# Patient Record
Sex: MALE | Race: WHITE | Employment: OTHER | URBAN - NONMETROPOLITAN AREA
[De-identification: names, ages, dates, MRNs, and addresses within clinical notes are randomized per-mention and may not be internally consistent; named-entity substitution may affect disease eponyms.]

---

## 2017-01-19 ENCOUNTER — NURSE ONLY (OUTPATIENT)
Dept: LAB | Age: 67
End: 2017-01-19

## 2017-01-19 DIAGNOSIS — E53.8 B12 DEFICIENCY: Primary | ICD-10-CM

## 2017-01-19 PROCEDURE — 96372 THER/PROPH/DIAG INJ SC/IM: CPT | Performed by: SURGERY

## 2017-01-19 RX ADMIN — CYANOCOBALAMIN 1000 MCG: 1000 INJECTION, SOLUTION INTRAMUSCULAR; SUBCUTANEOUS at 16:02

## 2017-01-25 ENCOUNTER — OFFICE VISIT (OUTPATIENT)
Dept: UROLOGY | Age: 67
End: 2017-01-25

## 2017-01-25 VITALS
BODY MASS INDEX: 30.72 KG/M2 | WEIGHT: 231.8 LBS | SYSTOLIC BLOOD PRESSURE: 128 MMHG | DIASTOLIC BLOOD PRESSURE: 80 MMHG | HEIGHT: 73 IN | HEART RATE: 78 BPM

## 2017-01-25 DIAGNOSIS — R97.20 ABNORMAL PSA: Primary | ICD-10-CM

## 2017-01-25 DIAGNOSIS — N40.0 BENIGN NON-NODULAR PROSTATIC HYPERPLASIA WITHOUT LOWER URINARY TRACT SYMPTOMS: ICD-10-CM

## 2017-01-25 PROCEDURE — 3017F COLORECTAL CA SCREEN DOC REV: CPT | Performed by: UROLOGY

## 2017-01-25 PROCEDURE — 4004F PT TOBACCO SCREEN RCVD TLK: CPT | Performed by: UROLOGY

## 2017-01-25 PROCEDURE — 1123F ACP DISCUSS/DSCN MKR DOCD: CPT | Performed by: UROLOGY

## 2017-01-25 PROCEDURE — G8419 CALC BMI OUT NRM PARAM NOF/U: HCPCS | Performed by: UROLOGY

## 2017-01-25 PROCEDURE — G8484 FLU IMMUNIZE NO ADMIN: HCPCS | Performed by: UROLOGY

## 2017-01-25 PROCEDURE — 99213 OFFICE O/P EST LOW 20 MIN: CPT | Performed by: UROLOGY

## 2017-01-25 PROCEDURE — 4040F PNEUMOC VAC/ADMIN/RCVD: CPT | Performed by: UROLOGY

## 2017-01-25 PROCEDURE — G8427 DOCREV CUR MEDS BY ELIG CLIN: HCPCS | Performed by: UROLOGY

## 2017-02-17 ENCOUNTER — NURSE ONLY (OUTPATIENT)
Dept: LAB | Age: 67
End: 2017-02-17

## 2017-02-17 DIAGNOSIS — E53.8 B12 DEFICIENCY: Primary | ICD-10-CM

## 2017-02-17 PROCEDURE — 96372 THER/PROPH/DIAG INJ SC/IM: CPT | Performed by: SURGERY

## 2017-02-17 RX ADMIN — CYANOCOBALAMIN 1000 MCG: 1000 INJECTION, SOLUTION INTRAMUSCULAR; SUBCUTANEOUS at 14:56

## 2017-03-07 ENCOUNTER — HOSPITAL ENCOUNTER (OUTPATIENT)
Age: 67
Setting detail: SPECIMEN
Discharge: HOME | End: 2017-03-07
Payer: MEDICARE

## 2017-03-07 DIAGNOSIS — I10 ESSENTIAL HYPERTENSION: ICD-10-CM

## 2017-03-07 LAB
ABSOLUTE RETIC #: 0.05 M/UL (ref 0.02–0.1)
FOLATE: >20 NG/ML
RETIC %: 0.8 % (ref 0.5–2)
VITAMIN B-12: 827 PG/ML (ref 211–946)

## 2017-03-07 RX ORDER — BENAZEPRIL HYDROCHLORIDE 40 MG/1
40 TABLET, FILM COATED ORAL DAILY
Qty: 90 TABLET | Refills: 3 | Status: SHIPPED | OUTPATIENT
Start: 2017-03-07 | End: 2018-03-01 | Stop reason: SDUPTHER

## 2017-03-14 ENCOUNTER — OFFICE VISIT (OUTPATIENT)
Dept: INTERNAL MEDICINE | Age: 67
End: 2017-03-14
Payer: MEDICARE

## 2017-03-14 VITALS
SYSTOLIC BLOOD PRESSURE: 118 MMHG | RESPIRATION RATE: 16 BRPM | DIASTOLIC BLOOD PRESSURE: 78 MMHG | WEIGHT: 221 LBS | BODY MASS INDEX: 29.93 KG/M2 | HEIGHT: 72 IN | HEART RATE: 96 BPM

## 2017-03-14 DIAGNOSIS — D50.9 IRON DEFICIENCY ANEMIA, UNSPECIFIED IRON DEFICIENCY ANEMIA TYPE: ICD-10-CM

## 2017-03-14 DIAGNOSIS — Z23 NEED FOR PNEUMOCOCCAL VACCINATION: ICD-10-CM

## 2017-03-14 DIAGNOSIS — I10 ESSENTIAL HYPERTENSION: ICD-10-CM

## 2017-03-14 DIAGNOSIS — E78.1 PURE HYPERGLYCERIDEMIA: Primary | ICD-10-CM

## 2017-03-14 DIAGNOSIS — K21.9 GASTROESOPHAGEAL REFLUX DISEASE WITHOUT ESOPHAGITIS: ICD-10-CM

## 2017-03-14 DIAGNOSIS — K92.1 GASTROINTESTINAL HEMORRHAGE WITH MELENA: ICD-10-CM

## 2017-03-14 PROCEDURE — 4040F PNEUMOC VAC/ADMIN/RCVD: CPT | Performed by: INTERNAL MEDICINE

## 2017-03-14 PROCEDURE — 90670 PCV13 VACCINE IM: CPT | Performed by: INTERNAL MEDICINE

## 2017-03-14 PROCEDURE — G8427 DOCREV CUR MEDS BY ELIG CLIN: HCPCS | Performed by: INTERNAL MEDICINE

## 2017-03-14 PROCEDURE — 99214 OFFICE O/P EST MOD 30 MIN: CPT | Performed by: INTERNAL MEDICINE

## 2017-03-14 PROCEDURE — G8484 FLU IMMUNIZE NO ADMIN: HCPCS | Performed by: INTERNAL MEDICINE

## 2017-03-14 PROCEDURE — 4004F PT TOBACCO SCREEN RCVD TLK: CPT | Performed by: INTERNAL MEDICINE

## 2017-03-14 PROCEDURE — 3017F COLORECTAL CA SCREEN DOC REV: CPT | Performed by: INTERNAL MEDICINE

## 2017-03-14 PROCEDURE — G8420 CALC BMI NORM PARAMETERS: HCPCS | Performed by: INTERNAL MEDICINE

## 2017-03-14 PROCEDURE — G0009 ADMIN PNEUMOCOCCAL VACCINE: HCPCS | Performed by: INTERNAL MEDICINE

## 2017-03-14 PROCEDURE — 1123F ACP DISCUSS/DSCN MKR DOCD: CPT | Performed by: INTERNAL MEDICINE

## 2017-03-14 RX ORDER — OMEPRAZOLE 20 MG/1
20 CAPSULE, DELAYED RELEASE ORAL DAILY
Qty: 180 CAPSULE | Refills: 3 | Status: SHIPPED | OUTPATIENT
Start: 2017-03-14 | End: 2018-03-20 | Stop reason: SDUPTHER

## 2017-03-14 RX ORDER — HYDROCHLOROTHIAZIDE 12.5 MG/1
12.5 CAPSULE, GELATIN COATED ORAL DAILY
Qty: 90 CAPSULE | Refills: 3 | Status: SHIPPED | OUTPATIENT
Start: 2017-03-14 | End: 2018-03-20 | Stop reason: SDUPTHER

## 2017-03-14 ASSESSMENT — ENCOUNTER SYMPTOMS
DIARRHEA: 0
HEARTBURN: 1
SHORTNESS OF BREATH: 0
COUGH: 0
VOMITING: 0
ABDOMINAL PAIN: 0
BLOOD IN STOOL: 0
CONSTIPATION: 0
EYE PAIN: 0
NAUSEA: 0
BACK PAIN: 0

## 2017-03-14 ASSESSMENT — PATIENT HEALTH QUESTIONNAIRE - PHQ9
2. FEELING DOWN, DEPRESSED OR HOPELESS: 0
SUM OF ALL RESPONSES TO PHQ9 QUESTIONS 1 & 2: 0
1. LITTLE INTEREST OR PLEASURE IN DOING THINGS: 0
SUM OF ALL RESPONSES TO PHQ QUESTIONS 1-9: 0

## 2017-03-20 ENCOUNTER — NURSE ONLY (OUTPATIENT)
Dept: LAB | Age: 67
End: 2017-03-20
Payer: MEDICARE

## 2017-03-20 DIAGNOSIS — E53.8 B12 DEFICIENCY: Primary | ICD-10-CM

## 2017-03-20 PROCEDURE — 96372 THER/PROPH/DIAG INJ SC/IM: CPT | Performed by: SURGERY

## 2017-03-20 RX ADMIN — CYANOCOBALAMIN 1000 MCG: 1000 INJECTION, SOLUTION INTRAMUSCULAR; SUBCUTANEOUS at 08:33

## 2017-03-21 ENCOUNTER — OFFICE VISIT (OUTPATIENT)
Dept: SURGERY | Age: 67
End: 2017-03-21
Payer: MEDICARE

## 2017-03-21 VITALS
BODY MASS INDEX: 30.31 KG/M2 | SYSTOLIC BLOOD PRESSURE: 114 MMHG | RESPIRATION RATE: 16 BRPM | TEMPERATURE: 97.4 F | HEIGHT: 72 IN | WEIGHT: 223.8 LBS | DIASTOLIC BLOOD PRESSURE: 78 MMHG | HEART RATE: 94 BPM

## 2017-03-21 DIAGNOSIS — D50.0 IRON DEFICIENCY ANEMIA SECONDARY TO BLOOD LOSS (CHRONIC): Primary | ICD-10-CM

## 2017-03-21 DIAGNOSIS — K92.2 CHRONIC GI BLEEDING: ICD-10-CM

## 2017-03-21 PROCEDURE — 1123F ACP DISCUSS/DSCN MKR DOCD: CPT | Performed by: SURGERY

## 2017-03-21 PROCEDURE — 99214 OFFICE O/P EST MOD 30 MIN: CPT | Performed by: SURGERY

## 2017-03-21 PROCEDURE — 3017F COLORECTAL CA SCREEN DOC REV: CPT | Performed by: SURGERY

## 2017-03-21 PROCEDURE — 4040F PNEUMOC VAC/ADMIN/RCVD: CPT | Performed by: SURGERY

## 2017-03-21 PROCEDURE — G8419 CALC BMI OUT NRM PARAM NOF/U: HCPCS | Performed by: SURGERY

## 2017-03-21 PROCEDURE — G8484 FLU IMMUNIZE NO ADMIN: HCPCS | Performed by: SURGERY

## 2017-03-21 PROCEDURE — G8427 DOCREV CUR MEDS BY ELIG CLIN: HCPCS | Performed by: SURGERY

## 2017-03-21 PROCEDURE — 4004F PT TOBACCO SCREEN RCVD TLK: CPT | Performed by: SURGERY

## 2017-04-19 ENCOUNTER — NURSE ONLY (OUTPATIENT)
Dept: LAB | Age: 67
End: 2017-04-19
Payer: MEDICARE

## 2017-04-19 DIAGNOSIS — E53.8 B12 DEFICIENCY: Primary | ICD-10-CM

## 2017-04-19 PROCEDURE — 96372 THER/PROPH/DIAG INJ SC/IM: CPT | Performed by: SURGERY

## 2017-04-19 RX ADMIN — CYANOCOBALAMIN 1000 MCG: 1000 INJECTION, SOLUTION INTRAMUSCULAR; SUBCUTANEOUS at 08:32

## 2017-05-19 ENCOUNTER — NURSE ONLY (OUTPATIENT)
Dept: LAB | Age: 67
End: 2017-05-19
Payer: MEDICARE

## 2017-05-19 DIAGNOSIS — E53.8 B12 DEFICIENCY: Primary | ICD-10-CM

## 2017-05-19 PROCEDURE — 96372 THER/PROPH/DIAG INJ SC/IM: CPT | Performed by: SURGERY

## 2017-05-19 RX ADMIN — CYANOCOBALAMIN 1000 MCG: 1000 INJECTION, SOLUTION INTRAMUSCULAR; SUBCUTANEOUS at 08:35

## 2017-06-19 ENCOUNTER — NURSE ONLY (OUTPATIENT)
Dept: LAB | Age: 67
End: 2017-06-19
Payer: MEDICARE

## 2017-06-19 DIAGNOSIS — E53.8 B12 DEFICIENCY: Primary | ICD-10-CM

## 2017-06-19 PROCEDURE — 96372 THER/PROPH/DIAG INJ SC/IM: CPT | Performed by: INTERNAL MEDICINE

## 2017-06-19 RX ADMIN — CYANOCOBALAMIN 1000 MCG: 1000 INJECTION, SOLUTION INTRAMUSCULAR; SUBCUTANEOUS at 15:31

## 2017-06-20 ENCOUNTER — OFFICE VISIT (OUTPATIENT)
Dept: PRIMARY CARE CLINIC | Age: 67
End: 2017-06-20
Payer: MEDICARE

## 2017-06-20 ENCOUNTER — OFFICE VISIT (OUTPATIENT)
Dept: SURGERY | Age: 67
End: 2017-06-20
Payer: MEDICARE

## 2017-06-20 VITALS
SYSTOLIC BLOOD PRESSURE: 120 MMHG | TEMPERATURE: 97.2 F | BODY MASS INDEX: 30.34 KG/M2 | OXYGEN SATURATION: 99 % | HEIGHT: 72 IN | DIASTOLIC BLOOD PRESSURE: 80 MMHG | HEART RATE: 92 BPM | WEIGHT: 224 LBS

## 2017-06-20 VITALS
BODY MASS INDEX: 29.91 KG/M2 | DIASTOLIC BLOOD PRESSURE: 78 MMHG | SYSTOLIC BLOOD PRESSURE: 118 MMHG | HEART RATE: 92 BPM | TEMPERATURE: 97.5 F | HEIGHT: 72 IN | RESPIRATION RATE: 16 BRPM | WEIGHT: 220.8 LBS

## 2017-06-20 DIAGNOSIS — D50.0 IRON DEFICIENCY ANEMIA SECONDARY TO BLOOD LOSS (CHRONIC): Primary | ICD-10-CM

## 2017-06-20 DIAGNOSIS — H61.23 BILATERAL IMPACTED CERUMEN: Primary | ICD-10-CM

## 2017-06-20 DIAGNOSIS — R19.5 OCCULT GI BLEEDING: ICD-10-CM

## 2017-06-20 DIAGNOSIS — H61.892 IRRITATION OF EXTERNAL EAR CANAL, LEFT: ICD-10-CM

## 2017-06-20 PROCEDURE — G8427 DOCREV CUR MEDS BY ELIG CLIN: HCPCS | Performed by: FAMILY MEDICINE

## 2017-06-20 PROCEDURE — 4004F PT TOBACCO SCREEN RCVD TLK: CPT | Performed by: SURGERY

## 2017-06-20 PROCEDURE — 69210 REMOVE IMPACTED EAR WAX UNI: CPT | Performed by: FAMILY MEDICINE

## 2017-06-20 PROCEDURE — 4040F PNEUMOC VAC/ADMIN/RCVD: CPT | Performed by: SURGERY

## 2017-06-20 PROCEDURE — G8419 CALC BMI OUT NRM PARAM NOF/U: HCPCS | Performed by: SURGERY

## 2017-06-20 PROCEDURE — 4040F PNEUMOC VAC/ADMIN/RCVD: CPT | Performed by: FAMILY MEDICINE

## 2017-06-20 PROCEDURE — G8417 CALC BMI ABV UP PARAM F/U: HCPCS | Performed by: FAMILY MEDICINE

## 2017-06-20 PROCEDURE — 4004F PT TOBACCO SCREEN RCVD TLK: CPT | Performed by: FAMILY MEDICINE

## 2017-06-20 PROCEDURE — 1123F ACP DISCUSS/DSCN MKR DOCD: CPT | Performed by: FAMILY MEDICINE

## 2017-06-20 PROCEDURE — 1123F ACP DISCUSS/DSCN MKR DOCD: CPT | Performed by: SURGERY

## 2017-06-20 PROCEDURE — G8427 DOCREV CUR MEDS BY ELIG CLIN: HCPCS | Performed by: SURGERY

## 2017-06-20 PROCEDURE — 3017F COLORECTAL CA SCREEN DOC REV: CPT | Performed by: FAMILY MEDICINE

## 2017-06-20 PROCEDURE — 99214 OFFICE O/P EST MOD 30 MIN: CPT | Performed by: SURGERY

## 2017-06-20 PROCEDURE — 3017F COLORECTAL CA SCREEN DOC REV: CPT | Performed by: SURGERY

## 2017-06-20 RX ORDER — NEOMYCIN SULFATE, POLYMYXIN B SULFATE AND HYDROCORTISONE 10; 3.5; 1 MG/ML; MG/ML; [USP'U]/ML
3 SUSPENSION/ DROPS AURICULAR (OTIC) 3 TIMES DAILY
Qty: 1 BOTTLE | Refills: 0 | Status: SHIPPED | OUTPATIENT
Start: 2017-06-20 | End: 2017-06-25

## 2017-06-20 ASSESSMENT — ENCOUNTER SYMPTOMS: RHINORRHEA: 0

## 2017-07-19 ENCOUNTER — NURSE ONLY (OUTPATIENT)
Dept: LAB | Age: 67
End: 2017-07-19
Payer: MEDICARE

## 2017-07-19 ENCOUNTER — OFFICE VISIT (OUTPATIENT)
Dept: UROLOGY | Age: 67
End: 2017-07-19
Payer: MEDICARE

## 2017-07-19 VITALS
WEIGHT: 222.2 LBS | SYSTOLIC BLOOD PRESSURE: 122 MMHG | DIASTOLIC BLOOD PRESSURE: 84 MMHG | BODY MASS INDEX: 30.14 KG/M2 | HEART RATE: 92 BPM

## 2017-07-19 DIAGNOSIS — R97.20 ABNORMAL PSA: Primary | ICD-10-CM

## 2017-07-19 DIAGNOSIS — E53.8 B12 DEFICIENCY: Primary | ICD-10-CM

## 2017-07-19 DIAGNOSIS — D51.0 VITAMIN B12 DEFICIENCY ANEMIA DUE TO INTRINSIC FACTOR DEFICIENCY: ICD-10-CM

## 2017-07-19 PROCEDURE — 99213 OFFICE O/P EST LOW 20 MIN: CPT | Performed by: UROLOGY

## 2017-07-19 PROCEDURE — 4040F PNEUMOC VAC/ADMIN/RCVD: CPT | Performed by: UROLOGY

## 2017-07-19 PROCEDURE — 4004F PT TOBACCO SCREEN RCVD TLK: CPT | Performed by: UROLOGY

## 2017-07-19 PROCEDURE — G8427 DOCREV CUR MEDS BY ELIG CLIN: HCPCS | Performed by: UROLOGY

## 2017-07-19 PROCEDURE — 1123F ACP DISCUSS/DSCN MKR DOCD: CPT | Performed by: UROLOGY

## 2017-07-19 PROCEDURE — 96372 THER/PROPH/DIAG INJ SC/IM: CPT | Performed by: SURGERY

## 2017-07-19 PROCEDURE — 3017F COLORECTAL CA SCREEN DOC REV: CPT | Performed by: UROLOGY

## 2017-07-19 PROCEDURE — G8417 CALC BMI ABV UP PARAM F/U: HCPCS | Performed by: UROLOGY

## 2017-07-19 RX ADMIN — CYANOCOBALAMIN 1000 MCG: 1000 INJECTION, SOLUTION INTRAMUSCULAR; SUBCUTANEOUS at 08:35

## 2017-08-18 ENCOUNTER — NURSE ONLY (OUTPATIENT)
Dept: LAB | Age: 67
End: 2017-08-18
Payer: MEDICARE

## 2017-08-18 DIAGNOSIS — E53.8 B12 DEFICIENCY: Primary | ICD-10-CM

## 2017-08-18 DIAGNOSIS — D51.0 VITAMIN B12 DEFICIENCY ANEMIA DUE TO INTRINSIC FACTOR DEFICIENCY: ICD-10-CM

## 2017-08-18 PROCEDURE — 96372 THER/PROPH/DIAG INJ SC/IM: CPT | Performed by: SURGERY

## 2017-08-18 RX ADMIN — CYANOCOBALAMIN 1000 MCG: 1000 INJECTION, SOLUTION INTRAMUSCULAR; SUBCUTANEOUS at 08:33

## 2017-09-12 ENCOUNTER — HOSPITAL ENCOUNTER (OUTPATIENT)
Dept: LAB | Age: 67
Discharge: HOME | End: 2017-09-12
Payer: MEDICARE

## 2017-09-12 DIAGNOSIS — K21.9 GASTROESOPHAGEAL REFLUX DISEASE WITHOUT ESOPHAGITIS: ICD-10-CM

## 2017-09-12 DIAGNOSIS — K92.1 GASTROINTESTINAL HEMORRHAGE WITH MELENA: ICD-10-CM

## 2017-09-12 DIAGNOSIS — D50.9 IRON DEFICIENCY ANEMIA, UNSPECIFIED IRON DEFICIENCY ANEMIA TYPE: ICD-10-CM

## 2017-09-12 DIAGNOSIS — I10 ESSENTIAL HYPERTENSION: ICD-10-CM

## 2017-09-12 LAB
HEMOGLOBIN: 15.9 G/DL (ref 13.5–17.5)
IRON SATURATION: 20 % (ref 20–55)
IRON: 64 UG/DL (ref 59–158)
TOTAL IRON BINDING CAPACITY: 322 UG/DL (ref 250–450)
UNSATURATED IRON BINDING CAPACITY: 258 UG/DL (ref 112–347)

## 2017-09-12 PROCEDURE — 85018 HEMOGLOBIN: CPT

## 2017-09-12 PROCEDURE — 83540 ASSAY OF IRON: CPT

## 2017-09-12 PROCEDURE — 36415 COLL VENOUS BLD VENIPUNCTURE: CPT

## 2017-09-12 PROCEDURE — 83550 IRON BINDING TEST: CPT

## 2017-09-18 ENCOUNTER — NURSE ONLY (OUTPATIENT)
Dept: LAB | Age: 67
End: 2017-09-18
Payer: MEDICARE

## 2017-09-18 DIAGNOSIS — E53.8 B12 DEFICIENCY: Primary | ICD-10-CM

## 2017-09-18 PROCEDURE — 96372 THER/PROPH/DIAG INJ SC/IM: CPT | Performed by: INTERNAL MEDICINE

## 2017-09-18 RX ADMIN — CYANOCOBALAMIN 1000 MCG: 1000 INJECTION, SOLUTION INTRAMUSCULAR; SUBCUTANEOUS at 08:35

## 2017-09-19 ENCOUNTER — OFFICE VISIT (OUTPATIENT)
Dept: INTERNAL MEDICINE | Age: 67
End: 2017-09-19
Payer: MEDICARE

## 2017-09-19 VITALS
BODY MASS INDEX: 30.34 KG/M2 | DIASTOLIC BLOOD PRESSURE: 88 MMHG | SYSTOLIC BLOOD PRESSURE: 138 MMHG | WEIGHT: 224 LBS | HEIGHT: 72 IN | RESPIRATION RATE: 20 BRPM | HEART RATE: 96 BPM

## 2017-09-19 DIAGNOSIS — D50.9 IRON DEFICIENCY ANEMIA, UNSPECIFIED IRON DEFICIENCY ANEMIA TYPE: ICD-10-CM

## 2017-09-19 DIAGNOSIS — E53.8 VITAMIN B12 DEFICIENCY: ICD-10-CM

## 2017-09-19 DIAGNOSIS — E78.1 PURE HYPERGLYCERIDEMIA: Primary | ICD-10-CM

## 2017-09-19 DIAGNOSIS — K21.9 GASTROESOPHAGEAL REFLUX DISEASE WITHOUT ESOPHAGITIS: ICD-10-CM

## 2017-09-19 DIAGNOSIS — I10 ESSENTIAL HYPERTENSION: ICD-10-CM

## 2017-09-19 PROCEDURE — 4004F PT TOBACCO SCREEN RCVD TLK: CPT | Performed by: INTERNAL MEDICINE

## 2017-09-19 PROCEDURE — G8417 CALC BMI ABV UP PARAM F/U: HCPCS | Performed by: INTERNAL MEDICINE

## 2017-09-19 PROCEDURE — 1123F ACP DISCUSS/DSCN MKR DOCD: CPT | Performed by: INTERNAL MEDICINE

## 2017-09-19 PROCEDURE — 99214 OFFICE O/P EST MOD 30 MIN: CPT | Performed by: INTERNAL MEDICINE

## 2017-09-19 PROCEDURE — 3017F COLORECTAL CA SCREEN DOC REV: CPT | Performed by: INTERNAL MEDICINE

## 2017-09-19 PROCEDURE — G8427 DOCREV CUR MEDS BY ELIG CLIN: HCPCS | Performed by: INTERNAL MEDICINE

## 2017-09-19 PROCEDURE — 4040F PNEUMOC VAC/ADMIN/RCVD: CPT | Performed by: INTERNAL MEDICINE

## 2017-09-19 ASSESSMENT — ENCOUNTER SYMPTOMS
HEARTBURN: 1
COUGH: 0
VOMITING: 0
BLOOD IN STOOL: 0
EYE PAIN: 0
BACK PAIN: 0
DIARRHEA: 0
ABDOMINAL PAIN: 0
NAUSEA: 0
SHORTNESS OF BREATH: 0
CONSTIPATION: 0

## 2017-10-17 ENCOUNTER — NURSE ONLY (OUTPATIENT)
Dept: LAB | Age: 67
End: 2017-10-17
Payer: MEDICARE

## 2017-10-17 DIAGNOSIS — E53.8 B12 DEFICIENCY: Primary | ICD-10-CM

## 2017-10-17 PROCEDURE — 96372 THER/PROPH/DIAG INJ SC/IM: CPT | Performed by: SURGERY

## 2017-10-17 RX ADMIN — CYANOCOBALAMIN 1000 MCG: 1000 INJECTION, SOLUTION INTRAMUSCULAR; SUBCUTANEOUS at 08:34

## 2017-11-16 ENCOUNTER — NURSE ONLY (OUTPATIENT)
Dept: LAB | Age: 67
End: 2017-11-16
Payer: MEDICARE

## 2017-11-16 DIAGNOSIS — E53.8 B12 DEFICIENCY: Primary | ICD-10-CM

## 2017-11-16 PROCEDURE — 96372 THER/PROPH/DIAG INJ SC/IM: CPT | Performed by: INTERNAL MEDICINE

## 2017-11-16 RX ADMIN — CYANOCOBALAMIN 1000 MCG: 1000 INJECTION, SOLUTION INTRAMUSCULAR; SUBCUTANEOUS at 13:57

## 2017-12-18 ENCOUNTER — NURSE ONLY (OUTPATIENT)
Dept: LAB | Age: 67
End: 2017-12-18
Payer: MEDICARE

## 2017-12-18 DIAGNOSIS — E53.8 B12 DEFICIENCY: Primary | ICD-10-CM

## 2017-12-18 PROCEDURE — 96372 THER/PROPH/DIAG INJ SC/IM: CPT | Performed by: SURGERY

## 2017-12-18 RX ADMIN — CYANOCOBALAMIN 1000 MCG: 1000 INJECTION, SOLUTION INTRAMUSCULAR; SUBCUTANEOUS at 08:40

## 2017-12-19 PROCEDURE — 82274 ASSAY TEST FOR BLOOD FECAL: CPT

## 2017-12-20 ENCOUNTER — HOSPITAL ENCOUNTER (OUTPATIENT)
Dept: LAB | Age: 67
Setting detail: SPECIMEN
Discharge: HOME | End: 2017-12-20
Payer: MEDICARE

## 2017-12-20 DIAGNOSIS — D50.0 IRON DEFICIENCY ANEMIA DUE TO CHRONIC BLOOD LOSS: ICD-10-CM

## 2017-12-20 DIAGNOSIS — R19.5 OCCULT GI BLEEDING: ICD-10-CM

## 2017-12-20 DIAGNOSIS — R97.20 ABNORMAL PSA: ICD-10-CM

## 2017-12-20 LAB
ABSOLUTE EOS #: 0.1 K/UL (ref 0–0.4)
ABSOLUTE IMMATURE GRANULOCYTE: ABNORMAL K/UL (ref 0–0.3)
ABSOLUTE LYMPH #: 1.3 K/UL (ref 1–4.8)
ABSOLUTE MONO #: 0.5 K/UL (ref 0.1–1.2)
BASOPHILS # BLD: 1 % (ref 0–2)
BASOPHILS ABSOLUTE: 0 K/UL (ref 0–0.2)
DATE, STOOL #1: NORMAL
DATE, STOOL #2: NORMAL
DATE, STOOL #3: NORMAL
DIFFERENTIAL TYPE: ABNORMAL
EOSINOPHILS RELATIVE PERCENT: 1 % (ref 1–8)
HCT VFR BLD CALC: 48.4 % (ref 41–53)
HEMOCCULT SP1 STL QL: NEGATIVE
HEMOCCULT SP2 STL QL: NORMAL
HEMOCCULT SP3 STL QL: NORMAL
HEMOGLOBIN: 16.6 G/DL (ref 13.5–17.5)
IMMATURE GRANULOCYTES: ABNORMAL %
IRON: 61 UG/DL (ref 59–158)
LYMPHOCYTES # BLD: 17 % (ref 15–43)
MCH RBC QN AUTO: 29.2 PG (ref 26–34)
MCHC RBC AUTO-ENTMCNC: 34.2 G/DL (ref 31–37)
MCV RBC AUTO: 85.3 FL (ref 80–100)
MONOCYTES # BLD: 6 % (ref 6–14)
PDW BLD-RTO: 14.9 % (ref 11–14.5)
PLATELET # BLD: 178 K/UL (ref 140–450)
PLATELET ESTIMATE: ABNORMAL
PMV BLD AUTO: 9 FL (ref 6–12)
RBC # BLD: 5.68 M/UL (ref 4.5–5.9)
RBC # BLD: ABNORMAL 10*6/UL
SEG NEUTROPHILS: 75 % (ref 44–74)
SEGMENTED NEUTROPHILS ABSOLUTE COUNT: 5.6 K/UL (ref 1.8–7.7)
TIME, STOOL #1: 800
TIME, STOOL #2: NORMAL
TIME, STOOL #3: NORMAL
WBC # BLD: 7.6 K/UL (ref 3.5–11)
WBC # BLD: ABNORMAL 10*3/UL

## 2017-12-20 PROCEDURE — 83540 ASSAY OF IRON: CPT

## 2017-12-20 PROCEDURE — 36415 COLL VENOUS BLD VENIPUNCTURE: CPT

## 2017-12-20 PROCEDURE — 85025 COMPLETE CBC W/AUTO DIFF WBC: CPT

## 2017-12-20 PROCEDURE — 84154 ASSAY OF PSA FREE: CPT

## 2017-12-21 LAB
PROSTATE SPECIFIC ANTIGEN FREE: 1.5 UG/L
PROSTATE SPECIFIC ANTIGEN PERCENT FREE: 25.4 %
PROSTATE SPECIFIC ANTIGEN: 5.9 UG/L (ref 0–4)

## 2017-12-29 ENCOUNTER — OFFICE VISIT (OUTPATIENT)
Dept: SURGERY | Age: 67
End: 2017-12-29
Payer: MEDICARE

## 2017-12-29 VITALS
WEIGHT: 223 LBS | SYSTOLIC BLOOD PRESSURE: 124 MMHG | RESPIRATION RATE: 16 BRPM | BODY MASS INDEX: 30.2 KG/M2 | DIASTOLIC BLOOD PRESSURE: 88 MMHG | TEMPERATURE: 97.3 F | HEIGHT: 72 IN | HEART RATE: 92 BPM

## 2017-12-29 DIAGNOSIS — D50.0 IRON DEFICIENCY ANEMIA DUE TO CHRONIC BLOOD LOSS: Primary | ICD-10-CM

## 2017-12-29 PROCEDURE — 4004F PT TOBACCO SCREEN RCVD TLK: CPT | Performed by: SURGERY

## 2017-12-29 PROCEDURE — 1123F ACP DISCUSS/DSCN MKR DOCD: CPT | Performed by: SURGERY

## 2017-12-29 PROCEDURE — 3017F COLORECTAL CA SCREEN DOC REV: CPT | Performed by: SURGERY

## 2017-12-29 PROCEDURE — G8484 FLU IMMUNIZE NO ADMIN: HCPCS | Performed by: SURGERY

## 2017-12-29 PROCEDURE — 99214 OFFICE O/P EST MOD 30 MIN: CPT | Performed by: SURGERY

## 2017-12-29 PROCEDURE — 4040F PNEUMOC VAC/ADMIN/RCVD: CPT | Performed by: SURGERY

## 2017-12-29 PROCEDURE — G8417 CALC BMI ABV UP PARAM F/U: HCPCS | Performed by: SURGERY

## 2017-12-29 PROCEDURE — G8427 DOCREV CUR MEDS BY ELIG CLIN: HCPCS | Performed by: SURGERY

## 2017-12-29 NOTE — PROGRESS NOTES
This patient is seen on follow-up for iron deficiency anemia secondary to chronic GI bleeding most likely aggravated by taking vitamin C niacin and fish oil. He stopped taking niacin fish oil and vitamin C and he was placed on daily iron supplement. He is asymptomatic at the present time. His most recent hemoglobin is 16 and his most recent iron blood level is 61 both are within normal limits although the iron level is in the lower normal limit. He is clinically asymptomatic at the present time has no complaints and the has normal vital signs. He is advised to continue his iron supplement for 6 more months. He is to repeat his CBC and differential and iron level after 6 months  He is to return after 6 months.

## 2018-01-17 ENCOUNTER — OFFICE VISIT (OUTPATIENT)
Dept: UROLOGY | Age: 68
End: 2018-01-17
Payer: MEDICARE

## 2018-01-17 ENCOUNTER — NURSE ONLY (OUTPATIENT)
Dept: LAB | Age: 68
End: 2018-01-17
Payer: MEDICARE

## 2018-01-17 VITALS
HEIGHT: 73 IN | WEIGHT: 222.2 LBS | BODY MASS INDEX: 29.45 KG/M2 | HEART RATE: 84 BPM | DIASTOLIC BLOOD PRESSURE: 84 MMHG | SYSTOLIC BLOOD PRESSURE: 138 MMHG

## 2018-01-17 DIAGNOSIS — R35.1 NOCTURIA: ICD-10-CM

## 2018-01-17 DIAGNOSIS — E53.8 B12 DEFICIENCY: Primary | ICD-10-CM

## 2018-01-17 DIAGNOSIS — N40.0 BENIGN NON-NODULAR PROSTATIC HYPERPLASIA WITHOUT LOWER URINARY TRACT SYMPTOMS: ICD-10-CM

## 2018-01-17 DIAGNOSIS — N40.0 ENLARGED PROSTATE: ICD-10-CM

## 2018-01-17 DIAGNOSIS — R97.20 ABNORMAL PSA: Primary | ICD-10-CM

## 2018-01-17 PROCEDURE — G8417 CALC BMI ABV UP PARAM F/U: HCPCS | Performed by: UROLOGY

## 2018-01-17 PROCEDURE — G8427 DOCREV CUR MEDS BY ELIG CLIN: HCPCS | Performed by: UROLOGY

## 2018-01-17 PROCEDURE — G8484 FLU IMMUNIZE NO ADMIN: HCPCS | Performed by: UROLOGY

## 2018-01-17 PROCEDURE — 4040F PNEUMOC VAC/ADMIN/RCVD: CPT | Performed by: UROLOGY

## 2018-01-17 PROCEDURE — 96372 THER/PROPH/DIAG INJ SC/IM: CPT | Performed by: SURGERY

## 2018-01-17 PROCEDURE — 4004F PT TOBACCO SCREEN RCVD TLK: CPT | Performed by: UROLOGY

## 2018-01-17 PROCEDURE — 99213 OFFICE O/P EST LOW 20 MIN: CPT | Performed by: UROLOGY

## 2018-01-17 PROCEDURE — 3017F COLORECTAL CA SCREEN DOC REV: CPT | Performed by: UROLOGY

## 2018-01-17 PROCEDURE — 1123F ACP DISCUSS/DSCN MKR DOCD: CPT | Performed by: UROLOGY

## 2018-01-17 RX ADMIN — CYANOCOBALAMIN 1000 MCG: 1000 INJECTION, SOLUTION INTRAMUSCULAR; SUBCUTANEOUS at 12:17

## 2018-01-17 NOTE — PROGRESS NOTES
Surendra Mejia MD   Urology Clinic Progress Note      Patient:  Shania Winchester  YOB: 1950  Date: 1/17/2018    HISTORY OF PRESENT ILLNESS:   The patient is a 79 y.o. male who presents today for follow-up for the following problem(s): elevated PSA  Overall the problem(s) : are improving. Associated Symptoms: No dysuria, gross hematuria. Pain Severity: Pain Score:   0 - No pain    Summary of old records: 7.92 on 7-18-17/last psa 5.68 on 1-17-17/prostate biopsy 2012 and 2014/ both benign. Prostate gland measures 80.7 mL in volume     Additional History:  Strong stream, empties bladder  Nocturia 1x  On diuretic    Last several PSA's:  Lab Results   Component Value Date    PSA 5.9 (H) 12/20/2017    PSA 7.92 (H) 07/18/2017    PSA 5.68 (H) 01/17/2017       Last total testosterone:  No results found for: TESTOSTERONE    Urinalysis today:  No results found for this visit on 01/17/18. Last BUN and creatinine:  Lab Results   Component Value Date    BUN 22 03/07/2017     Lab Results   Component Value Date    CREATININE 1.16 03/07/2017       Imaging Reviewed during this Office Visit:   (results were independently reviewed by physician and radiology report verified)    PAST MEDICAL, FAMILY AND SOCIAL HISTORY UPDATE:  Past Medical History:   Diagnosis Date    BPH with elevated PSA     post biopsy    Elevated Gina-Barr virus antibody titer     Elevated fasting glucose     History of chronic fatigue syndrome     History of GI bleed     Hyperlipidemia     (triglycerides)    Hypertension     Internal hemorrhoids      Past Surgical History:   Procedure Laterality Date    COLONOSCOPY  2000    internal hemorrhoids    COLONOSCOPY  02/02/2015    polypx1, repeat 5yrs due to family hx & hx polyps- brannon    COLONOSCOPY  12/09/2015    polyp X2  int. Hemorrhoids  partial prolapse of ileocecal valve    COLONOSCOPY  11/2016    FOOT SURGERY Bilateral 1958    to correct flat arches (age 9)   [address] BIOPSY Bilateral 12/04/2012    benign    PROSTATE BIOPSY Bilateral 07/2014    benign    UPPER GASTROINTESTINAL ENDOSCOPY  12/8/15    Normal upper GI tract, no evidence of blood in upper GI tract, mild distal esophagitis    UPPER GASTROINTESTINAL ENDOSCOPY  11/2016     Family History   Problem Relation Age of Onset    Coronary Art Dis Other     Hypertension Other     Stroke Other     Diabetes Other     Cancer Other      Outpatient Prescriptions Marked as Taking for the 1/17/18 encounter (Office Visit) with Kel Hill MD   Medication Sig Dispense Refill    omeprazole (PRILOSEC) 20 MG delayed release capsule Take 1 capsule by mouth daily 180 capsule 3    hydrochlorothiazide (MICROZIDE) 12.5 MG capsule Take 1 capsule by mouth daily 90 capsule 3    benazepril (LOTENSIN) 40 MG tablet Take 1 tablet by mouth daily 90 tablet 3    ferrous sulfate 325 (65 FE) MG tablet Take 325 mg by mouth 2 times daily       NONFORMULARY Take  by mouth daily. Centrum Men's multivitamin. Review of patient's allergies indicates no known allergies. History   Smoking Status    Light Tobacco Smoker    Types: Pipe   Smokeless Tobacco    Never Used     Comment: Rare pipe smoker. No inhalation. Has never smoked cigarettes. History   Alcohol Use    0.0 oz/week     Comment: Rare. REVIEW OF SYSTEMS:  Constitutional: negative  Eyes: negative  Respiratory: negative  Cardiovascular: negative  Gastrointestinal: negative  Musculoskeletal: negative  Genitourinary: negative except for what is in HPI  Skin: negative   Neurological: negative  Hematological/Lymphatic: negative  Psychological: negative    Physical Exam:      Vitals:    01/17/18 1315   BP: 138/84   Pulse: 84     Patient is a 79 y.o. male in no acute distress and alert and oriented to person, place and time. NAD, A/o  Non labored respiration  Normal peripheral pulses  Skin- warm and dry  Psych- normal mood and affect      Assessment and Plan      1.

## 2018-01-24 ENCOUNTER — HOSPITAL ENCOUNTER (OUTPATIENT)
Dept: MRI IMAGING | Age: 68
Discharge: HOME | End: 2018-01-24
Payer: MEDICARE

## 2018-01-24 DIAGNOSIS — R97.20 ABNORMAL PSA: ICD-10-CM

## 2018-01-24 LAB
BUN BLDV-MCNC: 23 MG/DL (ref 8–23)
CREAT SERPL-MCNC: 0.99 MG/DL (ref 0.7–1.2)
GFR AFRICAN AMERICAN: >60 ML/MIN
GFR NON-AFRICAN AMERICAN: >60 ML/MIN
GFR SERPL CREATININE-BSD FRML MDRD: NORMAL ML/MIN/{1.73_M2}
GFR SERPL CREATININE-BSD FRML MDRD: NORMAL ML/MIN/{1.73_M2}

## 2018-01-24 PROCEDURE — A9579 GAD-BASE MR CONTRAST NOS,1ML: HCPCS | Performed by: UROLOGY

## 2018-01-24 PROCEDURE — 2580000003 HC RX 258: Performed by: UROLOGY

## 2018-01-24 PROCEDURE — 72197 MRI PELVIS W/O & W/DYE: CPT

## 2018-01-24 PROCEDURE — 36415 COLL VENOUS BLD VENIPUNCTURE: CPT

## 2018-01-24 PROCEDURE — 6360000004 HC RX CONTRAST MEDICATION: Performed by: UROLOGY

## 2018-01-24 PROCEDURE — 82565 ASSAY OF CREATININE: CPT

## 2018-01-24 PROCEDURE — 84520 ASSAY OF UREA NITROGEN: CPT

## 2018-01-24 RX ORDER — 0.9 % SODIUM CHLORIDE 0.9 %
50 INTRAVENOUS SOLUTION INTRAVENOUS ONCE
Status: COMPLETED | OUTPATIENT
Start: 2018-01-24 | End: 2018-01-24

## 2018-01-24 RX ADMIN — GADOTERIDOL 20 ML: 279.3 INJECTION, SOLUTION INTRAVENOUS at 17:50

## 2018-01-24 RX ADMIN — SODIUM CHLORIDE 50 ML: 9 INJECTION, SOLUTION INTRAVENOUS at 17:50

## 2018-02-07 ENCOUNTER — OFFICE VISIT (OUTPATIENT)
Dept: UROLOGY | Age: 68
End: 2018-02-07
Payer: MEDICARE

## 2018-02-07 VITALS
HEIGHT: 73 IN | DIASTOLIC BLOOD PRESSURE: 90 MMHG | BODY MASS INDEX: 29.55 KG/M2 | WEIGHT: 223 LBS | SYSTOLIC BLOOD PRESSURE: 130 MMHG | HEART RATE: 92 BPM

## 2018-02-07 DIAGNOSIS — R35.1 NOCTURIA: ICD-10-CM

## 2018-02-07 DIAGNOSIS — R97.20 ABNORMAL PSA: Primary | ICD-10-CM

## 2018-02-07 DIAGNOSIS — N40.0 ENLARGED PROSTATE: ICD-10-CM

## 2018-02-07 DIAGNOSIS — N40.0 BENIGN NON-NODULAR PROSTATIC HYPERPLASIA WITHOUT LOWER URINARY TRACT SYMPTOMS: ICD-10-CM

## 2018-02-07 PROCEDURE — G8417 CALC BMI ABV UP PARAM F/U: HCPCS | Performed by: UROLOGY

## 2018-02-07 PROCEDURE — 4040F PNEUMOC VAC/ADMIN/RCVD: CPT | Performed by: UROLOGY

## 2018-02-07 PROCEDURE — G8427 DOCREV CUR MEDS BY ELIG CLIN: HCPCS | Performed by: UROLOGY

## 2018-02-07 PROCEDURE — 3017F COLORECTAL CA SCREEN DOC REV: CPT | Performed by: UROLOGY

## 2018-02-07 PROCEDURE — G8484 FLU IMMUNIZE NO ADMIN: HCPCS | Performed by: UROLOGY

## 2018-02-07 PROCEDURE — 1123F ACP DISCUSS/DSCN MKR DOCD: CPT | Performed by: UROLOGY

## 2018-02-07 PROCEDURE — 99213 OFFICE O/P EST LOW 20 MIN: CPT | Performed by: UROLOGY

## 2018-02-07 PROCEDURE — 4004F PT TOBACCO SCREEN RCVD TLK: CPT | Performed by: UROLOGY

## 2018-02-07 NOTE — PROGRESS NOTES
Bilateral 12/04/2012    benign    PROSTATE BIOPSY Bilateral 07/2014    benign    UPPER GASTROINTESTINAL ENDOSCOPY  12/8/15    Normal upper GI tract, no evidence of blood in upper GI tract, mild distal esophagitis    UPPER GASTROINTESTINAL ENDOSCOPY  11/2016     Family History   Problem Relation Age of Onset    Coronary Art Dis Other     Hypertension Other     Stroke Other     Diabetes Other     Cancer Other      Outpatient Prescriptions Marked as Taking for the 2/7/18 encounter (Office Visit) with Lane Fernandez MD   Medication Sig Dispense Refill    omeprazole (PRILOSEC) 20 MG delayed release capsule Take 1 capsule by mouth daily 180 capsule 3    hydrochlorothiazide (MICROZIDE) 12.5 MG capsule Take 1 capsule by mouth daily 90 capsule 3    benazepril (LOTENSIN) 40 MG tablet Take 1 tablet by mouth daily 90 tablet 3    ferrous sulfate 325 (65 FE) MG tablet Take 325 mg by mouth 2 times daily       NONFORMULARY Take  by mouth daily. Centrum Men's multivitamin. Review of patient's allergies indicates no known allergies. History   Smoking Status    Light Tobacco Smoker    Types: Pipe   Smokeless Tobacco    Never Used     Comment: Rare pipe smoker. No inhalation. Has never smoked cigarettes. History   Alcohol Use    0.0 oz/week     Comment: Rare. REVIEW OF SYSTEMS:  Constitutional: negative  Eyes: negative  Respiratory: negative  Cardiovascular: negative  Gastrointestinal: negative  Musculoskeletal: negative  Genitourinary: negative except for what is in HPI  Skin: negative   Neurological: negative  Hematological/Lymphatic: negative  Psychological: negative    Physical Exam:      Vitals:    02/07/18 1423   BP: (!) 130/90   Pulse: 92     Patient is a 79 y.o. male in no acute distress and alert and oriented to person, place and time. NAD, A/o  Non labored respiration  Normal peripheral pulses  Skin- warm and dry  Psych- normal mood and affect      Assessment and Plan      1.  Abnormal

## 2018-02-16 ENCOUNTER — NURSE ONLY (OUTPATIENT)
Dept: LAB | Age: 68
End: 2018-02-16
Payer: MEDICARE

## 2018-02-16 DIAGNOSIS — E53.8 B12 DEFICIENCY: Primary | ICD-10-CM

## 2018-02-16 PROCEDURE — 96372 THER/PROPH/DIAG INJ SC/IM: CPT | Performed by: INTERNAL MEDICINE

## 2018-02-16 RX ADMIN — CYANOCOBALAMIN 1000 MCG: 1000 INJECTION, SOLUTION INTRAMUSCULAR; SUBCUTANEOUS at 14:30

## 2018-03-01 DIAGNOSIS — I10 ESSENTIAL HYPERTENSION: ICD-10-CM

## 2018-03-01 RX ORDER — BENAZEPRIL HYDROCHLORIDE 40 MG/1
40 TABLET, FILM COATED ORAL DAILY
Qty: 90 TABLET | Refills: 3 | Status: SHIPPED | OUTPATIENT
Start: 2018-03-01 | End: 2019-02-21 | Stop reason: SDUPTHER

## 2018-03-13 ENCOUNTER — HOSPITAL ENCOUNTER (OUTPATIENT)
Dept: LAB | Age: 68
Setting detail: SPECIMEN
Discharge: HOME | End: 2018-03-13
Payer: MEDICARE

## 2018-03-13 DIAGNOSIS — E78.1 PURE HYPERGLYCERIDEMIA: ICD-10-CM

## 2018-03-13 DIAGNOSIS — I10 ESSENTIAL HYPERTENSION: ICD-10-CM

## 2018-03-13 DIAGNOSIS — E53.8 VITAMIN B12 DEFICIENCY: ICD-10-CM

## 2018-03-13 DIAGNOSIS — D50.9 IRON DEFICIENCY ANEMIA, UNSPECIFIED IRON DEFICIENCY ANEMIA TYPE: ICD-10-CM

## 2018-03-13 LAB
ALBUMIN SERPL-MCNC: 4.4 G/DL (ref 3.5–5.2)
ALBUMIN/GLOBULIN RATIO: 1.4 (ref 1–2.5)
ALP BLD-CCNC: 75 U/L (ref 40–129)
ALT SERPL-CCNC: 47 U/L (ref 5–41)
ANION GAP SERPL CALCULATED.3IONS-SCNC: 14 MMOL/L (ref 9–17)
AST SERPL-CCNC: 32 U/L
BILIRUB SERPL-MCNC: 1.21 MG/DL (ref 0.3–1.2)
BUN BLDV-MCNC: 21 MG/DL (ref 8–23)
BUN/CREAT BLD: 20 (ref 9–20)
CALCIUM SERPL-MCNC: 9.5 MG/DL (ref 8.6–10.4)
CHLORIDE BLD-SCNC: 98 MMOL/L (ref 98–107)
CHOLESTEROL/HDL RATIO: 8.5
CHOLESTEROL: 222 MG/DL
CO2: 26 MMOL/L (ref 20–31)
CREAT SERPL-MCNC: 1.06 MG/DL (ref 0.7–1.2)
GFR AFRICAN AMERICAN: >60 ML/MIN
GFR NON-AFRICAN AMERICAN: >60 ML/MIN
GFR SERPL CREATININE-BSD FRML MDRD: ABNORMAL ML/MIN/{1.73_M2}
GFR SERPL CREATININE-BSD FRML MDRD: ABNORMAL ML/MIN/{1.73_M2}
GLUCOSE BLD-MCNC: 106 MG/DL (ref 70–99)
HDLC SERPL-MCNC: 26 MG/DL
HEMOGLOBIN: 17.1 G/DL (ref 13.5–17.5)
IRON SATURATION: 30 % (ref 20–55)
IRON: 107 UG/DL (ref 59–158)
LDL CHOLESTEROL: 136 MG/DL (ref 0–130)
POTASSIUM SERPL-SCNC: 4 MMOL/L (ref 3.7–5.3)
SODIUM BLD-SCNC: 138 MMOL/L (ref 135–144)
TOTAL IRON BINDING CAPACITY: 355 UG/DL (ref 250–450)
TOTAL PROTEIN: 7.6 G/DL (ref 6.4–8.3)
TRIGL SERPL-MCNC: 300 MG/DL
UNSATURATED IRON BINDING CAPACITY: 248 UG/DL (ref 112–347)
VITAMIN B-12: 699 PG/ML (ref 232–1245)
VLDLC SERPL CALC-MCNC: ABNORMAL MG/DL (ref 1–30)

## 2018-03-13 PROCEDURE — 82607 VITAMIN B-12: CPT

## 2018-03-13 PROCEDURE — 85018 HEMOGLOBIN: CPT

## 2018-03-13 PROCEDURE — 36415 COLL VENOUS BLD VENIPUNCTURE: CPT

## 2018-03-13 PROCEDURE — 83540 ASSAY OF IRON: CPT

## 2018-03-13 PROCEDURE — 83550 IRON BINDING TEST: CPT

## 2018-03-13 PROCEDURE — 80061 LIPID PANEL: CPT

## 2018-03-13 PROCEDURE — 80053 COMPREHEN METABOLIC PANEL: CPT

## 2018-03-19 ENCOUNTER — NURSE ONLY (OUTPATIENT)
Dept: LAB | Age: 68
End: 2018-03-19
Payer: MEDICARE

## 2018-03-19 DIAGNOSIS — E53.8 VITAMIN B12 DEFICIENCY: ICD-10-CM

## 2018-03-19 DIAGNOSIS — E53.8 B12 DEFICIENCY: Primary | ICD-10-CM

## 2018-03-19 PROCEDURE — 96372 THER/PROPH/DIAG INJ SC/IM: CPT | Performed by: SURGERY

## 2018-03-19 RX ADMIN — CYANOCOBALAMIN 1000 MCG: 1000 INJECTION, SOLUTION INTRAMUSCULAR; SUBCUTANEOUS at 08:39

## 2018-03-20 ENCOUNTER — OFFICE VISIT (OUTPATIENT)
Dept: INTERNAL MEDICINE | Age: 68
End: 2018-03-20
Payer: MEDICARE

## 2018-03-20 VITALS
SYSTOLIC BLOOD PRESSURE: 124 MMHG | DIASTOLIC BLOOD PRESSURE: 86 MMHG | RESPIRATION RATE: 16 BRPM | HEIGHT: 72 IN | WEIGHT: 222 LBS | HEART RATE: 88 BPM | BODY MASS INDEX: 30.07 KG/M2

## 2018-03-20 DIAGNOSIS — K21.9 GASTROESOPHAGEAL REFLUX DISEASE WITHOUT ESOPHAGITIS: ICD-10-CM

## 2018-03-20 DIAGNOSIS — I10 ESSENTIAL HYPERTENSION: Primary | ICD-10-CM

## 2018-03-20 DIAGNOSIS — K92.1 GASTROINTESTINAL HEMORRHAGE WITH MELENA: ICD-10-CM

## 2018-03-20 DIAGNOSIS — D50.9 IRON DEFICIENCY ANEMIA, UNSPECIFIED IRON DEFICIENCY ANEMIA TYPE: ICD-10-CM

## 2018-03-20 DIAGNOSIS — E78.1 PURE HYPERGLYCERIDEMIA: ICD-10-CM

## 2018-03-20 PROCEDURE — 99214 OFFICE O/P EST MOD 30 MIN: CPT | Performed by: INTERNAL MEDICINE

## 2018-03-20 PROCEDURE — 3017F COLORECTAL CA SCREEN DOC REV: CPT | Performed by: INTERNAL MEDICINE

## 2018-03-20 PROCEDURE — 4004F PT TOBACCO SCREEN RCVD TLK: CPT | Performed by: INTERNAL MEDICINE

## 2018-03-20 PROCEDURE — G8484 FLU IMMUNIZE NO ADMIN: HCPCS | Performed by: INTERNAL MEDICINE

## 2018-03-20 PROCEDURE — G8427 DOCREV CUR MEDS BY ELIG CLIN: HCPCS | Performed by: INTERNAL MEDICINE

## 2018-03-20 PROCEDURE — 1123F ACP DISCUSS/DSCN MKR DOCD: CPT | Performed by: INTERNAL MEDICINE

## 2018-03-20 PROCEDURE — 4040F PNEUMOC VAC/ADMIN/RCVD: CPT | Performed by: INTERNAL MEDICINE

## 2018-03-20 PROCEDURE — G8417 CALC BMI ABV UP PARAM F/U: HCPCS | Performed by: INTERNAL MEDICINE

## 2018-03-20 RX ORDER — HYDROCHLOROTHIAZIDE 12.5 MG/1
12.5 CAPSULE, GELATIN COATED ORAL DAILY
Qty: 90 CAPSULE | Refills: 3 | Status: SHIPPED | OUTPATIENT
Start: 2018-03-20 | End: 2019-03-19 | Stop reason: SDUPTHER

## 2018-03-20 RX ORDER — OMEPRAZOLE 20 MG/1
20 CAPSULE, DELAYED RELEASE ORAL DAILY
Qty: 180 CAPSULE | Refills: 3 | Status: SHIPPED | OUTPATIENT
Start: 2018-03-20 | End: 2019-03-19 | Stop reason: SDUPTHER

## 2018-03-20 ASSESSMENT — ENCOUNTER SYMPTOMS
HEARTBURN: 1
COUGH: 0
BLOOD IN STOOL: 0
SHORTNESS OF BREATH: 0
BACK PAIN: 0
VOMITING: 0
EYE PAIN: 0
CONSTIPATION: 0
ABDOMINAL PAIN: 0
NAUSEA: 0
DIARRHEA: 0

## 2018-03-20 ASSESSMENT — PATIENT HEALTH QUESTIONNAIRE - PHQ9
SUM OF ALL RESPONSES TO PHQ QUESTIONS 1-9: 0
2. FEELING DOWN, DEPRESSED OR HOPELESS: 0
SUM OF ALL RESPONSES TO PHQ9 QUESTIONS 1 & 2: 0
1. LITTLE INTEREST OR PLEASURE IN DOING THINGS: 0

## 2018-03-20 NOTE — PROGRESS NOTES
Chronic Disease Visit Information    BP Readings from Last 3 Encounters:   03/20/18 (!) 138/92   02/07/18 (!) 130/90   01/17/18 138/84          LDL Cholesterol (mg/dL)   Date Value   03/13/2018 136 (H)     HDL (mg/dL)   Date Value   03/13/2018 26 (L)     BUN (mg/dL)   Date Value   03/13/2018 21     CREATININE (mg/dL)   Date Value   03/13/2018 1.06     Glucose (mg/dL)   Date Value   03/13/2018 106 (H)            Have you changed or started any medications since your last visit including any over-the-counter medicines, vitamins, or herbal medicines? no   Are you having any side effects from any of your medications? -  no  Have you stopped taking any of your medications? Is so, why? -  no    Have you seen any other physician or provider since your last visit? Yes - Records Obtained  Have you had any other diagnostic tests since your last visit? Yes - Records Obtained  Have you been seen in the emergency room and/or had an admission to a hospital since we last saw you? No  Have you had your annual diabetic retinal (eye) exam? NA  Have you had your routine dental cleaning in the past 6 months? yes - every 6 months    Have you activated your [employer] account? If not, what are your barriers?  No: declines     Patient Care Team:  Amanda Yi MD as PCP - General (Internal Medicine)  Amanda Yi MD as PCP - S Attributed Provider         Medical History Review  Past Medical, Family, and Social History reviewed and does contribute to the patient presenting condition    Health Maintenance   Topic Date Due    AAA screen  1950    Diabetes screen  09/04/1990    Shingles Vaccine (1 of 2 - 2 Dose Series) 09/04/2000    Hepatitis C screen  09/19/2018 (Originally 1950)    DTaP/Tdap/Td vaccine (1 - Tdap) 03/20/2019 (Originally 9/29/2007)    Flu vaccine (1) 03/20/2019 (Originally 9/1/2017)    Pneumococcal low/med risk (2 of 2 - PPSV23) 03/20/2020 (Originally 3/14/2018)    Colon Cancer Screen FIT/FOBT

## 2018-03-20 NOTE — PATIENT INSTRUCTIONS
calluses or corns, you can lose your balance and fall. · Talk to your doctor if you have numbness in your feet. Preventing falls at home  · Remove raised doorway thresholds, throw rugs, and clutter. Repair loose carpet or raised areas in the floor. · Move furniture and electrical cords to keep them out of walking paths. · Use nonskid floor wax, and wipe up spills right away, especially on ceramic tile floors. · If you use a walker or cane, put rubber tips on it. If you use crutches, clean the bottoms of them regularly with an abrasive pad, such as steel wool. · Keep your house well lit, especially [place], and outside walkways. Use night-lights in areas such as hallways and bathrooms. Add extra light switches or use remote switches (such as switches that go on or off when you clap your hands) to make it easier to turn lights on if you have to get up during the night. · Install sturdy handrails on stairways. · Move items in your cabinets so that the things you use a lot are on the lower shelves (about waist level). · Keep a cordless phone and a flashlight with new batteries by your bed. If possible, put a phone in each of the main rooms of your house, or carry a cell phone in case you fall and cannot reach a phone. Or, you can wear a device around your neck or wrist. You push a button that sends a signal for help. · Wear low-heeled shoes that fit well and give your feet good support. Use footwear with nonskid soles. Check the heels and soles of your shoes for wear. Repair or replace worn heels or soles. · Do not wear socks without shoes on wood floors. · Walk on the grass when the sidewalks are slippery. If you live in an area that gets snow and ice in the winter, sprinkle salt on slippery steps and sidewalks. Preventing falls in the bath  · Install grab bars and nonskid mats inside and outside your shower or tub and near the toilet and sinks. · Use shower chairs and bath benches.   · Use a the medicines you take. How can you care for yourself at home? Look at what you eat  · Keep a food diary for a week or two and record everything you eat or drink. Track the number of servings you eat from each food group. · For a balanced diet every day, eat a variety of:  ¨ 6 or more ounce-equivalents of grains, such as cereals, breads, crackers, rice, or pasta, every day. An ounce-equivalent is 1 slice of bread, 1 cup of ready-to-eat cereal, or ½ cup of cooked rice, cooked pasta, or cooked cereal.  ¨ 2½ cups of vegetables, especially:  § Dark-green vegetables such as broccoli and spinach. § Orange vegetables such as carrots and sweet potatoes. § Dry beans (such as blake and kidney beans) and peas (such as lentils). ¨ 2 cups of fresh, frozen, or canned fruit. A small apple or 1 banana or orange equals 1 cup. ¨ 3 cups of nonfat or low-fat milk, yogurt, or other milk products. ¨ 5½ ounces of meat and beans, such as chicken, fish, lean meat, beans, nuts, and seeds. One egg, 1 tablespoon of peanut butter, ½ ounce nuts or seeds, or ¼ cup of cooked beans equals 1 ounce of meat. · Learn how to read food labels for serving sizes and ingredients. Fast-food and convenience-food meals often contain few or no fruits or vegetables. Make sure you eat some fruits and vegetables to make the meal more nutritious. · Look at your food diary. For each food group, add up what you have eaten and then divide the total by the number of days. This will give you an idea of how much you are eating from each food group. See if you can find some ways to change your diet to make it more healthy. Start small  · Do not try to make dramatic changes to your diet all at once. You might feel that you are missing out on your favorite foods and then be more likely to fail. · Start slowly, and gradually change your habits. Try some of the following:  ¨ Use whole wheat bread instead of white bread.   ¨ Use nonfat or low-fat milk instead of whole milk. ¨ Eat brown rice instead of white rice, and eat whole wheat pasta instead of white-flour pasta. ¨ Try low-fat cheeses and low-fat yogurt. ¨ Add more fruits and vegetables to meals and have them for snacks. ¨ Add lettuce, tomato, cucumber, and onion to sandwiches. ¨ Add fruit to yogurt and cereal.  Enjoy food  · You can still eat your favorite foods. You just may need to eat less of them. If your favorite foods are high in fat, salt, and sugar, limit how often you eat them, but do not cut them out entirely. · Eat a wide variety of foods. Make healthy choices when eating out  · The type of restaurant you choose can help you make healthy choices. Even fast-food chains are now offering more low-fat or healthier choices on the menu. · Choose smaller portions, or take half of your meal home. · When eating out, try:  ¨ A veggie pizza with a whole wheat crust or grilled chicken (instead of sausage or pepperoni). ¨ Pasta with roasted vegetables, grilled chicken, or marinara sauce instead of cream sauce. ¨ A vegetable wrap or grilled chicken wrap. ¨ Broiled or poached food instead of fried or breaded items. Make healthy choices easy  · Buy packaged, prewashed, ready-to-eat fresh vegetables and fruits, such as baby carrots, salad mixes, and chopped or shredded broccoli and cauliflower. · Buy packaged, presliced fruits, such as melon or pineapple. · Choose 100% fruit or vegetable juice instead of soda. Limit juice intake to 4 to 6 oz (½ to ¾ cup) a day. · Blend low-fat yogurt, fruit juice, and canned or frozen fruit to make a smoothie for breakfast or a snack. Where can you learn more? Go to https://[employer]. org and sign in to your [employer] account. Enter F473 in the Daio box to learn more about \"Eating Healthy Foods: Care Instructions. \"     If you do not have an account, please click on the \"Sign Up Now\" link. Current as of:  May 12, 2017  Content Version: 11.5  © 2376-8694 Healthwise, Incorporated. Care instructions adapted under license by [facility] ([facility]). If you have questions about a medical condition or this instruction, always ask your healthcare professional. [address] any warranty or liability for your use of this information.

## 2018-03-20 NOTE — PROGRESS NOTES
[address] [facility]  [address] [id number]  Dept: [phone]  Dept Fax: [phone]  Loc: [phone]    David Blancas is a 79 y.o. male who presents today for his medical conditions/complaints as noted below. David Blancas is c/o of   Chief Complaint   Patient presents with    Hypertension     6 month appt    Hyperlipidemia    Gastroesophageal Reflux         HPI:     Hypertension   This is a chronic (essential htn) problem. The current episode started more than 1 year ago. The problem has been waxing and waning since onset. The problem is controlled. Pertinent negatives include no chest pain, headaches, neck pain, palpitations or shortness of breath. Hyperlipidemia   This is a chronic problem. The current episode started more than 1 year ago. The problem is controlled. Recent lipid tests were reviewed and are variable. Pertinent negatives include no chest pain or shortness of breath. Gastroesophageal Reflux   He complains of heartburn. He reports no abdominal pain, no chest pain, no coughing or no nausea. This is a recurrent problem. The current episode started more than 1 month ago. The problem occurs rarely. The problem has been waxing and waning.        No results found for: LABA1C             No results found for: LABMICR  LDL Cholesterol (mg/dL)   Date Value   03/13/2018 136 (H)   03/07/2017 108   03/07/2016 98         AST (U/L)   Date Value   03/13/2018 32     ALT (U/L)   Date Value   03/13/2018 47 (H)     BUN (mg/dL)   Date Value   03/13/2018 21     BP Readings from Last 3 Encounters:   03/20/18 124/86   02/07/18 (!) 130/90   01/17/18 138/84              Past Medical History:   Diagnosis Date    BPH with elevated PSA     post biopsy    Elevated Gina-Barr virus antibody titer     Elevated fasting glucose     History of chronic fatigue syndrome     History of GI bleed     Hyperlipidemia     (triglycerides)    Hypertension     Internal hemorrhoids       Past Surgical History:   Procedure Laterality Date    COLONOSCOPY  2000    internal hemorrhoids    COLONOSCOPY  02/02/2015    polypx1, repeat 5yrs due to family hx & hx polyps- brannon    COLONOSCOPY  12/09/2015    polyp X2  int. Hemorrhoids  partial prolapse of ileocecal valve    COLONOSCOPY  11/2016    FOOT SURGERY Bilateral 1958    to correct flat arches (age 9)    PROSTATE BIOPSY Bilateral 12/04/2012    benign    PROSTATE BIOPSY Bilateral 07/2014    benign    UPPER GASTROINTESTINAL ENDOSCOPY  12/8/15    Normal upper GI tract, no evidence of blood in upper GI tract, mild distal esophagitis    UPPER GASTROINTESTINAL ENDOSCOPY  11/2016       Family History   Problem Relation Age of Onset    Coronary Art Dis Other     Hypertension Other     Stroke Other     Diabetes Other     Cancer Other        Social History   Substance Use Topics    Smoking status: Light Tobacco Smoker     Types: Pipe    Smokeless tobacco: Never Used      Comment: Rare pipe smoker. No inhalation. Has never smoked cigarettes.  Alcohol use 0.0 oz/week      Comment: Rare. Current Outpatient Prescriptions   Medication Sig Dispense Refill    hydrochlorothiazide (MICROZIDE) 12.5 MG capsule Take 1 capsule by mouth daily 90 capsule 3    omeprazole (PRILOSEC) 20 MG delayed release capsule Take 1 capsule by mouth daily 180 capsule 3    benazepril (LOTENSIN) 40 MG tablet Take 1 tablet by mouth daily 90 tablet 3    ferrous sulfate 325 (65 FE) MG tablet Take 325 mg by mouth 2 times daily       NONFORMULARY Take  by mouth daily. Centrum Men's multivitamin.        Current Facility-Administered Medications   Medication Dose Route Frequency Provider Last Rate Last Dose    cyanocobalamin injection 1,000 mcg  1 mg Intramuscular Q30 Days William Cuadra MD   1,000 mcg at 03/19/18 0839     No Known Allergies    Health Maintenance   Topic Date Due    AAA screen  1950    Diabetes screen  09/04/1990    Shingles Vaccine (1 of 2 - 2 Dose Series) 09/04/2000    Hepatitis C screen  09/19/2018 (Originally 1950)    DTaP/Tdap/Td vaccine (1 - Tdap) 03/20/2019 (Originally 9/29/2007)    Flu vaccine (1) 03/20/2019 (Originally 9/1/2017)    Pneumococcal low/med risk (2 of 2 - PPSV23) 03/20/2020 (Originally 3/14/2018)    Colon Cancer Screen FIT/FOBT  12/19/2018    Potassium monitoring  03/13/2019    Creatinine monitoring  03/13/2019    Lipid screen  03/13/2023       Subjective:      Review of Systems   Constitutional: Negative for chills and fever. HENT: Negative for hearing loss. Eyes: Negative for pain and visual disturbance. Respiratory: Negative for cough and shortness of breath. Cardiovascular: Negative for chest pain, palpitations and leg swelling. Gastrointestinal: Positive for heartburn. Negative for abdominal pain, blood in stool, constipation, diarrhea, nausea and vomiting. Endocrine: Negative for cold intolerance, polydipsia and polyuria. Genitourinary: Negative for difficulty urinating, dysuria and hematuria. Musculoskeletal: Negative for arthralgias, back pain, gait problem and neck pain. Skin: Negative for pallor and rash. Neurological: Negative for dizziness, weakness, numbness and headaches. Hematological: Negative for adenopathy. Does not bruise/bleed easily. Psychiatric/Behavioral: Negative for confusion. The patient is not nervous/anxious. Objective:     Physical Exam   Constitutional: He is oriented to person, place, and time. He appears well-developed and well-nourished. HENT:   Head: Normocephalic and atraumatic. Eyes: EOM are normal. Pupils are equal, round, and reactive to light. Neck: Neck supple. Cardiovascular: Normal rate and regular rhythm. Exam reveals no gallop and no friction rub. No murmur heard. Pulmonary/Chest: Effort normal and breath sounds normal. He has no wheezes. He has no rales. Abdominal: Soft. He exhibits no distension and no mass.

## 2018-04-18 ENCOUNTER — NURSE ONLY (OUTPATIENT)
Dept: LAB | Age: 68
End: 2018-04-18
Payer: MEDICARE

## 2018-04-18 DIAGNOSIS — E53.8 B12 DEFICIENCY: Primary | ICD-10-CM

## 2018-04-18 PROCEDURE — 96372 THER/PROPH/DIAG INJ SC/IM: CPT | Performed by: SURGERY

## 2018-04-18 RX ADMIN — CYANOCOBALAMIN 1000 MCG: 1000 INJECTION, SOLUTION INTRAMUSCULAR; SUBCUTANEOUS at 11:58

## 2018-05-18 ENCOUNTER — NURSE ONLY (OUTPATIENT)
Dept: LAB | Age: 68
End: 2018-05-18
Payer: MEDICARE

## 2018-05-18 DIAGNOSIS — E53.8 B12 DEFICIENCY: Primary | ICD-10-CM

## 2018-05-18 PROCEDURE — 96372 THER/PROPH/DIAG INJ SC/IM: CPT | Performed by: INTERNAL MEDICINE

## 2018-05-18 RX ADMIN — CYANOCOBALAMIN 1000 MCG: 1000 INJECTION, SOLUTION INTRAMUSCULAR; SUBCUTANEOUS at 09:46

## 2018-06-18 ENCOUNTER — NURSE ONLY (OUTPATIENT)
Dept: LAB | Age: 68
End: 2018-06-18
Payer: MEDICARE

## 2018-06-18 DIAGNOSIS — E53.8 VITAMIN B12 DEFICIENCY: Primary | ICD-10-CM

## 2018-06-18 PROCEDURE — 96372 THER/PROPH/DIAG INJ SC/IM: CPT | Performed by: INTERNAL MEDICINE

## 2018-06-18 RX ADMIN — CYANOCOBALAMIN 1000 MCG: 1000 INJECTION, SOLUTION INTRAMUSCULAR; SUBCUTANEOUS at 08:38

## 2018-06-21 ENCOUNTER — HOSPITAL ENCOUNTER (OUTPATIENT)
Age: 68
Setting detail: SPECIMEN
Discharge: HOME | End: 2018-06-21
Payer: MEDICARE

## 2018-06-21 PROCEDURE — 82274 ASSAY TEST FOR BLOOD FECAL: CPT

## 2018-06-22 ENCOUNTER — HOSPITAL ENCOUNTER (OUTPATIENT)
Dept: LAB | Age: 68
Setting detail: SPECIMEN
Discharge: HOME | End: 2018-06-22
Payer: MEDICARE

## 2018-06-22 DIAGNOSIS — D50.0 IRON DEFICIENCY ANEMIA DUE TO CHRONIC BLOOD LOSS: ICD-10-CM

## 2018-06-22 LAB
ABSOLUTE EOS #: 0.1 K/UL (ref 0–0.4)
ABSOLUTE IMMATURE GRANULOCYTE: NORMAL K/UL (ref 0–0.3)
ABSOLUTE LYMPH #: 1.5 K/UL (ref 1–4.8)
ABSOLUTE MONO #: 0.4 K/UL (ref 0.1–1.2)
BASOPHILS # BLD: 1 % (ref 0–2)
BASOPHILS ABSOLUTE: 0 K/UL (ref 0–0.2)
DATE, STOOL #1: ABNORMAL
DATE, STOOL #2: ABNORMAL
DATE, STOOL #3: ABNORMAL
DIFFERENTIAL TYPE: NORMAL
EOSINOPHILS RELATIVE PERCENT: 1 % (ref 1–8)
HCT VFR BLD CALC: 47.6 % (ref 41–53)
HEMOCCULT SP1 STL QL: POSITIVE
HEMOCCULT SP2 STL QL: ABNORMAL
HEMOCCULT SP3 STL QL: ABNORMAL
HEMOGLOBIN: 16.1 G/DL (ref 13.5–17.5)
IMMATURE GRANULOCYTES: NORMAL %
IRON: 74 UG/DL (ref 59–158)
LYMPHOCYTES # BLD: 22 % (ref 15–43)
MCH RBC QN AUTO: 30.5 PG (ref 26–34)
MCHC RBC AUTO-ENTMCNC: 33.9 G/DL (ref 31–37)
MCV RBC AUTO: 89.9 FL (ref 80–100)
MONOCYTES # BLD: 6 % (ref 6–14)
NRBC AUTOMATED: NORMAL PER 100 WBC
PDW BLD-RTO: 14.4 % (ref 11–14.5)
PLATELET # BLD: 172 K/UL (ref 140–450)
PLATELET ESTIMATE: NORMAL
PMV BLD AUTO: 9.2 FL (ref 6–12)
RBC # BLD: 5.29 M/UL (ref 4.5–5.9)
RBC # BLD: NORMAL 10*6/UL
SEG NEUTROPHILS: 70 % (ref 44–74)
SEGMENTED NEUTROPHILS ABSOLUTE COUNT: 4.8 K/UL (ref 1.8–7.7)
TIME, STOOL #1: 945
TIME, STOOL #2: ABNORMAL
TIME, STOOL #3: ABNORMAL
WBC # BLD: 6.8 K/UL (ref 3.5–11)
WBC # BLD: NORMAL 10*3/UL

## 2018-06-22 PROCEDURE — 36415 COLL VENOUS BLD VENIPUNCTURE: CPT

## 2018-06-22 PROCEDURE — 85025 COMPLETE CBC W/AUTO DIFF WBC: CPT

## 2018-06-22 PROCEDURE — 83540 ASSAY OF IRON: CPT

## 2018-06-29 ENCOUNTER — OFFICE VISIT (OUTPATIENT)
Dept: SURGERY | Age: 68
End: 2018-06-29
Payer: MEDICARE

## 2018-06-29 VITALS
WEIGHT: 229 LBS | SYSTOLIC BLOOD PRESSURE: 124 MMHG | DIASTOLIC BLOOD PRESSURE: 76 MMHG | HEART RATE: 104 BPM | BODY MASS INDEX: 31.02 KG/M2 | HEIGHT: 72 IN | TEMPERATURE: 97.5 F | RESPIRATION RATE: 20 BRPM

## 2018-06-29 DIAGNOSIS — K92.1 BLOOD IN THE STOOL: ICD-10-CM

## 2018-06-29 DIAGNOSIS — D50.9 IRON DEFICIENCY ANEMIA, UNSPECIFIED IRON DEFICIENCY ANEMIA TYPE: Primary | ICD-10-CM

## 2018-06-29 PROCEDURE — G8427 DOCREV CUR MEDS BY ELIG CLIN: HCPCS | Performed by: SURGERY

## 2018-06-29 PROCEDURE — 3017F COLORECTAL CA SCREEN DOC REV: CPT | Performed by: SURGERY

## 2018-06-29 PROCEDURE — 1123F ACP DISCUSS/DSCN MKR DOCD: CPT | Performed by: SURGERY

## 2018-06-29 PROCEDURE — 4040F PNEUMOC VAC/ADMIN/RCVD: CPT | Performed by: SURGERY

## 2018-06-29 PROCEDURE — G8417 CALC BMI ABV UP PARAM F/U: HCPCS | Performed by: SURGERY

## 2018-06-29 PROCEDURE — 99214 OFFICE O/P EST MOD 30 MIN: CPT | Performed by: SURGERY

## 2018-06-29 PROCEDURE — 4004F PT TOBACCO SCREEN RCVD TLK: CPT | Performed by: SURGERY

## 2018-07-18 ENCOUNTER — NURSE ONLY (OUTPATIENT)
Dept: LAB | Age: 68
End: 2018-07-18
Payer: MEDICARE

## 2018-07-18 DIAGNOSIS — E53.8 VITAMIN B12 DEFICIENCY: Primary | ICD-10-CM

## 2018-07-18 PROCEDURE — 96372 THER/PROPH/DIAG INJ SC/IM: CPT | Performed by: INTERNAL MEDICINE

## 2018-07-18 RX ADMIN — CYANOCOBALAMIN 1000 MCG: 1000 INJECTION, SOLUTION INTRAMUSCULAR; SUBCUTANEOUS at 08:47

## 2018-07-31 ENCOUNTER — TELEPHONE (OUTPATIENT)
Dept: INTERNAL MEDICINE | Age: 68
End: 2018-07-31

## 2018-07-31 NOTE — TELEPHONE ENCOUNTER
Patient called in stating that he has been experiencing diarrhea for the past 10 days. Patient states that is it light in color so patient does not think that blood is present. Patient also that it is kind of formed but is not watery. Patient states that he has not changed his diet or started taking any new medication. Patient also stated that he is feeling fine otherwise. No antibiotic started recently or finished. Patient wants to know if he needs to be seen tomorrow on 08/01/18 or if he needs to go to urgent care. Please advise.

## 2018-08-17 ENCOUNTER — NURSE ONLY (OUTPATIENT)
Dept: LAB | Age: 68
End: 2018-08-17
Payer: MEDICARE

## 2018-08-17 DIAGNOSIS — E53.8 VITAMIN B12 DEFICIENCY: Primary | ICD-10-CM

## 2018-08-17 PROCEDURE — 96372 THER/PROPH/DIAG INJ SC/IM: CPT | Performed by: SURGERY

## 2018-08-17 RX ADMIN — CYANOCOBALAMIN 1000 MCG: 1000 INJECTION, SOLUTION INTRAMUSCULAR; SUBCUTANEOUS at 08:44

## 2018-08-28 ENCOUNTER — HOSPITAL ENCOUNTER (OUTPATIENT)
Age: 68
Setting detail: SPECIMEN
Discharge: HOME | End: 2018-08-28
Payer: MEDICARE

## 2018-08-28 PROCEDURE — 82274 ASSAY TEST FOR BLOOD FECAL: CPT

## 2018-08-29 ENCOUNTER — HOSPITAL ENCOUNTER (OUTPATIENT)
Dept: LAB | Age: 68
Setting detail: SPECIMEN
Discharge: HOME | End: 2018-08-29
Payer: MEDICARE

## 2018-08-29 DIAGNOSIS — D50.9 IRON DEFICIENCY ANEMIA, UNSPECIFIED IRON DEFICIENCY ANEMIA TYPE: ICD-10-CM

## 2018-08-29 DIAGNOSIS — K92.1 BLOOD IN THE STOOL: ICD-10-CM

## 2018-08-29 LAB
ABSOLUTE EOS #: 0.1 K/UL (ref 0–0.4)
ABSOLUTE IMMATURE GRANULOCYTE: ABNORMAL K/UL (ref 0–0.3)
ABSOLUTE LYMPH #: 1.8 K/UL (ref 1–4.8)
ABSOLUTE MONO #: 0.5 K/UL (ref 0.1–1.2)
BASOPHILS # BLD: 1 % (ref 0–2)
BASOPHILS ABSOLUTE: 0 K/UL (ref 0–0.2)
DATE, STOOL #1: NORMAL
DATE, STOOL #2: NORMAL
DATE, STOOL #3: NORMAL
DIFFERENTIAL TYPE: ABNORMAL
EOSINOPHILS RELATIVE PERCENT: 2 % (ref 1–8)
HCT VFR BLD CALC: 46.2 % (ref 41–53)
HEMOCCULT SP1 STL QL: NEGATIVE
HEMOCCULT SP2 STL QL: NORMAL
HEMOCCULT SP3 STL QL: NORMAL
HEMOGLOBIN: 15.6 G/DL (ref 13.5–17.5)
IMMATURE GRANULOCYTES: ABNORMAL %
IRON: 59 UG/DL (ref 59–158)
LYMPHOCYTES # BLD: 25 % (ref 15–43)
MCH RBC QN AUTO: 30.3 PG (ref 26–34)
MCHC RBC AUTO-ENTMCNC: 33.8 G/DL (ref 31–37)
MCV RBC AUTO: 89.6 FL (ref 80–100)
MONOCYTES # BLD: 7 % (ref 6–14)
NRBC AUTOMATED: ABNORMAL PER 100 WBC
PDW BLD-RTO: 15.1 % (ref 11–14.5)
PLATELET # BLD: 176 K/UL (ref 140–450)
PLATELET ESTIMATE: ABNORMAL
PMV BLD AUTO: 8.8 FL (ref 6–12)
RBC # BLD: 5.15 M/UL (ref 4.5–5.9)
RBC # BLD: ABNORMAL 10*6/UL
SEG NEUTROPHILS: 65 % (ref 44–74)
SEGMENTED NEUTROPHILS ABSOLUTE COUNT: 4.6 K/UL (ref 1.8–7.7)
TIME, STOOL #1: 800
TIME, STOOL #2: NORMAL
TIME, STOOL #3: NORMAL
WBC # BLD: 7.1 K/UL (ref 3.5–11)
WBC # BLD: ABNORMAL 10*3/UL

## 2018-08-29 PROCEDURE — 36415 COLL VENOUS BLD VENIPUNCTURE: CPT

## 2018-08-29 PROCEDURE — 85025 COMPLETE CBC W/AUTO DIFF WBC: CPT

## 2018-08-29 PROCEDURE — 83540 ASSAY OF IRON: CPT

## 2018-08-31 ENCOUNTER — OFFICE VISIT (OUTPATIENT)
Dept: SURGERY | Age: 68
End: 2018-08-31
Payer: MEDICARE

## 2018-08-31 VITALS
HEART RATE: 80 BPM | HEIGHT: 72 IN | SYSTOLIC BLOOD PRESSURE: 130 MMHG | DIASTOLIC BLOOD PRESSURE: 84 MMHG | TEMPERATURE: 97.3 F | BODY MASS INDEX: 30.48 KG/M2 | WEIGHT: 225 LBS

## 2018-08-31 DIAGNOSIS — R19.5 OCCULT GI BLEEDING: Primary | ICD-10-CM

## 2018-08-31 PROCEDURE — 99213 OFFICE O/P EST LOW 20 MIN: CPT | Performed by: SURGERY

## 2018-08-31 NOTE — PROGRESS NOTES
Heron Waller is a 79 y.o. male who presents today for follow-up for history of Hemoccult-positive stool as well as gastritis likely related to niacin and vitamin C. Patient has long-standing history of iron deficiency anemia which now appears resolved and was seen by Dr. Guanaco Vaca for this problem. He had EGD and colonoscopy performed which showed some polyps as well as some erosive gastritis and at that point his niacin and vitamin C were discontinued. Patient has been on chronic iron supplementation as well as B12 injections and most recent lab work shows normal hemoglobin with low normal iron and normal B12 levels. Most recent fecal occult blood test was negative. He presents for follow-up and for further recommendations. Past Medical History:   Diagnosis Date    BPH with elevated PSA     post biopsy    Elevated Gina-Barr virus antibody titer     Elevated fasting glucose     History of chronic fatigue syndrome     History of GI bleed     Hyperlipidemia     (triglycerides)    Hypertension     Internal hemorrhoids        Past Surgical History:   Procedure Laterality Date    COLONOSCOPY  2000    internal hemorrhoids    COLONOSCOPY  02/02/2015    polypx1, repeat 5yrs due to family hx & hx polyps- brannon    COLONOSCOPY  12/09/2015    polyp X2  int. Hemorrhoids  partial prolapse of ileocecal valve    COLONOSCOPY  11/2016    FOOT SURGERY Bilateral 1958    to correct flat arches (age 9)    PROSTATE BIOPSY Bilateral 12/04/2012    benign    PROSTATE BIOPSY Bilateral 07/2014    benign    UPPER GASTROINTESTINAL ENDOSCOPY  12/8/15    Normal upper GI tract, no evidence of blood in upper GI tract, mild distal esophagitis    UPPER GASTROINTESTINAL ENDOSCOPY  11/2016       Current Outpatient Prescriptions   Medication Sig Dispense Refill    hydrochlorothiazide (MICROZIDE) 12.5 MG capsule Take 1 capsule by mouth daily 90 capsule 3    omeprazole (PRILOSEC) 20 MG delayed release capsule Take 1 capsule by mouth daily 180 capsule 3    benazepril (LOTENSIN) 40 MG tablet Take 1 tablet by mouth daily 90 tablet 3    ferrous sulfate 325 (65 FE) MG tablet Take 325 mg by mouth 2 times daily       NONFORMULARY Take  by mouth daily. Centrum Men's multivitamin. Current Facility-Administered Medications   Medication Dose Route Frequency Provider Last Rate Last Dose    cyanocobalamin injection 1,000 mcg  1 mg Intramuscular Q30 Days William Cuadra MD   1,000 mcg at 08/17/18 0844       No Known Allergies    Family History   Problem Relation Age of Onset    Coronary Art Dis Other     Hypertension Other     Stroke Other     Diabetes Other     Cancer Other        Social History     Social History    Marital status: Single     Spouse name: N/A    Number of children: N/A    Years of education: N/A     Occupational History    Not on file. Social History Main Topics    Smoking status: Light Tobacco Smoker     Types: Pipe    Smokeless tobacco: Never Used      Comment: Rare pipe smoker. No inhalation. Has never smoked cigarettes.  Alcohol use 0.0 oz/week      Comment: Rare.  Drug use: No    Sexual activity: Not on file     Other Topics Concern    Not on file     Social History Narrative    No narrative on file       ROS: A comprehensive review of systems was negative except for what was noted in the HPI.     Objective   Vitals:    08/31/18 0842   BP: 130/84   Pulse: 80   Temp: 97.3 °F (36.3 °C)     General:in no apparent distress, well developed and well nourished, alert and oriented times 3  Eyes: PERRL and EOMI  Ears, Nose, Throat: external ear and ear canal normal bilaterally, oropharynx clear and moist with normal mucous membranes  Neck: neck supple and non tender without mass  Lungs: clear to auscultation without wheezes or rales   Heart: S1S2, no mumurs, RRR  Abdomen: soft, nontender, no HSM, no guarding, no rebound, no masses  Extremity: negative  Neuro: CN II-XII grossly

## 2018-09-11 ENCOUNTER — HOSPITAL ENCOUNTER (OUTPATIENT)
Dept: LAB | Age: 68
Setting detail: SPECIMEN
Discharge: HOME | End: 2018-09-11
Payer: MEDICARE

## 2018-09-11 DIAGNOSIS — K92.1 GASTROINTESTINAL HEMORRHAGE WITH MELENA: ICD-10-CM

## 2018-09-11 DIAGNOSIS — D50.9 IRON DEFICIENCY ANEMIA, UNSPECIFIED IRON DEFICIENCY ANEMIA TYPE: ICD-10-CM

## 2018-09-11 DIAGNOSIS — I10 ESSENTIAL HYPERTENSION: ICD-10-CM

## 2018-09-11 LAB
HEMOGLOBIN: 16.3 G/DL (ref 13.5–17.5)
IRON SATURATION: 22 % (ref 20–55)
IRON: 65 UG/DL (ref 59–158)
TOTAL IRON BINDING CAPACITY: 300 UG/DL (ref 250–450)
UNSATURATED IRON BINDING CAPACITY: 235 UG/DL (ref 112–347)

## 2018-09-11 PROCEDURE — 36415 COLL VENOUS BLD VENIPUNCTURE: CPT

## 2018-09-11 PROCEDURE — 85018 HEMOGLOBIN: CPT

## 2018-09-11 PROCEDURE — 83550 IRON BINDING TEST: CPT

## 2018-09-11 PROCEDURE — 83540 ASSAY OF IRON: CPT

## 2018-09-17 ENCOUNTER — NURSE ONLY (OUTPATIENT)
Dept: LAB | Age: 68
End: 2018-09-17
Payer: MEDICARE

## 2018-09-17 DIAGNOSIS — E53.8 VITAMIN B12 DEFICIENCY: Primary | ICD-10-CM

## 2018-09-17 PROCEDURE — 96372 THER/PROPH/DIAG INJ SC/IM: CPT | Performed by: INTERNAL MEDICINE

## 2018-09-17 RX ADMIN — CYANOCOBALAMIN 1000 MCG: 1000 INJECTION, SOLUTION INTRAMUSCULAR; SUBCUTANEOUS at 08:40

## 2018-09-18 ENCOUNTER — OFFICE VISIT (OUTPATIENT)
Dept: INTERNAL MEDICINE | Age: 68
End: 2018-09-18
Payer: MEDICARE

## 2018-09-18 VITALS
WEIGHT: 222 LBS | HEIGHT: 73 IN | BODY MASS INDEX: 29.42 KG/M2 | SYSTOLIC BLOOD PRESSURE: 136 MMHG | DIASTOLIC BLOOD PRESSURE: 90 MMHG | HEART RATE: 84 BPM | RESPIRATION RATE: 16 BRPM

## 2018-09-18 DIAGNOSIS — E78.1 PURE HYPERGLYCERIDEMIA: ICD-10-CM

## 2018-09-18 DIAGNOSIS — E53.8 VITAMIN B12 DEFICIENCY: ICD-10-CM

## 2018-09-18 DIAGNOSIS — K21.9 GASTROESOPHAGEAL REFLUX DISEASE WITHOUT ESOPHAGITIS: ICD-10-CM

## 2018-09-18 DIAGNOSIS — Z00.00 MEDICARE ANNUAL WELLNESS VISIT, SUBSEQUENT: ICD-10-CM

## 2018-09-18 DIAGNOSIS — D50.9 IRON DEFICIENCY ANEMIA, UNSPECIFIED IRON DEFICIENCY ANEMIA TYPE: ICD-10-CM

## 2018-09-18 DIAGNOSIS — I10 ESSENTIAL HYPERTENSION: ICD-10-CM

## 2018-09-18 DIAGNOSIS — Z00.00 ROUTINE GENERAL MEDICAL EXAMINATION AT A HEALTH CARE FACILITY: Primary | ICD-10-CM

## 2018-09-18 PROCEDURE — G8417 CALC BMI ABV UP PARAM F/U: HCPCS | Performed by: INTERNAL MEDICINE

## 2018-09-18 PROCEDURE — 4040F PNEUMOC VAC/ADMIN/RCVD: CPT | Performed by: INTERNAL MEDICINE

## 2018-09-18 PROCEDURE — G0439 PPPS, SUBSEQ VISIT: HCPCS | Performed by: INTERNAL MEDICINE

## 2018-09-18 PROCEDURE — 1123F ACP DISCUSS/DSCN MKR DOCD: CPT | Performed by: INTERNAL MEDICINE

## 2018-09-18 PROCEDURE — 4004F PT TOBACCO SCREEN RCVD TLK: CPT | Performed by: INTERNAL MEDICINE

## 2018-09-18 PROCEDURE — 99213 OFFICE O/P EST LOW 20 MIN: CPT | Performed by: INTERNAL MEDICINE

## 2018-09-18 PROCEDURE — G8427 DOCREV CUR MEDS BY ELIG CLIN: HCPCS | Performed by: INTERNAL MEDICINE

## 2018-09-18 PROCEDURE — 1101F PT FALLS ASSESS-DOCD LE1/YR: CPT | Performed by: INTERNAL MEDICINE

## 2018-09-18 PROCEDURE — 3017F COLORECTAL CA SCREEN DOC REV: CPT | Performed by: INTERNAL MEDICINE

## 2018-09-18 ASSESSMENT — ENCOUNTER SYMPTOMS
CONSTIPATION: 0
EYE PAIN: 0
BLOOD IN STOOL: 0
COUGH: 0
VOMITING: 0
BACK PAIN: 0
DIARRHEA: 0
SHORTNESS OF BREATH: 0
HEARTBURN: 1
NAUSEA: 0
ABDOMINAL PAIN: 0

## 2018-09-18 ASSESSMENT — LIFESTYLE VARIABLES
HOW OFTEN DURING THE LAST YEAR HAVE YOU HAD A FEELING OF GUILT OR REMORSE AFTER DRINKING: 0
HOW OFTEN DURING THE LAST YEAR HAVE YOU BEEN UNABLE TO REMEMBER WHAT HAPPENED THE NIGHT BEFORE BECAUSE YOU HAD BEEN DRINKING: 0
HOW MANY STANDARD DRINKS CONTAINING ALCOHOL DO YOU HAVE ON A TYPICAL DAY: 0
AUDIT-C TOTAL SCORE: 1
HOW OFTEN DO YOU HAVE SIX OR MORE DRINKS ON ONE OCCASION: 0
HOW OFTEN DURING THE LAST YEAR HAVE YOU FOUND THAT YOU WERE NOT ABLE TO STOP DRINKING ONCE YOU HAD STARTED: 0
HOW OFTEN DO YOU HAVE A DRINK CONTAINING ALCOHOL: 1
HOW OFTEN DURING THE LAST YEAR HAVE YOU FAILED TO DO WHAT WAS NORMALLY EXPECTED FROM YOU BECAUSE OF DRINKING: 0
HOW OFTEN DURING THE LAST YEAR HAVE YOU NEEDED AN ALCOHOLIC DRINK FIRST THING IN THE MORNING TO GET YOURSELF GOING AFTER A NIGHT OF HEAVY DRINKING: 0

## 2018-09-18 ASSESSMENT — ANXIETY QUESTIONNAIRES: GAD7 TOTAL SCORE: 0

## 2018-09-18 ASSESSMENT — PATIENT HEALTH QUESTIONNAIRE - PHQ9
SUM OF ALL RESPONSES TO PHQ QUESTIONS 1-9: 0
SUM OF ALL RESPONSES TO PHQ QUESTIONS 1-9: 0

## 2018-09-18 NOTE — PROGRESS NOTES
Medicare Annual Wellness Visit  Name: Heather Clark Date: 2018   MRN: [id number] Sex: Male   Age: 76 y.o. Ethnicity: Non-/Non    : 1950 Race: Ghazal Kauffman is here for Medicare AWV; Hypertension; and Hyperlipidemia    Screenings for behavioral, psychosocial and functional/safety risks, and cognitive dysfunction are all negative except as indicated below. These results, as well as other patient data from the [address] form, are documented in Flowsheets linked to this Encounter. No Known Allergies    Prior to Visit Medications    Medication Sig Taking? Authorizing Provider   hydrochlorothiazide (MICROZIDE) 12.5 MG capsule Take 1 capsule by mouth daily Yes David Kowalski MD   omeprazole (PRILOSEC) 20 MG delayed release capsule Take 1 capsule by mouth daily Yes David Kowalski MD   benazepril (LOTENSIN) 40 MG tablet Take 1 tablet by mouth daily Yes David Kowalski MD   ferrous sulfate 325 (65 FE) MG tablet Take 325 mg by mouth 2 times daily  Yes Historical Provider, MD   NONFORMULARY Take  by mouth daily. Centrum Men's multivitamin. Yes Historical Provider, MD       Past Medical History:   Diagnosis Date    BPH with elevated PSA     post biopsy    Elevated Gina-Barr virus antibody titer     Elevated fasting glucose     History of chronic fatigue syndrome     History of GI bleed     Hyperlipidemia     (triglycerides)    Hypertension     Internal hemorrhoids      Past Surgical History:   Procedure Laterality Date    COLONOSCOPY      internal hemorrhoids    COLONOSCOPY  2015    polypx1, repeat 5yrs due to family hx & hx polyps- brannon    COLONOSCOPY  2015    polyp X2  int. Hemorrhoids  partial prolapse of ileocecal valve    COLONOSCOPY  2016    FOOT SURGERY Bilateral     to correct flat arches (age 9)    PROSTATE BIOPSY Bilateral 2012    benign    PROSTATE BIOPSY Bilateral 2014    benign    UPPER have on a typical day when drinking?: One or two  How often do you have six or more drinks on one occasion?: Never  Audit-C Score: 1  During the past year, how often have you found that you were not able to stop drinking once you had started?: Never  During the past year, how often have you failed to do what was normally expected of you because of drinking?: Never  During the past year, how often have you needed a drink in the morning to get yourself going after a heavy drinking session?: Never  During the past year, how often have you had a feeling of guilt or remorse after drinking?: Never  During the past year, have you been unable to remember what happened the night before because you had been drinking?: Never  Substance Abuse Interventions:  · None indicated    Safety:  Safety  Do you have working smoke detectors?: (!) No  Have all throw rugs been removed or fastened?: Yes  Do you have non-slip mats in all bathtubs?: Yes  Do all of your stairways have a railing or banister?: Yes  Are your doorways, halls and stairs free of clutter?: Yes  Do you always fasten your seatbelt when you are in a car?: Yes  Safety Interventions:  · None indicated    Personalized Preventive Plan   Current Health Maintenance Status  Immunization History   Administered Date(s) Administered    DT 09/19/1997, 09/28/2007    Pneumococcal 13-valent Conjugate ([place]) 09/14/2015, 03/14/2017    Pneumococcal Polysaccharide ([id number]) 09/28/2007        Health Maintenance   Topic Date Due    AAA screen  1950    Diabetes screen  09/04/1990    Shingles Vaccine (1 of 2 - 2 Dose Series) 09/04/2000    Hepatitis C screen  09/19/2018 (Originally 1950)    DTaP/Tdap/Td vaccine (1 - Tdap) 03/20/2019 (Originally 9/29/2007)    Flu vaccine (1) 03/20/2019 (Originally 9/1/2018)    Pneumococcal low/med risk (2 of 2 - PPSV23) 03/20/2020 (Originally 3/14/2018)    Potassium monitoring  03/13/2019    Creatinine monitoring  03/13/2019   

## 2018-09-18 NOTE — PATIENT INSTRUCTIONS
Personalized Preventive Plan for Rich Mireles - 9/18/2018  Medicare offers a range of preventive health benefits. Some of the tests and screenings are paid in full while other may be subject to a deductible, co-insurance, and/or copay. Some of these benefits include a comprehensive review of your medical history including lifestyle, illnesses that may run in your family, and various assessments and screenings as appropriate. After reviewing your medical record and screening and assessments performed today your provider may have ordered immunizations, labs, imaging, and/or referrals for you. A list of these orders (if applicable) as well as your Preventive Care list are included within your After Visit Summary for your review. Other Preventive Recommendations:    · A preventive eye exam performed by an eye specialist is recommended every 1-2 years to screen for glaucoma; cataracts, macular degeneration, and other eye disorders. · A preventive dental visit is recommended every 6 months. · Try to get at least 150 minutes of exercise per week or 10,000 steps per day on a pedometer . · Order or download the FREE \"Exercise & Physical Activity: Your Everyday Guide\" from [employer]. Call 2-799.633.6243 or search [employer] online. · You need 6288-8173 mg of calcium and 2113-1015 IU of vitamin D per day. It is possible to meet your calcium requirement with diet alone, but a vitamin D supplement is usually necessary to meet this goal.  · When exposed to the sun, use a sunscreen that protects against both UVA and UVB radiation with an SPF of 30 or greater. Reapply every 2 to 3 hours or after sweating, drying off with a towel, or swimming. · Always wear a seat belt when traveling in a car. Always wear a helmet when riding a bicycle or motorcycle.

## 2018-10-10 ENCOUNTER — TELEPHONE (OUTPATIENT)
Dept: UROLOGY | Age: 68
End: 2018-10-10

## 2018-10-10 NOTE — TELEPHONE ENCOUNTER
Spoke with patient, was unsure what medication Dr. Brent Hong mentioned, Dr. Brent Hong said he would like to have patient come into the office to talk about what medication he could take, patient scheduled 11/07/18 at 320pm.

## 2018-10-17 ENCOUNTER — NURSE ONLY (OUTPATIENT)
Dept: LAB | Age: 68
End: 2018-10-17
Payer: MEDICARE

## 2018-10-17 DIAGNOSIS — E53.8 VITAMIN B12 DEFICIENCY: Primary | ICD-10-CM

## 2018-10-17 PROCEDURE — 96372 THER/PROPH/DIAG INJ SC/IM: CPT | Performed by: SURGERY

## 2018-10-17 RX ADMIN — CYANOCOBALAMIN 1000 MCG: 1000 INJECTION, SOLUTION INTRAMUSCULAR; SUBCUTANEOUS at 08:44

## 2018-11-07 ENCOUNTER — OFFICE VISIT (OUTPATIENT)
Dept: UROLOGY | Age: 68
End: 2018-11-07
Payer: MEDICARE

## 2018-11-07 VITALS
HEIGHT: 73 IN | OXYGEN SATURATION: 99 % | SYSTOLIC BLOOD PRESSURE: 138 MMHG | BODY MASS INDEX: 29.9 KG/M2 | HEART RATE: 106 BPM | WEIGHT: 225.6 LBS | DIASTOLIC BLOOD PRESSURE: 84 MMHG

## 2018-11-07 DIAGNOSIS — R97.20 ABNORMAL PSA: Primary | ICD-10-CM

## 2018-11-07 DIAGNOSIS — N40.0 ENLARGED PROSTATE: ICD-10-CM

## 2018-11-07 DIAGNOSIS — R35.1 NOCTURIA: ICD-10-CM

## 2018-11-07 DIAGNOSIS — N40.0 BENIGN NON-NODULAR PROSTATIC HYPERPLASIA WITHOUT LOWER URINARY TRACT SYMPTOMS: ICD-10-CM

## 2018-11-07 PROCEDURE — 1101F PT FALLS ASSESS-DOCD LE1/YR: CPT | Performed by: UROLOGY

## 2018-11-07 PROCEDURE — 3017F COLORECTAL CA SCREEN DOC REV: CPT | Performed by: UROLOGY

## 2018-11-07 PROCEDURE — G8484 FLU IMMUNIZE NO ADMIN: HCPCS | Performed by: UROLOGY

## 2018-11-07 PROCEDURE — G8427 DOCREV CUR MEDS BY ELIG CLIN: HCPCS | Performed by: UROLOGY

## 2018-11-07 PROCEDURE — 4004F PT TOBACCO SCREEN RCVD TLK: CPT | Performed by: UROLOGY

## 2018-11-07 PROCEDURE — 99214 OFFICE O/P EST MOD 30 MIN: CPT | Performed by: UROLOGY

## 2018-11-07 PROCEDURE — 1123F ACP DISCUSS/DSCN MKR DOCD: CPT | Performed by: UROLOGY

## 2018-11-07 PROCEDURE — 4040F PNEUMOC VAC/ADMIN/RCVD: CPT | Performed by: UROLOGY

## 2018-11-07 PROCEDURE — G8417 CALC BMI ABV UP PARAM F/U: HCPCS | Performed by: UROLOGY

## 2018-11-07 RX ORDER — TAMSULOSIN HYDROCHLORIDE 0.4 MG/1
0.4 CAPSULE ORAL DAILY
Qty: 90 CAPSULE | Refills: 3 | Status: SHIPPED | OUTPATIENT
Start: 2018-11-07 | End: 2019-01-09 | Stop reason: SDUPTHER

## 2018-11-07 RX ORDER — OXYBUTYNIN CHLORIDE 10 MG/1
10 TABLET, EXTENDED RELEASE ORAL DAILY
Qty: 45 TABLET | Refills: 1 | Status: SHIPPED | OUTPATIENT
Start: 2018-11-07 | End: 2019-01-09 | Stop reason: SDUPTHER

## 2018-11-07 NOTE — PROGRESS NOTES
Marcelina Ba MD   Urology Clinic Progress Note      Patient:  Cami Arenas  YOB: 1950  Date: 11/7/2018    HISTORY OF PRESENT ILLNESS:   The patient is a 76 y.o. male who presents today for follow-up for the following problem(s): elevated PSA  Overall the problem(s) : are improving. Associated Symptoms: No dysuria, gross hematuria. Pain Severity: Pain Score:   0 - No pain    Summary of old records: 7.92 on 7-18-17/last psa 5.68 on 1-17-17/prostate biopsy 2012 and 2014/ both benign. Prostate gland measures 80.7 mL in volume at that time. Nocturia 1x  On diuretic    Additional History:  LUTS becoming more bothersome  Mainly the nocturia      I independently reviewed and verified the images and reports from:    Mri Prostate W Wo Contrast    Result Date: 1/24/2018  EXAMINATION: MULTIPARAMETRIC MRI OF THE PROSTATE WITH AND WITHOUT CONTRAST 1/24/2018: TECHNIQUE: Multiparametric imaging with dynamic contrast enhanced imaging and diffusion weighted imaging was performed. COMPARISON: None. HISTORY: ORDERING SYSTEM PROVIDED HISTORY: Abnormal PSA TECHNOLOGIST PROVIDED HISTORY: Reason for exam:->elevated PSA Ordering Physician Provided Reason for Exam: Pt denies any complaints, states his PSA fluctuates over the last few years, has had several normal bx's. Acuity: Chronic Type of Exam: Ongoing FINDINGS: Prostate:  Prostate gland is enlarged impinging on the bladder measuring 6.2 x 6.6 x 6.1 cm in maximal dimensions for a volume of 130.7 cc. Peripheral zone:  Scattered indistinct areas of hypointensity seen on T2 and ADC imaging. Central/transitional zone:  Diffusely enlarged heterogeneous nodular changes of BPH including right posterolateral exophytic component which partially obscures the right peripheral zone. Seminal vesicles: Unremarkable. Neurovascular bundle:  Unremarkable. Lymphadenopathy:  No evidence of lymphadenopathy. Bladder:  Mild trabeculation of the bladder wall Bowel:   The

## 2018-11-16 ENCOUNTER — NURSE ONLY (OUTPATIENT)
Dept: LAB | Age: 68
End: 2018-11-16
Payer: MEDICARE

## 2018-11-16 ENCOUNTER — TELEPHONE (OUTPATIENT)
Dept: LAB | Age: 68
End: 2018-11-16

## 2018-11-16 DIAGNOSIS — E53.8 VITAMIN B12 DEFICIENCY: Primary | ICD-10-CM

## 2018-11-16 PROCEDURE — 96372 THER/PROPH/DIAG INJ SC/IM: CPT | Performed by: INTERNAL MEDICINE

## 2018-11-16 RX ORDER — CYANOCOBALAMIN 1000 UG/ML
1000 INJECTION INTRAMUSCULAR; SUBCUTANEOUS
Status: SHIPPED | OUTPATIENT
Start: 2018-11-16 | End: 2019-11-11

## 2018-11-16 RX ADMIN — CYANOCOBALAMIN 1000 MCG: 1000 INJECTION, SOLUTION INTRAMUSCULAR; SUBCUTANEOUS at 14:13

## 2018-11-16 NOTE — TELEPHONE ENCOUNTER
Patients B12 order is from Dr. Hamilton President. He states that Dr. Makeda Arredondo agreed to fulfill this order. Could the old order be deleted with a new one placed?

## 2018-12-17 ENCOUNTER — NURSE ONLY (OUTPATIENT)
Dept: LAB | Age: 68
End: 2018-12-17
Payer: MEDICARE

## 2018-12-17 DIAGNOSIS — E53.8 VITAMIN B12 DEFICIENCY: Primary | ICD-10-CM

## 2018-12-17 PROCEDURE — 96372 THER/PROPH/DIAG INJ SC/IM: CPT | Performed by: INTERNAL MEDICINE

## 2018-12-17 RX ADMIN — CYANOCOBALAMIN 1000 MCG: 1000 INJECTION INTRAMUSCULAR; SUBCUTANEOUS at 16:27

## 2018-12-26 ENCOUNTER — HOSPITAL ENCOUNTER (OUTPATIENT)
Dept: LAB | Age: 68
Discharge: HOME | End: 2018-12-26
Payer: MEDICARE

## 2018-12-26 DIAGNOSIS — N40.0 BENIGN NON-NODULAR PROSTATIC HYPERPLASIA WITHOUT LOWER URINARY TRACT SYMPTOMS: ICD-10-CM

## 2018-12-26 DIAGNOSIS — R97.20 ABNORMAL PSA: ICD-10-CM

## 2018-12-26 LAB — PROSTATE SPECIFIC ANTIGEN: 6.77 UG/L

## 2018-12-26 PROCEDURE — 36415 COLL VENOUS BLD VENIPUNCTURE: CPT

## 2018-12-26 PROCEDURE — 84153 ASSAY OF PSA TOTAL: CPT

## 2019-01-09 ENCOUNTER — OFFICE VISIT (OUTPATIENT)
Dept: UROLOGY | Age: 69
End: 2019-01-09
Payer: MEDICARE

## 2019-01-09 VITALS
SYSTOLIC BLOOD PRESSURE: 120 MMHG | OXYGEN SATURATION: 97 % | WEIGHT: 222.2 LBS | HEIGHT: 73 IN | BODY MASS INDEX: 29.45 KG/M2 | HEART RATE: 83 BPM | DIASTOLIC BLOOD PRESSURE: 80 MMHG

## 2019-01-09 DIAGNOSIS — N40.0 ENLARGED PROSTATE: ICD-10-CM

## 2019-01-09 DIAGNOSIS — R97.20 ABNORMAL PSA: Primary | ICD-10-CM

## 2019-01-09 DIAGNOSIS — N40.0 BENIGN NON-NODULAR PROSTATIC HYPERPLASIA WITHOUT LOWER URINARY TRACT SYMPTOMS: ICD-10-CM

## 2019-01-09 DIAGNOSIS — R35.1 NOCTURIA: ICD-10-CM

## 2019-01-09 PROCEDURE — G8417 CALC BMI ABV UP PARAM F/U: HCPCS | Performed by: UROLOGY

## 2019-01-09 PROCEDURE — 3017F COLORECTAL CA SCREEN DOC REV: CPT | Performed by: UROLOGY

## 2019-01-09 PROCEDURE — 1101F PT FALLS ASSESS-DOCD LE1/YR: CPT | Performed by: UROLOGY

## 2019-01-09 PROCEDURE — 4004F PT TOBACCO SCREEN RCVD TLK: CPT | Performed by: UROLOGY

## 2019-01-09 PROCEDURE — 4040F PNEUMOC VAC/ADMIN/RCVD: CPT | Performed by: UROLOGY

## 2019-01-09 PROCEDURE — G8427 DOCREV CUR MEDS BY ELIG CLIN: HCPCS | Performed by: UROLOGY

## 2019-01-09 PROCEDURE — 1123F ACP DISCUSS/DSCN MKR DOCD: CPT | Performed by: UROLOGY

## 2019-01-09 PROCEDURE — G8484 FLU IMMUNIZE NO ADMIN: HCPCS | Performed by: UROLOGY

## 2019-01-09 PROCEDURE — 99214 OFFICE O/P EST MOD 30 MIN: CPT | Performed by: UROLOGY

## 2019-01-09 RX ORDER — TAMSULOSIN HYDROCHLORIDE 0.4 MG/1
0.4 CAPSULE ORAL DAILY
Qty: 90 CAPSULE | Refills: 3 | Status: SHIPPED | OUTPATIENT
Start: 2019-01-09 | End: 2019-12-09 | Stop reason: ALTCHOICE

## 2019-01-09 RX ORDER — OXYBUTYNIN CHLORIDE 10 MG/1
10 TABLET, EXTENDED RELEASE ORAL DAILY
Qty: 90 TABLET | Refills: 3 | Status: SHIPPED | OUTPATIENT
Start: 2019-01-09 | End: 2019-03-19 | Stop reason: SDUPTHER

## 2019-01-16 ENCOUNTER — NURSE ONLY (OUTPATIENT)
Dept: LAB | Age: 69
End: 2019-01-16
Payer: MEDICARE

## 2019-01-16 DIAGNOSIS — E53.8 VITAMIN B12 DEFICIENCY: Primary | ICD-10-CM

## 2019-01-16 PROCEDURE — 96372 THER/PROPH/DIAG INJ SC/IM: CPT | Performed by: INTERNAL MEDICINE

## 2019-01-16 RX ADMIN — CYANOCOBALAMIN 1000 MCG: 1000 INJECTION INTRAMUSCULAR; SUBCUTANEOUS at 09:44

## 2019-02-18 ENCOUNTER — NURSE ONLY (OUTPATIENT)
Dept: LAB | Age: 69
End: 2019-02-18
Payer: MEDICARE

## 2019-02-18 DIAGNOSIS — E53.8 VITAMIN B12 DEFICIENCY: Primary | ICD-10-CM

## 2019-02-18 PROCEDURE — 96372 THER/PROPH/DIAG INJ SC/IM: CPT | Performed by: INTERNAL MEDICINE

## 2019-02-18 RX ADMIN — CYANOCOBALAMIN 1000 MCG: 1000 INJECTION INTRAMUSCULAR; SUBCUTANEOUS at 11:36

## 2019-02-21 DIAGNOSIS — I10 ESSENTIAL HYPERTENSION: ICD-10-CM

## 2019-02-21 RX ORDER — BENAZEPRIL HYDROCHLORIDE 40 MG/1
TABLET, FILM COATED ORAL
Qty: 90 TABLET | Refills: 3 | Status: SHIPPED | OUTPATIENT
Start: 2019-02-21 | End: 2019-12-19 | Stop reason: SDUPTHER

## 2019-03-12 ENCOUNTER — HOSPITAL ENCOUNTER (OUTPATIENT)
Dept: LAB | Age: 69
Discharge: HOME | End: 2019-03-12
Payer: MEDICARE

## 2019-03-12 DIAGNOSIS — D50.9 IRON DEFICIENCY ANEMIA, UNSPECIFIED IRON DEFICIENCY ANEMIA TYPE: ICD-10-CM

## 2019-03-12 DIAGNOSIS — E53.8 VITAMIN B12 DEFICIENCY: ICD-10-CM

## 2019-03-12 DIAGNOSIS — E78.1 PURE HYPERGLYCERIDEMIA: ICD-10-CM

## 2019-03-12 DIAGNOSIS — I10 ESSENTIAL HYPERTENSION: ICD-10-CM

## 2019-03-12 LAB
ALBUMIN SERPL-MCNC: 4.4 G/DL (ref 3.5–5.2)
ALBUMIN/GLOBULIN RATIO: 1.3 (ref 1–2.5)
ALP BLD-CCNC: 76 U/L (ref 40–129)
ALT SERPL-CCNC: 36 U/L (ref 5–41)
ANION GAP SERPL CALCULATED.3IONS-SCNC: 15 MMOL/L (ref 9–17)
AST SERPL-CCNC: 29 U/L
BILIRUB SERPL-MCNC: 1.27 MG/DL (ref 0.3–1.2)
BUN BLDV-MCNC: 22 MG/DL (ref 8–23)
BUN/CREAT BLD: 21 (ref 9–20)
CALCIUM SERPL-MCNC: 9.6 MG/DL (ref 8.6–10.4)
CHLORIDE BLD-SCNC: 103 MMOL/L (ref 98–107)
CHOLESTEROL/HDL RATIO: 7.1
CHOLESTEROL: 207 MG/DL
CO2: 25 MMOL/L (ref 20–31)
CREAT SERPL-MCNC: 1.06 MG/DL (ref 0.7–1.2)
GFR AFRICAN AMERICAN: >60 ML/MIN
GFR NON-AFRICAN AMERICAN: >60 ML/MIN
GFR SERPL CREATININE-BSD FRML MDRD: ABNORMAL ML/MIN/{1.73_M2}
GFR SERPL CREATININE-BSD FRML MDRD: ABNORMAL ML/MIN/{1.73_M2}
GLUCOSE BLD-MCNC: 112 MG/DL (ref 70–99)
HDLC SERPL-MCNC: 29 MG/DL
HEMOGLOBIN: 16.1 G/DL (ref 13.5–17.5)
IRON SATURATION: 38 % (ref 20–55)
IRON: 123 UG/DL (ref 59–158)
LDL CHOLESTEROL: 127 MG/DL (ref 0–130)
POTASSIUM SERPL-SCNC: 4 MMOL/L (ref 3.7–5.3)
SODIUM BLD-SCNC: 143 MMOL/L (ref 135–144)
TOTAL IRON BINDING CAPACITY: 326 UG/DL (ref 250–450)
TOTAL PROTEIN: 7.7 G/DL (ref 6.4–8.3)
TRIGL SERPL-MCNC: 257 MG/DL
UNSATURATED IRON BINDING CAPACITY: 203 UG/DL (ref 112–347)
VITAMIN B-12: 512 PG/ML (ref 232–1245)
VLDLC SERPL CALC-MCNC: ABNORMAL MG/DL (ref 1–30)

## 2019-03-12 PROCEDURE — 80061 LIPID PANEL: CPT

## 2019-03-12 PROCEDURE — 85018 HEMOGLOBIN: CPT

## 2019-03-12 PROCEDURE — 36415 COLL VENOUS BLD VENIPUNCTURE: CPT

## 2019-03-12 PROCEDURE — 83550 IRON BINDING TEST: CPT

## 2019-03-12 PROCEDURE — 80053 COMPREHEN METABOLIC PANEL: CPT

## 2019-03-12 PROCEDURE — 83540 ASSAY OF IRON: CPT

## 2019-03-12 PROCEDURE — 82607 VITAMIN B-12: CPT

## 2019-03-18 ENCOUNTER — NURSE ONLY (OUTPATIENT)
Dept: LAB | Age: 69
End: 2019-03-18
Payer: MEDICARE

## 2019-03-18 DIAGNOSIS — E53.8 VITAMIN B12 DEFICIENCY: Primary | ICD-10-CM

## 2019-03-18 PROCEDURE — 96372 THER/PROPH/DIAG INJ SC/IM: CPT | Performed by: INTERNAL MEDICINE

## 2019-03-18 RX ADMIN — CYANOCOBALAMIN 1000 MCG: 1000 INJECTION INTRAMUSCULAR; SUBCUTANEOUS at 16:01

## 2019-03-19 ENCOUNTER — OFFICE VISIT (OUTPATIENT)
Dept: INTERNAL MEDICINE | Age: 69
End: 2019-03-19
Payer: MEDICARE

## 2019-03-19 VITALS
DIASTOLIC BLOOD PRESSURE: 92 MMHG | RESPIRATION RATE: 16 BRPM | WEIGHT: 218 LBS | HEIGHT: 73 IN | HEART RATE: 104 BPM | BODY MASS INDEX: 28.89 KG/M2 | SYSTOLIC BLOOD PRESSURE: 118 MMHG

## 2019-03-19 DIAGNOSIS — D50.9 IRON DEFICIENCY ANEMIA, UNSPECIFIED IRON DEFICIENCY ANEMIA TYPE: ICD-10-CM

## 2019-03-19 DIAGNOSIS — K92.1 GASTROINTESTINAL HEMORRHAGE WITH MELENA: ICD-10-CM

## 2019-03-19 DIAGNOSIS — I10 ESSENTIAL HYPERTENSION: Primary | ICD-10-CM

## 2019-03-19 DIAGNOSIS — K21.9 GASTROESOPHAGEAL REFLUX DISEASE WITHOUT ESOPHAGITIS: ICD-10-CM

## 2019-03-19 DIAGNOSIS — Z86.2 HISTORY OF ANEMIA: ICD-10-CM

## 2019-03-19 DIAGNOSIS — E78.2 MIXED HYPERLIPIDEMIA: ICD-10-CM

## 2019-03-19 PROCEDURE — G8417 CALC BMI ABV UP PARAM F/U: HCPCS | Performed by: INTERNAL MEDICINE

## 2019-03-19 PROCEDURE — 99214 OFFICE O/P EST MOD 30 MIN: CPT | Performed by: INTERNAL MEDICINE

## 2019-03-19 PROCEDURE — 1101F PT FALLS ASSESS-DOCD LE1/YR: CPT | Performed by: INTERNAL MEDICINE

## 2019-03-19 PROCEDURE — G8427 DOCREV CUR MEDS BY ELIG CLIN: HCPCS | Performed by: INTERNAL MEDICINE

## 2019-03-19 PROCEDURE — 3017F COLORECTAL CA SCREEN DOC REV: CPT | Performed by: INTERNAL MEDICINE

## 2019-03-19 PROCEDURE — G8484 FLU IMMUNIZE NO ADMIN: HCPCS | Performed by: INTERNAL MEDICINE

## 2019-03-19 PROCEDURE — 4040F PNEUMOC VAC/ADMIN/RCVD: CPT | Performed by: INTERNAL MEDICINE

## 2019-03-19 PROCEDURE — 1123F ACP DISCUSS/DSCN MKR DOCD: CPT | Performed by: INTERNAL MEDICINE

## 2019-03-19 PROCEDURE — 4004F PT TOBACCO SCREEN RCVD TLK: CPT | Performed by: INTERNAL MEDICINE

## 2019-03-19 RX ORDER — OXYBUTYNIN CHLORIDE 10 MG/1
10 TABLET, EXTENDED RELEASE ORAL DAILY
Qty: 90 TABLET | Refills: 3 | Status: SHIPPED | OUTPATIENT
Start: 2019-03-19 | End: 2019-12-09 | Stop reason: ALTCHOICE

## 2019-03-19 RX ORDER — OMEPRAZOLE 20 MG/1
20 CAPSULE, DELAYED RELEASE ORAL DAILY
Qty: 180 CAPSULE | Refills: 3 | Status: SHIPPED | OUTPATIENT
Start: 2019-03-19 | End: 2020-03-24 | Stop reason: SDUPTHER

## 2019-03-19 RX ORDER — HYDROCHLOROTHIAZIDE 12.5 MG/1
12.5 CAPSULE, GELATIN COATED ORAL DAILY
Qty: 90 CAPSULE | Refills: 3 | Status: SHIPPED | OUTPATIENT
Start: 2019-03-19 | End: 2020-03-13 | Stop reason: SDUPTHER

## 2019-03-19 ASSESSMENT — ENCOUNTER SYMPTOMS
ABDOMINAL PAIN: 0
COUGH: 0
EYE PAIN: 0
BACK PAIN: 0
SHORTNESS OF BREATH: 0
DIARRHEA: 0
VOMITING: 0
NAUSEA: 0
CONSTIPATION: 0
HEARTBURN: 1
BLOOD IN STOOL: 0

## 2019-03-19 ASSESSMENT — PATIENT HEALTH QUESTIONNAIRE - PHQ9
2. FEELING DOWN, DEPRESSED OR HOPELESS: 0
SUM OF ALL RESPONSES TO PHQ9 QUESTIONS 1 & 2: 0
1. LITTLE INTEREST OR PLEASURE IN DOING THINGS: 0
SUM OF ALL RESPONSES TO PHQ QUESTIONS 1-9: 0
SUM OF ALL RESPONSES TO PHQ QUESTIONS 1-9: 0

## 2019-04-17 ENCOUNTER — NURSE ONLY (OUTPATIENT)
Dept: LAB | Age: 69
End: 2019-04-17
Payer: MEDICARE

## 2019-04-17 DIAGNOSIS — E53.8 VITAMIN B12 DEFICIENCY: Primary | ICD-10-CM

## 2019-04-17 PROCEDURE — 96372 THER/PROPH/DIAG INJ SC/IM: CPT | Performed by: INTERNAL MEDICINE

## 2019-04-17 RX ADMIN — CYANOCOBALAMIN 1000 MCG: 1000 INJECTION INTRAMUSCULAR; SUBCUTANEOUS at 08:40

## 2019-05-17 ENCOUNTER — NURSE ONLY (OUTPATIENT)
Dept: LAB | Age: 69
End: 2019-05-17
Payer: MEDICARE

## 2019-05-17 DIAGNOSIS — E53.8 VITAMIN B12 DEFICIENCY: Primary | ICD-10-CM

## 2019-05-17 PROCEDURE — 96372 THER/PROPH/DIAG INJ SC/IM: CPT | Performed by: INTERNAL MEDICINE

## 2019-05-17 RX ADMIN — CYANOCOBALAMIN 1000 MCG: 1000 INJECTION INTRAMUSCULAR; SUBCUTANEOUS at 09:54

## 2019-06-17 ENCOUNTER — NURSE ONLY (OUTPATIENT)
Dept: LAB | Age: 69
End: 2019-06-17
Payer: MEDICARE

## 2019-06-17 DIAGNOSIS — E53.8 VITAMIN B12 DEFICIENCY: Primary | ICD-10-CM

## 2019-06-17 PROCEDURE — 96372 THER/PROPH/DIAG INJ SC/IM: CPT | Performed by: INTERNAL MEDICINE

## 2019-06-17 RX ADMIN — CYANOCOBALAMIN 1000 MCG: 1000 INJECTION INTRAMUSCULAR; SUBCUTANEOUS at 11:03

## 2019-07-03 ENCOUNTER — HOSPITAL ENCOUNTER (OUTPATIENT)
Dept: LAB | Age: 69
Discharge: HOME | End: 2019-07-03
Payer: MEDICARE

## 2019-07-03 DIAGNOSIS — R97.20 ABNORMAL PSA: ICD-10-CM

## 2019-07-03 LAB — PROSTATE SPECIFIC ANTIGEN: 9.73 UG/L

## 2019-07-03 PROCEDURE — 36415 COLL VENOUS BLD VENIPUNCTURE: CPT

## 2019-07-03 PROCEDURE — 84153 ASSAY OF PSA TOTAL: CPT

## 2019-07-17 ENCOUNTER — NURSE ONLY (OUTPATIENT)
Dept: LAB | Age: 69
End: 2019-07-17
Payer: MEDICARE

## 2019-07-17 DIAGNOSIS — E53.8 VITAMIN B12 DEFICIENCY: Primary | ICD-10-CM

## 2019-07-17 PROCEDURE — 96372 THER/PROPH/DIAG INJ SC/IM: CPT | Performed by: INTERNAL MEDICINE

## 2019-07-17 RX ADMIN — CYANOCOBALAMIN 1000 MCG: 1000 INJECTION INTRAMUSCULAR; SUBCUTANEOUS at 11:12

## 2019-07-24 ENCOUNTER — OFFICE VISIT (OUTPATIENT)
Dept: UROLOGY | Age: 69
End: 2019-07-24
Payer: MEDICARE

## 2019-07-24 VITALS
HEIGHT: 73 IN | WEIGHT: 227 LBS | HEART RATE: 88 BPM | DIASTOLIC BLOOD PRESSURE: 84 MMHG | BODY MASS INDEX: 30.09 KG/M2 | SYSTOLIC BLOOD PRESSURE: 140 MMHG

## 2019-07-24 DIAGNOSIS — N32.81 OVERACTIVE BLADDER: ICD-10-CM

## 2019-07-24 DIAGNOSIS — R35.1 NOCTURIA: ICD-10-CM

## 2019-07-24 DIAGNOSIS — R97.20 ABNORMAL PSA: Primary | ICD-10-CM

## 2019-07-24 DIAGNOSIS — N40.0 BENIGN NON-NODULAR PROSTATIC HYPERPLASIA WITHOUT LOWER URINARY TRACT SYMPTOMS: ICD-10-CM

## 2019-07-24 PROCEDURE — 3017F COLORECTAL CA SCREEN DOC REV: CPT | Performed by: UROLOGY

## 2019-07-24 PROCEDURE — G8417 CALC BMI ABV UP PARAM F/U: HCPCS | Performed by: UROLOGY

## 2019-07-24 PROCEDURE — 4004F PT TOBACCO SCREEN RCVD TLK: CPT | Performed by: UROLOGY

## 2019-07-24 PROCEDURE — G8427 DOCREV CUR MEDS BY ELIG CLIN: HCPCS | Performed by: UROLOGY

## 2019-07-24 PROCEDURE — 1123F ACP DISCUSS/DSCN MKR DOCD: CPT | Performed by: UROLOGY

## 2019-07-24 PROCEDURE — 99214 OFFICE O/P EST MOD 30 MIN: CPT | Performed by: UROLOGY

## 2019-07-24 PROCEDURE — 4040F PNEUMOC VAC/ADMIN/RCVD: CPT | Performed by: UROLOGY

## 2019-08-02 ENCOUNTER — HOSPITAL ENCOUNTER (OUTPATIENT)
Dept: LAB | Age: 69
Discharge: HOME | End: 2019-08-02
Payer: MEDICARE

## 2019-08-02 DIAGNOSIS — R97.20 ABNORMAL PSA: ICD-10-CM

## 2019-08-02 LAB — PROSTATE SPECIFIC ANTIGEN: 6.81 UG/L

## 2019-08-02 PROCEDURE — 84153 ASSAY OF PSA TOTAL: CPT

## 2019-08-02 PROCEDURE — 84154 ASSAY OF PSA FREE: CPT

## 2019-08-02 PROCEDURE — 36415 COLL VENOUS BLD VENIPUNCTURE: CPT

## 2019-08-05 LAB
PROSTATE SPECIFIC ANTIGEN FREE: 1.6 UG/L
PROSTATE SPECIFIC ANTIGEN PERCENT FREE: 29.1 %
PROSTATE SPECIFIC ANTIGEN: 5.5 UG/L (ref 0–4)

## 2019-08-14 ENCOUNTER — OFFICE VISIT (OUTPATIENT)
Dept: UROLOGY | Age: 69
End: 2019-08-14
Payer: MEDICARE

## 2019-08-14 VITALS
DIASTOLIC BLOOD PRESSURE: 90 MMHG | WEIGHT: 224 LBS | SYSTOLIC BLOOD PRESSURE: 128 MMHG | HEIGHT: 73 IN | BODY MASS INDEX: 29.69 KG/M2 | HEART RATE: 86 BPM

## 2019-08-14 DIAGNOSIS — N40.0 BENIGN NON-NODULAR PROSTATIC HYPERPLASIA WITHOUT LOWER URINARY TRACT SYMPTOMS: ICD-10-CM

## 2019-08-14 DIAGNOSIS — N40.0 ENLARGED PROSTATE: ICD-10-CM

## 2019-08-14 DIAGNOSIS — R97.20 ABNORMAL PSA: Primary | ICD-10-CM

## 2019-08-14 DIAGNOSIS — N32.81 OVERACTIVE BLADDER: ICD-10-CM

## 2019-08-14 DIAGNOSIS — R35.1 NOCTURIA: ICD-10-CM

## 2019-08-14 PROCEDURE — 1123F ACP DISCUSS/DSCN MKR DOCD: CPT | Performed by: UROLOGY

## 2019-08-14 PROCEDURE — 99213 OFFICE O/P EST LOW 20 MIN: CPT | Performed by: UROLOGY

## 2019-08-14 PROCEDURE — G8427 DOCREV CUR MEDS BY ELIG CLIN: HCPCS | Performed by: UROLOGY

## 2019-08-14 PROCEDURE — 4004F PT TOBACCO SCREEN RCVD TLK: CPT | Performed by: UROLOGY

## 2019-08-14 PROCEDURE — 4040F PNEUMOC VAC/ADMIN/RCVD: CPT | Performed by: UROLOGY

## 2019-08-14 PROCEDURE — 3017F COLORECTAL CA SCREEN DOC REV: CPT | Performed by: UROLOGY

## 2019-08-14 PROCEDURE — G8417 CALC BMI ABV UP PARAM F/U: HCPCS | Performed by: UROLOGY

## 2019-08-14 NOTE — PROGRESS NOTES
radiology report verified)    PAST MEDICAL, FAMILY AND SOCIAL HISTORY UPDATE:  Past Medical History:   Diagnosis Date    BPH with elevated PSA     post biopsy    Elevated Gina-Barr virus antibody titer     Elevated fasting glucose     History of chronic fatigue syndrome     History of GI bleed     Hyperlipidemia     (triglycerides)    Hypertension     Internal hemorrhoids      Past Surgical History:   Procedure Laterality Date    COLONOSCOPY  2000    internal hemorrhoids    COLONOSCOPY  02/02/2015    polypx1, repeat 5yrs due to family hx & hx polyps- brannon    COLONOSCOPY  12/09/2015    polyp X2  int. Hemorrhoids  partial prolapse of ileocecal valve    COLONOSCOPY  11/2016    FOOT SURGERY Bilateral 1958    to correct flat arches (age 9)    PROSTATE BIOPSY Bilateral 12/04/2012    benign    PROSTATE BIOPSY Bilateral 07/2014    benign    UPPER GASTROINTESTINAL ENDOSCOPY  12/8/15    Normal upper GI tract, no evidence of blood in upper GI tract, mild distal esophagitis    UPPER GASTROINTESTINAL ENDOSCOPY  11/2016     Family History   Problem Relation Age of Onset    Coronary Art Dis Other     Hypertension Other     Stroke Other     Diabetes Other     Cancer Other      Outpatient Medications Marked as Taking for the 8/14/19 encounter (Office Visit) with Mila Hall MD   Medication Sig Dispense Refill    omeprazole (PRILOSEC) 20 MG delayed release capsule Take 1 capsule by mouth daily 180 capsule 3    hydrochlorothiazide (MICROZIDE) 12.5 MG capsule Take 1 capsule by mouth daily 90 capsule 3    benazepril (LOTENSIN) 40 MG tablet take 1 tablet by mouth once daily 90 tablet 3    ferrous sulfate 325 (65 FE) MG tablet Take 325 mg by mouth 2 times daily       NONFORMULARY Take  by mouth daily. Centrum Men's multivitamin. Patient has no known allergies.   Social History     Tobacco Use   Smoking Status Light Tobacco Smoker    Types: Pipe   Smokeless Tobacco Never Used   Tobacco Comment

## 2019-08-16 ENCOUNTER — NURSE ONLY (OUTPATIENT)
Dept: LAB | Age: 69
End: 2019-08-16
Payer: MEDICARE

## 2019-08-16 DIAGNOSIS — E53.8 VITAMIN B12 DEFICIENCY: Primary | ICD-10-CM

## 2019-08-16 PROCEDURE — 96372 THER/PROPH/DIAG INJ SC/IM: CPT | Performed by: INTERNAL MEDICINE

## 2019-08-16 RX ADMIN — CYANOCOBALAMIN 1000 MCG: 1000 INJECTION INTRAMUSCULAR; SUBCUTANEOUS at 08:41

## 2019-09-09 ENCOUNTER — TELEPHONE (OUTPATIENT)
Dept: SURGERY | Age: 69
End: 2019-09-09

## 2019-09-16 ENCOUNTER — NURSE ONLY (OUTPATIENT)
Dept: LAB | Age: 69
End: 2019-09-16
Payer: MEDICARE

## 2019-09-16 DIAGNOSIS — E53.8 VITAMIN B12 DEFICIENCY: Primary | ICD-10-CM

## 2019-09-16 PROCEDURE — 96372 THER/PROPH/DIAG INJ SC/IM: CPT | Performed by: INTERNAL MEDICINE

## 2019-09-16 RX ADMIN — CYANOCOBALAMIN 1000 MCG: 1000 INJECTION INTRAMUSCULAR; SUBCUTANEOUS at 08:30

## 2019-09-17 ENCOUNTER — HOSPITAL ENCOUNTER (OUTPATIENT)
Dept: LAB | Age: 69
Discharge: HOME | End: 2019-09-17
Payer: MEDICARE

## 2019-09-17 DIAGNOSIS — Z86.2 HISTORY OF ANEMIA: ICD-10-CM

## 2019-09-17 DIAGNOSIS — I10 ESSENTIAL HYPERTENSION: ICD-10-CM

## 2019-09-17 DIAGNOSIS — E78.2 MIXED HYPERLIPIDEMIA: ICD-10-CM

## 2019-09-17 DIAGNOSIS — K21.9 GASTROESOPHAGEAL REFLUX DISEASE WITHOUT ESOPHAGITIS: ICD-10-CM

## 2019-09-17 LAB
ESTIMATED AVERAGE GLUCOSE: 114 MG/DL
HBA1C MFR BLD: 5.6 % (ref 4.8–5.9)
IRON SATURATION: 29 % (ref 20–55)
IRON: 81 UG/DL (ref 59–158)
TOTAL IRON BINDING CAPACITY: 277 UG/DL (ref 250–450)
UNSATURATED IRON BINDING CAPACITY: 196 UG/DL (ref 112–347)

## 2019-09-17 PROCEDURE — 36415 COLL VENOUS BLD VENIPUNCTURE: CPT

## 2019-09-17 PROCEDURE — 83550 IRON BINDING TEST: CPT

## 2019-09-17 PROCEDURE — 83036 HEMOGLOBIN GLYCOSYLATED A1C: CPT

## 2019-09-17 PROCEDURE — 83540 ASSAY OF IRON: CPT

## 2019-09-18 DIAGNOSIS — Z12.11 ENCOUNTER FOR SCREENING COLONOSCOPY: Primary | ICD-10-CM

## 2019-09-24 ENCOUNTER — OFFICE VISIT (OUTPATIENT)
Dept: INTERNAL MEDICINE | Age: 69
End: 2019-09-24
Payer: MEDICARE

## 2019-09-24 VITALS
BODY MASS INDEX: 29.95 KG/M2 | DIASTOLIC BLOOD PRESSURE: 102 MMHG | SYSTOLIC BLOOD PRESSURE: 142 MMHG | HEART RATE: 108 BPM | HEIGHT: 73 IN | RESPIRATION RATE: 16 BRPM | WEIGHT: 226 LBS

## 2019-09-24 DIAGNOSIS — E78.5 HYPERLIPIDEMIA, UNSPECIFIED HYPERLIPIDEMIA TYPE: ICD-10-CM

## 2019-09-24 DIAGNOSIS — K21.9 GASTROESOPHAGEAL REFLUX DISEASE WITHOUT ESOPHAGITIS: ICD-10-CM

## 2019-09-24 DIAGNOSIS — Z00.00 MEDICARE ANNUAL WELLNESS VISIT, SUBSEQUENT: ICD-10-CM

## 2019-09-24 DIAGNOSIS — Z00.00 ROUTINE GENERAL MEDICAL EXAMINATION AT A HEALTH CARE FACILITY: Primary | ICD-10-CM

## 2019-09-24 DIAGNOSIS — D50.9 IRON DEFICIENCY ANEMIA, UNSPECIFIED IRON DEFICIENCY ANEMIA TYPE: ICD-10-CM

## 2019-09-24 DIAGNOSIS — I10 ESSENTIAL HYPERTENSION: ICD-10-CM

## 2019-09-24 DIAGNOSIS — B07.8 OTHER VIRAL WARTS: ICD-10-CM

## 2019-09-24 DIAGNOSIS — E53.8 VITAMIN B12 DEFICIENCY: ICD-10-CM

## 2019-09-24 PROCEDURE — G0439 PPPS, SUBSEQ VISIT: HCPCS | Performed by: INTERNAL MEDICINE

## 2019-09-24 PROCEDURE — 99214 OFFICE O/P EST MOD 30 MIN: CPT | Performed by: INTERNAL MEDICINE

## 2019-09-24 PROCEDURE — 3017F COLORECTAL CA SCREEN DOC REV: CPT | Performed by: INTERNAL MEDICINE

## 2019-09-24 PROCEDURE — 4040F PNEUMOC VAC/ADMIN/RCVD: CPT | Performed by: INTERNAL MEDICINE

## 2019-09-24 PROCEDURE — 4004F PT TOBACCO SCREEN RCVD TLK: CPT | Performed by: INTERNAL MEDICINE

## 2019-09-24 PROCEDURE — G8417 CALC BMI ABV UP PARAM F/U: HCPCS | Performed by: INTERNAL MEDICINE

## 2019-09-24 PROCEDURE — 1123F ACP DISCUSS/DSCN MKR DOCD: CPT | Performed by: INTERNAL MEDICINE

## 2019-09-24 PROCEDURE — G8427 DOCREV CUR MEDS BY ELIG CLIN: HCPCS | Performed by: INTERNAL MEDICINE

## 2019-09-24 RX ORDER — AMLODIPINE BESYLATE 5 MG/1
5 TABLET ORAL DAILY
Qty: 30 TABLET | Refills: 5 | Status: SHIPPED | OUTPATIENT
Start: 2019-09-24 | End: 2020-03-24 | Stop reason: SDUPTHER

## 2019-09-24 ASSESSMENT — LIFESTYLE VARIABLES
HAVE YOU OR SOMEONE ELSE BEEN INJURED AS A RESULT OF YOUR DRINKING: 0
HOW OFTEN DURING THE LAST YEAR HAVE YOU HAD A FEELING OF GUILT OR REMORSE AFTER DRINKING: 0
HOW OFTEN DURING THE LAST YEAR HAVE YOU FAILED TO DO WHAT WAS NORMALLY EXPECTED FROM YOU BECAUSE OF DRINKING: 0
HOW OFTEN DURING THE LAST YEAR HAVE YOU FOUND THAT YOU WERE NOT ABLE TO STOP DRINKING ONCE YOU HAD STARTED: 0
HAS A RELATIVE, FRIEND, DOCTOR, OR ANOTHER HEALTH PROFESSIONAL EXPRESSED CONCERN ABOUT YOUR DRINKING OR SUGGESTED YOU CUT DOWN: 0
HOW MANY STANDARD DRINKS CONTAINING ALCOHOL DO YOU HAVE ON A TYPICAL DAY: 0
HOW OFTEN DO YOU HAVE SIX OR MORE DRINKS ON ONE OCCASION: 0
HOW OFTEN DURING THE LAST YEAR HAVE YOU BEEN UNABLE TO REMEMBER WHAT HAPPENED THE NIGHT BEFORE BECAUSE YOU HAD BEEN DRINKING: 0
HOW OFTEN DO YOU HAVE A DRINK CONTAINING ALCOHOL: 1
HOW OFTEN DURING THE LAST YEAR HAVE YOU NEEDED AN ALCOHOLIC DRINK FIRST THING IN THE MORNING TO GET YOURSELF GOING AFTER A NIGHT OF HEAVY DRINKING: 0
AUDIT-C TOTAL SCORE: 1
AUDIT TOTAL SCORE: 1

## 2019-09-24 ASSESSMENT — ENCOUNTER SYMPTOMS
ABDOMINAL PAIN: 0
EYE PAIN: 0
BACK PAIN: 0
BLOOD IN STOOL: 0
VOMITING: 0
SHORTNESS OF BREATH: 0
CONSTIPATION: 0
DIARRHEA: 0
NAUSEA: 0
HEARTBURN: 1
COUGH: 0

## 2019-09-24 ASSESSMENT — PATIENT HEALTH QUESTIONNAIRE - PHQ9
SUM OF ALL RESPONSES TO PHQ QUESTIONS 1-9: 0
SUM OF ALL RESPONSES TO PHQ QUESTIONS 1-9: 0

## 2019-09-24 NOTE — PROGRESS NOTES
[address]  [address]  [facility] [address]  Dept: [phone]  Dept Fax: [phone]  Loc: [phone]     Naveen Tilley is a 71 y.o. male who presents today for his medical conditions/complaintsas noted below. Naveen Tilley is c/o of   Chief Complaint   Patient presents with    Medicare AWV    Hypertension     6 month appt    Hyperlipidemia    Gastroesophageal Reflux         HPI:     Hypertension   This is a chronic (essential htn) problem. The current episode started more than 1 year ago. The problem has been waxing and waning since onset. The problem is controlled. Pertinent negatives include no chest pain, headaches, neck pain, palpitations or shortness of breath. Hyperlipidemia   This is a chronic problem. The current episode started more than 1 year ago. The problem is controlled. Recent lipid tests were reviewed and are variable. Pertinent negatives include no chest pain or shortness of breath. Gastroesophageal Reflux   He complains of heartburn. He reports no abdominal pain, no chest pain, no coughing or no nausea. This is a recurrent problem. The current episode started more than 1 month ago. The problem occurs rarely. The problem has been waxing and waning. Other   This is a recurrent (4-General medical exam) problem. The current episode started today. The problem occurs intermittently. The problem has been waxing and waning. Pertinent negatives include no abdominal pain, arthralgias, chest pain, chills, coughing, fever, headaches, nausea, neck pain, numbness, rash, vomiting or weakness.        Hemoglobin A1C (%)   Date Value   09/17/2019 5.6            No results found for: LABMICR  LDL Cholesterol (mg/dL)   Date Value   03/12/2019 127   03/13/2018 136 (H)   03/07/2017 108         AST (U/L)   Date Value   03/12/2019 29     ALT (U/L)   Date Value   03/12/2019 36     BUN (mg/dL)   Date Value   03/12/2019 22     BP Readings from Last 3

## 2019-09-24 NOTE — PROGRESS NOTES
equivalent per week Amount  0.0 standard drinks of alcohol/wk Comment  Rare.           Drug Use     Drug Use Status  No          Sexual Activity     Sexually Active  Not Asked               Audit Questionnaire: Screen for Alcohol Misuse  How often do you have a drink containing alcohol?: Monthly or less  How many standard drinks containing alcohol do you have on a typical day when drinking?: One or two  How often do you have six or more drinks on one occasion?: Never  Audit-C Score: 1  During the past year, how often have you found that you were not able to stop drinking once you had started?: Never  During the past year, how often have you failed to do what was normally expected of you because of drinking?: Never  During the past year, how often have you needed a drink in the morning to get yourself going after a heavy drinking session?: Never  During the past year, how often have you had a feeling of guilt or remorse after drinking?: Never  During the past year, have you been unable to remember what happened the night before because you had been drinking?: Never  Have you or someone else been injured as a result of your drinking?: No  Has a relative or friend, doctor or health worker been concerned about your drinking or suggested you cut down?: No  Total Score: 1  Substance Abuse Interventions:  · Tobacco abuse:  smokes pipe     Safety:  Safety  Do you have working smoke detectors?: (!) No  Have all throw rugs been removed or fastened?: Yes  Do you have non-slip mats or surfaces in all bathtubs/showers?: Yes  Do all of your stairways have a railing or banister?: Yes  Are your doorways, halls and stairs free of clutter?: Yes  Do you always fasten your seatbelt when you are in a car?: Yes  Safety Interventions:  · Home safety tips provided    Personalized Preventive Plan   Current Health Maintenance Status  Immunization History   Administered Date(s) Administered    DT (pediatric) 09/19/1997, 09/28/2007    Pneumococcal Conjugate 13-valent ([id number]) 09/14/2015, 03/14/2017    Pneumococcal Polysaccharide ([id number]) 09/28/2007        Health Maintenance   Topic Date Due    AAA screen  1950    DTaP/Tdap/Td vaccine (3 - Tdap) 09/28/2017    Pneumococcal 65+ years Vaccine (2 of 2 - PPSV23) 03/14/2018    Annual Wellness Visit (AWV)  05/29/2019    Shingles Vaccine (1 of 2) 03/19/2020 (Originally 9/4/2000)    Flu vaccine (1) 09/24/2020 (Originally 9/1/2019)    Hepatitis C screen  09/24/2020 (Originally 1950)    Potassium monitoring  03/12/2020    Creatinine monitoring  03/12/2020    Colon cancer screen colonoscopy  11/16/2021    Diabetes screen  09/17/2022    Lipid screen  03/12/2024     Recommendations for Preventive Services Due: see orders and patient instructions/AVS.  . Recommended screening schedule for the next 5-10 years is provided to the patient in written form: see Patient Instructions/AVS.    IYrn LPN, 4/18/4730, performed the documented evaluation under the direct supervision of the attending physician.

## 2019-10-02 ENCOUNTER — NURSE ONLY (OUTPATIENT)
Dept: LAB | Age: 69
End: 2019-10-02

## 2019-10-02 VITALS — SYSTOLIC BLOOD PRESSURE: 124 MMHG | DIASTOLIC BLOOD PRESSURE: 80 MMHG

## 2019-10-16 ENCOUNTER — NURSE ONLY (OUTPATIENT)
Dept: LAB | Age: 69
End: 2019-10-16
Payer: MEDICARE

## 2019-10-16 DIAGNOSIS — E53.8 VITAMIN B12 DEFICIENCY: Primary | ICD-10-CM

## 2019-10-16 PROCEDURE — 96372 THER/PROPH/DIAG INJ SC/IM: CPT | Performed by: INTERNAL MEDICINE

## 2019-10-16 RX ADMIN — CYANOCOBALAMIN 1000 MCG: 1000 INJECTION INTRAMUSCULAR; SUBCUTANEOUS at 09:00

## 2019-11-04 ENCOUNTER — TELEPHONE (OUTPATIENT)
Dept: SURGERY | Age: 69
End: 2019-11-04

## 2019-11-05 ENCOUNTER — HOSPITAL ENCOUNTER (OUTPATIENT)
Age: 69
Setting detail: SPECIMEN
Discharge: HOME | End: 2019-11-05
Payer: MEDICARE

## 2019-11-05 ENCOUNTER — TELEPHONE (OUTPATIENT)
Dept: SURGERY | Age: 69
End: 2019-11-05

## 2019-11-05 ENCOUNTER — OFFICE VISIT (OUTPATIENT)
Dept: PRIMARY CARE CLINIC | Age: 69
End: 2019-11-05
Payer: MEDICARE

## 2019-11-05 VITALS
HEART RATE: 108 BPM | DIASTOLIC BLOOD PRESSURE: 86 MMHG | WEIGHT: 221 LBS | OXYGEN SATURATION: 96 % | BODY MASS INDEX: 29.29 KG/M2 | SYSTOLIC BLOOD PRESSURE: 118 MMHG | TEMPERATURE: 97.3 F | HEIGHT: 73 IN

## 2019-11-05 DIAGNOSIS — R30.0 DYSURIA: ICD-10-CM

## 2019-11-05 DIAGNOSIS — N30.01 ACUTE CYSTITIS WITH HEMATURIA: Primary | ICD-10-CM

## 2019-11-05 LAB
-: ABNORMAL
AMORPHOUS: ABNORMAL
BACTERIA: ABNORMAL
BILIRUBIN URINE: NEGATIVE
CASTS UA: ABNORMAL /LPF (ref 0–2)
COLOR: ABNORMAL
COMMENT UA: ABNORMAL
CRYSTALS, UA: ABNORMAL /HPF
EPITHELIAL CELLS UA: ABNORMAL /HPF (ref 0–5)
GLUCOSE URINE: NEGATIVE
KETONES, URINE: NEGATIVE
LEUKOCYTE ESTERASE, URINE: NEGATIVE
MUCUS: ABNORMAL
NITRITE, URINE: NEGATIVE
OTHER OBSERVATIONS UA: ABNORMAL
PH UA: 6 (ref 5–6)
PROTEIN UA: NEGATIVE
RBC UA: ABNORMAL /HPF (ref 0–4)
RENAL EPITHELIAL, UA: ABNORMAL /HPF
SPECIFIC GRAVITY UA: 1.01 (ref 1.01–1.02)
TRICHOMONAS: ABNORMAL
TURBIDITY: ABNORMAL
URINE HGB: ABNORMAL
UROBILINOGEN, URINE: NORMAL
WBC UA: ABNORMAL /HPF (ref 0–4)
YEAST: ABNORMAL

## 2019-11-05 PROCEDURE — 4040F PNEUMOC VAC/ADMIN/RCVD: CPT | Performed by: NURSE PRACTITIONER

## 2019-11-05 PROCEDURE — 99213 OFFICE O/P EST LOW 20 MIN: CPT | Performed by: NURSE PRACTITIONER

## 2019-11-05 PROCEDURE — 87086 URINE CULTURE/COLONY COUNT: CPT

## 2019-11-05 PROCEDURE — 3017F COLORECTAL CA SCREEN DOC REV: CPT | Performed by: NURSE PRACTITIONER

## 2019-11-05 PROCEDURE — G8484 FLU IMMUNIZE NO ADMIN: HCPCS | Performed by: NURSE PRACTITIONER

## 2019-11-05 PROCEDURE — 1123F ACP DISCUSS/DSCN MKR DOCD: CPT | Performed by: NURSE PRACTITIONER

## 2019-11-05 PROCEDURE — G8417 CALC BMI ABV UP PARAM F/U: HCPCS | Performed by: NURSE PRACTITIONER

## 2019-11-05 PROCEDURE — 81001 URINALYSIS AUTO W/SCOPE: CPT

## 2019-11-05 PROCEDURE — G8427 DOCREV CUR MEDS BY ELIG CLIN: HCPCS | Performed by: NURSE PRACTITIONER

## 2019-11-05 PROCEDURE — 4004F PT TOBACCO SCREEN RCVD TLK: CPT | Performed by: NURSE PRACTITIONER

## 2019-11-05 RX ORDER — CIPROFLOXACIN 500 MG/1
500 TABLET, FILM COATED ORAL 2 TIMES DAILY
Qty: 14 TABLET | Refills: 0 | Status: SHIPPED | OUTPATIENT
Start: 2019-11-05 | End: 2019-11-12

## 2019-11-05 ASSESSMENT — ENCOUNTER SYMPTOMS
SHORTNESS OF BREATH: 0
COUGH: 0
DIARRHEA: 0
WHEEZING: 0
NAUSEA: 0
VOMITING: 0

## 2019-11-06 LAB
CULTURE: NO GROWTH
Lab: NORMAL
SPECIMEN DESCRIPTION: NORMAL

## 2019-11-13 ENCOUNTER — OFFICE VISIT (OUTPATIENT)
Dept: PRIMARY CARE CLINIC | Age: 69
End: 2019-11-13
Payer: MEDICARE

## 2019-11-13 ENCOUNTER — TELEPHONE (OUTPATIENT)
Dept: SURGERY | Age: 69
End: 2019-11-13

## 2019-11-13 VITALS
TEMPERATURE: 98.4 F | BODY MASS INDEX: 30.48 KG/M2 | RESPIRATION RATE: 20 BRPM | WEIGHT: 231 LBS | HEART RATE: 112 BPM | OXYGEN SATURATION: 99 % | DIASTOLIC BLOOD PRESSURE: 60 MMHG | SYSTOLIC BLOOD PRESSURE: 116 MMHG

## 2019-11-13 DIAGNOSIS — N40.1 BENIGN PROSTATIC HYPERPLASIA WITH URINARY RETENTION: ICD-10-CM

## 2019-11-13 DIAGNOSIS — R33.9 URINARY RETENTION: Primary | ICD-10-CM

## 2019-11-13 DIAGNOSIS — R33.8 BENIGN PROSTATIC HYPERPLASIA WITH URINARY RETENTION: ICD-10-CM

## 2019-11-13 PROCEDURE — G8427 DOCREV CUR MEDS BY ELIG CLIN: HCPCS | Performed by: FAMILY MEDICINE

## 2019-11-13 PROCEDURE — G8417 CALC BMI ABV UP PARAM F/U: HCPCS | Performed by: FAMILY MEDICINE

## 2019-11-13 PROCEDURE — 4004F PT TOBACCO SCREEN RCVD TLK: CPT | Performed by: FAMILY MEDICINE

## 2019-11-13 PROCEDURE — 1123F ACP DISCUSS/DSCN MKR DOCD: CPT | Performed by: FAMILY MEDICINE

## 2019-11-13 PROCEDURE — 51702 INSERT TEMP BLADDER CATH: CPT | Performed by: FAMILY MEDICINE

## 2019-11-13 PROCEDURE — 3017F COLORECTAL CA SCREEN DOC REV: CPT | Performed by: FAMILY MEDICINE

## 2019-11-13 PROCEDURE — 99214 OFFICE O/P EST MOD 30 MIN: CPT | Performed by: FAMILY MEDICINE

## 2019-11-13 PROCEDURE — 4040F PNEUMOC VAC/ADMIN/RCVD: CPT | Performed by: FAMILY MEDICINE

## 2019-11-13 PROCEDURE — G8484 FLU IMMUNIZE NO ADMIN: HCPCS | Performed by: FAMILY MEDICINE

## 2019-11-13 RX ORDER — LIDOCAINE 50 MG/G
OINTMENT TOPICAL
Qty: 30 G | Refills: 0 | Status: SHIPPED | OUTPATIENT
Start: 2019-11-13 | End: 2021-01-01 | Stop reason: ALTCHOICE

## 2019-11-14 ASSESSMENT — ENCOUNTER SYMPTOMS
CONSTIPATION: 0
RESPIRATORY NEGATIVE: 1
ABDOMINAL DISTENTION: 0
ABDOMINAL PAIN: 1
EYES NEGATIVE: 1
VOMITING: 0
DIARRHEA: 0
NAUSEA: 0
CHANGE IN BOWEL HABIT: 0

## 2019-11-15 ENCOUNTER — NURSE ONLY (OUTPATIENT)
Dept: LAB | Age: 69
End: 2019-11-15
Payer: MEDICARE

## 2019-11-15 DIAGNOSIS — E53.8 VITAMIN B12 DEFICIENCY: Primary | ICD-10-CM

## 2019-11-15 PROCEDURE — 96372 THER/PROPH/DIAG INJ SC/IM: CPT | Performed by: INTERNAL MEDICINE

## 2019-11-15 RX ADMIN — CYANOCOBALAMIN 1000 MCG: 1000 INJECTION, SOLUTION INTRAMUSCULAR; SUBCUTANEOUS at 14:30

## 2019-11-16 ENCOUNTER — HOSPITAL ENCOUNTER (OUTPATIENT)
Age: 69
Setting detail: SPECIMEN
Discharge: HOME | End: 2019-11-16
Payer: MEDICARE

## 2019-11-16 ENCOUNTER — OFFICE VISIT (OUTPATIENT)
Dept: PRIMARY CARE CLINIC | Age: 69
End: 2019-11-16
Payer: MEDICARE

## 2019-11-16 VITALS
TEMPERATURE: 98.7 F | HEART RATE: 74 BPM | OXYGEN SATURATION: 98 % | DIASTOLIC BLOOD PRESSURE: 74 MMHG | BODY MASS INDEX: 28.37 KG/M2 | WEIGHT: 215 LBS | SYSTOLIC BLOOD PRESSURE: 120 MMHG

## 2019-11-16 DIAGNOSIS — R33.9 URINARY RETENTION: Primary | ICD-10-CM

## 2019-11-16 DIAGNOSIS — R30.0 DYSURIA: ICD-10-CM

## 2019-11-16 LAB
-: ABNORMAL
AMORPHOUS: ABNORMAL
BACTERIA: ABNORMAL
BILIRUBIN URINE: NEGATIVE
CASTS UA: ABNORMAL /LPF (ref 0–2)
CASTS UA: ABNORMAL /LPF (ref 0–2)
COLOR: ABNORMAL
COMMENT UA: ABNORMAL
CRYSTALS, UA: ABNORMAL /HPF
EPITHELIAL CELLS UA: ABNORMAL /HPF (ref 0–5)
GLUCOSE URINE: NEGATIVE
KETONES, URINE: NEGATIVE
LEUKOCYTE ESTERASE, URINE: NEGATIVE
MUCUS: ABNORMAL
NITRITE, URINE: NEGATIVE
OTHER OBSERVATIONS UA: ABNORMAL
PH UA: 5.5 (ref 5–6)
PROTEIN UA: ABNORMAL
RBC UA: ABNORMAL /HPF (ref 0–4)
RENAL EPITHELIAL, UA: ABNORMAL /HPF
SPECIFIC GRAVITY UA: 1.02 (ref 1.01–1.02)
TRICHOMONAS: ABNORMAL
TURBIDITY: ABNORMAL
URINE HGB: ABNORMAL
UROBILINOGEN, URINE: NORMAL
WBC UA: ABNORMAL /HPF (ref 0–4)
YEAST: ABNORMAL

## 2019-11-16 PROCEDURE — G8417 CALC BMI ABV UP PARAM F/U: HCPCS | Performed by: FAMILY MEDICINE

## 2019-11-16 PROCEDURE — 1123F ACP DISCUSS/DSCN MKR DOCD: CPT | Performed by: FAMILY MEDICINE

## 2019-11-16 PROCEDURE — G8427 DOCREV CUR MEDS BY ELIG CLIN: HCPCS | Performed by: FAMILY MEDICINE

## 2019-11-16 PROCEDURE — 81001 URINALYSIS AUTO W/SCOPE: CPT

## 2019-11-16 PROCEDURE — 3017F COLORECTAL CA SCREEN DOC REV: CPT | Performed by: FAMILY MEDICINE

## 2019-11-16 PROCEDURE — 4040F PNEUMOC VAC/ADMIN/RCVD: CPT | Performed by: FAMILY MEDICINE

## 2019-11-16 PROCEDURE — 4004F PT TOBACCO SCREEN RCVD TLK: CPT | Performed by: FAMILY MEDICINE

## 2019-11-16 PROCEDURE — G8484 FLU IMMUNIZE NO ADMIN: HCPCS | Performed by: FAMILY MEDICINE

## 2019-11-16 PROCEDURE — 99213 OFFICE O/P EST LOW 20 MIN: CPT | Performed by: FAMILY MEDICINE

## 2019-11-20 ENCOUNTER — OFFICE VISIT (OUTPATIENT)
Dept: UROLOGY | Age: 69
End: 2019-11-20
Payer: MEDICARE

## 2019-11-20 VITALS
WEIGHT: 212 LBS | BODY MASS INDEX: 28.1 KG/M2 | OXYGEN SATURATION: 96 % | HEIGHT: 73 IN | DIASTOLIC BLOOD PRESSURE: 64 MMHG | HEART RATE: 88 BPM | RESPIRATION RATE: 16 BRPM | SYSTOLIC BLOOD PRESSURE: 108 MMHG

## 2019-11-20 DIAGNOSIS — N40.0 BENIGN NON-NODULAR PROSTATIC HYPERPLASIA WITHOUT LOWER URINARY TRACT SYMPTOMS: ICD-10-CM

## 2019-11-20 DIAGNOSIS — R35.1 NOCTURIA: ICD-10-CM

## 2019-11-20 DIAGNOSIS — N40.0 ENLARGED PROSTATE: ICD-10-CM

## 2019-11-20 DIAGNOSIS — R97.20 ABNORMAL PSA: Primary | ICD-10-CM

## 2019-11-20 DIAGNOSIS — N32.81 OVERACTIVE BLADDER: ICD-10-CM

## 2019-11-20 PROCEDURE — 3017F COLORECTAL CA SCREEN DOC REV: CPT | Performed by: UROLOGY

## 2019-11-20 PROCEDURE — 4040F PNEUMOC VAC/ADMIN/RCVD: CPT | Performed by: UROLOGY

## 2019-11-20 PROCEDURE — 99214 OFFICE O/P EST MOD 30 MIN: CPT | Performed by: UROLOGY

## 2019-11-20 PROCEDURE — G8484 FLU IMMUNIZE NO ADMIN: HCPCS | Performed by: UROLOGY

## 2019-11-20 PROCEDURE — G8427 DOCREV CUR MEDS BY ELIG CLIN: HCPCS | Performed by: UROLOGY

## 2019-11-20 PROCEDURE — 1123F ACP DISCUSS/DSCN MKR DOCD: CPT | Performed by: UROLOGY

## 2019-11-20 PROCEDURE — G8417 CALC BMI ABV UP PARAM F/U: HCPCS | Performed by: UROLOGY

## 2019-11-20 PROCEDURE — 4004F PT TOBACCO SCREEN RCVD TLK: CPT | Performed by: UROLOGY

## 2019-11-20 RX ORDER — POLYETHYLENE GLYCOL 3350 17 G/17G
17 POWDER, FOR SOLUTION ORAL DAILY
Qty: 30 EACH | Refills: 1 | Status: SHIPPED | OUTPATIENT
Start: 2019-11-20 | End: 2019-12-20

## 2019-11-20 SDOH — HEALTH STABILITY: MENTAL HEALTH: HOW MANY STANDARD DRINKS CONTAINING ALCOHOL DO YOU HAVE ON A TYPICAL DAY?: 1 OR 2

## 2019-11-20 SDOH — HEALTH STABILITY: MENTAL HEALTH: HOW OFTEN DO YOU HAVE A DRINK CONTAINING ALCOHOL?: MONTHLY OR LESS

## 2019-11-27 ENCOUNTER — OFFICE VISIT (OUTPATIENT)
Dept: UROLOGY | Age: 69
End: 2019-11-27
Payer: MEDICARE

## 2019-11-27 VITALS
WEIGHT: 216.2 LBS | HEIGHT: 73 IN | HEART RATE: 78 BPM | DIASTOLIC BLOOD PRESSURE: 72 MMHG | TEMPERATURE: 97.8 F | BODY MASS INDEX: 28.65 KG/M2 | SYSTOLIC BLOOD PRESSURE: 118 MMHG

## 2019-11-27 DIAGNOSIS — R35.1 NOCTURIA: ICD-10-CM

## 2019-11-27 DIAGNOSIS — N32.81 OVERACTIVE BLADDER: ICD-10-CM

## 2019-11-27 DIAGNOSIS — N40.0 ENLARGED PROSTATE: ICD-10-CM

## 2019-11-27 DIAGNOSIS — R33.9 INCOMPLETE BLADDER EMPTYING: ICD-10-CM

## 2019-11-27 DIAGNOSIS — N40.0 BENIGN NON-NODULAR PROSTATIC HYPERPLASIA WITHOUT LOWER URINARY TRACT SYMPTOMS: ICD-10-CM

## 2019-11-27 DIAGNOSIS — R97.20 ABNORMAL PSA: Primary | ICD-10-CM

## 2019-11-27 PROCEDURE — 3017F COLORECTAL CA SCREEN DOC REV: CPT | Performed by: UROLOGY

## 2019-11-27 PROCEDURE — 99214 OFFICE O/P EST MOD 30 MIN: CPT | Performed by: UROLOGY

## 2019-11-27 PROCEDURE — G8484 FLU IMMUNIZE NO ADMIN: HCPCS | Performed by: UROLOGY

## 2019-11-27 PROCEDURE — G8427 DOCREV CUR MEDS BY ELIG CLIN: HCPCS | Performed by: UROLOGY

## 2019-11-27 PROCEDURE — 4040F PNEUMOC VAC/ADMIN/RCVD: CPT | Performed by: UROLOGY

## 2019-11-27 PROCEDURE — 4004F PT TOBACCO SCREEN RCVD TLK: CPT | Performed by: UROLOGY

## 2019-11-27 PROCEDURE — G8417 CALC BMI ABV UP PARAM F/U: HCPCS | Performed by: UROLOGY

## 2019-11-27 PROCEDURE — 1123F ACP DISCUSS/DSCN MKR DOCD: CPT | Performed by: UROLOGY

## 2019-11-27 PROCEDURE — 51702 INSERT TEMP BLADDER CATH: CPT | Performed by: UROLOGY

## 2019-11-27 PROCEDURE — 51798 US URINE CAPACITY MEASURE: CPT | Performed by: UROLOGY

## 2019-12-04 ENCOUNTER — TELEPHONE (OUTPATIENT)
Dept: UROLOGY | Age: 69
End: 2019-12-04

## 2019-12-04 ENCOUNTER — PROCEDURE VISIT (OUTPATIENT)
Dept: UROLOGY | Age: 69
End: 2019-12-04
Payer: MEDICARE

## 2019-12-04 VITALS
DIASTOLIC BLOOD PRESSURE: 80 MMHG | SYSTOLIC BLOOD PRESSURE: 118 MMHG | BODY MASS INDEX: 28.66 KG/M2 | HEIGHT: 73 IN | WEIGHT: 216.27 LBS | HEART RATE: 99 BPM

## 2019-12-04 DIAGNOSIS — N32.81 OVERACTIVE BLADDER: ICD-10-CM

## 2019-12-04 DIAGNOSIS — R33.9 INCOMPLETE BLADDER EMPTYING: ICD-10-CM

## 2019-12-04 DIAGNOSIS — R35.1 NOCTURIA: ICD-10-CM

## 2019-12-04 DIAGNOSIS — N40.0 ENLARGED PROSTATE: ICD-10-CM

## 2019-12-04 DIAGNOSIS — R97.20 ABNORMAL PSA: Primary | ICD-10-CM

## 2019-12-04 DIAGNOSIS — N40.0 BENIGN NON-NODULAR PROSTATIC HYPERPLASIA WITHOUT LOWER URINARY TRACT SYMPTOMS: ICD-10-CM

## 2019-12-04 PROCEDURE — 52000 CYSTOURETHROSCOPY: CPT | Performed by: UROLOGY

## 2019-12-10 ENCOUNTER — ANESTHESIA (OUTPATIENT)
Dept: OPERATING ROOM | Age: 69
End: 2019-12-10
Payer: MEDICARE

## 2019-12-10 ENCOUNTER — HOSPITAL ENCOUNTER (OUTPATIENT)
Age: 69
Setting detail: OBSERVATION
Discharge: HOME | End: 2019-12-12
Attending: UROLOGY | Admitting: UROLOGY
Payer: MEDICARE

## 2019-12-10 ENCOUNTER — ANESTHESIA EVENT (OUTPATIENT)
Dept: OPERATING ROOM | Age: 69
End: 2019-12-10
Payer: MEDICARE

## 2019-12-10 VITALS — OXYGEN SATURATION: 100 % | SYSTOLIC BLOOD PRESSURE: 131 MMHG | DIASTOLIC BLOOD PRESSURE: 83 MMHG | TEMPERATURE: 96.3 F

## 2019-12-10 DIAGNOSIS — N40.0 ENLARGED PROSTATE: Primary | ICD-10-CM

## 2019-12-10 DIAGNOSIS — G89.18 POST-OP PAIN: Primary | ICD-10-CM

## 2019-12-10 PROBLEM — N13.8 BPH WITH OBSTRUCTION/LOWER URINARY TRACT SYMPTOMS: Status: ACTIVE | Noted: 2019-12-10

## 2019-12-10 PROBLEM — N40.1 BPH WITH OBSTRUCTION/LOWER URINARY TRACT SYMPTOMS: Status: ACTIVE | Noted: 2019-12-10

## 2019-12-10 LAB
ABO/RH: NORMAL
ANION GAP SERPL CALCULATED.3IONS-SCNC: 10 MMOL/L (ref 9–17)
ANTIBODY SCREEN: NEGATIVE
ARM BAND NUMBER: NORMAL
BUN BLDV-MCNC: 23 MG/DL (ref 8–23)
BUN/CREAT BLD: ABNORMAL (ref 9–20)
CALCIUM SERPL-MCNC: 8.8 MG/DL (ref 8.6–10.4)
CHLORIDE BLD-SCNC: 107 MMOL/L (ref 98–107)
CO2: 24 MMOL/L (ref 20–31)
CREAT SERPL-MCNC: 1.07 MG/DL (ref 0.7–1.2)
EXPIRATION DATE: NORMAL
GFR AFRICAN AMERICAN: >60 ML/MIN
GFR NON-AFRICAN AMERICAN: >60 ML/MIN
GFR NON-AFRICAN AMERICAN: >60 ML/MIN
GFR SERPL CREATININE-BSD FRML MDRD: >60 ML/MIN
GFR SERPL CREATININE-BSD FRML MDRD: ABNORMAL ML/MIN/{1.73_M2}
GFR SERPL CREATININE-BSD FRML MDRD: ABNORMAL ML/MIN/{1.73_M2}
GFR SERPL CREATININE-BSD FRML MDRD: NORMAL ML/MIN/{1.73_M2}
GLUCOSE BLD-MCNC: 140 MG/DL (ref 70–99)
GLUCOSE BLD-MCNC: 95 MG/DL (ref 74–100)
HCT VFR BLD CALC: 38.2 % (ref 40.7–50.3)
HEMOGLOBIN: 12.8 G/DL (ref 13–17)
MCH RBC QN AUTO: 29.7 PG (ref 25.2–33.5)
MCHC RBC AUTO-ENTMCNC: 33.5 G/DL (ref 28.4–34.8)
MCV RBC AUTO: 88.6 FL (ref 82.6–102.9)
NRBC AUTOMATED: 0 PER 100 WBC
PDW BLD-RTO: 13.2 % (ref 11.8–14.4)
PLATELET # BLD: 221 K/UL (ref 138–453)
PMV BLD AUTO: 10.3 FL (ref 8.1–13.5)
POC CREATININE: 1.13 MG/DL (ref 0.51–1.19)
POC POTASSIUM: 4.1 MMOL/L (ref 3.5–4.5)
POTASSIUM SERPL-SCNC: 4.2 MMOL/L (ref 3.7–5.3)
RBC # BLD: 4.31 M/UL (ref 4.21–5.77)
SODIUM BLD-SCNC: 141 MMOL/L (ref 135–144)
WBC # BLD: 8.9 K/UL (ref 3.5–11.3)

## 2019-12-10 PROCEDURE — 2580000003 HC RX 258: Performed by: STUDENT IN AN ORGANIZED HEALTH CARE EDUCATION/TRAINING PROGRAM

## 2019-12-10 PROCEDURE — 84132 ASSAY OF SERUM POTASSIUM: CPT

## 2019-12-10 PROCEDURE — 6360000002 HC RX W HCPCS: Performed by: NURSE ANESTHETIST, CERTIFIED REGISTERED

## 2019-12-10 PROCEDURE — 6360000002 HC RX W HCPCS: Performed by: STUDENT IN AN ORGANIZED HEALTH CARE EDUCATION/TRAINING PROGRAM

## 2019-12-10 PROCEDURE — 3700000001 HC ADD 15 MINUTES (ANESTHESIA): Performed by: UROLOGY

## 2019-12-10 PROCEDURE — 6360000002 HC RX W HCPCS: Performed by: ANESTHESIOLOGY

## 2019-12-10 PROCEDURE — 3700000000 HC ANESTHESIA ATTENDED CARE: Performed by: UROLOGY

## 2019-12-10 PROCEDURE — 2500000003 HC RX 250 WO HCPCS: Performed by: NURSE ANESTHETIST, CERTIFIED REGISTERED

## 2019-12-10 PROCEDURE — 85027 COMPLETE CBC AUTOMATED: CPT

## 2019-12-10 PROCEDURE — 93005 ELECTROCARDIOGRAM TRACING: CPT | Performed by: ANESTHESIOLOGY

## 2019-12-10 PROCEDURE — 6360000002 HC RX W HCPCS: Performed by: PHYSICIAN ASSISTANT

## 2019-12-10 PROCEDURE — 86850 RBC ANTIBODY SCREEN: CPT

## 2019-12-10 PROCEDURE — 2580000003 HC RX 258

## 2019-12-10 PROCEDURE — 87186 SC STD MICRODIL/AGAR DIL: CPT

## 2019-12-10 PROCEDURE — 2709999900 HC NON-CHARGEABLE SUPPLY: Performed by: UROLOGY

## 2019-12-10 PROCEDURE — 87086 URINE CULTURE/COLONY COUNT: CPT

## 2019-12-10 PROCEDURE — 6360000002 HC RX W HCPCS

## 2019-12-10 PROCEDURE — 2780000010 HC IMPLANT OTHER: Performed by: UROLOGY

## 2019-12-10 PROCEDURE — G0378 HOSPITAL OBSERVATION PER HR: HCPCS

## 2019-12-10 PROCEDURE — 2580000003 HC RX 258: Performed by: ANESTHESIOLOGY

## 2019-12-10 PROCEDURE — 88305 TISSUE EXAM BY PATHOLOGIST: CPT

## 2019-12-10 PROCEDURE — 7100000001 HC PACU RECOVERY - ADDTL 15 MIN: Performed by: UROLOGY

## 2019-12-10 PROCEDURE — 3600000009 HC SURGERY ROBOT BASE: Performed by: UROLOGY

## 2019-12-10 PROCEDURE — 2580000003 HC RX 258: Performed by: UROLOGY

## 2019-12-10 PROCEDURE — 82565 ASSAY OF CREATININE: CPT

## 2019-12-10 PROCEDURE — 7100000000 HC PACU RECOVERY - FIRST 15 MIN: Performed by: UROLOGY

## 2019-12-10 PROCEDURE — 6370000000 HC RX 637 (ALT 250 FOR IP): Performed by: STUDENT IN AN ORGANIZED HEALTH CARE EDUCATION/TRAINING PROGRAM

## 2019-12-10 PROCEDURE — 86900 BLOOD TYPING SEROLOGIC ABO: CPT

## 2019-12-10 PROCEDURE — 82947 ASSAY GLUCOSE BLOOD QUANT: CPT

## 2019-12-10 PROCEDURE — S2900 ROBOTIC SURGICAL SYSTEM: HCPCS | Performed by: UROLOGY

## 2019-12-10 PROCEDURE — 3600000019 HC SURGERY ROBOT ADDTL 15MIN: Performed by: UROLOGY

## 2019-12-10 PROCEDURE — 6370000000 HC RX 637 (ALT 250 FOR IP): Performed by: UROLOGY

## 2019-12-10 PROCEDURE — 86403 PARTICLE AGGLUT ANTBDY SCRN: CPT

## 2019-12-10 PROCEDURE — 2580000003 HC RX 258: Performed by: NURSE ANESTHETIST, CERTIFIED REGISTERED

## 2019-12-10 PROCEDURE — 80048 BASIC METABOLIC PNL TOTAL CA: CPT

## 2019-12-10 PROCEDURE — 86901 BLOOD TYPING SEROLOGIC RH(D): CPT

## 2019-12-10 RX ORDER — FENTANYL CITRATE 50 UG/ML
INJECTION, SOLUTION INTRAMUSCULAR; INTRAVENOUS PRN
Status: DISCONTINUED | OUTPATIENT
Start: 2019-12-10 | End: 2019-12-10 | Stop reason: SDUPTHER

## 2019-12-10 RX ORDER — DOCUSATE SODIUM 100 MG/1
100 CAPSULE, LIQUID FILLED ORAL 2 TIMES DAILY
Status: DISCONTINUED | OUTPATIENT
Start: 2019-12-10 | End: 2019-12-12 | Stop reason: HOSPADM

## 2019-12-10 RX ORDER — MAGNESIUM HYDROXIDE 1200 MG/15ML
3000 LIQUID ORAL CONTINUOUS
Status: DISCONTINUED | OUTPATIENT
Start: 2019-12-10 | End: 2019-12-12 | Stop reason: HOSPADM

## 2019-12-10 RX ORDER — SODIUM CHLORIDE 9 MG/ML
INJECTION, SOLUTION INTRAVENOUS CONTINUOUS
Status: DISCONTINUED | OUTPATIENT
Start: 2019-12-10 | End: 2019-12-12

## 2019-12-10 RX ORDER — OXYCODONE HYDROCHLORIDE 5 MG/1
5 TABLET ORAL EVERY 4 HOURS PRN
Status: DISCONTINUED | OUTPATIENT
Start: 2019-12-10 | End: 2019-12-12 | Stop reason: HOSPADM

## 2019-12-10 RX ORDER — SODIUM CHLORIDE 0.9 % (FLUSH) 0.9 %
10 SYRINGE (ML) INJECTION EVERY 12 HOURS SCHEDULED
Status: DISCONTINUED | OUTPATIENT
Start: 2019-12-10 | End: 2019-12-12 | Stop reason: HOSPADM

## 2019-12-10 RX ORDER — ULTRASOUND COUPLING MEDIUM
GEL (GRAM) TOPICAL PRN
Status: DISCONTINUED | OUTPATIENT
Start: 2019-12-10 | End: 2019-12-10 | Stop reason: ALTCHOICE

## 2019-12-10 RX ORDER — LISINOPRIL 20 MG/1
40 TABLET ORAL DAILY
Status: DISCONTINUED | OUTPATIENT
Start: 2019-12-11 | End: 2019-12-12 | Stop reason: HOSPADM

## 2019-12-10 RX ORDER — SODIUM CHLORIDE 0.9 % (FLUSH) 0.9 %
10 SYRINGE (ML) INJECTION PRN
Status: DISCONTINUED | OUTPATIENT
Start: 2019-12-10 | End: 2019-12-12 | Stop reason: HOSPADM

## 2019-12-10 RX ORDER — HEPARIN SODIUM 5000 [USP'U]/ML
5000 INJECTION, SOLUTION INTRAVENOUS; SUBCUTANEOUS ONCE
Status: COMPLETED | OUTPATIENT
Start: 2019-12-10 | End: 2019-12-10

## 2019-12-10 RX ORDER — MIDAZOLAM HYDROCHLORIDE 1 MG/ML
INJECTION INTRAMUSCULAR; INTRAVENOUS PRN
Status: DISCONTINUED | OUTPATIENT
Start: 2019-12-10 | End: 2019-12-10 | Stop reason: SDUPTHER

## 2019-12-10 RX ORDER — NEOSTIGMINE METHYLSULFATE 5 MG/5 ML
SYRINGE (ML) INTRAVENOUS PRN
Status: DISCONTINUED | OUTPATIENT
Start: 2019-12-10 | End: 2019-12-10 | Stop reason: SDUPTHER

## 2019-12-10 RX ORDER — PROPOFOL 10 MG/ML
INJECTION, EMULSION INTRAVENOUS PRN
Status: DISCONTINUED | OUTPATIENT
Start: 2019-12-10 | End: 2019-12-10 | Stop reason: SDUPTHER

## 2019-12-10 RX ORDER — MORPHINE SULFATE 2 MG/ML
2 INJECTION, SOLUTION INTRAMUSCULAR; INTRAVENOUS
Status: DISCONTINUED | OUTPATIENT
Start: 2019-12-10 | End: 2019-12-12 | Stop reason: HOSPADM

## 2019-12-10 RX ORDER — FENTANYL CITRATE 50 UG/ML
INJECTION, SOLUTION INTRAMUSCULAR; INTRAVENOUS
Status: COMPLETED
Start: 2019-12-10 | End: 2019-12-10

## 2019-12-10 RX ORDER — MAGNESIUM HYDROXIDE 1200 MG/15ML
LIQUID ORAL
Status: COMPLETED
Start: 2019-12-10 | End: 2019-12-10

## 2019-12-10 RX ORDER — LIDOCAINE HYDROCHLORIDE 10 MG/ML
INJECTION, SOLUTION EPIDURAL; INFILTRATION; INTRACAUDAL; PERINEURAL PRN
Status: DISCONTINUED | OUTPATIENT
Start: 2019-12-10 | End: 2019-12-10 | Stop reason: SDUPTHER

## 2019-12-10 RX ORDER — SODIUM CHLORIDE, SODIUM LACTATE, POTASSIUM CHLORIDE, CALCIUM CHLORIDE 600; 310; 30; 20 MG/100ML; MG/100ML; MG/100ML; MG/100ML
INJECTION, SOLUTION INTRAVENOUS CONTINUOUS PRN
Status: DISCONTINUED | OUTPATIENT
Start: 2019-12-10 | End: 2019-12-10 | Stop reason: SDUPTHER

## 2019-12-10 RX ORDER — TAMSULOSIN HYDROCHLORIDE 0.4 MG/1
0.4 CAPSULE ORAL DAILY
Status: CANCELLED | OUTPATIENT
Start: 2019-12-10

## 2019-12-10 RX ORDER — MORPHINE SULFATE 4 MG/ML
4 INJECTION, SOLUTION INTRAMUSCULAR; INTRAVENOUS
Status: DISCONTINUED | OUTPATIENT
Start: 2019-12-10 | End: 2019-12-12 | Stop reason: HOSPADM

## 2019-12-10 RX ORDER — FENTANYL CITRATE 50 UG/ML
50 INJECTION, SOLUTION INTRAMUSCULAR; INTRAVENOUS EVERY 5 MIN PRN
Status: DISCONTINUED | OUTPATIENT
Start: 2019-12-10 | End: 2019-12-12 | Stop reason: HOSPADM

## 2019-12-10 RX ORDER — ACETAMINOPHEN 325 MG/1
650 TABLET ORAL EVERY 6 HOURS
Status: DISCONTINUED | OUTPATIENT
Start: 2019-12-10 | End: 2019-12-12 | Stop reason: HOSPADM

## 2019-12-10 RX ORDER — OXYCODONE HYDROCHLORIDE 5 MG/1
10 TABLET ORAL EVERY 4 HOURS PRN
Status: DISCONTINUED | OUTPATIENT
Start: 2019-12-10 | End: 2019-12-12 | Stop reason: HOSPADM

## 2019-12-10 RX ORDER — PANTOPRAZOLE SODIUM 40 MG/1
40 TABLET, DELAYED RELEASE ORAL
Status: DISCONTINUED | OUTPATIENT
Start: 2019-12-11 | End: 2019-12-12 | Stop reason: HOSPADM

## 2019-12-10 RX ORDER — ONDANSETRON 2 MG/ML
INJECTION INTRAMUSCULAR; INTRAVENOUS PRN
Status: DISCONTINUED | OUTPATIENT
Start: 2019-12-10 | End: 2019-12-10 | Stop reason: SDUPTHER

## 2019-12-10 RX ORDER — SODIUM CHLORIDE, SODIUM LACTATE, POTASSIUM CHLORIDE, CALCIUM CHLORIDE 600; 310; 30; 20 MG/100ML; MG/100ML; MG/100ML; MG/100ML
INJECTION, SOLUTION INTRAVENOUS CONTINUOUS
Status: DISCONTINUED | OUTPATIENT
Start: 2019-12-10 | End: 2019-12-10

## 2019-12-10 RX ORDER — HYDROCHLOROTHIAZIDE 12.5 MG/1
12.5 CAPSULE, GELATIN COATED ORAL DAILY
Status: DISCONTINUED | OUTPATIENT
Start: 2019-12-10 | End: 2019-12-12 | Stop reason: HOSPADM

## 2019-12-10 RX ORDER — AMLODIPINE BESYLATE 5 MG/1
5 TABLET ORAL DAILY
Status: DISCONTINUED | OUTPATIENT
Start: 2019-12-11 | End: 2019-12-12 | Stop reason: HOSPADM

## 2019-12-10 RX ORDER — LANOLIN ALCOHOL/MO/W.PET/CERES
325 CREAM (GRAM) TOPICAL 2 TIMES DAILY
Status: DISCONTINUED | OUTPATIENT
Start: 2019-12-10 | End: 2019-12-12 | Stop reason: HOSPADM

## 2019-12-10 RX ORDER — LABETALOL HYDROCHLORIDE 5 MG/ML
INJECTION, SOLUTION INTRAVENOUS PRN
Status: DISCONTINUED | OUTPATIENT
Start: 2019-12-10 | End: 2019-12-10 | Stop reason: SDUPTHER

## 2019-12-10 RX ORDER — ONDANSETRON 2 MG/ML
4 INJECTION INTRAMUSCULAR; INTRAVENOUS EVERY 6 HOURS PRN
Status: DISCONTINUED | OUTPATIENT
Start: 2019-12-10 | End: 2019-12-12 | Stop reason: HOSPADM

## 2019-12-10 RX ORDER — POLYETHYLENE GLYCOL 3350 17 G/17G
17 POWDER, FOR SOLUTION ORAL DAILY
Status: DISCONTINUED | OUTPATIENT
Start: 2019-12-10 | End: 2019-12-12 | Stop reason: HOSPADM

## 2019-12-10 RX ORDER — GLYCOPYRROLATE 1 MG/5 ML
SYRINGE (ML) INTRAVENOUS PRN
Status: DISCONTINUED | OUTPATIENT
Start: 2019-12-10 | End: 2019-12-10 | Stop reason: SDUPTHER

## 2019-12-10 RX ORDER — DEXAMETHASONE SODIUM PHOSPHATE 10 MG/ML
INJECTION INTRAMUSCULAR; INTRAVENOUS PRN
Status: DISCONTINUED | OUTPATIENT
Start: 2019-12-10 | End: 2019-12-10 | Stop reason: SDUPTHER

## 2019-12-10 RX ORDER — ROCURONIUM BROMIDE 10 MG/ML
INJECTION, SOLUTION INTRAVENOUS PRN
Status: DISCONTINUED | OUTPATIENT
Start: 2019-12-10 | End: 2019-12-10 | Stop reason: SDUPTHER

## 2019-12-10 RX ORDER — MAGNESIUM HYDROXIDE 1200 MG/15ML
LIQUID ORAL CONTINUOUS PRN
Status: COMPLETED | OUTPATIENT
Start: 2019-12-10 | End: 2019-12-10

## 2019-12-10 RX ADMIN — ROCURONIUM BROMIDE 50 MG: 10 INJECTION INTRAVENOUS at 14:44

## 2019-12-10 RX ADMIN — SODIUM CHLORIDE 3000 ML: 900 IRRIGANT IRRIGATION at 17:15

## 2019-12-10 RX ADMIN — HYDROMORPHONE HYDROCHLORIDE 0.25 MG: 1 INJECTION, SOLUTION INTRAMUSCULAR; INTRAVENOUS; SUBCUTANEOUS at 17:45

## 2019-12-10 RX ADMIN — Medication 10 ML: at 20:57

## 2019-12-10 RX ADMIN — ACETAMINOPHEN 650 MG: 325 TABLET ORAL at 20:54

## 2019-12-10 RX ADMIN — FENTANYL CITRATE 50 MCG: 50 INJECTION, SOLUTION INTRAMUSCULAR; INTRAVENOUS at 18:00

## 2019-12-10 RX ADMIN — PHENYLEPHRINE HYDROCHLORIDE 100 MCG: 10 INJECTION INTRAVENOUS at 16:39

## 2019-12-10 RX ADMIN — MIDAZOLAM HYDROCHLORIDE 2 MG: 1 INJECTION, SOLUTION INTRAMUSCULAR; INTRAVENOUS at 14:35

## 2019-12-10 RX ADMIN — ONDANSETRON 4 MG: 2 INJECTION, SOLUTION INTRAMUSCULAR; INTRAVENOUS at 16:13

## 2019-12-10 RX ADMIN — HYDROMORPHONE HYDROCHLORIDE 0.25 MG: 1 INJECTION, SOLUTION INTRAMUSCULAR; INTRAVENOUS; SUBCUTANEOUS at 17:40

## 2019-12-10 RX ADMIN — FERROUS SULFATE TAB EC 325 MG (65 MG FE EQUIVALENT) 325 MG: 325 (65 FE) TABLET DELAYED RESPONSE at 20:54

## 2019-12-10 RX ADMIN — SODIUM CHLORIDE, POTASSIUM CHLORIDE, SODIUM LACTATE AND CALCIUM CHLORIDE: 600; 310; 30; 20 INJECTION, SOLUTION INTRAVENOUS at 13:00

## 2019-12-10 RX ADMIN — FENTANYL CITRATE 50 MCG: 50 INJECTION, SOLUTION INTRAMUSCULAR; INTRAVENOUS at 17:55

## 2019-12-10 RX ADMIN — DOCUSATE SODIUM 100 MG: 100 CAPSULE, LIQUID FILLED ORAL at 20:54

## 2019-12-10 RX ADMIN — HYDROMORPHONE HYDROCHLORIDE 0.5 MG: 1 INJECTION, SOLUTION INTRAMUSCULAR; INTRAVENOUS; SUBCUTANEOUS at 17:23

## 2019-12-10 RX ADMIN — LIDOCAINE HYDROCHLORIDE 50 MG: 10 INJECTION, SOLUTION EPIDURAL; INFILTRATION; INTRACAUDAL; PERINEURAL at 14:44

## 2019-12-10 RX ADMIN — FENTANYL CITRATE 100 MCG: 50 INJECTION INTRAMUSCULAR; INTRAVENOUS at 15:33

## 2019-12-10 RX ADMIN — POLYETHYLENE GLYCOL 3350 17 G: 17 POWDER, FOR SOLUTION ORAL at 20:55

## 2019-12-10 RX ADMIN — ROCURONIUM BROMIDE 15 MG: 10 INJECTION INTRAVENOUS at 15:44

## 2019-12-10 RX ADMIN — HYDROMORPHONE HYDROCHLORIDE 0.25 MG: 1 INJECTION, SOLUTION INTRAMUSCULAR; INTRAVENOUS; SUBCUTANEOUS at 17:35

## 2019-12-10 RX ADMIN — SODIUM CHLORIDE: 9 INJECTION, SOLUTION INTRAVENOUS at 18:40

## 2019-12-10 RX ADMIN — FENTANYL CITRATE 50 MCG: 50 INJECTION INTRAMUSCULAR; INTRAVENOUS at 14:44

## 2019-12-10 RX ADMIN — SODIUM CHLORIDE, POTASSIUM CHLORIDE, SODIUM LACTATE AND CALCIUM CHLORIDE: 600; 310; 30; 20 INJECTION, SOLUTION INTRAVENOUS at 16:04

## 2019-12-10 RX ADMIN — DEXTROSE MONOHYDRATE 2 G: 50 INJECTION, SOLUTION INTRAVENOUS at 23:20

## 2019-12-10 RX ADMIN — PROPOFOL 200 MG: 10 INJECTION, EMULSION INTRAVENOUS at 14:44

## 2019-12-10 RX ADMIN — SODIUM CHLORIDE, POTASSIUM CHLORIDE, SODIUM LACTATE AND CALCIUM CHLORIDE: 600; 310; 30; 20 INJECTION, SOLUTION INTRAVENOUS at 14:49

## 2019-12-10 RX ADMIN — FENTANYL CITRATE 50 MCG: 50 INJECTION INTRAMUSCULAR; INTRAVENOUS at 14:43

## 2019-12-10 RX ADMIN — ONDANSETRON 4 MG: 2 INJECTION INTRAMUSCULAR; INTRAVENOUS at 20:33

## 2019-12-10 RX ADMIN — HYDROMORPHONE HYDROCHLORIDE 0.5 MG: 1 INJECTION, SOLUTION INTRAMUSCULAR; INTRAVENOUS; SUBCUTANEOUS at 17:29

## 2019-12-10 RX ADMIN — Medication 5 MG: at 15:32

## 2019-12-10 RX ADMIN — HYDROMORPHONE HYDROCHLORIDE 0.25 MG: 1 INJECTION, SOLUTION INTRAMUSCULAR; INTRAVENOUS; SUBCUTANEOUS at 17:50

## 2019-12-10 RX ADMIN — Medication 3 MG: at 16:46

## 2019-12-10 RX ADMIN — Medication 10 MG: at 15:38

## 2019-12-10 RX ADMIN — MORPHINE SULFATE 2 MG: 2 INJECTION, SOLUTION INTRAMUSCULAR; INTRAVENOUS at 21:16

## 2019-12-10 RX ADMIN — SODIUM CHLORIDE 3000 ML: 900 IRRIGANT IRRIGATION at 17:17

## 2019-12-10 RX ADMIN — SODIUM CHLORIDE 3000 ML: 900 IRRIGANT IRRIGATION at 20:56

## 2019-12-10 RX ADMIN — Medication 0.4 MG: at 16:44

## 2019-12-10 RX ADMIN — HEPARIN SODIUM 5000 UNITS: 5000 INJECTION INTRAVENOUS; SUBCUTANEOUS at 13:02

## 2019-12-10 RX ADMIN — Medication 2 G: at 15:03

## 2019-12-10 RX ADMIN — DEXAMETHASONE SODIUM PHOSPHATE 8 MG: 10 INJECTION INTRAMUSCULAR; INTRAVENOUS at 16:13

## 2019-12-10 RX ADMIN — SODIUM CHLORIDE, POTASSIUM CHLORIDE, SODIUM LACTATE AND CALCIUM CHLORIDE: 600; 310; 30; 20 INJECTION, SOLUTION INTRAVENOUS at 15:47

## 2019-12-10 ASSESSMENT — PULMONARY FUNCTION TESTS
PIF_VALUE: 28
PIF_VALUE: 27
PIF_VALUE: 27
PIF_VALUE: 15
PIF_VALUE: 15
PIF_VALUE: 14
PIF_VALUE: 27
PIF_VALUE: 27
PIF_VALUE: 51
PIF_VALUE: 0
PIF_VALUE: 27
PIF_VALUE: 28
PIF_VALUE: 28
PIF_VALUE: 29
PIF_VALUE: 13
PIF_VALUE: 40
PIF_VALUE: 27
PIF_VALUE: 13
PIF_VALUE: 15
PIF_VALUE: 29
PIF_VALUE: 28
PIF_VALUE: 14
PIF_VALUE: 13
PIF_VALUE: 27
PIF_VALUE: 27
PIF_VALUE: 11
PIF_VALUE: 23
PIF_VALUE: 18
PIF_VALUE: 15
PIF_VALUE: 27
PIF_VALUE: 20
PIF_VALUE: 15
PIF_VALUE: 28
PIF_VALUE: 0
PIF_VALUE: 4
PIF_VALUE: 22
PIF_VALUE: 14
PIF_VALUE: 27
PIF_VALUE: 27
PIF_VALUE: 14
PIF_VALUE: 27
PIF_VALUE: 11
PIF_VALUE: 6
PIF_VALUE: 15
PIF_VALUE: 26
PIF_VALUE: 13
PIF_VALUE: 27
PIF_VALUE: 20
PIF_VALUE: 27
PIF_VALUE: 27
PIF_VALUE: 14
PIF_VALUE: 13
PIF_VALUE: 27
PIF_VALUE: 22
PIF_VALUE: 8
PIF_VALUE: 28
PIF_VALUE: 27
PIF_VALUE: 28
PIF_VALUE: 28
PIF_VALUE: 26
PIF_VALUE: 28
PIF_VALUE: 28
PIF_VALUE: 13
PIF_VALUE: 21
PIF_VALUE: 14
PIF_VALUE: 28
PIF_VALUE: 22
PIF_VALUE: 12
PIF_VALUE: 28
PIF_VALUE: 27
PIF_VALUE: 23
PIF_VALUE: 14
PIF_VALUE: 22
PIF_VALUE: 14
PIF_VALUE: 26
PIF_VALUE: 11
PIF_VALUE: 14
PIF_VALUE: 27
PIF_VALUE: 28
PIF_VALUE: 28
PIF_VALUE: 13
PIF_VALUE: 16
PIF_VALUE: 27
PIF_VALUE: 28
PIF_VALUE: 28
PIF_VALUE: 17
PIF_VALUE: 8
PIF_VALUE: 36
PIF_VALUE: 21
PIF_VALUE: 28
PIF_VALUE: 16
PIF_VALUE: 22
PIF_VALUE: 28
PIF_VALUE: 21
PIF_VALUE: 27
PIF_VALUE: 21
PIF_VALUE: 29
PIF_VALUE: 13
PIF_VALUE: 1
PIF_VALUE: 18
PIF_VALUE: 20
PIF_VALUE: 29
PIF_VALUE: 28
PIF_VALUE: 27
PIF_VALUE: 17
PIF_VALUE: 28
PIF_VALUE: 13
PIF_VALUE: 16
PIF_VALUE: 15
PIF_VALUE: 28
PIF_VALUE: 28
PIF_VALUE: 15
PIF_VALUE: 13
PIF_VALUE: 0
PIF_VALUE: 27
PIF_VALUE: 27
PIF_VALUE: 15
PIF_VALUE: 0
PIF_VALUE: 28
PIF_VALUE: 27
PIF_VALUE: 27
PIF_VALUE: 28
PIF_VALUE: 20
PIF_VALUE: 22
PIF_VALUE: 17
PIF_VALUE: 10
PIF_VALUE: 27
PIF_VALUE: 27
PIF_VALUE: 24
PIF_VALUE: 11
PIF_VALUE: 27
PIF_VALUE: 0
PIF_VALUE: 27
PIF_VALUE: 0
PIF_VALUE: 28
PIF_VALUE: 73
PIF_VALUE: 29
PIF_VALUE: 1
PIF_VALUE: 28
PIF_VALUE: 14

## 2019-12-10 ASSESSMENT — PAIN SCALES - GENERAL
PAINLEVEL_OUTOF10: 9
PAINLEVEL_OUTOF10: 6
PAINLEVEL_OUTOF10: 6
PAINLEVEL_OUTOF10: 5
PAINLEVEL_OUTOF10: 6
PAINLEVEL_OUTOF10: 9
PAINLEVEL_OUTOF10: 7
PAINLEVEL_OUTOF10: 9
PAINLEVEL_OUTOF10: 8
PAINLEVEL_OUTOF10: 6

## 2019-12-10 ASSESSMENT — PAIN DESCRIPTION - DESCRIPTORS
DESCRIPTORS: PRESSURE;DISCOMFORT
DESCRIPTORS: PRESSURE
DESCRIPTORS: PRESSURE

## 2019-12-10 ASSESSMENT — PAIN DESCRIPTION - LOCATION
LOCATION: ABDOMEN;PERINEUM
LOCATION: ABDOMEN

## 2019-12-10 ASSESSMENT — PAIN DESCRIPTION - PAIN TYPE
TYPE: OTHER (COMMENT)
TYPE: SURGICAL PAIN
TYPE: SURGICAL PAIN
TYPE: OTHER (COMMENT)

## 2019-12-10 ASSESSMENT — PAIN - FUNCTIONAL ASSESSMENT: PAIN_FUNCTIONAL_ASSESSMENT: 0-10

## 2019-12-11 LAB
ANION GAP SERPL CALCULATED.3IONS-SCNC: 11 MMOL/L (ref 9–17)
BUN BLDV-MCNC: 24 MG/DL (ref 8–23)
BUN/CREAT BLD: ABNORMAL (ref 9–20)
CALCIUM SERPL-MCNC: 8.4 MG/DL (ref 8.6–10.4)
CHLORIDE BLD-SCNC: 108 MMOL/L (ref 98–107)
CO2: 22 MMOL/L (ref 20–31)
CREAT SERPL-MCNC: 1.04 MG/DL (ref 0.7–1.2)
EKG ATRIAL RATE: 83 BPM
EKG P AXIS: 61 DEGREES
EKG P-R INTERVAL: 182 MS
EKG Q-T INTERVAL: 392 MS
EKG QRS DURATION: 84 MS
EKG QTC CALCULATION (BAZETT): 460 MS
EKG R AXIS: 40 DEGREES
EKG T AXIS: 36 DEGREES
EKG VENTRICULAR RATE: 83 BPM
GFR AFRICAN AMERICAN: >60 ML/MIN
GFR NON-AFRICAN AMERICAN: >60 ML/MIN
GFR SERPL CREATININE-BSD FRML MDRD: ABNORMAL ML/MIN/{1.73_M2}
GFR SERPL CREATININE-BSD FRML MDRD: ABNORMAL ML/MIN/{1.73_M2}
GLUCOSE BLD-MCNC: 146 MG/DL (ref 70–99)
HCT VFR BLD CALC: 38.5 % (ref 40.7–50.3)
HEMOGLOBIN: 13.3 G/DL (ref 13–17)
MCH RBC QN AUTO: 29.4 PG (ref 25.2–33.5)
MCHC RBC AUTO-ENTMCNC: 34.5 G/DL (ref 28.4–34.8)
MCV RBC AUTO: 85 FL (ref 82.6–102.9)
NRBC AUTOMATED: 0 PER 100 WBC
PDW BLD-RTO: 13.2 % (ref 11.8–14.4)
PLATELET # BLD: 193 K/UL (ref 138–453)
PMV BLD AUTO: 10 FL (ref 8.1–13.5)
POTASSIUM SERPL-SCNC: 4.4 MMOL/L (ref 3.7–5.3)
RBC # BLD: 4.53 M/UL (ref 4.21–5.77)
SODIUM BLD-SCNC: 141 MMOL/L (ref 135–144)
WBC # BLD: 9.6 K/UL (ref 3.5–11.3)

## 2019-12-11 PROCEDURE — 2580000003 HC RX 258: Performed by: STUDENT IN AN ORGANIZED HEALTH CARE EDUCATION/TRAINING PROGRAM

## 2019-12-11 PROCEDURE — 51700 IRRIGATION OF BLADDER: CPT

## 2019-12-11 PROCEDURE — 85027 COMPLETE CBC AUTOMATED: CPT

## 2019-12-11 PROCEDURE — 6370000000 HC RX 637 (ALT 250 FOR IP): Performed by: STUDENT IN AN ORGANIZED HEALTH CARE EDUCATION/TRAINING PROGRAM

## 2019-12-11 PROCEDURE — 6360000002 HC RX W HCPCS: Performed by: STUDENT IN AN ORGANIZED HEALTH CARE EDUCATION/TRAINING PROGRAM

## 2019-12-11 PROCEDURE — 96374 THER/PROPH/DIAG INJ IV PUSH: CPT

## 2019-12-11 PROCEDURE — 93010 ELECTROCARDIOGRAM REPORT: CPT | Performed by: INTERNAL MEDICINE

## 2019-12-11 PROCEDURE — G0378 HOSPITAL OBSERVATION PER HR: HCPCS

## 2019-12-11 PROCEDURE — 80048 BASIC METABOLIC PNL TOTAL CA: CPT

## 2019-12-11 PROCEDURE — 36415 COLL VENOUS BLD VENIPUNCTURE: CPT

## 2019-12-11 RX ORDER — CIPROFLOXACIN 500 MG/1
500 TABLET, FILM COATED ORAL 2 TIMES DAILY
Qty: 6 TABLET | Refills: 0 | Status: SHIPPED | OUTPATIENT
Start: 2019-12-11 | End: 2019-12-12 | Stop reason: HOSPADM

## 2019-12-11 RX ORDER — LIDOCAINE HYDROCHLORIDE 20 MG/ML
JELLY TOPICAL PRN
Status: DISCONTINUED | OUTPATIENT
Start: 2019-12-11 | End: 2019-12-12 | Stop reason: HOSPADM

## 2019-12-11 RX ORDER — OXYCODONE HYDROCHLORIDE AND ACETAMINOPHEN 5; 325 MG/1; MG/1
1 TABLET ORAL EVERY 6 HOURS PRN
Qty: 20 TABLET | Refills: 0 | Status: SHIPPED | OUTPATIENT
Start: 2019-12-11 | End: 2019-12-16

## 2019-12-11 RX ORDER — DOCUSATE SODIUM 100 MG/1
100 CAPSULE, LIQUID FILLED ORAL 2 TIMES DAILY
Qty: 60 CAPSULE | Refills: 0 | Status: SHIPPED | OUTPATIENT
Start: 2019-12-11 | End: 2020-01-10

## 2019-12-11 RX ADMIN — OXYCODONE HYDROCHLORIDE 5 MG: 5 TABLET ORAL at 10:07

## 2019-12-11 RX ADMIN — DOCUSATE SODIUM 100 MG: 100 CAPSULE, LIQUID FILLED ORAL at 21:00

## 2019-12-11 RX ADMIN — OXYCODONE HYDROCHLORIDE 10 MG: 5 TABLET ORAL at 18:46

## 2019-12-11 RX ADMIN — ACETAMINOPHEN 650 MG: 325 TABLET ORAL at 18:46

## 2019-12-11 RX ADMIN — LISINOPRIL 40 MG: 20 TABLET ORAL at 10:02

## 2019-12-11 RX ADMIN — ACETAMINOPHEN 650 MG: 325 TABLET ORAL at 01:45

## 2019-12-11 RX ADMIN — OXYCODONE HYDROCHLORIDE 10 MG: 5 TABLET ORAL at 13:55

## 2019-12-11 RX ADMIN — DOCUSATE SODIUM 100 MG: 100 CAPSULE, LIQUID FILLED ORAL at 08:49

## 2019-12-11 RX ADMIN — AMLODIPINE BESYLATE 5 MG: 5 TABLET ORAL at 10:01

## 2019-12-11 RX ADMIN — LIDOCAINE HYDROCHLORIDE: 20 JELLY TOPICAL at 13:55

## 2019-12-11 RX ADMIN — DEXTROSE MONOHYDRATE 2 G: 50 INJECTION, SOLUTION INTRAVENOUS at 06:31

## 2019-12-11 RX ADMIN — POLYETHYLENE GLYCOL 3350 17 G: 17 POWDER, FOR SOLUTION ORAL at 08:49

## 2019-12-11 RX ADMIN — HYDROCHLOROTHIAZIDE 12.5 MG: 12.5 CAPSULE ORAL at 10:02

## 2019-12-11 RX ADMIN — MORPHINE SULFATE 4 MG: 4 INJECTION INTRAVENOUS at 00:06

## 2019-12-11 RX ADMIN — FERROUS SULFATE TAB EC 325 MG (65 MG FE EQUIVALENT) 325 MG: 325 (65 FE) TABLET DELAYED RESPONSE at 21:00

## 2019-12-11 RX ADMIN — PANTOPRAZOLE SODIUM 40 MG: 40 TABLET, DELAYED RELEASE ORAL at 08:50

## 2019-12-11 RX ADMIN — FERROUS SULFATE TAB EC 325 MG (65 MG FE EQUIVALENT) 325 MG: 325 (65 FE) TABLET DELAYED RESPONSE at 10:02

## 2019-12-11 RX ADMIN — PANTOPRAZOLE SODIUM 40 MG: 40 TABLET, DELAYED RELEASE ORAL at 06:30

## 2019-12-11 RX ADMIN — ACETAMINOPHEN 650 MG: 325 TABLET ORAL at 13:55

## 2019-12-11 RX ADMIN — ACETAMINOPHEN 650 MG: 325 TABLET ORAL at 06:30

## 2019-12-11 ASSESSMENT — PAIN SCALES - GENERAL
PAINLEVEL_OUTOF10: 0
PAINLEVEL_OUTOF10: 3
PAINLEVEL_OUTOF10: 4
PAINLEVEL_OUTOF10: 5
PAINLEVEL_OUTOF10: 8
PAINLEVEL_OUTOF10: 0
PAINLEVEL_OUTOF10: 0
PAINLEVEL_OUTOF10: 8
PAINLEVEL_OUTOF10: 5
PAINLEVEL_OUTOF10: 0
PAINLEVEL_OUTOF10: 8

## 2019-12-12 VITALS
SYSTOLIC BLOOD PRESSURE: 145 MMHG | HEIGHT: 73 IN | TEMPERATURE: 97.2 F | OXYGEN SATURATION: 95 % | DIASTOLIC BLOOD PRESSURE: 78 MMHG | WEIGHT: 212.08 LBS | BODY MASS INDEX: 28.11 KG/M2 | RESPIRATION RATE: 16 BRPM | HEART RATE: 85 BPM

## 2019-12-12 LAB
CREATININE FLUID: 1.1 MG/DL
CULTURE: ABNORMAL
HCT VFR BLD CALC: 35.7 % (ref 40.7–50.3)
HEMOGLOBIN: 11.8 G/DL (ref 13–17)
Lab: ABNORMAL
MCH RBC QN AUTO: 29.6 PG (ref 25.2–33.5)
MCHC RBC AUTO-ENTMCNC: 33.1 G/DL (ref 28.4–34.8)
MCV RBC AUTO: 89.7 FL (ref 82.6–102.9)
NRBC AUTOMATED: 0 PER 100 WBC
PDW BLD-RTO: 13.8 % (ref 11.8–14.4)
PLATELET # BLD: 178 K/UL (ref 138–453)
PMV BLD AUTO: 11 FL (ref 8.1–13.5)
RBC # BLD: 3.98 M/UL (ref 4.21–5.77)
SPECIMEN DESCRIPTION: ABNORMAL
SPECIMEN TYPE: NORMAL
SURGICAL PATHOLOGY REPORT: NORMAL
WBC # BLD: 8.9 K/UL (ref 3.5–11.3)

## 2019-12-12 PROCEDURE — 36415 COLL VENOUS BLD VENIPUNCTURE: CPT

## 2019-12-12 PROCEDURE — 96372 THER/PROPH/DIAG INJ SC/IM: CPT

## 2019-12-12 PROCEDURE — G0378 HOSPITAL OBSERVATION PER HR: HCPCS

## 2019-12-12 PROCEDURE — 82570 ASSAY OF URINE CREATININE: CPT

## 2019-12-12 PROCEDURE — 2580000003 HC RX 258: Performed by: STUDENT IN AN ORGANIZED HEALTH CARE EDUCATION/TRAINING PROGRAM

## 2019-12-12 PROCEDURE — 6370000000 HC RX 637 (ALT 250 FOR IP): Performed by: STUDENT IN AN ORGANIZED HEALTH CARE EDUCATION/TRAINING PROGRAM

## 2019-12-12 PROCEDURE — 85027 COMPLETE CBC AUTOMATED: CPT

## 2019-12-12 PROCEDURE — 6360000002 HC RX W HCPCS: Performed by: STUDENT IN AN ORGANIZED HEALTH CARE EDUCATION/TRAINING PROGRAM

## 2019-12-12 RX ORDER — HEPARIN SODIUM 5000 [USP'U]/ML
5000 INJECTION, SOLUTION INTRAVENOUS; SUBCUTANEOUS EVERY 8 HOURS SCHEDULED
Status: DISCONTINUED | OUTPATIENT
Start: 2019-12-12 | End: 2019-12-12 | Stop reason: HOSPADM

## 2019-12-12 RX ORDER — DOXYCYCLINE HYCLATE 100 MG
100 TABLET ORAL 2 TIMES DAILY
Qty: 20 TABLET | Refills: 0 | Status: SHIPPED | OUTPATIENT
Start: 2019-12-12 | End: 2019-12-22

## 2019-12-12 RX ORDER — CIPROFLOXACIN 500 MG/1
500 TABLET, FILM COATED ORAL 2 TIMES DAILY
Qty: 6 TABLET | Refills: 0 | Status: SHIPPED | OUTPATIENT
Start: 2019-12-12 | End: 2019-12-15

## 2019-12-12 RX ADMIN — DOCUSATE SODIUM 100 MG: 100 CAPSULE, LIQUID FILLED ORAL at 09:38

## 2019-12-12 RX ADMIN — AMLODIPINE BESYLATE 5 MG: 5 TABLET ORAL at 09:38

## 2019-12-12 RX ADMIN — PANTOPRAZOLE SODIUM 40 MG: 40 TABLET, DELAYED RELEASE ORAL at 06:40

## 2019-12-12 RX ADMIN — OXYCODONE HYDROCHLORIDE 10 MG: 5 TABLET ORAL at 00:02

## 2019-12-12 RX ADMIN — FERROUS SULFATE TAB EC 325 MG (65 MG FE EQUIVALENT) 325 MG: 325 (65 FE) TABLET DELAYED RESPONSE at 09:38

## 2019-12-12 RX ADMIN — HYDROCHLOROTHIAZIDE 12.5 MG: 12.5 CAPSULE ORAL at 09:38

## 2019-12-12 RX ADMIN — LISINOPRIL 40 MG: 20 TABLET ORAL at 09:38

## 2019-12-12 RX ADMIN — POLYETHYLENE GLYCOL 3350 17 G: 17 POWDER, FOR SOLUTION ORAL at 09:38

## 2019-12-12 RX ADMIN — Medication 10 ML: at 09:39

## 2019-12-12 RX ADMIN — ACETAMINOPHEN 650 MG: 325 TABLET ORAL at 08:16

## 2019-12-12 RX ADMIN — HEPARIN SODIUM 5000 UNITS: 5000 INJECTION INTRAVENOUS; SUBCUTANEOUS at 08:04

## 2019-12-12 RX ADMIN — ACETAMINOPHEN 650 MG: 325 TABLET ORAL at 01:58

## 2019-12-12 ASSESSMENT — PAIN SCALES - GENERAL
PAINLEVEL_OUTOF10: 8
PAINLEVEL_OUTOF10: 7
PAINLEVEL_OUTOF10: 8
PAINLEVEL_OUTOF10: 8

## 2019-12-12 ASSESSMENT — PAIN DESCRIPTION - LOCATION
LOCATION: ABDOMEN
LOCATION: ABDOMEN

## 2019-12-12 ASSESSMENT — PAIN DESCRIPTION - PAIN TYPE
TYPE: SURGICAL PAIN
TYPE: SURGICAL PAIN

## 2019-12-16 ENCOUNTER — NURSE ONLY (OUTPATIENT)
Dept: LAB | Age: 69
End: 2019-12-16
Payer: MEDICARE

## 2019-12-16 DIAGNOSIS — E53.8 VITAMIN B12 DEFICIENCY: Primary | ICD-10-CM

## 2019-12-16 PROCEDURE — 96372 THER/PROPH/DIAG INJ SC/IM: CPT | Performed by: INTERNAL MEDICINE

## 2019-12-16 RX ADMIN — CYANOCOBALAMIN 1000 MCG: 1000 INJECTION, SOLUTION INTRAMUSCULAR; SUBCUTANEOUS at 16:22

## 2019-12-19 ENCOUNTER — HOSPITAL ENCOUNTER (OUTPATIENT)
Dept: GENERAL RADIOLOGY | Age: 69
Discharge: HOME | End: 2019-12-21
Payer: MEDICARE

## 2019-12-19 DIAGNOSIS — I10 ESSENTIAL HYPERTENSION: ICD-10-CM

## 2019-12-19 DIAGNOSIS — N40.0 ENLARGED PROSTATE: ICD-10-CM

## 2019-12-19 PROCEDURE — 51600 INJECTION FOR BLADDER X-RAY: CPT

## 2019-12-19 PROCEDURE — 74430 CONTRAST X-RAY BLADDER: CPT

## 2019-12-19 PROCEDURE — 6360000004 HC RX CONTRAST MEDICATION: Performed by: UROLOGY

## 2019-12-19 RX ORDER — BENAZEPRIL HYDROCHLORIDE 40 MG/1
TABLET, FILM COATED ORAL
Qty: 90 TABLET | Refills: 3 | Status: SHIPPED | OUTPATIENT
Start: 2019-12-19 | End: 2021-01-01

## 2019-12-19 RX ADMIN — IOTHALAMATE MEGLUMINE 250 ML: 172 INJECTION URETERAL at 08:56

## 2020-01-15 ENCOUNTER — NURSE ONLY (OUTPATIENT)
Dept: LAB | Age: 70
End: 2020-01-15
Payer: MEDICARE

## 2020-01-15 PROCEDURE — 96372 THER/PROPH/DIAG INJ SC/IM: CPT

## 2020-01-15 RX ADMIN — CYANOCOBALAMIN 1000 MCG: 1000 INJECTION, SOLUTION INTRAMUSCULAR; SUBCUTANEOUS at 08:33

## 2020-01-21 ENCOUNTER — OFFICE VISIT (OUTPATIENT)
Dept: PRIMARY CARE CLINIC | Age: 70
End: 2020-01-21
Payer: MEDICARE

## 2020-01-21 VITALS
OXYGEN SATURATION: 98 % | WEIGHT: 207.2 LBS | HEIGHT: 73 IN | DIASTOLIC BLOOD PRESSURE: 72 MMHG | RESPIRATION RATE: 16 BRPM | BODY MASS INDEX: 27.46 KG/M2 | SYSTOLIC BLOOD PRESSURE: 118 MMHG | TEMPERATURE: 97.9 F | HEART RATE: 91 BPM

## 2020-01-21 PROCEDURE — 99213 OFFICE O/P EST LOW 20 MIN: CPT | Performed by: NURSE PRACTITIONER

## 2020-01-21 PROCEDURE — G8427 DOCREV CUR MEDS BY ELIG CLIN: HCPCS | Performed by: NURSE PRACTITIONER

## 2020-01-21 PROCEDURE — G8417 CALC BMI ABV UP PARAM F/U: HCPCS | Performed by: NURSE PRACTITIONER

## 2020-01-21 PROCEDURE — 3017F COLORECTAL CA SCREEN DOC REV: CPT | Performed by: NURSE PRACTITIONER

## 2020-01-21 PROCEDURE — 99212 OFFICE O/P EST SF 10 MIN: CPT | Performed by: NURSE PRACTITIONER

## 2020-01-21 PROCEDURE — G8484 FLU IMMUNIZE NO ADMIN: HCPCS | Performed by: NURSE PRACTITIONER

## 2020-01-21 PROCEDURE — 4040F PNEUMOC VAC/ADMIN/RCVD: CPT | Performed by: NURSE PRACTITIONER

## 2020-01-21 PROCEDURE — 4004F PT TOBACCO SCREEN RCVD TLK: CPT | Performed by: NURSE PRACTITIONER

## 2020-01-21 PROCEDURE — 1123F ACP DISCUSS/DSCN MKR DOCD: CPT | Performed by: NURSE PRACTITIONER

## 2020-01-21 RX ORDER — ONDANSETRON 4 MG/1
4 TABLET, ORALLY DISINTEGRATING ORAL EVERY 8 HOURS PRN
Qty: 10 TABLET | Refills: 0 | Status: SHIPPED | OUTPATIENT
Start: 2020-01-21 | End: 2020-01-25

## 2020-01-21 ASSESSMENT — PATIENT HEALTH QUESTIONNAIRE - PHQ9
SUM OF ALL RESPONSES TO PHQ QUESTIONS 1-9: 0
SUM OF ALL RESPONSES TO PHQ9 QUESTIONS 1 & 2: 0
2. FEELING DOWN, DEPRESSED OR HOPELESS: 0
SUM OF ALL RESPONSES TO PHQ QUESTIONS 1-9: 0
1. LITTLE INTEREST OR PLEASURE IN DOING THINGS: 0

## 2020-01-21 ASSESSMENT — ENCOUNTER SYMPTOMS
COUGH: 0
DIARRHEA: 1
ABDOMINAL PAIN: 1
VOMITING: 1
BLOATING: 1
RESPIRATORY NEGATIVE: 1
NAUSEA: 1
FLATUS: 1

## 2020-01-21 NOTE — PATIENT INSTRUCTIONS
clean. Wash your hands, cutting boards, and countertops with hot soapy water frequently. · Cook meat until it is well done. · Do not eat raw eggs or uncooked sauces made with raw eggs. · Do not take chances. If food looks or tastes spoiled, throw it out. When should you call for help? Call 911 anytime you think you may need emergency care. For example, call if:    · You vomit blood or what looks like coffee grounds.     · You passed out (lost consciousness).     · You pass maroon or very bloody stools.    Call your doctor now or seek immediate medical care if:    · You have severe belly pain.     · You have signs of needing more fluids. You have sunken eyes, a dry mouth, and pass only a little dark urine.     · You feel like you are going to faint.     · You have increased belly pain that does not go away in 1 to 2 days.     · You have new or increased nausea, or you are vomiting.     · You have a new or higher fever.     · Your stools are black and tarlike or have streaks of blood.    Watch closely for changes in your health, and be sure to contact your doctor if:    · You are dizzy or lightheaded.     · You urinate less than usual, or your urine is dark yellow or brown.     · You do not feel better with each day that goes by. Where can you learn more? Go to https://[email or web address]. org and sign in to your [employer] account. Enter N142 in the [facility] box to learn more about \"Gastroenteritis: Care Instructions. \"     If you do not have an account, please click on the \"Sign Up Now\" link. Current as of: June 9, 2019  Content Version: 12.3  © 6406-1331 [employer]. Care instructions adapted under license by [facility] ([facility]). If you have questions about a medical condition or this instruction, always ask your healthcare professional. [address]  any warranty or liability for your use of this information.

## 2020-01-21 NOTE — PROGRESS NOTES
Vomiting 10 tablet 0    benazepril (LOTENSIN) 40 MG tablet take 1 tablet by mouth once daily 90 tablet 3    lidocaine (XYLOCAINE) 5 % ointment Apply topically as needed. 30 g 0    amLODIPine (NORVASC) 5 MG tablet Take 1 tablet by mouth daily 30 tablet 5    omeprazole (PRILOSEC) 20 MG delayed release capsule Take 1 capsule by mouth daily 180 capsule 3    hydrochlorothiazide (MICROZIDE) 12.5 MG capsule Take 1 capsule by mouth daily 90 capsule 3    ferrous sulfate 325 (65 FE) MG tablet Take 325 mg by mouth 2 times daily       NONFORMULARY Take 1 tablet by mouth daily Centrum Men's multivitamin. Current Facility-Administered Medications   Medication Dose Route Frequency Provider Last Rate Last Dose    cyanocobalamin injection 1,000 mcg  1 mg Intramuscular Q30 Days William Cuadra MD   1,000 mcg at 01/15/20 1147        He has No Known Allergies. Kwabena Pitt He  reports that he has been smoking pipe. He started smoking about 50 years ago. He has a 0.10 pack-year smoking history. He has never used smokeless tobacco.      Objective:    Vitals:    01/21/20 1803   BP: 118/72   Site: Right Upper Arm   Position: Sitting   Cuff Size: Medium Adult   Pulse: 91   Resp: 16   Temp: 97.9 °F (36.6 °C)   TempSrc: Tympanic   SpO2: 98%   Weight: 207 lb 3.2 oz (94 kg)   Height: 6' 1\" (1.854 m)     Body mass index is 27.34 kg/m². Review of Systems   Constitutional: Positive for appetite change, chills and fatigue. Negative for fever. Respiratory: Negative. Negative for cough. Cardiovascular: Negative. Gastrointestinal: Positive for abdominal pain (lower abdominal cramping prior to diarrhea stool), bloating, diarrhea, flatus, nausea and vomiting (2 episodes (1 on saturday and 1 on monday)). Musculoskeletal: Negative for myalgias. Neurological: Negative for headaches. Physical Exam  Vitals signs and nursing note reviewed. Constitutional:       Appearance: He is well-developed. HENT:      Head: Normocephalic. Jaw: There is normal jaw occlusion. Right Ear: Hearing, tympanic membrane, ear canal and external ear normal.      Left Ear: Hearing, tympanic membrane, ear canal and external ear normal.      Nose: Nose normal.      Right Turbinates: Swollen. Left Turbinates: Swollen. Mouth/Throat:      Lips: Pink. Mouth: Mucous membranes are moist.      Pharynx: Oropharynx is clear. Uvula midline. Eyes:      Pupils: Pupils are equal, round, and reactive to light. Neck:      Musculoskeletal: Normal range of motion and neck supple. Cardiovascular:      Rate and Rhythm: Normal rate and regular rhythm. Heart sounds: Normal heart sounds. Pulmonary:      Effort: Pulmonary effort is normal.      Breath sounds: Normal breath sounds. Abdominal:      General: Abdomen is flat. Bowel sounds are increased. Palpations: Abdomen is soft. Tenderness: There is no tenderness. Lymphadenopathy:      Cervical: No cervical adenopathy. Skin:     General: Skin is warm and dry. Neurological:      Mental Status: He is alert and oriented to person, place, and time. Psychiatric:         Behavior: Behavior normal.         Thought Content: Thought content normal.       Assessment and Plan:    No results found for this visit on 01/21/20. Diagnosis Orders   1. Acute gastroenteritis     2. Nausea  ondansetron (ZOFRAN-ODT) 4 MG disintegrating tablet     I recommended the BRAT diet and take small sips of water. I also recommended taking a zofran before trying to eat. We discussed the symptoms of dehydration. Instructed to follow up with PCP if symptoms have not improved or worsen. The use, risks, benefits, and side effects of prescribed or recommended medications were discussed. All questions were answered and the patient/caregiver voiced understanding. No orders of the defined types were placed in this encounter.         Electronically signed by ELROY Gilmore CNP on 1/21/20 at 6:19 PM

## 2020-01-22 ENCOUNTER — OFFICE VISIT (OUTPATIENT)
Dept: UROLOGY | Age: 70
End: 2020-01-22
Payer: MEDICARE

## 2020-01-22 VITALS
SYSTOLIC BLOOD PRESSURE: 114 MMHG | OXYGEN SATURATION: 96 % | DIASTOLIC BLOOD PRESSURE: 70 MMHG | WEIGHT: 208 LBS | HEART RATE: 104 BPM | TEMPERATURE: 97 F | BODY MASS INDEX: 27.57 KG/M2 | HEIGHT: 73 IN

## 2020-01-22 PROCEDURE — 99213 OFFICE O/P EST LOW 20 MIN: CPT

## 2020-01-22 PROCEDURE — 99024 POSTOP FOLLOW-UP VISIT: CPT | Performed by: UROLOGY

## 2020-01-22 NOTE — PROGRESS NOTES
Jack Mcfarlane MD   Urology Clinic Progress Note      Patient:  Dena Rivera  YOB: 1950  Date: 1/22/2020    HISTORY OF PRESENT ILLNESS:   The patient is a 71 y.o. male who presents today for follow-up for the following problem(s): elevated PSA  Overall the problem(s) : are improving. Associated Symptoms: No dysuria, gross hematuria. Pain Severity:      Summary of old records: 7.92 on 7-18-17/last psa 5.68 on 1-17-17/prostate biopsy 2012 and 2014/ both benign. Prostate gland measures 80.7 mL in volume at that time. Nocturia 1x  On diuretic  BPH and OAB- on Ditropan and flomax    Additional History:  Duration: 2 week  Location: bladder  alleviating factors: amaya  Aggravating factors: constipation- improving with miralax  Associated with: pelvic pain  Frequency: all the time    Stopped Ditropan 10mg and flomax BID      PSA decreased to 5.5  PSA, Free Pct 29.1  %     Robo simple prostatectomy 12/9/2019  Path negative  On 1x nocturia, used to be 3-4x  Stronger stream  Minimal dysuria      Mri Prostate W Wo Contrast  Result Date: 1/24/2018   volume of 130.7 cc  *Peripheral zone:  Scattered indistinct areas of hypointensity seen on T2 and ADC imaging. PI-RADS 2 *Central/transitional zone:  Diffusely enlarged heterogeneous nodular changes of BPH including right posterolateral exophytic component which partially obscures the right peripheral zone. PI-RADS 2 *Enlarged prostate gland as measured above with impingement on the bladder which demonstrates mild wall trabeculation, please correlate for chronic outlet obstruction. Last several PSA's:  Lab Results   Component Value Date    PSA 6.81 (H) 08/02/2019    PSA 5.5 (H) 08/02/2019    PSA 9.73 (H) 07/03/2019       Last total testosterone:  No results found for: TESTOSTERONE    Urinalysis today:  No results found for this visit on 01/22/20.     Last BUN and creatinine:  Lab Results   Component Value Date    BUN 24 (H) 12/11/2019     Lab Medications Marked as Taking for the 1/22/20 encounter (Office Visit) with Rohith Leung MD   Medication Sig Dispense Refill    ondansetron (ZOFRAN-ODT) 4 MG disintegrating tablet Take 1 tablet by mouth every 8 hours as needed for Nausea or Vomiting 10 tablet 0    benazepril (LOTENSIN) 40 MG tablet take 1 tablet by mouth once daily 90 tablet 3    amLODIPine (NORVASC) 5 MG tablet Take 1 tablet by mouth daily 30 tablet 5    omeprazole (PRILOSEC) 20 MG delayed release capsule Take 1 capsule by mouth daily 180 capsule 3    hydrochlorothiazide (MICROZIDE) 12.5 MG capsule Take 1 capsule by mouth daily 90 capsule 3    ferrous sulfate 325 (65 FE) MG tablet Take 325 mg by mouth 2 times daily       NONFORMULARY Take 1 tablet by mouth daily Centrum Men's multivitamin. Patient has no known allergies. Social History     Tobacco Use   Smoking Status Light Tobacco Smoker    Packs/day: 0.01    Years: 10.00    Pack years: 0.10    Types: Pipe    Start date: 1/1/1970   Smokeless Tobacco Never Used   Tobacco Comment    Rare pipe smoker. 4 BOWLS A WEEK No inhalation. Has never smoked cigarettes. Social History     Substance and Sexual Activity   Alcohol Use Yes    Alcohol/week: 0.0 standard drinks    Frequency: Monthly or less    Drinks per session: 1 or 2    Binge frequency: Never    Comment: WHISKEY OR WINE- 1 OR 2 A MONTH       REVIEW OF SYSTEMS:  Constitutional: negative  Eyes: negative  Respiratory: negative  Cardiovascular: negative  Gastrointestinal: negative  Musculoskeletal: negative  Genitourinary: negative except for what is in HPI  Skin: negative   Neurological: negative  Hematological/Lymphatic: negative  Psychological: negative    Physical Exam:      Vitals:    01/22/20 0909   BP: 114/70   Pulse: 104   Temp: 97 °F (36.1 °C)   SpO2: 96%     Patient is a 71 y.o. male in no acute distress and alert and oriented to person, place and time.   NAD, A/o  Non labored respiration  Normal peripheral

## 2020-02-10 ENCOUNTER — OFFICE VISIT (OUTPATIENT)
Dept: DERMATOLOGY | Age: 70
End: 2020-02-10
Payer: MEDICARE

## 2020-02-10 ENCOUNTER — HOSPITAL ENCOUNTER (OUTPATIENT)
Age: 70
Setting detail: SPECIMEN
Discharge: HOME | End: 2020-02-10
Payer: MEDICARE

## 2020-02-10 VITALS
DIASTOLIC BLOOD PRESSURE: 80 MMHG | HEART RATE: 113 BPM | OXYGEN SATURATION: 97 % | BODY MASS INDEX: 27.55 KG/M2 | WEIGHT: 207.89 LBS | SYSTOLIC BLOOD PRESSURE: 140 MMHG | HEIGHT: 73 IN

## 2020-02-10 PROCEDURE — G8417 CALC BMI ABV UP PARAM F/U: HCPCS | Performed by: DERMATOLOGY

## 2020-02-10 PROCEDURE — 1123F ACP DISCUSS/DSCN MKR DOCD: CPT | Performed by: DERMATOLOGY

## 2020-02-10 PROCEDURE — 11103 TANGNTL BX SKIN EA SEP/ADDL: CPT | Performed by: DERMATOLOGY

## 2020-02-10 PROCEDURE — 3017F COLORECTAL CA SCREEN DOC REV: CPT | Performed by: DERMATOLOGY

## 2020-02-10 PROCEDURE — 4040F PNEUMOC VAC/ADMIN/RCVD: CPT | Performed by: DERMATOLOGY

## 2020-02-10 PROCEDURE — 11102 TANGNTL BX SKIN SINGLE LES: CPT | Performed by: DERMATOLOGY

## 2020-02-10 PROCEDURE — G8484 FLU IMMUNIZE NO ADMIN: HCPCS | Performed by: DERMATOLOGY

## 2020-02-10 PROCEDURE — 4004F PT TOBACCO SCREEN RCVD TLK: CPT | Performed by: DERMATOLOGY

## 2020-02-10 PROCEDURE — 99202 OFFICE O/P NEW SF 15 MIN: CPT | Performed by: DERMATOLOGY

## 2020-02-10 PROCEDURE — 17000 DESTRUCT PREMALG LESION: CPT | Performed by: DERMATOLOGY

## 2020-02-10 PROCEDURE — G8427 DOCREV CUR MEDS BY ELIG CLIN: HCPCS | Performed by: DERMATOLOGY

## 2020-02-10 PROCEDURE — 99213 OFFICE O/P EST LOW 20 MIN: CPT

## 2020-02-12 LAB — DERMATOLOGY PATHOLOGY REPORT: NORMAL

## 2020-02-12 NOTE — PROGRESS NOTES
Smoker     Packs/day: 0.01     Years: 10.00     Pack years: 0.10     Types: Pipe     Start date: 1/1/1970    Smokeless tobacco: Never Used    Tobacco comment: Rare pipe smoker. 4 BOWLS A WEEK No inhalation. Has never smoked cigarettes. Substance Use Topics    Alcohol use: Yes     Alcohol/week: 0.0 standard drinks     Frequency: Monthly or less     Drinks per session: 1 or 2     Binge frequency: Never     Comment: WHISKEY OR WINE- 1 OR 2 A MONTH       REVIEW OF SYSTEMS:  Review of Systems   Constitutional: Negative. Skin:Denies any new changing, growing or bleeding lesions or rashes except as described in the HPI     PHYSICAL EXAM:   BP (!) 140/80 (Site: Left Upper Arm, Position: Sitting, Cuff Size: Medium Adult)   Pulse 113   Ht 6' 0.99\" (1.854 m)   Wt 207 lb 14.3 oz (94.3 kg)   SpO2 97%   BMI 27.43 kg/m²     General Exam:  General Appearance: No acute distress, Well nourished     Neuro: Alert and oriented to person, place and time  Psych: Normal affect   Lymph Node: Not performed    Cutaneous Exam: Performed as documented in clinic note below. Sun-exposed skin,which includes the head/face, neck, both arms, digits and/or nails was examined. Pertinent Physical Exam Findings:  Physical Exam  Skin:            Comments: AK on nasal tip         Medical Necessity of Exam Performed:   Distribution of patient concerns    Additional Diagnostic Testing performed during exam: Not performed ,  Not performed    ASSESSMENT:   Diagnosis Orders   1. Neoplasm of uncertain behavior of skin     2. Actinic skin damage     3. Papules     4. Keratosis, actinic         Plan of Action is as Follows:  Assessment 1.  Neoplasm of uncertain behavior of skin  - Left cheek T.E versus BCC  - Right hand SCC r/o wart  Shave Biopsy: After verbal consent including the risks of bleeding infection and scar and cleaning with alcohol the lesions on the face and hand was anesthetized with (LMX AND/OR 1% lidocaine with epinephrine) and was removed with a dermablade. Hemostasis was achieved with aluminum chloride and Vaseline and a bandage were applied. 2. Actinic skin damage  Discussed sunscreen and sun protection - recommend SPF 30 or greater sunscreen applied every 2-3 hours, sun protective clothing and avoidance of peak sun. 3. Facial Papules  DDX includes cowden, brook spiegler, BHD - will await other biopsies and then further investigate     4. Keratosis, actinic  Cryotherapy: After verbal consent was obtained including discussion of the risks (lesion persistence, lesion recurrence and hypo/hyperpigmentation) and benefits (resolution of the lesion) 1 total Actinic Keratosis on the nasal tip were treated once with liquid nitrogen to achieve a 2-3 mm freeze border. Patient Instructions   BIOPSY WOUND CARE    A biopsy is where a small piece of skin tissue is removed and examined by a pathologist.  When a biopsy is done, there is a small wound site that requires proper care to prevent infection and scarring. Some biopsies require sutures and their removal.    How to Care for Biopsy Wound    A.  Leave band-aid or dressing on for 24 hours. B. Wash two times a day with soap and water. C.  Let the wound air dry, then apply Vaseline ointment and cover with a Band-Aid       unless otherwise instructed by your provider. D. If there is slight discomfort, you may give acetaminophen or ibuprofen. Bleeding:  Every effort is made to stop bleeding prior to you leaving the dermatology clinic. Unfortunately, people will occasionally start to bleed after leaving the clinic. If you start to bleed hold firm pressure for 15 minutes to the area. If you are on blood thinners I recommend keeping a product called wound seal (can buy at any drug store) at home as this can be a useful adjunct to stop bleeding.  If after holding pressure for 15 minutes and/or applying wound seal the bleeding does not stop please call the dermatology clinic. When To Call the Doctor    Call the Dermatology Clinic or your doctor if any of the following occur:    A. Redness and swelling  B. Tenderness and warm to touch  C.  Drainage from wound  D. Fever    Biopsy Results    Biopsy results are usually available in 1-2 weeks. We provide biopsy results in letters for begin results or we will call for any concerning results. If you have not heard from our staff please call the office within 2 weeks. Please call our office with any concerns at [phone]. Photo surveillance performed: Yes    Follow-up: based on biopsy    This note was created with the assistance of aspeech-recognition program.  Although the intention is to generate a document that actually reflects thecontent of the visit, no guarantees can be provided that every mistake has been identified and corrected by editing.     Electronically signed by Arely Bergman MD on 2/12/20 at 9:31 AM

## 2020-02-13 ENCOUNTER — TELEPHONE (OUTPATIENT)
Dept: DERMATOLOGY | Age: 70
End: 2020-02-13

## 2020-02-13 NOTE — TELEPHONE ENCOUNTER
Please inform Edwin Saldaña that both biopsies demonstrated basal cell carcinomas.  I have placed a referral for him to see Dr. Jimmie Hare in [place] for treatment [facility]) - please print and fax referal. Given that both lesions were basal cell carcinoma we had discussed that if this was the result we should biopsy a couple other spots on his face next to the nose to ensure they are also alright (please add on to upcomming Friday to do this - also we can further discuss results at that appt)    Ivonne Olguin

## 2020-02-14 ENCOUNTER — TELEPHONE (OUTPATIENT)
Dept: INTERNAL MEDICINE | Age: 70
End: 2020-02-14

## 2020-02-14 ENCOUNTER — NURSE ONLY (OUTPATIENT)
Dept: LAB | Age: 70
End: 2020-02-14
Payer: MEDICARE

## 2020-02-14 PROCEDURE — 96372 THER/PROPH/DIAG INJ SC/IM: CPT

## 2020-02-14 RX ORDER — CYANOCOBALAMIN 1000 UG/ML
1000 INJECTION INTRAMUSCULAR; SUBCUTANEOUS
Status: SHIPPED | OUTPATIENT
Start: 2020-02-14 | End: 2021-01-01

## 2020-02-14 RX ADMIN — CYANOCOBALAMIN 1000 MCG: 1000 INJECTION, SOLUTION INTRAMUSCULAR; SUBCUTANEOUS at 15:35

## 2020-02-14 NOTE — TELEPHONE ENCOUNTER
Informed patient of results and scheduled for 2/21/20 for another shave biopsy. Referral faxed to Dr. Ibis Washington and pt voiced understanding.

## 2020-02-20 RX ORDER — BENAZEPRIL HYDROCHLORIDE 40 MG/1
TABLET, FILM COATED ORAL
Qty: 90 TABLET | Refills: 3 | OUTPATIENT
Start: 2020-02-20

## 2020-02-21 ENCOUNTER — HOSPITAL ENCOUNTER (OUTPATIENT)
Age: 70
Setting detail: SPECIMEN
Discharge: HOME | End: 2020-02-21
Payer: MEDICARE

## 2020-02-21 ENCOUNTER — OFFICE VISIT (OUTPATIENT)
Dept: DERMATOLOGY | Age: 70
End: 2020-02-21
Payer: MEDICARE

## 2020-02-21 VITALS
DIASTOLIC BLOOD PRESSURE: 80 MMHG | SYSTOLIC BLOOD PRESSURE: 122 MMHG | BODY MASS INDEX: 27.43 KG/M2 | HEART RATE: 74 BPM | HEIGHT: 73 IN | WEIGHT: 207 LBS

## 2020-02-21 PROCEDURE — 99213 OFFICE O/P EST LOW 20 MIN: CPT

## 2020-02-21 PROCEDURE — 11102 TANGNTL BX SKIN SINGLE LES: CPT | Performed by: DERMATOLOGY

## 2020-02-25 LAB — DERMATOLOGY PATHOLOGY REPORT: NORMAL

## 2020-02-26 ENCOUNTER — TELEPHONE (OUTPATIENT)
Dept: DERMATOLOGY | Age: 70
End: 2020-02-26

## 2020-02-26 NOTE — TELEPHONE ENCOUNTER
I called and spoke with patient - lesion adjacent to nose is also a basal cell carcinoma. I spoke with Dr. Fareed Valdivia about this patient and plan is to transfer care to him as he likely will need to either have multiple surgeries or start vismodegib.      Please fax previous referral - both path reports and this note to Dr. Fareed Valdivia - patient will f/u with Dr. Fareed Valdivia both for biopsies and for future care,    Thanks,    Ernst Meigs

## 2020-03-13 RX ORDER — HYDROCHLOROTHIAZIDE 12.5 MG/1
12.5 CAPSULE, GELATIN COATED ORAL DAILY
Qty: 90 CAPSULE | Refills: 3 | Status: SHIPPED | OUTPATIENT
Start: 2020-03-13 | End: 2021-01-01 | Stop reason: SDUPTHER

## 2020-03-16 ENCOUNTER — NURSE ONLY (OUTPATIENT)
Dept: LAB | Age: 70
End: 2020-03-16
Payer: MEDICARE

## 2020-03-16 PROCEDURE — 96372 THER/PROPH/DIAG INJ SC/IM: CPT

## 2020-03-16 RX ADMIN — CYANOCOBALAMIN 1000 MCG: 1000 INJECTION, SOLUTION INTRAMUSCULAR at 15:48

## 2020-03-17 ENCOUNTER — HOSPITAL ENCOUNTER (OUTPATIENT)
Dept: LAB | Age: 70
Discharge: HOME | End: 2020-03-17
Payer: MEDICARE

## 2020-03-17 LAB
ALBUMIN SERPL-MCNC: 4.8 G/DL (ref 3.5–5.2)
ALBUMIN/GLOBULIN RATIO: 1.5 (ref 1–2.5)
ALP BLD-CCNC: 75 U/L (ref 40–129)
ALT SERPL-CCNC: 22 U/L (ref 5–41)
ANION GAP SERPL CALCULATED.3IONS-SCNC: 14 MMOL/L (ref 9–17)
AST SERPL-CCNC: 21 U/L
BILIRUB SERPL-MCNC: 0.88 MG/DL (ref 0.3–1.2)
BUN BLDV-MCNC: 27 MG/DL (ref 8–23)
BUN/CREAT BLD: 24 (ref 9–20)
CALCIUM SERPL-MCNC: 9.7 MG/DL (ref 8.6–10.4)
CHLORIDE BLD-SCNC: 101 MMOL/L (ref 98–107)
CHOLESTEROL/HDL RATIO: 8.4
CHOLESTEROL: 227 MG/DL
CO2: 24 MMOL/L (ref 20–31)
CREAT SERPL-MCNC: 1.14 MG/DL (ref 0.7–1.2)
GFR AFRICAN AMERICAN: >60 ML/MIN
GFR NON-AFRICAN AMERICAN: >60 ML/MIN
GFR SERPL CREATININE-BSD FRML MDRD: ABNORMAL ML/MIN/{1.73_M2}
GFR SERPL CREATININE-BSD FRML MDRD: ABNORMAL ML/MIN/{1.73_M2}
GLUCOSE BLD-MCNC: 115 MG/DL (ref 70–99)
HDLC SERPL-MCNC: 27 MG/DL
HEMOGLOBIN: 16.2 G/DL (ref 13–17)
IRON SATURATION: 31 % (ref 20–55)
IRON: 105 UG/DL (ref 59–158)
LDL CHOLESTEROL: 135 MG/DL (ref 0–130)
POTASSIUM SERPL-SCNC: 4.1 MMOL/L (ref 3.7–5.3)
SODIUM BLD-SCNC: 139 MMOL/L (ref 135–144)
TOTAL IRON BINDING CAPACITY: 338 UG/DL (ref 250–450)
TOTAL PROTEIN: 7.9 G/DL (ref 6.4–8.3)
TRIGL SERPL-MCNC: 326 MG/DL
UNSATURATED IRON BINDING CAPACITY: 233 UG/DL (ref 112–347)
VITAMIN B-12: >2000 PG/ML (ref 232–1245)
VLDLC SERPL CALC-MCNC: ABNORMAL MG/DL (ref 1–30)

## 2020-03-17 PROCEDURE — 36415 COLL VENOUS BLD VENIPUNCTURE: CPT

## 2020-03-17 PROCEDURE — 83550 IRON BINDING TEST: CPT

## 2020-03-17 PROCEDURE — 85018 HEMOGLOBIN: CPT

## 2020-03-17 PROCEDURE — 80061 LIPID PANEL: CPT

## 2020-03-17 PROCEDURE — 80053 COMPREHEN METABOLIC PANEL: CPT

## 2020-03-17 PROCEDURE — 82607 VITAMIN B-12: CPT

## 2020-03-17 PROCEDURE — 83540 ASSAY OF IRON: CPT

## 2020-03-20 RX ORDER — ATORVASTATIN CALCIUM 10 MG/1
10 TABLET, FILM COATED ORAL DAILY
Qty: 90 TABLET | Refills: 1 | Status: SHIPPED | OUTPATIENT
Start: 2020-03-20 | End: 2020-09-10

## 2020-03-24 ENCOUNTER — OFFICE VISIT (OUTPATIENT)
Dept: INTERNAL MEDICINE | Age: 70
End: 2020-03-24
Payer: MEDICARE

## 2020-03-24 VITALS
SYSTOLIC BLOOD PRESSURE: 98 MMHG | HEIGHT: 73 IN | DIASTOLIC BLOOD PRESSURE: 78 MMHG | HEART RATE: 104 BPM | RESPIRATION RATE: 18 BRPM | WEIGHT: 213 LBS | BODY MASS INDEX: 28.23 KG/M2

## 2020-03-24 PROCEDURE — 90715 TDAP VACCINE 7 YRS/> IM: CPT | Performed by: INTERNAL MEDICINE

## 2020-03-24 PROCEDURE — G8484 FLU IMMUNIZE NO ADMIN: HCPCS | Performed by: INTERNAL MEDICINE

## 2020-03-24 PROCEDURE — 99213 OFFICE O/P EST LOW 20 MIN: CPT | Performed by: INTERNAL MEDICINE

## 2020-03-24 PROCEDURE — 99214 OFFICE O/P EST MOD 30 MIN: CPT | Performed by: INTERNAL MEDICINE

## 2020-03-24 PROCEDURE — 1123F ACP DISCUSS/DSCN MKR DOCD: CPT | Performed by: INTERNAL MEDICINE

## 2020-03-24 PROCEDURE — G8427 DOCREV CUR MEDS BY ELIG CLIN: HCPCS | Performed by: INTERNAL MEDICINE

## 2020-03-24 PROCEDURE — 90471 IMMUNIZATION ADMIN: CPT | Performed by: INTERNAL MEDICINE

## 2020-03-24 PROCEDURE — G0009 ADMIN PNEUMOCOCCAL VACCINE: HCPCS | Performed by: INTERNAL MEDICINE

## 2020-03-24 PROCEDURE — 4004F PT TOBACCO SCREEN RCVD TLK: CPT | Performed by: INTERNAL MEDICINE

## 2020-03-24 PROCEDURE — 3017F COLORECTAL CA SCREEN DOC REV: CPT | Performed by: INTERNAL MEDICINE

## 2020-03-24 PROCEDURE — G8417 CALC BMI ABV UP PARAM F/U: HCPCS | Performed by: INTERNAL MEDICINE

## 2020-03-24 PROCEDURE — 4040F PNEUMOC VAC/ADMIN/RCVD: CPT | Performed by: INTERNAL MEDICINE

## 2020-03-24 RX ORDER — AMLODIPINE BESYLATE 5 MG/1
5 TABLET ORAL DAILY
Qty: 90 TABLET | Refills: 3 | Status: SHIPPED | OUTPATIENT
Start: 2020-03-24 | End: 2021-01-01 | Stop reason: SDUPTHER

## 2020-03-24 RX ORDER — OMEPRAZOLE 20 MG/1
20 CAPSULE, DELAYED RELEASE ORAL DAILY
Qty: 180 CAPSULE | Refills: 3 | Status: SHIPPED | OUTPATIENT
Start: 2020-03-24 | End: 2021-01-01 | Stop reason: SDUPTHER

## 2020-03-24 ASSESSMENT — ENCOUNTER SYMPTOMS
ABDOMINAL PAIN: 0
CONSTIPATION: 0
COUGH: 0
SHORTNESS OF BREATH: 0
BLOOD IN STOOL: 0
BACK PAIN: 0
DIARRHEA: 0
VOMITING: 0
EYE PAIN: 0
NAUSEA: 0
HEARTBURN: 1

## 2020-03-24 NOTE — PROGRESS NOTES
[place] [address]  Dept: [phone]  Dept Fax: [phone]  Loc: [phone]     Shruthi Lino is a 71 y.o. male who presents today for his medical conditions/complaintsas noted below. Shruthi Lino is c/o of   Chief Complaint   Patient presents with    Hypertension     6 month appt    Hyperlipidemia    Gastroesophageal Reflux         HPI:     Hypertension   This is a chronic (essential htn) problem. The current episode started more than 1 year ago. The problem has been waxing and waning since onset. The problem is controlled. Pertinent negatives include no chest pain, headaches, neck pain, palpitations or shortness of breath. Hyperlipidemia   This is a chronic problem. The current episode started more than 1 year ago. The problem is controlled. Recent lipid tests were reviewed and are variable. Pertinent negatives include no chest pain or shortness of breath. Gastroesophageal Reflux   He complains of heartburn. He reports no abdominal pain, no chest pain, no coughing or no nausea. This is a recurrent problem. The current episode started more than 1 month ago. The problem occurs rarely. The problem has been waxing and waning.        Hemoglobin A1C (%)   Date Value   09/17/2019 5.6            No results found for: LABMICR  LDL Cholesterol (mg/dL)   Date Value   03/17/2020 135 (H)   03/12/2019 127   03/13/2018 136 (H)         AST (U/L)   Date Value   03/17/2020 21     ALT (U/L)   Date Value   03/17/2020 22     BUN (mg/dL)   Date Value   03/17/2020 27 (H)     BP Readings from Last 3 Encounters:   03/24/20 98/78   02/21/20 122/80   02/10/20 (!) 140/80              Past Medical History:   Diagnosis Date    Anemia 2016    ON IRON    BPH with elevated PSA     post biopsy    Colon polyps 2016    BENEIGN REMOVED WITH COLONOSCOPY    Elevated Gina-Barr virus antibody titer 1989 chills and fever. HENT: Negative for hearing loss. Eyes: Negative for pain and visual disturbance. Respiratory: Negative for cough and shortness of breath. Cardiovascular: Negative for chest pain, palpitations and leg swelling. Gastrointestinal: Positive for heartburn. Negative for abdominal pain, blood in stool, constipation, diarrhea, nausea and vomiting. Endocrine: Negative for cold intolerance, polydipsia and polyuria. Genitourinary: Negative for difficulty urinating, dysuria and hematuria. Musculoskeletal: Negative for arthralgias, back pain, gait problem and neck pain. Skin: Negative for pallor and rash. Neurological: Negative for dizziness, weakness, numbness and headaches. Hematological: Negative for adenopathy. Does not bruise/bleed easily. Psychiatric/Behavioral: Negative for confusion. The patient is not nervous/anxious. Objective:     Physical Exam  Vitals signs reviewed. Constitutional:       Appearance: He is well-developed. HENT:      Head: Normocephalic and atraumatic. Eyes:      Pupils: Pupils are equal, round, and reactive to light. Neck:      Musculoskeletal: Neck supple. Cardiovascular:      Rate and Rhythm: Normal rate and regular rhythm. Heart sounds: No murmur. No friction rub. No gallop. Pulmonary:      Effort: Pulmonary effort is normal.      Breath sounds: Normal breath sounds. No wheezing or rales. Abdominal:      General: There is no distension. Palpations: Abdomen is soft. There is no mass. Tenderness: There is no abdominal tenderness. There is no rebound. Musculoskeletal: Normal range of motion. Lymphadenopathy:      Cervical: No cervical adenopathy. Skin:     General: Skin is warm and dry. Findings: No rash. Neurological:      Mental Status: He is alert and oriented to person, place, and time. Cranial Nerves: No cranial nerve deficit (grossly). Psychiatric:         Thought Content:  Thought content normal.        BP 98/78 (Site: Left Upper Arm, Position: Sitting, Cuff Size: Medium Adult)   Pulse 104   Resp 18   Ht 6' 1\" (1.854 m)   Wt 213 lb (96.6 kg)   BMI 28.10 kg/m²     Assessment:       Diagnosis Orders   1. Essential hypertension  amLODIPine (NORVASC) 5 MG tablet   2. Gastroesophageal reflux disease without esophagitis  omeprazole (PRILOSEC) 20 MG delayed release capsule   3. Iron deficiency anemia, unspecified iron deficiency anemia type  Hemoglobin    Iron And TIBC   4. Other hyperlipidemia               Plan:       Return in about 6 months (around 9/24/2020) for medicare AWV, Hypertension, Hyperlipidemia. Orders Placed This Encounter   Procedures    Pneumococcal polysaccharide vaccine 23-valent greater than or equal to 3yo subcutaneous/IM     V03.82    Tdap (age 6y and older) IM (BOOSTRIX)    Hemoglobin     Standing Status:   Future     Standing Expiration Date:   3/24/2021    Iron And TIBC     Standing Status:   Future     Standing Expiration Date:   3/24/2021     Order Specific Question:   Is Patient Fasting? Answer:   no     Order Specific Question:   No of Hours? Answer:   no     Orders Placed This Encounter   Medications    omeprazole (PRILOSEC) 20 MG delayed release capsule     Sig: Take 1 capsule by mouth daily     Dispense:  180 capsule     Refill:  3    amLODIPine (NORVASC) 5 MG tablet     Sig: Take 1 tablet by mouth daily     Dispense:  90 tablet     Refill:  3       Patientgiven educational materials - see patient instructions. Discussed use, benefit,and side effects of prescribed medications. All patient questions answered. Ptvoiced understanding. Reviewed health maintenance. Instructed to continue currentmedications, diet and exercise. Patient agreed with treatment plan. Follow up asdirected.      Electronically signed by Claudio Aldridge MD on 3/24/2020 at 3:12 PM

## 2020-04-15 ENCOUNTER — HOSPITAL ENCOUNTER (OUTPATIENT)
Dept: LAB | Age: 70
Discharge: HOME | End: 2020-04-15
Payer: MEDICARE

## 2020-04-15 ENCOUNTER — NURSE ONLY (OUTPATIENT)
Dept: LAB | Age: 70
End: 2020-04-15
Payer: MEDICARE

## 2020-04-15 LAB — PROSTATE SPECIFIC ANTIGEN: 0.63 UG/L

## 2020-04-15 PROCEDURE — 96372 THER/PROPH/DIAG INJ SC/IM: CPT

## 2020-04-15 PROCEDURE — 84153 ASSAY OF PSA TOTAL: CPT

## 2020-04-15 PROCEDURE — 36415 COLL VENOUS BLD VENIPUNCTURE: CPT

## 2020-04-15 RX ADMIN — CYANOCOBALAMIN 1000 MCG: 1000 INJECTION, SOLUTION INTRAMUSCULAR at 09:04

## 2020-04-29 ENCOUNTER — OFFICE VISIT (OUTPATIENT)
Dept: UROLOGY | Age: 70
End: 2020-04-29
Payer: MEDICARE

## 2020-04-29 VITALS
WEIGHT: 222 LBS | BODY MASS INDEX: 29.42 KG/M2 | HEIGHT: 73 IN | SYSTOLIC BLOOD PRESSURE: 122 MMHG | HEART RATE: 88 BPM | DIASTOLIC BLOOD PRESSURE: 78 MMHG

## 2020-04-29 PROCEDURE — 4040F PNEUMOC VAC/ADMIN/RCVD: CPT | Performed by: UROLOGY

## 2020-04-29 PROCEDURE — 99213 OFFICE O/P EST LOW 20 MIN: CPT | Performed by: UROLOGY

## 2020-04-29 PROCEDURE — 1123F ACP DISCUSS/DSCN MKR DOCD: CPT | Performed by: UROLOGY

## 2020-04-29 PROCEDURE — 3017F COLORECTAL CA SCREEN DOC REV: CPT | Performed by: UROLOGY

## 2020-04-29 PROCEDURE — 99213 OFFICE O/P EST LOW 20 MIN: CPT

## 2020-04-29 PROCEDURE — G8417 CALC BMI ABV UP PARAM F/U: HCPCS | Performed by: UROLOGY

## 2020-04-29 PROCEDURE — 4004F PT TOBACCO SCREEN RCVD TLK: CPT | Performed by: UROLOGY

## 2020-04-29 PROCEDURE — G8427 DOCREV CUR MEDS BY ELIG CLIN: HCPCS | Performed by: UROLOGY

## 2020-04-29 NOTE — PROGRESS NOTES
Component Value Date    BUN 27 (H) 03/17/2020     Lab Results   Component Value Date    CREATININE 1.14 03/17/2020       Imaging Reviewed during this Office Visit:   (results were independently reviewed by physician and radiology report verified)    PAST MEDICAL, FAMILY AND SOCIAL HISTORY UPDATE:  Past Medical History:   Diagnosis Date    Anemia 2016    ON IRON    BPH with elevated PSA     post biopsy    Colon polyps 2016    BENEIGN REMOVED WITH COLONOSCOPY    Elevated Gina-Barr virus antibody titer 1989    NEVER TREATED FOR    Elevated fasting glucose     History of blood transfusion 2016    R/T INTESTINAL BLEEDING    History of chronic fatigue syndrome 1989    RESOLVED     History of GI bleed 2016    BROUGHT ON BY MEDS---NIACIN, FISH OIL AND VIT C    Hyperlipidemia 2014    (triglycerides-ELEVATED, TRYING TO CONTROL WITH DIET    Hypertension 1999    ON RX    Wears glasses      Past Surgical History:   Procedure Laterality Date    COLONOSCOPY  2000    internal hemorrhoids    COLONOSCOPY  02/02/2015    polypx1, repeat 5yrs due to family hx & hx polyps- brannon    COLONOSCOPY  12/09/2015    polyp X2  int. Hemorrhoids  partial prolapse of ileocecal valve    COLONOSCOPY  11/2016    FOOT SURGERY Bilateral 1958    to correct flat arches (age 9)    PROSTATE BIOPSY Bilateral 12/04/2012    benign    PROSTATE BIOPSY Bilateral 07/2014    benign    PROSTATECTOMY  12/10/2019    LAPAROSCOPIC ROBOTIC SIMPLE PROSTATECTOMY     PROSTATECTOMY N/A 12/10/2019    XI LAPAROSCOPIC ROBOTIC SIMPLE PROSTATECTOMY performed by Nora Dutton MD at [facility]  12/8/15    Normal upper GI tract, no evidence of blood in upper GI tract, mild distal esophagitis    UPPER GASTROINTESTINAL ENDOSCOPY  11/2016     Family History   Problem Relation Age of Onset    Cancer Mother         LUNG    High Blood Pressure Mother     Diabetes Father         IDDM-LATE IN LIFE    Heart Disease Father

## 2020-05-15 ENCOUNTER — NURSE ONLY (OUTPATIENT)
Dept: LAB | Age: 70
End: 2020-05-15
Payer: MEDICARE

## 2020-05-15 PROCEDURE — 96372 THER/PROPH/DIAG INJ SC/IM: CPT

## 2020-05-15 RX ADMIN — CYANOCOBALAMIN 1000 MCG: 1000 INJECTION, SOLUTION INTRAMUSCULAR at 08:47

## 2020-06-15 ENCOUNTER — NURSE ONLY (OUTPATIENT)
Dept: LAB | Age: 70
End: 2020-06-15
Payer: MEDICARE

## 2020-06-15 PROCEDURE — 96372 THER/PROPH/DIAG INJ SC/IM: CPT

## 2020-06-15 RX ADMIN — CYANOCOBALAMIN 1000 MCG: 1000 INJECTION, SOLUTION INTRAMUSCULAR at 08:41

## 2020-07-13 ENCOUNTER — TELEPHONE (OUTPATIENT)
Dept: DERMATOLOGY | Age: 70
End: 2020-07-13

## 2020-07-13 NOTE — TELEPHONE ENCOUNTER
Because of the number of basal cell carcinoma plan was to transfer care to [facility] - can you call over there and review this (see last telephone encounter)    Thanks,    Yash Llanes

## 2020-07-15 ENCOUNTER — NURSE ONLY (OUTPATIENT)
Dept: LAB | Age: 70
End: 2020-07-15
Payer: MEDICARE

## 2020-07-15 PROCEDURE — 96372 THER/PROPH/DIAG INJ SC/IM: CPT

## 2020-07-15 RX ADMIN — CYANOCOBALAMIN 1000 MCG: 1000 INJECTION, SOLUTION INTRAMUSCULAR at 09:10

## 2020-08-14 ENCOUNTER — NURSE ONLY (OUTPATIENT)
Dept: LAB | Age: 70
End: 2020-08-14
Payer: MEDICARE

## 2020-08-14 PROCEDURE — 96372 THER/PROPH/DIAG INJ SC/IM: CPT

## 2020-08-14 RX ADMIN — CYANOCOBALAMIN 1000 MCG: 1000 INJECTION, SOLUTION INTRAMUSCULAR at 08:41

## 2020-08-14 NOTE — PROGRESS NOTES
Vitamin B12, 1000 mcg given IM right deltoid as ordered. Patient tolerated it well. Reminder card given for 30 days date.

## 2020-09-10 RX ORDER — ATORVASTATIN CALCIUM 10 MG/1
TABLET, FILM COATED ORAL
Qty: 90 TABLET | Refills: 3 | Status: SHIPPED | OUTPATIENT
Start: 2020-09-10 | End: 2021-01-01 | Stop reason: SDUPTHER

## 2020-09-14 ENCOUNTER — NURSE ONLY (OUTPATIENT)
Dept: LAB | Age: 70
End: 2020-09-14
Payer: MEDICARE

## 2020-09-14 PROCEDURE — 96372 THER/PROPH/DIAG INJ SC/IM: CPT

## 2020-09-14 RX ADMIN — CYANOCOBALAMIN 1000 MCG: 1000 INJECTION, SOLUTION INTRAMUSCULAR at 09:12

## 2020-09-15 ENCOUNTER — HOSPITAL ENCOUNTER (OUTPATIENT)
Dept: LAB | Age: 70
Discharge: HOME | End: 2020-09-15
Payer: MEDICARE

## 2020-09-15 LAB
ALT SERPL-CCNC: 44 U/L (ref 5–41)
AST SERPL-CCNC: 27 U/L
CHOLESTEROL/HDL RATIO: 5.3
CHOLESTEROL: 142 MG/DL
HDLC SERPL-MCNC: 27 MG/DL
HEMOGLOBIN: 16.3 G/DL (ref 13–17)
IRON SATURATION: 26 % (ref 20–55)
IRON: 81 UG/DL (ref 59–158)
LDL CHOLESTEROL: 55 MG/DL (ref 0–130)
TOTAL CK: 197 U/L (ref 39–308)
TOTAL IRON BINDING CAPACITY: 307 UG/DL (ref 250–450)
TRIGL SERPL-MCNC: 302 MG/DL
UNSATURATED IRON BINDING CAPACITY: 226 UG/DL (ref 112–347)
VLDLC SERPL CALC-MCNC: ABNORMAL MG/DL (ref 1–30)

## 2020-09-15 PROCEDURE — 83540 ASSAY OF IRON: CPT

## 2020-09-15 PROCEDURE — 84460 ALANINE AMINO (ALT) (SGPT): CPT

## 2020-09-15 PROCEDURE — 82550 ASSAY OF CK (CPK): CPT

## 2020-09-15 PROCEDURE — 36415 COLL VENOUS BLD VENIPUNCTURE: CPT

## 2020-09-15 PROCEDURE — 84450 TRANSFERASE (AST) (SGOT): CPT

## 2020-09-15 PROCEDURE — 85018 HEMOGLOBIN: CPT

## 2020-09-15 PROCEDURE — 80061 LIPID PANEL: CPT

## 2020-09-15 PROCEDURE — 83550 IRON BINDING TEST: CPT

## 2020-09-24 ENCOUNTER — TELEPHONE (OUTPATIENT)
Dept: SURGERY | Age: 70
End: 2020-09-24

## 2020-09-24 ENCOUNTER — OFFICE VISIT (OUTPATIENT)
Dept: INTERNAL MEDICINE | Age: 70
End: 2020-09-24
Payer: MEDICARE

## 2020-09-24 VITALS
DIASTOLIC BLOOD PRESSURE: 76 MMHG | WEIGHT: 226 LBS | RESPIRATION RATE: 16 BRPM | BODY MASS INDEX: 29.95 KG/M2 | SYSTOLIC BLOOD PRESSURE: 102 MMHG | HEART RATE: 96 BPM | HEIGHT: 73 IN

## 2020-09-24 PROCEDURE — G8427 DOCREV CUR MEDS BY ELIG CLIN: HCPCS | Performed by: INTERNAL MEDICINE

## 2020-09-24 PROCEDURE — 3017F COLORECTAL CA SCREEN DOC REV: CPT | Performed by: INTERNAL MEDICINE

## 2020-09-24 PROCEDURE — 4040F PNEUMOC VAC/ADMIN/RCVD: CPT | Performed by: INTERNAL MEDICINE

## 2020-09-24 PROCEDURE — 99214 OFFICE O/P EST MOD 30 MIN: CPT | Performed by: INTERNAL MEDICINE

## 2020-09-24 PROCEDURE — G8417 CALC BMI ABV UP PARAM F/U: HCPCS | Performed by: INTERNAL MEDICINE

## 2020-09-24 PROCEDURE — 1123F ACP DISCUSS/DSCN MKR DOCD: CPT | Performed by: INTERNAL MEDICINE

## 2020-09-24 PROCEDURE — 99213 OFFICE O/P EST LOW 20 MIN: CPT

## 2020-09-24 PROCEDURE — 4004F PT TOBACCO SCREEN RCVD TLK: CPT | Performed by: INTERNAL MEDICINE

## 2020-09-24 RX ORDER — FENOFIBRATE 160 MG/1
160 TABLET ORAL DAILY
Qty: 90 TABLET | Refills: 1 | Status: SHIPPED | OUTPATIENT
Start: 2020-09-24 | End: 2021-01-01 | Stop reason: SDUPTHER

## 2020-09-24 ASSESSMENT — ENCOUNTER SYMPTOMS
CONSTIPATION: 0
BACK PAIN: 0
VOMITING: 0
NAUSEA: 0
DIARRHEA: 0
SHORTNESS OF BREATH: 0
COUGH: 0
EYE PAIN: 0
HEARTBURN: 1
ABDOMINAL PAIN: 0
BLOOD IN STOOL: 0

## 2020-09-24 NOTE — PROGRESS NOTES
[place] [address]  Dept: [phone]  Dept Fax: [phone]  Loc: [phone]     Jay Andrews is a 79 y.o. male who presents today for his medical conditions/complaintsas noted below. Jay Andrews is c/o of   Chief Complaint   Patient presents with    Hypertension     6m    Hyperlipidemia    Gastroesophageal Reflux         HPI:     Hypertension   This is a chronic (essential htn) problem. The current episode started more than 1 year ago. The problem has been waxing and waning since onset. The problem is controlled. Pertinent negatives include no chest pain, headaches, neck pain, palpitations or shortness of breath. Hyperlipidemia   This is a chronic problem. The current episode started more than 1 year ago. The problem is controlled. Recent lipid tests were reviewed and are variable. Pertinent negatives include no chest pain or shortness of breath. Gastroesophageal Reflux   He complains of heartburn. He reports no abdominal pain, no chest pain, no coughing or no nausea. This is a recurrent problem. The current episode started more than 1 month ago. The problem occurs rarely. The problem has been waxing and waning.        Hemoglobin A1C (%)   Date Value   09/17/2019 5.6            No results found for: LABMICR  LDL Cholesterol (mg/dL)   Date Value   09/15/2020 55   03/17/2020 135 (H)   03/12/2019 127         AST (U/L)   Date Value   09/15/2020 27     ALT (U/L)   Date Value   09/15/2020 44 (H)     BUN (mg/dL)   Date Value   03/17/2020 27 (H)     BP Readings from Last 3 Encounters:   09/24/20 102/76   04/29/20 122/78   03/24/20 98/78              Past Medical History:   Diagnosis Date    Anemia 2016    ON IRON    BPH with elevated PSA     post biopsy    Colon polyps 2016    BENEIGN REMOVED WITH COLONOSCOPY    Elevated Gina-Barr virus antibody titer 1989    NEVER TREATED FOR  Elevated fasting glucose     History of blood transfusion 2016    R/T INTESTINAL BLEEDING    History of chronic fatigue syndrome 1989    RESOLVED     History of GI bleed 2016    BROUGHT ON BY MEDS---NIACIN, FISH OIL AND VIT C    Hyperlipidemia 2014    (triglycerides-ELEVATED, TRYING TO CONTROL WITH DIET    Hypertension 1999    ON RX    Wears glasses       Past Surgical History:   Procedure Laterality Date    COLONOSCOPY  2000    internal hemorrhoids    COLONOSCOPY  02/02/2015    polypx1, repeat 5yrs due to family hx & hx polyps- brannon    COLONOSCOPY  12/09/2015    polyp X2  int. Hemorrhoids  partial prolapse of ileocecal valve    COLONOSCOPY  11/2016    FOOT SURGERY Bilateral 1958    to correct flat arches (age 9)    PROSTATE BIOPSY Bilateral 12/04/2012    benign    PROSTATE BIOPSY Bilateral 07/2014    benign    PROSTATECTOMY  12/10/2019    LAPAROSCOPIC ROBOTIC SIMPLE PROSTATECTOMY     PROSTATECTOMY N/A 12/10/2019    XI LAPAROSCOPIC ROBOTIC SIMPLE PROSTATECTOMY performed by Raymundo Hussein MD at [facility]  12/8/15    Normal upper GI tract, no evidence of blood in upper GI tract, mild distal esophagitis    UPPER GASTROINTESTINAL ENDOSCOPY  11/2016       Family History   Problem Relation Age of Onset    Cancer Mother         LUNG    High Blood Pressure Mother     Diabetes Father         IDDM-LATE IN LIFE    Heart Disease Father         CHF    High Blood Pressure Father           Social History     Tobacco Use    Smoking status: Light Tobacco Smoker     Packs/day: 0.01     Years: 10.00     Pack years: 0.10     Types: Pipe     Start date: 1/1/1970    Smokeless tobacco: Never Used    Tobacco comment: Rare pipe smoker. 4 BOWLS A WEEK No inhalation. Has never smoked cigarettes. Substance Use Topics    Alcohol use:  Yes     Alcohol/week: 0.0 standard drinks     Frequency: Monthly or less     Drinks per session: 1 or 2     Binge frequency: Never     Comment: WHISKEY OR WINE- 1 OR 2 A MONTH         Current Outpatient Medications   Medication Sig Dispense Refill    fenofibrate (TRIGLIDE) 160 MG tablet Take 1 tablet by mouth daily 90 tablet 1    atorvastatin (LIPITOR) 10 MG tablet take 1 tablet by mouth once daily 90 tablet 3    omeprazole (PRILOSEC) 20 MG delayed release capsule Take 1 capsule by mouth daily 180 capsule 3    amLODIPine (NORVASC) 5 MG tablet Take 1 tablet by mouth daily 90 tablet 3    hydrochlorothiazide (MICROZIDE) 12.5 MG capsule Take 1 capsule by mouth daily 90 capsule 3    benazepril (LOTENSIN) 40 MG tablet take 1 tablet by mouth once daily 90 tablet 3    ferrous sulfate 325 (65 FE) MG tablet Take 325 mg by mouth daily (with breakfast)       NONFORMULARY Take 1 tablet by mouth daily Centrum Men's multivitamin.  lidocaine (XYLOCAINE) 5 % ointment Apply topically as needed.  30 g 0     Current Facility-Administered Medications   Medication Dose Route Frequency Provider Last Rate Last Dose    cyanocobalamin injection 1,000 mcg  1,000 mcg Intramuscular Q30 Days Kelsie Austin MD   1,000 mcg at 09/14/20 0912    cyanocobalamin injection 1,000 mcg  1 mg Intramuscular Q30 Days William Cuadra MD   1,000 mcg at 02/14/20 1535     No Known Allergies    Health Maintenance   Topic Date Due    AAA screen  1950    Shingles Vaccine (1 of 2) 09/04/2000    Annual Wellness Visit (AWV)  05/29/2019    Hepatitis C screen  09/24/2020 (Originally 1950)    Flu vaccine (1) 09/24/2021 (Originally 9/1/2020)    Potassium monitoring  03/17/2021    Creatinine monitoring  03/17/2021    Lipid screen  09/15/2021    Colon cancer screen colonoscopy  11/16/2021    Diabetes screen  09/17/2022    DTaP/Tdap/Td vaccine (4 - Td) 03/24/2030    Pneumococcal 65+ years Vaccine  Completed    Hepatitis A vaccine  Aged Out    Hepatitis B vaccine  Aged Out    Hib vaccine  Aged Out    Meningococcal (ACWY) vaccine  Aged Out       Subjective:      Review of Systems   Constitutional: Negative for chills and fever. HENT: Negative for hearing loss. Eyes: Negative for pain and visual disturbance. Respiratory: Negative for cough and shortness of breath. Cardiovascular: Negative for chest pain, palpitations and leg swelling. Gastrointestinal: Positive for heartburn. Negative for abdominal pain, blood in stool, constipation, diarrhea, nausea and vomiting. Endocrine: Negative for cold intolerance, polydipsia and polyuria. Genitourinary: Negative for difficulty urinating, dysuria and hematuria. Musculoskeletal: Negative for arthralgias, back pain, gait problem and neck pain. Skin: Negative for pallor and rash. Neurological: Negative for dizziness, weakness, numbness and headaches. Hematological: Negative for adenopathy. Does not bruise/bleed easily. Psychiatric/Behavioral: Negative for confusion. The patient is not nervous/anxious. Objective:     Physical Exam  Vitals signs reviewed. Constitutional:       Appearance: He is well-developed. HENT:      Head: Normocephalic and atraumatic. Eyes:      Pupils: Pupils are equal, round, and reactive to light. Neck:      Musculoskeletal: Neck supple. Cardiovascular:      Rate and Rhythm: Normal rate and regular rhythm. Heart sounds: No murmur. No friction rub. No gallop. Pulmonary:      Effort: Pulmonary effort is normal.      Breath sounds: Normal breath sounds. No wheezing or rales. Abdominal:      General: There is no distension. Palpations: Abdomen is soft. There is no mass. Tenderness: There is no abdominal tenderness. There is no rebound. Musculoskeletal: Normal range of motion. Lymphadenopathy:      Cervical: No cervical adenopathy. Skin:     General: Skin is warm and dry. Findings: No rash. Neurological:      Mental Status: He is alert and oriented to person, place, and time. Cranial Nerves: No cranial nerve deficit (grossly).    Psychiatric:

## 2020-10-07 ENCOUNTER — TELEPHONE (OUTPATIENT)
Dept: SURGERY | Age: 70
End: 2020-10-07

## 2020-10-07 RX ORDER — POLYETHYLENE GLYCOL 3350, SODIUM CHLORIDE, SODIUM BICARBONATE, POTASSIUM CHLORIDE 420; 11.2; 5.72; 1.48 G/4L; G/4L; G/4L; G/4L
POWDER, FOR SOLUTION ORAL
Qty: 4000 ML | Refills: 0 | Status: SHIPPED | OUTPATIENT
Start: 2020-10-07 | End: 2021-01-01

## 2020-10-07 RX ORDER — BISACODYL 5 MG
TABLET, DELAYED RELEASE (ENTERIC COATED) ORAL
Qty: 2 TABLET | Refills: 0 | Status: SHIPPED | OUTPATIENT
Start: 2020-10-07 | End: 2021-01-01

## 2020-10-07 NOTE — TELEPHONE ENCOUNTER
Spoke with patient at this time scheduled for a Colonoscopy Thursday 11/12/2020 with Dr. Arlene Garza at [facility]. Surgery instructions and bowel prep went over with patient at this time, denies any questions. Bowel prep sent to pharmacy of choice and all instructions mailed at this time. Acknowledges understanding of procedure and will call with any further questions.

## 2020-10-16 ENCOUNTER — NURSE ONLY (OUTPATIENT)
Dept: LAB | Age: 70
End: 2020-10-16
Payer: MEDICARE

## 2020-10-16 PROCEDURE — 96372 THER/PROPH/DIAG INJ SC/IM: CPT

## 2020-10-16 RX ADMIN — CYANOCOBALAMIN 1000 MCG: 1000 INJECTION, SOLUTION INTRAMUSCULAR at 08:46

## 2020-10-29 ENCOUNTER — PRE-PROCEDURE TELEPHONE (OUTPATIENT)
Dept: PREADMISSION TESTING | Age: 70
End: 2020-10-29

## 2020-10-29 NOTE — TELEPHONE ENCOUNTER
Spoke with patient and confirmed a covid swab appt for Nov 6 at 1030. Instructions provided, patient verbalizes understanding.

## 2020-11-06 ENCOUNTER — HOSPITAL ENCOUNTER (OUTPATIENT)
Dept: PREADMISSION TESTING | Age: 70
Setting detail: SPECIMEN
Discharge: HOME | End: 2020-11-10
Payer: MEDICARE

## 2020-11-06 PROCEDURE — U0003 INFECTIOUS AGENT DETECTION BY NUCLEIC ACID (DNA OR RNA); SEVERE ACUTE RESPIRATORY SYNDROME CORONAVIRUS 2 (SARS-COV-2) (CORONAVIRUS DISEASE [COVID-19]), AMPLIFIED PROBE TECHNIQUE, MAKING USE OF HIGH THROUGHPUT TECHNOLOGIES AS DESCRIBED BY CMS-2020-01-R: HCPCS

## 2020-11-07 LAB — SARS-COV-2, NAA: NOT DETECTED

## 2020-11-11 NOTE — H&P
mcg  1,000 mcg Intramuscular Q30 Days Paco aBtes MD   1,000 mcg at 10/16/20 0846    cyanocobalamin injection 1,000 mcg  1 mg Intramuscular Q30 Days William Cuadra MD   1,000 mcg at 02/14/20 1535     Current Outpatient Medications   Medication Sig Dispense Refill    bisacodyl (BISACODYL) 5 MG EC tablet Take as directed the day of bowel prep 2 tablet 0    polyethylene glycol-electrolytes (NULYTELY) 420 g solution Take as directed the day of bowel prep 4000 mL 0    fenofibrate (TRIGLIDE) 160 MG tablet Take 1 tablet by mouth daily 90 tablet 1    atorvastatin (LIPITOR) 10 MG tablet take 1 tablet by mouth once daily 90 tablet 3    omeprazole (PRILOSEC) 20 MG delayed release capsule Take 1 capsule by mouth daily 180 capsule 3    amLODIPine (NORVASC) 5 MG tablet Take 1 tablet by mouth daily 90 tablet 3    hydrochlorothiazide (MICROZIDE) 12.5 MG capsule Take 1 capsule by mouth daily 90 capsule 3    benazepril (LOTENSIN) 40 MG tablet take 1 tablet by mouth once daily 90 tablet 3    lidocaine (XYLOCAINE) 5 % ointment Apply topically as needed. 30 g 0    ferrous sulfate 325 (65 FE) MG tablet Take 325 mg by mouth daily (with breakfast)       NONFORMULARY Take 1 tablet by mouth daily Centrum Men's multivitamin.           No Known Allergies    Family History   Problem Relation Age of Onset    Cancer Mother         LUNG    High Blood Pressure Mother     Diabetes Father         IDDM-LATE IN LIFE    Heart Disease Father         CHF    High Blood Pressure Father        Social History     Socioeconomic History    Marital status: Single     Spouse name: Not on file    Number of children: Not on file    Years of education: Not on file    Highest education level: Not on file   Occupational History    Not on file   Social Needs    Financial resource strain: Not on file    Food insecurity     Worry: Not on file     Inability: Not on file    Transportation needs     Medical: Not on file     Non-medical: Not on file   Tobacco Use    Smoking status: Light Tobacco Smoker     Packs/day: 0.01     Years: 10.00     Pack years: 0.10     Types: Pipe     Start date: 1/1/1970    Smokeless tobacco: Never Used    Tobacco comment: Rare pipe smoker. 4 BOWLS A WEEK No inhalation. Has never smoked cigarettes. Substance and Sexual Activity    Alcohol use: Yes     Alcohol/week: 0.0 standard drinks     Frequency: Monthly or less     Drinks per session: 1 or 2     Binge frequency: Never     Comment: WHISKEY OR WINE- 1 OR 2 A MONTH    Drug use: No    Sexual activity: Not on file   Lifestyle    Physical activity     Days per week: Not on file     Minutes per session: Not on file    Stress: Not on file   Relationships    Social connections     Talks on phone: Not on file     Gets together: Not on file     Attends [employer] service: Not on file     Active member of club or organization: Not on file     Attends meetings of clubs or organizations: Not on file     Relationship status: Not on file    Intimate partner violence     Fear of current or ex partner: Not on file     Emotionally abused: Not on file     Physically abused: Not on file     Forced sexual activity: Not on file   Other Topics Concern    Not on file   Social History Narrative    Not on file       ROS:   Review of Systems - Negative except as noted in HPI      Objective   There were no vitals filed for this visit. General:in no apparent distress and well developed and well nourished  Eyes: No gross abnormalities. Ears, Nose, Throat: hearing grossly normal bilaterally  Neck: neck supple and non tender without mass  Lungs: clear to auscultation without wheezes or rales   Heart: S1S2, no mumurs, RRR  Abdomen: soft, nontender, no HSM, no guarding, no rebound, no masses  Extremity: negative  Neuro: CN II-XII grossly intact      Assessment     1. Repeat screening colonoscopy  2. Hx of polyps  3. Family hx of colon CA      Plan     1. Will proceed as planned  2.  F/u based on findings    Electronically signed by Lelia Tuttle DO on 11/11/2020 at 10:41 AM      (Please note that portions of this note were completed with a voice recognition program.  Efforts were made to edit the dictations but occasionally words are mis-transcribed.)

## 2020-11-12 ENCOUNTER — HOSPITAL ENCOUNTER (OUTPATIENT)
Age: 70
Setting detail: OUTPATIENT SURGERY
Discharge: HOME | End: 2020-11-12
Attending: SURGERY | Admitting: SURGERY
Payer: MEDICARE

## 2020-11-12 ENCOUNTER — ANESTHESIA (OUTPATIENT)
Dept: OPERATING ROOM | Age: 70
End: 2020-11-12
Payer: MEDICARE

## 2020-11-12 ENCOUNTER — ANESTHESIA EVENT (OUTPATIENT)
Dept: OPERATING ROOM | Age: 70
End: 2020-11-12
Payer: MEDICARE

## 2020-11-12 VITALS
HEART RATE: 86 BPM | TEMPERATURE: 98.1 F | RESPIRATION RATE: 16 BRPM | SYSTOLIC BLOOD PRESSURE: 116 MMHG | OXYGEN SATURATION: 96 % | DIASTOLIC BLOOD PRESSURE: 70 MMHG | HEIGHT: 72 IN | WEIGHT: 225 LBS | BODY MASS INDEX: 30.48 KG/M2

## 2020-11-12 VITALS
OXYGEN SATURATION: 95 % | RESPIRATION RATE: 28 BRPM | DIASTOLIC BLOOD PRESSURE: 62 MMHG | SYSTOLIC BLOOD PRESSURE: 92 MMHG

## 2020-11-12 PROCEDURE — 2580000003 HC RX 258: Performed by: SURGERY

## 2020-11-12 PROCEDURE — 7100000011 HC PHASE II RECOVERY - ADDTL 15 MIN: Performed by: SURGERY

## 2020-11-12 PROCEDURE — 2500000003 HC RX 250 WO HCPCS: Performed by: NURSE ANESTHETIST, CERTIFIED REGISTERED

## 2020-11-12 PROCEDURE — 3700000001 HC ADD 15 MINUTES (ANESTHESIA): Performed by: SURGERY

## 2020-11-12 PROCEDURE — 45384 COLONOSCOPY W/LESION REMOVAL: CPT | Performed by: SURGERY

## 2020-11-12 PROCEDURE — 6360000002 HC RX W HCPCS: Performed by: NURSE ANESTHETIST, CERTIFIED REGISTERED

## 2020-11-12 PROCEDURE — 2709999900 HC NON-CHARGEABLE SUPPLY: Performed by: SURGERY

## 2020-11-12 PROCEDURE — 88305 TISSUE EXAM BY PATHOLOGIST: CPT

## 2020-11-12 PROCEDURE — 7100000010 HC PHASE II RECOVERY - FIRST 15 MIN: Performed by: SURGERY

## 2020-11-12 PROCEDURE — 3609010600 HC COLONOSCOPY POLYPECTOMY SNARE/COLD BIOPSY: Performed by: SURGERY

## 2020-11-12 PROCEDURE — 45385 COLONOSCOPY W/LESION REMOVAL: CPT | Performed by: SURGERY

## 2020-11-12 PROCEDURE — 3700000000 HC ANESTHESIA ATTENDED CARE: Performed by: SURGERY

## 2020-11-12 RX ORDER — SODIUM CHLORIDE 0.9 % (FLUSH) 0.9 %
10 SYRINGE (ML) INJECTION EVERY 12 HOURS SCHEDULED
Status: DISCONTINUED | OUTPATIENT
Start: 2020-11-12 | End: 2020-11-12 | Stop reason: HOSPADM

## 2020-11-12 RX ORDER — PROPOFOL 10 MG/ML
INJECTION, EMULSION INTRAVENOUS PRN
Status: DISCONTINUED | OUTPATIENT
Start: 2020-11-12 | End: 2020-11-12 | Stop reason: SDUPTHER

## 2020-11-12 RX ORDER — SODIUM CHLORIDE, SODIUM LACTATE, POTASSIUM CHLORIDE, CALCIUM CHLORIDE 600; 310; 30; 20 MG/100ML; MG/100ML; MG/100ML; MG/100ML
INJECTION, SOLUTION INTRAVENOUS CONTINUOUS
Status: DISCONTINUED | OUTPATIENT
Start: 2020-11-12 | End: 2020-11-12 | Stop reason: HOSPADM

## 2020-11-12 RX ORDER — SODIUM CHLORIDE 0.9 % (FLUSH) 0.9 %
10 SYRINGE (ML) INJECTION PRN
Status: DISCONTINUED | OUTPATIENT
Start: 2020-11-12 | End: 2020-11-12 | Stop reason: HOSPADM

## 2020-11-12 RX ORDER — LIDOCAINE HYDROCHLORIDE 40 MG/ML
INJECTION, SOLUTION RETROBULBAR; TOPICAL PRN
Status: DISCONTINUED | OUTPATIENT
Start: 2020-11-12 | End: 2020-11-12 | Stop reason: SDUPTHER

## 2020-11-12 RX ADMIN — PROPOFOL 100 MG: 10 INJECTION, EMULSION INTRAVENOUS at 08:16

## 2020-11-12 RX ADMIN — SODIUM CHLORIDE, POTASSIUM CHLORIDE, SODIUM LACTATE AND CALCIUM CHLORIDE: 600; 310; 30; 20 INJECTION, SOLUTION INTRAVENOUS at 08:44

## 2020-11-12 RX ADMIN — PROPOFOL 50 MG: 10 INJECTION, EMULSION INTRAVENOUS at 08:24

## 2020-11-12 RX ADMIN — LIDOCAINE HYDROCHLORIDE 60 MG: 40 INJECTION, SOLUTION RETROBULBAR; TOPICAL at 08:16

## 2020-11-12 RX ADMIN — PROPOFOL 40 MG: 10 INJECTION, EMULSION INTRAVENOUS at 08:39

## 2020-11-12 RX ADMIN — PROPOFOL 50 MG: 10 INJECTION, EMULSION INTRAVENOUS at 08:19

## 2020-11-12 RX ADMIN — PROPOFOL 60 MG: 10 INJECTION, EMULSION INTRAVENOUS at 08:33

## 2020-11-12 RX ADMIN — PROPOFOL 50 MG: 10 INJECTION, EMULSION INTRAVENOUS at 08:21

## 2020-11-12 RX ADMIN — SODIUM CHLORIDE, POTASSIUM CHLORIDE, SODIUM LACTATE AND CALCIUM CHLORIDE: 600; 310; 30; 20 INJECTION, SOLUTION INTRAVENOUS at 08:00

## 2020-11-12 RX ADMIN — PROPOFOL 40 MG: 10 INJECTION, EMULSION INTRAVENOUS at 08:28

## 2020-11-12 ASSESSMENT — PAIN - FUNCTIONAL ASSESSMENT: PAIN_FUNCTIONAL_ASSESSMENT: 0-10

## 2020-11-12 ASSESSMENT — PAIN SCALES - GENERAL: PAINLEVEL_OUTOF10: 0

## 2020-11-12 ASSESSMENT — LIFESTYLE VARIABLES: SMOKING_STATUS: 1

## 2020-11-12 NOTE — ANESTHESIA POSTPROCEDURE EVALUATION
Department of Anesthesiology  Postprocedure Note    Patient: Irasema Hammonds  MRN: [id number]  YOB: 1950  Date of evaluation: 11/12/2020  Time:  8:49 AM     Procedure Summary     Date:  11/12/20 Room / Location:  [address]    Anesthesia Start:  0813 Anesthesia Stop:  0848    Procedure:  COLONOSCOPY POLYPECTOMY SNARE/HOT BIOPSY (N/A ) Diagnosis:  (history colon polyps)    Surgeon:  Nadege Jewell DO Responsible Provider:  ELROY Farias CRNA    Anesthesia Type:  TIVA, general ASA Status:  2          Anesthesia Type: TIVA, general    Karyn Phase I: Karyn Score: 10    Karyn Phase II:      Last vitals: Reviewed and per EMR flowsheets.        Anesthesia Post Evaluation    Patient location during evaluation: bedside  Patient participation: complete - patient participated  Level of consciousness: sleepy but conscious  Pain score: 0  Airway patency: patent  Nausea & Vomiting: no nausea and no vomiting  Complications: no  Cardiovascular status: blood pressure returned to baseline and hemodynamically stable  Respiratory status: acceptable  Hydration status: euvolemic

## 2020-11-12 NOTE — OP NOTE
which was gently  insufflated with air. Scope was then slowly advanced through the colon  under visualization to the level of the cecum, which was identified by  appendiceal orifice and ileocecal valve. During advancement of the  scope, the bowel prep was noted be adequate. Once at the cecum, the  terminal ileum was intubated and inspection showed normal mucosa. In  the cecum, there were four separate polyps; two of these were removed  with hot forceps and sent as separate specimens, and the two larger ones  were then removed with hot snare and also sent as separate specimens. After ensuring adequate hemostasis, the scope was then slowly withdrawn  through the remainder of the colon. At 90 cm, a small polyp was  encountered and removed with hot forceps. Additional polyps were  encountered at 85 cm and 50 cm, also removed with a bite of the hot  forceps and sent as separate specimens. Remainder of the colon was  unremarkable with no other findings. Once at the rectum, retroflex view  was obtained which showed no distal rectal abnormalities. The scope was  straightened and removed, and procedure was concluded. At conclusion of  the procedure, the patient was in stable condition and was taken to the  PACU for recovery. PLAN:  At this point, I going to follow up results of pathology and we  will make final recommendations for repeat based on those results. The  patient will likely need a colonoscopy in 3 years, but we will await  final pathology before making the recommendation.         Jacqueline Stevens    D: 11/12/2020 8:51:18       T: 11/12/2020 8:59:39     [email or web address]  Job#: [id number]     Doc#: [id number]    CC:  Patient's Primary Care

## 2020-11-12 NOTE — ANESTHESIA PRE PROCEDURE
Department of Anesthesiology  Preprocedure Note       Name:  Sosa Glynn   Age:  79 y.o.  :  1950                                          MRN:  [id number]         Date:  2020      Surgeon: Garry Ashford):  Andres Tovar DO    Procedure: Procedure(s):  Colonoscopy    Medications prior to admission:   Prior to Admission medications    Medication Sig Start Date End Date Taking? Authorizing Provider   bisacodyl (BISACODYL) 5 MG EC tablet Take as directed the day of bowel prep 10/7/20  Yes Andres oTvar DO   polyethylene glycol-electrolytes (NULYTELY) 420 g solution Take as directed the day of bowel prep 10/7/20  Yes Andres Tovar DO   fenofibrate (TRIGLIDE) 160 MG tablet Take 1 tablet by mouth daily 20  Yes Dona Centeno MD   atorvastatin (LIPITOR) 10 MG tablet take 1 tablet by mouth once daily 9/10/20  Yes Dona Centeno MD   omeprazole (PRILOSEC) 20 MG delayed release capsule Take 1 capsule by mouth daily 3/24/20  Yes Dona Centeno MD   amLODIPine (NORVASC) 5 MG tablet Take 1 tablet by mouth daily 3/24/20  Yes Dona Centeno MD   hydrochlorothiazide (MICROZIDE) 12.5 MG capsule Take 1 capsule by mouth daily 3/13/20  Yes Dona Centeno MD   benazepril (LOTENSIN) 40 MG tablet take 1 tablet by mouth once daily 19  Yes Dona Centeno MD   ferrous sulfate 325 (65 FE) MG tablet Take 325 mg by mouth daily (with breakfast)    Yes Historical Provider, MD   NONFORMULARY Take 1 tablet by mouth daily Centrum Men's multivitamin. Yes Historical Provider, MD   lidocaine (XYLOCAINE) 5 % ointment Apply topically as needed.  19   Laney Rogel DO       Current medications:    Current Facility-Administered Medications   Medication Dose Route Frequency Provider Last Rate Last Dose    lactated ringers infusion   Intravenous Continuous Andres Tovar  mL/hr at 20 0800      sodium chloride flush 0.9 % injection 10 mL  10 mL Intravenous 2 times per day 03/17/2020     03/17/2020    CO2 24 03/17/2020    BUN 27 03/17/2020    CREATININE 1.14 03/17/2020    GFRAA >60 03/17/2020    LABGLOM >60 03/17/2020    GLUCOSE 115 03/17/2020    PROT 7.9 03/17/2020    CALCIUM 9.7 03/17/2020    BILITOT 0.88 03/17/2020    ALKPHOS 75 03/17/2020    AST 27 09/15/2020    ALT 44 09/15/2020       POC Tests: No results for input(s): POCGLU, POCNA, POCK, POCCL, POCBUN, POCHEMO, POCHCT in the last 72 hours. Coags:   Lab Results   Component Value Date    PROTIME 11.3 11/16/2016    INR 1.0 11/16/2016       HCG (If Applicable): No results found for: PREGTESTUR, PREGSERUM, HCG, HCGQUANT     ABGs: No results found for: PHART, PO2ART, WHA0MGG, QCC1AYB, BEART, Q9KDAOVW     Type & Screen (If Applicable):  No results found for: LABABO, LABRH    Drug/Infectious Status (If Applicable):  No results found for: HIV, HEPCAB    COVID-19 Screening (If Applicable):   Lab Results   Component Value Date    COVID19 Not Detected 11/06/2020         Anesthesia Evaluation  Patient summary reviewed no history of anesthetic complications:   Airway: Mallampati: II  TM distance: >3 FB   Neck ROM: full  Mouth opening: > = 3 FB Dental:          Pulmonary:normal exam    (+) current smoker                          ROS comment: Pipe smoker   Cardiovascular:  Exercise tolerance: good (>4 METS),   (+) hypertension:,       ECG reviewed                        Neuro/Psych:   Negative Neuro/Psych ROS              GI/Hepatic/Renal:   (+) GERD:, bowel prep,           Endo/Other:                     Abdominal:           Vascular: negative vascular ROS. Anesthesia Plan      TIVA and general     ASA 2       Induction: intravenous. Anesthetic plan and risks discussed with patient.       Plan discussed with surgical team.                  ELROY Cm - MARLENE   11/12/2020

## 2020-11-13 LAB — SURGICAL PATHOLOGY REPORT: NORMAL

## 2020-11-16 ENCOUNTER — NURSE ONLY (OUTPATIENT)
Dept: LAB | Age: 70
End: 2020-11-16
Payer: MEDICARE

## 2020-11-16 PROCEDURE — 96372 THER/PROPH/DIAG INJ SC/IM: CPT

## 2020-11-16 RX ADMIN — CYANOCOBALAMIN 1000 MCG: 1000 INJECTION, SOLUTION INTRAMUSCULAR at 08:40

## 2020-11-16 NOTE — PROGRESS NOTES
Vitamin B12, 1000 mcg given IM left deltoid as ordered. Patient tolerated it well. Reminder card given with 30 days date.

## 2020-12-18 ENCOUNTER — NURSE ONLY (OUTPATIENT)
Dept: LAB | Age: 70
End: 2020-12-18
Payer: MEDICARE

## 2020-12-18 PROCEDURE — 96372 THER/PROPH/DIAG INJ SC/IM: CPT

## 2020-12-18 RX ADMIN — CYANOCOBALAMIN 1000 MCG: 1000 INJECTION, SOLUTION INTRAMUSCULAR at 09:02

## 2020-12-18 NOTE — PROGRESS NOTES
Vitamin B12, 1000 mcg given IM right deltoid as ordered. Patient tolerated it well. Reminder card given with next injection date.

## 2021-01-01 ENCOUNTER — HOSPITAL ENCOUNTER (OUTPATIENT)
Dept: LAB | Age: 71
Discharge: HOME | End: 2021-09-17
Payer: MEDICARE

## 2021-01-01 ENCOUNTER — NURSE ONLY (OUTPATIENT)
Dept: LAB | Age: 71
End: 2021-01-01
Payer: MEDICARE

## 2021-01-01 ENCOUNTER — OFFICE VISIT (OUTPATIENT)
Dept: SURGERY | Age: 71
End: 2021-01-01
Payer: MEDICARE

## 2021-01-01 ENCOUNTER — IMMUNIZATION (OUTPATIENT)
Dept: LAB | Age: 71
End: 2021-01-01
Payer: MEDICARE

## 2021-01-01 ENCOUNTER — HOSPITAL ENCOUNTER (OUTPATIENT)
Dept: LAB | Age: 71
Discharge: HOME | End: 2021-12-14
Payer: MEDICARE

## 2021-01-01 ENCOUNTER — OFFICE VISIT (OUTPATIENT)
Dept: UROLOGY | Age: 71
End: 2021-01-01
Payer: MEDICARE

## 2021-01-01 ENCOUNTER — HOSPITAL ENCOUNTER (OUTPATIENT)
Dept: LAB | Age: 71
Discharge: HOME | End: 2021-10-22
Payer: MEDICARE

## 2021-01-01 ENCOUNTER — OFFICE VISIT (OUTPATIENT)
Dept: INTERNAL MEDICINE | Age: 71
End: 2021-01-01
Payer: MEDICARE

## 2021-01-01 ENCOUNTER — HOSPITAL ENCOUNTER (OUTPATIENT)
Age: 71
Setting detail: OUTPATIENT SURGERY
Discharge: HOME | End: 2021-10-15
Attending: PLASTIC SURGERY | Admitting: PLASTIC SURGERY
Payer: MEDICARE

## 2021-01-01 ENCOUNTER — HOSPITAL ENCOUNTER (OUTPATIENT)
Dept: LAB | Age: 71
Discharge: HOME | End: 2021-04-28
Payer: MEDICARE

## 2021-01-01 ENCOUNTER — HOSPITAL ENCOUNTER (OUTPATIENT)
Dept: LAB | Age: 71
Discharge: HOME | End: 2021-03-15
Payer: MEDICARE

## 2021-01-01 ENCOUNTER — TELEPHONE (OUTPATIENT)
Dept: SURGERY | Age: 71
End: 2021-01-01

## 2021-01-01 VITALS
SYSTOLIC BLOOD PRESSURE: 114 MMHG | RESPIRATION RATE: 16 BRPM | HEART RATE: 92 BPM | HEIGHT: 73 IN | DIASTOLIC BLOOD PRESSURE: 80 MMHG | BODY MASS INDEX: 29.29 KG/M2 | WEIGHT: 221 LBS

## 2021-01-01 VITALS
HEART RATE: 78 BPM | BODY MASS INDEX: 27.83 KG/M2 | HEIGHT: 73 IN | TEMPERATURE: 97 F | SYSTOLIC BLOOD PRESSURE: 118 MMHG | OXYGEN SATURATION: 97 % | WEIGHT: 210 LBS | RESPIRATION RATE: 16 BRPM | DIASTOLIC BLOOD PRESSURE: 67 MMHG

## 2021-01-01 VITALS
HEART RATE: 76 BPM | HEIGHT: 73 IN | SYSTOLIC BLOOD PRESSURE: 132 MMHG | WEIGHT: 214 LBS | BODY MASS INDEX: 28.36 KG/M2 | DIASTOLIC BLOOD PRESSURE: 86 MMHG

## 2021-01-01 VITALS
HEART RATE: 102 BPM | BODY MASS INDEX: 27.96 KG/M2 | DIASTOLIC BLOOD PRESSURE: 58 MMHG | WEIGHT: 211 LBS | RESPIRATION RATE: 16 BRPM | SYSTOLIC BLOOD PRESSURE: 118 MMHG | OXYGEN SATURATION: 97 % | HEIGHT: 73 IN

## 2021-01-01 VITALS
BODY MASS INDEX: 27.57 KG/M2 | HEIGHT: 73 IN | SYSTOLIC BLOOD PRESSURE: 82 MMHG | DIASTOLIC BLOOD PRESSURE: 60 MMHG | RESPIRATION RATE: 16 BRPM | WEIGHT: 208 LBS | HEART RATE: 113 BPM

## 2021-01-01 VITALS
DIASTOLIC BLOOD PRESSURE: 78 MMHG | HEART RATE: 96 BPM | HEIGHT: 73 IN | RESPIRATION RATE: 16 BRPM | WEIGHT: 224.13 LBS | BODY MASS INDEX: 29.7 KG/M2 | SYSTOLIC BLOOD PRESSURE: 122 MMHG

## 2021-01-01 VITALS
RESPIRATION RATE: 16 BRPM | BODY MASS INDEX: 27.96 KG/M2 | WEIGHT: 211 LBS | SYSTOLIC BLOOD PRESSURE: 112 MMHG | DIASTOLIC BLOOD PRESSURE: 68 MMHG | HEART RATE: 92 BPM | HEIGHT: 73 IN

## 2021-01-01 VITALS
WEIGHT: 215 LBS | OXYGEN SATURATION: 98 % | BODY MASS INDEX: 28.49 KG/M2 | RESPIRATION RATE: 16 BRPM | DIASTOLIC BLOOD PRESSURE: 78 MMHG | HEART RATE: 93 BPM | SYSTOLIC BLOOD PRESSURE: 116 MMHG | HEIGHT: 73 IN

## 2021-01-01 DIAGNOSIS — E53.8 VITAMIN B 12 DEFICIENCY: ICD-10-CM

## 2021-01-01 DIAGNOSIS — D50.9 IRON DEFICIENCY ANEMIA, UNSPECIFIED IRON DEFICIENCY ANEMIA TYPE: ICD-10-CM

## 2021-01-01 DIAGNOSIS — E53.8 VITAMIN B12 DEFICIENCY: ICD-10-CM

## 2021-01-01 DIAGNOSIS — I10 PRIMARY HYPERTENSION: ICD-10-CM

## 2021-01-01 DIAGNOSIS — D48.5 NEOPLASM OF UNCERTAIN BEHAVIOR OF SKIN: ICD-10-CM

## 2021-01-01 DIAGNOSIS — E53.8 VITAMIN B12 DEFICIENCY: Primary | ICD-10-CM

## 2021-01-01 DIAGNOSIS — E78.49 OTHER HYPERLIPIDEMIA: ICD-10-CM

## 2021-01-01 DIAGNOSIS — C44.41 BASAL CELL CARCINOMA OF SCALP: ICD-10-CM

## 2021-01-01 DIAGNOSIS — R53.83 OTHER FATIGUE: Primary | ICD-10-CM

## 2021-01-01 DIAGNOSIS — K21.9 GASTROESOPHAGEAL REFLUX DISEASE WITHOUT ESOPHAGITIS: ICD-10-CM

## 2021-01-01 DIAGNOSIS — C44.311 BASAL CELL CARCINOMA OF NASAL TIP: ICD-10-CM

## 2021-01-01 DIAGNOSIS — N40.0 ENLARGED PROSTATE: ICD-10-CM

## 2021-01-01 DIAGNOSIS — C44.319 BASAL CELL CARCINOMA OF RIGHT CHEEK: ICD-10-CM

## 2021-01-01 DIAGNOSIS — C44.311 BASAL CELL CARCINOMA OF RIGHT NASAL SIDEWALL: Primary | ICD-10-CM

## 2021-01-01 DIAGNOSIS — D50.9 IRON DEFICIENCY ANEMIA, UNSPECIFIED IRON DEFICIENCY ANEMIA TYPE: Primary | ICD-10-CM

## 2021-01-01 DIAGNOSIS — R35.1 NOCTURIA: Primary | ICD-10-CM

## 2021-01-01 DIAGNOSIS — R53.83 OTHER FATIGUE: ICD-10-CM

## 2021-01-01 DIAGNOSIS — Z00.00 ROUTINE GENERAL MEDICAL EXAMINATION AT A HEALTH CARE FACILITY: Primary | ICD-10-CM

## 2021-01-01 DIAGNOSIS — C44.319 BASAL CELL CARCINOMA (BCC) OF LEFT CHEEK: ICD-10-CM

## 2021-01-01 DIAGNOSIS — N32.81 OVERACTIVE BLADDER: ICD-10-CM

## 2021-01-01 DIAGNOSIS — D48.5 NEOPLASM OF UNCERTAIN BEHAVIOR OF SKIN: Primary | ICD-10-CM

## 2021-01-01 DIAGNOSIS — Z00.00 MEDICARE ANNUAL WELLNESS VISIT, SUBSEQUENT: ICD-10-CM

## 2021-01-01 DIAGNOSIS — I10 ESSENTIAL HYPERTENSION: ICD-10-CM

## 2021-01-01 DIAGNOSIS — N40.0 BENIGN NON-NODULAR PROSTATIC HYPERPLASIA WITHOUT LOWER URINARY TRACT SYMPTOMS: ICD-10-CM

## 2021-01-01 DIAGNOSIS — E78.5 HYPERLIPIDEMIA, UNSPECIFIED HYPERLIPIDEMIA TYPE: ICD-10-CM

## 2021-01-01 DIAGNOSIS — I10 ESSENTIAL HYPERTENSION: Primary | ICD-10-CM

## 2021-01-01 LAB
ALBUMIN SERPL-MCNC: 4.2 G/DL (ref 3.5–5.2)
ALBUMIN/GLOBULIN RATIO: 1.4 (ref 1–2.5)
ALP BLD-CCNC: 57 U/L (ref 40–129)
ALT SERPL-CCNC: 9 U/L (ref 5–41)
ANION GAP SERPL CALCULATED.3IONS-SCNC: 8 MMOL/L (ref 9–17)
AST SERPL-CCNC: 14 U/L
BILIRUB SERPL-MCNC: 0.66 MG/DL (ref 0.3–1.2)
BUN BLDV-MCNC: 29 MG/DL (ref 8–23)
BUN/CREAT BLD: 23 (ref 9–20)
CALCIUM SERPL-MCNC: 9.4 MG/DL (ref 8.6–10.4)
CHLORIDE BLD-SCNC: 101 MMOL/L (ref 98–107)
CHOLESTEROL/HDL RATIO: 4.7
CHOLESTEROL: 127 MG/DL
CO2: 29 MMOL/L (ref 20–31)
CREAT SERPL-MCNC: 1.28 MG/DL (ref 0.7–1.2)
DERMATOLOGY PATHOLOGY REPORT: NORMAL
GFR AFRICAN AMERICAN: >60 ML/MIN
GFR NON-AFRICAN AMERICAN: 56 ML/MIN
GFR SERPL CREATININE-BSD FRML MDRD: ABNORMAL ML/MIN/{1.73_M2}
GFR SERPL CREATININE-BSD FRML MDRD: ABNORMAL ML/MIN/{1.73_M2}
GLUCOSE BLD-MCNC: 120 MG/DL (ref 70–99)
HDLC SERPL-MCNC: 27 MG/DL
HEMOGLOBIN: 11.9 G/DL (ref 13–17)
HEMOGLOBIN: 12.8 G/DL (ref 13–17)
HEMOGLOBIN: 13.3 G/DL (ref 13–17)
HEMOGLOBIN: 13.9 G/DL (ref 13–17)
IRON SATURATION: 13 % (ref 20–55)
IRON SATURATION: 17 % (ref 20–55)
IRON SATURATION: 23 % (ref 20–55)
IRON SATURATION: 4 % (ref 20–55)
IRON: 10 UG/DL (ref 59–158)
IRON: 36 UG/DL (ref 59–158)
IRON: 53 UG/DL (ref 59–158)
IRON: 70 UG/DL (ref 59–158)
LDL CHOLESTEROL: 66 MG/DL (ref 0–130)
POTASSIUM SERPL-SCNC: 3.7 MMOL/L (ref 3.7–5.3)
PROSTATE SPECIFIC ANTIGEN: 0.95 UG/L
SODIUM BLD-SCNC: 138 MMOL/L (ref 135–144)
THYROXINE, FREE: 1.29 NG/DL (ref 0.93–1.7)
TOTAL IRON BINDING CAPACITY: 235 UG/DL (ref 250–450)
TOTAL IRON BINDING CAPACITY: 282 UG/DL (ref 250–450)
TOTAL IRON BINDING CAPACITY: 311 UG/DL (ref 250–450)
TOTAL IRON BINDING CAPACITY: 321 UG/DL (ref 250–450)
TOTAL PROTEIN: 7.1 G/DL (ref 6.4–8.3)
TRIGL SERPL-MCNC: 169 MG/DL
TSH SERPL DL<=0.05 MIU/L-ACNC: 4.22 MIU/L (ref 0.3–5)
UNSATURATED IRON BINDING CAPACITY: 225 UG/DL (ref 112–347)
UNSATURATED IRON BINDING CAPACITY: 241 UG/DL (ref 112–347)
UNSATURATED IRON BINDING CAPACITY: 246 UG/DL (ref 112–347)
UNSATURATED IRON BINDING CAPACITY: 268 UG/DL (ref 112–347)
VITAMIN B-12: 377 PG/ML (ref 232–1245)
VLDLC SERPL CALC-MCNC: ABNORMAL MG/DL (ref 1–30)

## 2021-01-01 PROCEDURE — G8427 DOCREV CUR MEDS BY ELIG CLIN: HCPCS | Performed by: UROLOGY

## 2021-01-01 PROCEDURE — G8427 DOCREV CUR MEDS BY ELIG CLIN: HCPCS | Performed by: INTERNAL MEDICINE

## 2021-01-01 PROCEDURE — 4040F PNEUMOC VAC/ADMIN/RCVD: CPT | Performed by: UROLOGY

## 2021-01-01 PROCEDURE — 99212 OFFICE O/P EST SF 10 MIN: CPT | Performed by: UROLOGY

## 2021-01-01 PROCEDURE — 88305 TISSUE EXAM BY PATHOLOGIST: CPT

## 2021-01-01 PROCEDURE — 83540 ASSAY OF IRON: CPT

## 2021-01-01 PROCEDURE — 85018 HEMOGLOBIN: CPT

## 2021-01-01 PROCEDURE — 91301 COVID-19, MODERNA VACCINE 100MCG/0.5ML DOSE: CPT

## 2021-01-01 PROCEDURE — 7100000010 HC PHASE II RECOVERY - FIRST 15 MIN: Performed by: PLASTIC SURGERY

## 2021-01-01 PROCEDURE — 3600000002 HC SURGERY LEVEL 2 BASE: Performed by: PLASTIC SURGERY

## 2021-01-01 PROCEDURE — G8484 FLU IMMUNIZE NO ADMIN: HCPCS | Performed by: INTERNAL MEDICINE

## 2021-01-01 PROCEDURE — 99213 OFFICE O/P EST LOW 20 MIN: CPT | Performed by: UROLOGY

## 2021-01-01 PROCEDURE — 99213 OFFICE O/P EST LOW 20 MIN: CPT | Performed by: INTERNAL MEDICINE

## 2021-01-01 PROCEDURE — 4004F PT TOBACCO SCREEN RCVD TLK: CPT | Performed by: INTERNAL MEDICINE

## 2021-01-01 PROCEDURE — 96372 THER/PROPH/DIAG INJ SC/IM: CPT

## 2021-01-01 PROCEDURE — 80053 COMPREHEN METABOLIC PANEL: CPT

## 2021-01-01 PROCEDURE — 4040F PNEUMOC VAC/ADMIN/RCVD: CPT | Performed by: PLASTIC SURGERY

## 2021-01-01 PROCEDURE — 84443 ASSAY THYROID STIM HORMONE: CPT

## 2021-01-01 PROCEDURE — PBSHW PBB SHADOW CHARGE: Performed by: INTERNAL MEDICINE

## 2021-01-01 PROCEDURE — 99024 POSTOP FOLLOW-UP VISIT: CPT | Performed by: PLASTIC SURGERY

## 2021-01-01 PROCEDURE — 3017F COLORECTAL CA SCREEN DOC REV: CPT | Performed by: PLASTIC SURGERY

## 2021-01-01 PROCEDURE — 4004F PT TOBACCO SCREEN RCVD TLK: CPT | Performed by: UROLOGY

## 2021-01-01 PROCEDURE — 99214 OFFICE O/P EST MOD 30 MIN: CPT | Performed by: INTERNAL MEDICINE

## 2021-01-01 PROCEDURE — G8417 CALC BMI ABV UP PARAM F/U: HCPCS | Performed by: INTERNAL MEDICINE

## 2021-01-01 PROCEDURE — 36415 COLL VENOUS BLD VENIPUNCTURE: CPT

## 2021-01-01 PROCEDURE — G8417 CALC BMI ABV UP PARAM F/U: HCPCS | Performed by: UROLOGY

## 2021-01-01 PROCEDURE — 7100000011 HC PHASE II RECOVERY - ADDTL 15 MIN: Performed by: PLASTIC SURGERY

## 2021-01-01 PROCEDURE — PBSHW COVID-19, MODERNA BOOSTER VACCINE 0.25ML DOSE: Performed by: INTERNAL MEDICINE

## 2021-01-01 PROCEDURE — 99397 PER PM REEVAL EST PAT 65+ YR: CPT | Performed by: INTERNAL MEDICINE

## 2021-01-01 PROCEDURE — 83550 IRON BINDING TEST: CPT

## 2021-01-01 PROCEDURE — 84439 ASSAY OF FREE THYROXINE: CPT

## 2021-01-01 PROCEDURE — 84153 ASSAY OF PSA TOTAL: CPT

## 2021-01-01 PROCEDURE — 82607 VITAMIN B-12: CPT

## 2021-01-01 PROCEDURE — 99212 OFFICE O/P EST SF 10 MIN: CPT | Performed by: PLASTIC SURGERY

## 2021-01-01 PROCEDURE — 99213 OFFICE O/P EST LOW 20 MIN: CPT | Performed by: PLASTIC SURGERY

## 2021-01-01 PROCEDURE — G0439 PPPS, SUBSEQ VISIT: HCPCS | Performed by: INTERNAL MEDICINE

## 2021-01-01 PROCEDURE — 80061 LIPID PANEL: CPT

## 2021-01-01 PROCEDURE — 1123F ACP DISCUSS/DSCN MKR DOCD: CPT | Performed by: INTERNAL MEDICINE

## 2021-01-01 PROCEDURE — 4040F PNEUMOC VAC/ADMIN/RCVD: CPT | Performed by: INTERNAL MEDICINE

## 2021-01-01 PROCEDURE — 99203 OFFICE O/P NEW LOW 30 MIN: CPT | Performed by: PLASTIC SURGERY

## 2021-01-01 PROCEDURE — G8427 DOCREV CUR MEDS BY ELIG CLIN: HCPCS | Performed by: PLASTIC SURGERY

## 2021-01-01 PROCEDURE — 91301 COVID-19, MODERNA BOOSTER VACCINE 0.25ML DOSE: CPT | Performed by: INTERNAL MEDICINE

## 2021-01-01 PROCEDURE — 99213 OFFICE O/P EST LOW 20 MIN: CPT

## 2021-01-01 PROCEDURE — 3600000012 HC SURGERY LEVEL 2 ADDTL 15MIN: Performed by: PLASTIC SURGERY

## 2021-01-01 PROCEDURE — 4004F PT TOBACCO SCREEN RCVD TLK: CPT | Performed by: PLASTIC SURGERY

## 2021-01-01 PROCEDURE — 1123F ACP DISCUSS/DSCN MKR DOCD: CPT | Performed by: PLASTIC SURGERY

## 2021-01-01 PROCEDURE — 3017F COLORECTAL CA SCREEN DOC REV: CPT | Performed by: INTERNAL MEDICINE

## 2021-01-01 PROCEDURE — 2500000003 HC RX 250 WO HCPCS: Performed by: PLASTIC SURGERY

## 2021-01-01 PROCEDURE — G8417 CALC BMI ABV UP PARAM F/U: HCPCS | Performed by: PLASTIC SURGERY

## 2021-01-01 PROCEDURE — 1123F ACP DISCUSS/DSCN MKR DOCD: CPT | Performed by: UROLOGY

## 2021-01-01 PROCEDURE — 3017F COLORECTAL CA SCREEN DOC REV: CPT | Performed by: UROLOGY

## 2021-01-01 PROCEDURE — 2709999900 HC NON-CHARGEABLE SUPPLY: Performed by: PLASTIC SURGERY

## 2021-01-01 RX ORDER — HYDROCHLOROTHIAZIDE 12.5 MG/1
12.5 CAPSULE, GELATIN COATED ORAL DAILY
Qty: 90 CAPSULE | Refills: 3 | Status: SHIPPED | OUTPATIENT
Start: 2021-01-01

## 2021-01-01 RX ORDER — FENOFIBRATE 160 MG/1
160 TABLET ORAL DAILY
Qty: 90 TABLET | Refills: 3 | Status: SHIPPED | OUTPATIENT
Start: 2021-01-01

## 2021-01-01 RX ORDER — AMLODIPINE BESYLATE 5 MG/1
5 TABLET ORAL DAILY
Qty: 90 TABLET | Refills: 3 | Status: SHIPPED | OUTPATIENT
Start: 2021-01-01

## 2021-01-01 RX ORDER — CYANOCOBALAMIN 1000 UG/ML
1000 INJECTION INTRAMUSCULAR; SUBCUTANEOUS
Status: SHIPPED | OUTPATIENT
Start: 2021-01-01

## 2021-01-01 RX ORDER — ATORVASTATIN CALCIUM 10 MG/1
TABLET, FILM COATED ORAL
Qty: 90 TABLET | Refills: 3 | Status: SHIPPED | OUTPATIENT
Start: 2021-01-01

## 2021-01-01 RX ORDER — BENAZEPRIL HYDROCHLORIDE 40 MG/1
TABLET, FILM COATED ORAL
Qty: 90 TABLET | Refills: 3 | Status: SHIPPED | OUTPATIENT
Start: 2021-01-01

## 2021-01-01 RX ORDER — OMEPRAZOLE 20 MG/1
20 CAPSULE, DELAYED RELEASE ORAL DAILY
Qty: 180 CAPSULE | Refills: 3 | Status: SHIPPED | OUTPATIENT
Start: 2021-01-01

## 2021-01-01 RX ADMIN — CYANOCOBALAMIN 1000 MCG: 1000 INJECTION, SOLUTION INTRAMUSCULAR at 16:16

## 2021-01-01 RX ADMIN — CYANOCOBALAMIN 1000 MCG: 1000 INJECTION, SOLUTION INTRAMUSCULAR at 09:04

## 2021-01-01 RX ADMIN — CYANOCOBALAMIN 1000 MCG: 1000 INJECTION, SOLUTION INTRAMUSCULAR at 09:18

## 2021-01-01 RX ADMIN — CYANOCOBALAMIN 1000 MCG: 1000 INJECTION, SOLUTION INTRAMUSCULAR at 16:14

## 2021-01-01 RX ADMIN — CYANOCOBALAMIN 1000 MCG: 1000 INJECTION, SOLUTION INTRAMUSCULAR at 08:40

## 2021-01-01 RX ADMIN — CYANOCOBALAMIN 1000 MCG: 1000 INJECTION, SOLUTION INTRAMUSCULAR at 16:12

## 2021-01-01 RX ADMIN — CYANOCOBALAMIN 1000 MCG: 1000 INJECTION, SOLUTION INTRAMUSCULAR at 08:41

## 2021-01-01 RX ADMIN — CYANOCOBALAMIN 1000 MCG: 1000 INJECTION, SOLUTION INTRAMUSCULAR; SUBCUTANEOUS at 10:20

## 2021-01-01 RX ADMIN — CYANOCOBALAMIN 1000 MCG: 1000 INJECTION, SOLUTION INTRAMUSCULAR at 16:04

## 2021-01-01 RX ADMIN — CYANOCOBALAMIN 1000 MCG: 1000 INJECTION, SOLUTION INTRAMUSCULAR at 09:11

## 2021-01-01 RX ADMIN — CYANOCOBALAMIN 1000 MCG: 1000 INJECTION, SOLUTION INTRAMUSCULAR at 13:51

## 2021-01-01 SDOH — ECONOMIC STABILITY: FOOD INSECURITY: WITHIN THE PAST 12 MONTHS, THE FOOD YOU BOUGHT JUST DIDN'T LAST AND YOU DIDN'T HAVE MONEY TO GET MORE.: NEVER TRUE

## 2021-01-01 SDOH — ECONOMIC STABILITY: FOOD INSECURITY: WITHIN THE PAST 12 MONTHS, YOU WORRIED THAT YOUR FOOD WOULD RUN OUT BEFORE YOU GOT MONEY TO BUY MORE.: NEVER TRUE

## 2021-01-01 ASSESSMENT — ENCOUNTER SYMPTOMS
NAUSEA: 0
SHORTNESS OF BREATH: 0
ABDOMINAL PAIN: 0
CONSTIPATION: 0
BLOOD IN STOOL: 0
NAUSEA: 0
SHORTNESS OF BREATH: 0
HEARTBURN: 1
ABDOMINAL PAIN: 0
COUGH: 0
VOMITING: 0
ABDOMINAL PAIN: 0
TROUBLE SWALLOWING: 0
BACK PAIN: 0
DIARRHEA: 0
EYE PAIN: 0
CONSTIPATION: 0
ABDOMINAL PAIN: 0
COUGH: 0
BACK PAIN: 0
BACK PAIN: 0
COUGH: 0
VOMITING: 0
HEARTBURN: 1
EYE PAIN: 0
DIARRHEA: 0
DIARRHEA: 0
SHORTNESS OF BREATH: 0
BLOOD IN STOOL: 0
EYE PAIN: 0
VOMITING: 0
SHORTNESS OF BREATH: 0
CONSTIPATION: 0
NAUSEA: 0
COUGH: 0
BLOOD IN STOOL: 0

## 2021-01-01 ASSESSMENT — PAIN - FUNCTIONAL ASSESSMENT: PAIN_FUNCTIONAL_ASSESSMENT: 0-10

## 2021-01-01 ASSESSMENT — PATIENT HEALTH QUESTIONNAIRE - PHQ9
2. FEELING DOWN, DEPRESSED OR HOPELESS: 0
SUM OF ALL RESPONSES TO PHQ9 QUESTIONS 1 & 2: 0
SUM OF ALL RESPONSES TO PHQ QUESTIONS 1-9: 0
SUM OF ALL RESPONSES TO PHQ QUESTIONS 1-9: 0
1. LITTLE INTEREST OR PLEASURE IN DOING THINGS: 0

## 2021-01-01 ASSESSMENT — PAIN SCALES - GENERAL
PAINLEVEL_OUTOF10: 0
PAINLEVEL_OUTOF10: 0

## 2021-01-01 ASSESSMENT — SOCIAL DETERMINANTS OF HEALTH (SDOH): HOW HARD IS IT FOR YOU TO PAY FOR THE VERY BASICS LIKE FOOD, HOUSING, MEDICAL CARE, AND HEATING?: NOT HARD AT ALL

## 2021-01-01 ASSESSMENT — LIFESTYLE VARIABLES
HOW OFTEN DO YOU HAVE A DRINK CONTAINING ALCOHOL: 1
HOW MANY STANDARD DRINKS CONTAINING ALCOHOL DO YOU HAVE ON A TYPICAL DAY: 0

## 2021-01-18 ENCOUNTER — NURSE ONLY (OUTPATIENT)
Dept: LAB | Age: 71
End: 2021-01-18
Payer: MEDICARE

## 2021-01-18 DIAGNOSIS — E53.8 VITAMIN B12 DEFICIENCY: Primary | ICD-10-CM

## 2021-01-18 PROCEDURE — 96372 THER/PROPH/DIAG INJ SC/IM: CPT

## 2021-01-18 RX ADMIN — CYANOCOBALAMIN 1000 MCG: 1000 INJECTION, SOLUTION INTRAMUSCULAR at 11:28

## 2021-03-25 NOTE — PROGRESS NOTES
[place] [address]  Dept: [phone]  Dept Fax: [phone]  Loc: [phone]     Alex Lu is a 79 y.o. male who presents today for his medical conditions/complaintsas noted below. Alex Lu is c/o of   Chief Complaint   Patient presents with    Medicare AWV     6 month    Hypertension    Hyperlipidemia    Gastroesophageal Reflux         HPI:     Hypertension  This is a chronic (essential htn) problem. The current episode started more than 1 year ago. The problem has been waxing and waning since onset. The problem is controlled. Pertinent negatives include no chest pain, headaches, neck pain, palpitations or shortness of breath. Hyperlipidemia  This is a chronic problem. The current episode started more than 1 year ago. The problem is controlled. Recent lipid tests were reviewed and are variable. Pertinent negatives include no chest pain or shortness of breath. Gastroesophageal Reflux  He complains of heartburn. He reports no abdominal pain, no chest pain, no coughing or no nausea. This is a recurrent problem. The current episode started more than 1 year ago. The problem occurs rarely. The problem has been waxing and waning. Other  This is a recurrent (4-General medical exam) problem. The current episode started today. The problem occurs intermittently. The problem has been waxing and waning. Pertinent negatives include no abdominal pain, arthralgias, chest pain, chills, coughing, fever, headaches, nausea, neck pain, numbness, rash, vomiting or weakness.        Hemoglobin A1C (%)   Date Value   09/17/2019 5.6            No results found for: LABMICR  LDL Cholesterol (mg/dL)   Date Value   03/15/2021 66   09/15/2020 55   03/17/2020 135 (H)         AST (U/L)   Date Value   03/15/2021 14     ALT (U/L)   Date Value   03/15/2021 9     BUN (mg/dL)   Date Value   03/15/2021 29 (H)     BP Readings from Last 3 Encounters:   03/25/21 114/80   11/12/20 116/70   11/12/20 92/62              Past Medical History:   Diagnosis Date    Anemia 2016    ON IRON    BPH with elevated PSA     post biopsy    Colon polyps 2016    BENEIGN REMOVED WITH COLONOSCOPY    Elevated Gina-Barr virus antibody titer 1989    NEVER TREATED FOR    Elevated fasting glucose     History of blood transfusion 2016    R/T INTESTINAL BLEEDING    History of chronic fatigue syndrome 1989    RESOLVED     History of GI bleed 2016    BROUGHT ON BY MEDS---NIACIN, FISH OIL AND VIT C    Hyperlipidemia 2014    (triglycerides-ELEVATED, TRYING TO CONTROL WITH DIET    Hypertension 1999    ON RX    Wears glasses       Past Surgical History:   Procedure Laterality Date    COLONOSCOPY  2000    internal hemorrhoids    COLONOSCOPY  02/02/2015    polypx1, repeat 5yrs due to family hx & hx polyps- brannon    COLONOSCOPY  12/09/2015    polyp X2  int. Hemorrhoids  partial prolapse of ileocecal valve    COLONOSCOPY  11/2016    COLONOSCOPY N/A 11/12/2020    COLONOSCOPY POLYPECTOMY SNARE/HOT BIOPSY performed by Eli Rios DO at [place] [address] Bilateral 1958    to correct flat arches (age 9)    PROSTATE BIOPSY Bilateral 12/04/2012    benign    PROSTATE BIOPSY Bilateral 07/2014    benign    PROSTATECTOMY  12/10/2019    LAPAROSCOPIC ROBOTIC SIMPLE PROSTATECTOMY     PROSTATECTOMY N/A 12/10/2019    XI LAPAROSCOPIC ROBOTIC SIMPLE PROSTATECTOMY performed by Africa Knox MD at [address] [facility]  12/8/15    Normal upper GI tract, no evidence of blood in upper GI tract, mild distal esophagitis    UPPER GASTROINTESTINAL ENDOSCOPY  11/2016       Family History   Problem Relation Age of Onset    Cancer Mother         LUNG    High Blood Pressure Mother     Diabetes Father         IDDM-LATE IN LIFE    Heart Disease Father         CHF    High Blood Pressure Father           Social History Creatinine monitoring  03/15/2022    Diabetes screen  09/17/2022    Colon cancer screen colonoscopy  11/12/2023    DTaP/Tdap/Td vaccine (4 - Td) 03/24/2030    Pneumococcal 65+ years Vaccine  Completed    COVID-19 Vaccine  Completed    Hepatitis A vaccine  Aged Out    Hepatitis B vaccine  Aged Out    Hib vaccine  Aged Out    Meningococcal (ACWY) vaccine  Aged Out       Subjective:      Review of Systems   Constitutional: Negative for chills and fever. HENT: Negative for hearing loss. Eyes: Negative for pain and visual disturbance. Respiratory: Negative for cough and shortness of breath. Cardiovascular: Negative for chest pain, palpitations and leg swelling. Gastrointestinal: Positive for heartburn. Negative for abdominal pain, blood in stool, constipation, diarrhea, nausea and vomiting. Endocrine: Negative for cold intolerance, polydipsia and polyuria. Genitourinary: Negative for difficulty urinating, dysuria and hematuria. Musculoskeletal: Negative for arthralgias, back pain, gait problem and neck pain. Skin: Negative for pallor and rash. Neurological: Negative for dizziness, weakness, numbness and headaches. Hematological: Negative for adenopathy. Does not bruise/bleed easily. Psychiatric/Behavioral: Negative for confusion. The patient is not nervous/anxious. Objective:     Physical Exam  Vitals signs reviewed. Constitutional:       Appearance: He is well-developed. HENT:      Head: Normocephalic and atraumatic. Eyes:      Pupils: Pupils are equal, round, and reactive to light. Neck:      Musculoskeletal: Neck supple. Cardiovascular:      Rate and Rhythm: Normal rate and regular rhythm. Heart sounds: No murmur. No friction rub. No gallop. Pulmonary:      Effort: Pulmonary effort is normal.      Breath sounds: Normal breath sounds. No wheezing or rales. Abdominal:      General: There is no distension. Palpations: Abdomen is soft. There is no mass. Sig: Take 1 capsule by mouth daily     Dispense:  180 capsule     Refill:  3    atorvastatin (LIPITOR) 10 MG tablet     Sig: take 1 tablet by mouth once daily     Dispense:  90 tablet     Refill:  3       Patientgiven educational materials - see patient instructions. Discussed use, benefit,and side effects of prescribed medications. All patient questions answered. Ptvoiced understanding. Reviewed health maintenance. Instructed to continue currentmedications, diet and exercise. Patient agreed with treatment plan. Follow up asdirected.      Electronically signed by Jonelle Leyden, MD on 3/25/2021 at 3:20 PM

## 2021-03-25 NOTE — PATIENT INSTRUCTIONS
Personalized Preventive Plan for Patsy Toure - 3/25/2021  Medicare offers a range of preventive health benefits. Some of the tests and screenings are paid in full while other may be subject to a deductible, co-insurance, and/or copay. Some of these benefits include a comprehensive review of your medical history including lifestyle, illnesses that may run in your family, and various assessments and screenings as appropriate. After reviewing your medical record and screening and assessments performed today your provider may have ordered immunizations, labs, imaging, and/or referrals for you. A list of these orders (if applicable) as well as your Preventive Care list are included within your After Visit Summary for your review. Other Preventive Recommendations:    · A preventive eye exam performed by an eye specialist is recommended every 1-2 years to screen for glaucoma; cataracts, macular degeneration, and other eye disorders. · A preventive dental visit is recommended every 6 months. · Try to get at least 150 minutes of exercise per week or 10,000 steps per day on a pedometer . · Order or download the FREE \"Exercise & Physical Activity: Your Everyday Guide\" from [employer]. Call 4-257.302.1129 or search [employer] online. · You need 0234-9902 mg of calcium and 9224-4375 IU of vitamin D per day. It is possible to meet your calcium requirement with diet alone, but a vitamin D supplement is usually necessary to meet this goal.  · When exposed to the sun, use a sunscreen that protects against both UVA and UVB radiation with an SPF of 30 or greater. Reapply every 2 to 3 hours or after sweating, drying off with a towel, or swimming. · Always wear a seat belt when traveling in a car. Always wear a helmet when riding a bicycle or motorcycle.

## 2021-03-25 NOTE — PROGRESS NOTES
Medicare Annual Wellness Visit  Name: Sarah Major Date: 3/25/2021   MRN: [id number] Sex: Male   Age: 79 y.o. Ethnicity: Non-/Non    : 1950 Race: Mitesh Mathur is here for Medicare AWV (6 month), Hypertension, Hyperlipidemia, and Gastroesophageal Reflux    Screenings for behavioral, psychosocial and functional/safety risks, and cognitive dysfunction are all negative except as indicated below. These results, as well as other patient data from the [address] form, are documented in Flowsheets linked to this Encounter. No Known Allergies      Prior to Visit Medications    Medication Sig Taking? Authorizing Provider   omeprazole (PRILOSEC) 20 MG delayed release capsule Take 1 capsule by mouth daily Yes Vu Dale MD   atorvastatin (LIPITOR) 10 MG tablet take 1 tablet by mouth once daily Yes Vu Dale MD   fenofibrate (TRIGLIDE) 160 MG tablet Take 1 tablet by mouth daily Yes Vu Dale MD   hydroCHLOROthiazide (MICROZIDE) 12.5 MG capsule Take 1 capsule by mouth daily Yes Vu Dale MD   amLODIPine (NORVASC) 5 MG tablet Take 1 tablet by mouth daily Yes Vu Dale MD   benazepril (LOTENSIN) 40 MG tablet take 1 tablet by mouth once daily Yes Vu Dale MD   ferrous sulfate 325 (65 FE) MG tablet Take 325 mg by mouth daily (with breakfast)  Yes Historical Provider, MD   NONFORMULARY Take 1 tablet by mouth daily Centrum Men's multivitamin.   Yes Historical Provider, MD         Past Medical History:   Diagnosis Date    Anemia 2016    ON IRON    BPH with elevated PSA     post biopsy    Colon polyps 2016    BENEIGN REMOVED WITH COLONOSCOPY    Elevated Gina-Barr virus antibody titer     NEVER TREATED FOR    Elevated fasting glucose     History of blood transfusion 2016    R/T INTESTINAL BLEEDING    History of chronic fatigue syndrome     RESOLVED     History of GI bleed 2016    BROUGHT ON BY MEDS---NIACIN, FISH OIL AND VIT C    Hyperlipidemia 2014    (triglycerides-ELEVATED, TRYING TO CONTROL WITH DIET    Hypertension 1999    ON RX    Wears glasses        Past Surgical History:   Procedure Laterality Date    COLONOSCOPY  2000    internal hemorrhoids    COLONOSCOPY  02/02/2015    polypx1, repeat 5yrs due to family hx & hx polyps- brannon    COLONOSCOPY  12/09/2015    polyp X2  int. Hemorrhoids  partial prolapse of ileocecal valve    COLONOSCOPY  11/2016    COLONOSCOPY N/A 11/12/2020    COLONOSCOPY POLYPECTOMY SNARE/HOT BIOPSY performed by Marta Solares DO at [address] [place] [address]    to correct flat arches (age 9)    PROSTATE BIOPSY Bilateral 12/04/2012    benign    PROSTATE BIOPSY Bilateral 07/2014    benign    PROSTATECTOMY  12/10/2019    LAPAROSCOPIC ROBOTIC SIMPLE PROSTATECTOMY     PROSTATECTOMY N/A 12/10/2019    XI LAPAROSCOPIC ROBOTIC SIMPLE PROSTATECTOMY performed by Elena Krishna MD at [place] [address]  12/8/15    Normal upper GI tract, no evidence of blood in upper GI tract, mild distal esophagitis    UPPER GASTROINTESTINAL ENDOSCOPY  11/2016         Family History   Problem Relation Age of Onset    Cancer Mother         LUNG    High Blood Pressure Mother     Diabetes Father         IDDM-LATE IN LIFE    Heart Disease Father         CHF    High Blood Pressure Father        CareTeam (Including outside providers/suppliers regularly involved in providing care):   Patient Care Team:  Melville Kayser, MD as PCP - General (Internal Medicine)  Melville Kayser, MD as PCP - [facility] Empaneled Provider    Wt Readings from Last 3 Encounters:   03/25/21 221 lb (100.2 kg)   11/12/20 225 lb (102.1 kg)   09/24/20 226 lb (102.5 kg)     Vitals:    03/25/21 1455   BP: 114/80   Site: Left Upper Arm   Position: Sitting   Cuff Size: Medium Adult   Pulse: 92   Resp: 16   Weight: 221 lb (100.2 kg)   Height: 6' 1\" (1.854 m)     Body mass index is 29.16 kg/m².     Based upon direct observation of the patient, evaluation of cognition reveals none. Patient's complete Health Risk Assessment and screening values have been reviewed and are found in Flowsheets. The following problems were reviewed today and where indicated follow up appointments were made and/or referrals ordered. Positive Risk Factor Screenings with Interventions:         Substance History:  Social History     Tobacco History     Smoking Status  Light Tobacco Smoker Smoking Start Date  1/1/1970 Smoking Frequency  0.01 packs/day for 10 years (0.1 pk yrs) Smoking Tobacco Type  Pipe    Smokeless Tobacco Use  Never Used    Tobacco Comment  Rare pipe smoker. 4 BOWLS A WEEK No inhalation. Has never smoked cigarettes. Alcohol History     Alcohol Use Status  Yes Drinks/Week  0 Standard drinks or equivalent per week Amount  0.0 standard drinks of alcohol/wk Comment  WHISKEY OR WINE- 1 OR 2 A MONTH          Drug Use     Drug Use Status  No          Sexual Activity     Sexually Active  Not Asked               Alcohol Screening: Audit-C Score: 1    A score of 8 or more is associated with harmful or hazardous drinking. A score of 13 or more in women, and 15 or more in men, is likely to indicate alcohol dependence.   Substance Abuse Interventions:  · na         Personalized Preventive Plan   Current Health Maintenance Status  Immunization History   Administered Date(s) Administered    COVID-19, Moderna, PF, 100mcg/0.5mL 02/15/2021, 03/15/2021    DT (pediatric) 09/19/1997, 09/28/2007    Pneumococcal Conjugate 13-valent (Lucioeen Fabry) 09/14/2015, 03/14/2017    Pneumococcal Polysaccharide ([id number]) 09/28/2007, 03/24/2020    Tdap (Boostrix, Adacel) 03/24/2020        Health Maintenance   Topic Date Due    AAA screen  Never done    Hepatitis C screen  Never done    Shingles Vaccine (1 of 2) Never done   [employer] Visit (AWV)  Never done    Flu vaccine (1) 09/24/2021 (Originally 9/1/2020)    Lipid screen

## 2021-05-05 NOTE — PROGRESS NOTES
Caroline Goodman MD   Urology Clinic Progress Note      Patient:  Mich Au  YOB: 1950  Date: 5/5/2021    HISTORY OF PRESENT ILLNESS:   The patient is a 79 y.o. male who presents today for follow-up for the following problem(s): elevated PSA  Overall the problem(s) : are improving. Associated Symptoms: No dysuria, gross hematuria. Pain Severity:      Summary of old records: 7.92 on 7-18-17/last psa 5.68 on 1-17-17/prostate biopsy 2012 and 2014/ both benign. Prostate gland measures 80.7 mL in volume at that time. Nocturia 1x  On diuretic  BPH and OAB- on Ditropan and flomax    Additional History:  Duration: 2 week  Location: bladder  alleviating factors: amaya  Aggravating factors: constipation- improving with miralax  Associated with: pelvic pain  Frequency: all the time        PSA decreased to 5.5  PSA, Free Pct 29.1  %     Robo simple prostatectomy 12/9/2019  Path negative  Doing well. Strong stream. Emptying well. No hematuria or UTIs. Sometimes Nocturia 1x. Off of all meds. Occasional TAYLA when cough or BM on rare occasion. Not bothersome. Mri Prostate W Wo Contrast  Result Date: 1/24/2018   volume of 130.7 cc  *Peripheral zone:  Scattered indistinct areas of hypointensity seen on T2 and ADC imaging. PI-RADS 2 *Central/transitional zone:  Diffusely enlarged heterogeneous nodular changes of BPH including right posterolateral exophytic component which partially obscures the right peripheral zone. PI-RADS 2 *Enlarged prostate gland as measured above with impingement on the bladder which demonstrates mild wall trabeculation, please correlate for chronic outlet obstruction. Last several PSA's:  Lab Results   Component Value Date    PSA 0.95 04/28/2021    PSA 0.63 04/15/2020    PSA 6.81 (H) 08/02/2019    PSA 5.5 (H) 08/02/2019       Last total testosterone:  No results found for: TESTOSTERONE    Urinalysis today:  No results found for this visit on 05/05/21.     Last BUN and creatinine:  Lab Results   Component Value Date    BUN 29 (H) 03/15/2021     Lab Results   Component Value Date    CREATININE 1.28 (H) 03/15/2021       Imaging Reviewed during this Office Visit:   (results were independently reviewed by physician and radiology report verified)    PAST MEDICAL, FAMILY AND SOCIAL HISTORY UPDATE:  Past Medical History:   Diagnosis Date    Anemia 2016    ON IRON    BPH with elevated PSA     post biopsy    Colon polyps 2016    BENEIGN REMOVED WITH COLONOSCOPY    Elevated Gina-Barr virus antibody titer 1989    NEVER TREATED FOR    Elevated fasting glucose     History of blood transfusion 2016    R/T INTESTINAL BLEEDING    History of chronic fatigue syndrome 1989    RESOLVED     History of GI bleed 2016    BROUGHT ON BY MEDS---NIACIN, FISH OIL AND VIT C    Hyperlipidemia 2014    (triglycerides-ELEVATED, TRYING TO CONTROL WITH DIET    Hypertension 1999    ON RX    Wears glasses      Past Surgical History:   Procedure Laterality Date    COLONOSCOPY  2000    internal hemorrhoids    COLONOSCOPY  02/02/2015    polypx1, repeat 5yrs due to family hx & hx polyps- brannon    COLONOSCOPY  12/09/2015    polyp X2  int. Hemorrhoids  partial prolapse of ileocecal valve    COLONOSCOPY  11/2016    COLONOSCOPY N/A 11/12/2020    COLONOSCOPY POLYPECTOMY SNARE/HOT BIOPSY performed by Dmirty Younger DO at [facility] Bilateral 1958    to correct flat arches (age 9)    PROSTATE BIOPSY Bilateral 12/04/2012    benign    PROSTATE BIOPSY Bilateral 07/2014    benign    PROSTATECTOMY  12/10/2019    LAPAROSCOPIC ROBOTIC SIMPLE PROSTATECTOMY     PROSTATECTOMY N/A 12/10/2019    XI LAPAROSCOPIC ROBOTIC SIMPLE PROSTATECTOMY performed by Paris Paz MD at [address] [place] [address]  12/8/15    Normal upper GI tract, no evidence of blood in upper GI tract, mild distal esophagitis    UPPER GASTROINTESTINAL ENDOSCOPY  11/2016     Family History   Problem Relation Age of Onset    Cancer Mother         LUNG    High Blood Pressure Mother     Diabetes Father         IDDM-LATE IN LIFE    Heart Disease Father         CHF    High Blood Pressure Father      Outpatient Medications Marked as Taking for the 5/5/21 encounter (Office Visit) with Fransisca Zaragoza MD   Medication Sig Dispense Refill    omeprazole (PRILOSEC) 20 MG delayed release capsule Take 1 capsule by mouth daily 180 capsule 3    atorvastatin (LIPITOR) 10 MG tablet take 1 tablet by mouth once daily 90 tablet 3    fenofibrate (TRIGLIDE) 160 MG tablet Take 1 tablet by mouth daily 90 tablet 3    hydroCHLOROthiazide (MICROZIDE) 12.5 MG capsule Take 1 capsule by mouth daily 90 capsule 3    amLODIPine (NORVASC) 5 MG tablet Take 1 tablet by mouth daily 90 tablet 3    benazepril (LOTENSIN) 40 MG tablet take 1 tablet by mouth once daily 90 tablet 3    ferrous sulfate 325 (65 FE) MG tablet Take 325 mg by mouth daily (with breakfast)          Patient has no known allergies. Social History     Tobacco Use   Smoking Status Light Tobacco Smoker    Packs/day: 0.01    Years: 10.00    Pack years: 0.10    Types: Pipe    Start date: 1/1/1970   Smokeless Tobacco Never Used   Tobacco Comment    Rare pipe smoker. 4 BOWLS A WEEK No inhalation. Has never smoked cigarettes.         Social History     Substance and Sexual Activity   Alcohol Use Yes    Alcohol/week: 0.0 standard drinks    Frequency: Monthly or less    Drinks per session: 1 or 2    Binge frequency: Never    Comment: WHISKEY OR WINE- 1 OR 2 A MONTH       REVIEW OF SYSTEMS:  Constitutional: negative  Eyes: negative  Respiratory: negative  Cardiovascular: negative  Gastrointestinal: negative  Musculoskeletal: negative  Genitourinary: negative except for what is in HPI  Skin: negative   Neurological: negative  Hematological/Lymphatic: negative  Psychological: negative    Physical Exam:      Vitals:    05/05/21 0958   BP: 122/78   Pulse: 96   Resp: 16     Patient is a 79 y.o. male in no acute distress and alert and oriented to person, place and time. NAD, A/o  Non labored respiration  Normal peripheral pulses  Skin- warm and dry  Psych- normal mood and affect      Assessment and Plan      1. Nocturia    2. Enlarged prostate    3. Overactive bladder    4.  Benign non-nodular prostatic hyperplasia without lower urinary tract symptoms           Plan:      Elevated PSA: decreased since surgery  Check PSA 1 year  BPH/LUTS-  Doing well  Urinary retention: resolved   Constipation: Miralax  Follow up  1 year with Pauline Ahumada, M.D  [facility]

## 2021-08-27 PROBLEM — D48.5 NEOPLASM OF UNCERTAIN BEHAVIOR OF SKIN: Status: ACTIVE | Noted: 2021-01-01

## 2021-08-27 PROBLEM — C44.319 BASAL CELL CARCINOMA (BCC) OF LEFT CHEEK: Status: ACTIVE | Noted: 2021-01-01

## 2021-08-27 NOTE — PROGRESS NOTES
New Patient Lesion    79year old white male presents today reporting a lesion of the face. Patient states this lesion has been present for several years. The lesion has been unchanged since first noticed. There has been some drainage and bleeding from the area on his right cheek. Patient has been seen by a dermatologist in the past, including Dr. Ashtyn Maurice. There has been prior treatment, including excision. Patient has a  history of basal cell carcinoma. Patient has a family history of lung and colon cancer in his mother. Patient recalls that he does not use sunscreen regularly, but states he is not out in the sun much. He avoids tanning beds. Patient states natural hair color is brown and has light complexion. Patient is not currently taking any blood thinners. Patient is a tobacco smoker. I have reviewed the patient's medical history in detail and updated the computerized patient record.

## 2021-08-27 NOTE — PROGRESS NOTES
Subjective:      Patient ID: Yovani Koch is a 79 y.o. male. HPI  Patient presents today reporting a lesion of the face. Patient states this lesion has been present for several years. The lesion has been unchanged since first noticed. There has been some drainage and bleeding from the area on his right cheek. Patient has been seen by a dermatologist in the past, including Merlin Shaffer. There has been prior excision of BCC on left cheek. Patient has a family history of lung and colon cancer in his mother. Patient recalls that he does not use sunscreen regularly, but states he is not out in the sun much. He avoids tanning beds. Patient states natural hair color is brown and has light complexion. Patient is not currently taking any blood thinners. Patient is a tobacco smoker. Review of Systems   Constitutional: Negative. HENT: Negative for congestion and trouble swallowing. Respiratory: Negative for cough and shortness of breath. Cardiovascular: Negative for chest pain. Gastrointestinal: Negative for abdominal pain. Neurological: Negative for light-headedness and headaches. Psychiatric/Behavioral: Negative for dysphoric mood. Objective:   Physical Exam  Vitals and nursing note reviewed. Exam conducted with a chaperone present. Constitutional:       Appearance: Normal appearance. HENT:      Head: Normocephalic and atraumatic. Nose:        Mouth/Throat:      Mouth: Mucous membranes are moist.   Eyes:      Extraocular Movements: Extraocular movements intact. Conjunctiva/sclera: Conjunctivae normal.      Pupils: Pupils are equal, round, and reactive to light. Pulmonary:      Effort: Pulmonary effort is normal.   Skin:     General: Skin is warm and dry. Neurological:      General: No focal deficit present. Mental Status: He is alert and oriented to person, place, and time. Checked over head and neck. Lesions as diagrammed.   Assessment:      Suspicious lesions x4. Plan:      Schedule for excision x4. Nasal tip may require skin graft or flap. I have discussed with the patient the indication, alternatives, and the possible risks and/or complications which include but are not limited to those delineated on the consent form for the planned procedure and the anesthesia methods. All questions were answered to patient's satisfaction. Consent was reviewed and signed. Will get Leena Saez scheduled.                Gagandeep Black MD

## 2021-09-23 NOTE — PROGRESS NOTES
[facility]  [address] [place] [id number]  Dept: [phone]  Dept Fax: [phone]  Loc: [phone]     Serafin Morales is a 70 y.o. male who presents today for his medical conditions/complaintsas noted below. Serafin Morales is c/o of   Chief Complaint   Patient presents with    Hypertension     6 month    Hyperlipidemia    Gastroesophageal Reflux         HPI:     Hypertension  This is a chronic problem. The current episode started more than 1 year ago. The problem has been waxing and waning since onset. The problem is controlled. Pertinent negatives include no chest pain, headaches, neck pain, palpitations or shortness of breath. Hyperlipidemia  This is a chronic problem. The current episode started more than 1 year ago. The problem is controlled. Recent lipid tests were reviewed and are variable. Pertinent negatives include no chest pain or shortness of breath. Gastroesophageal Reflux  He complains of heartburn. He reports no abdominal pain, no chest pain, no coughing or no nausea. This is a recurrent problem. The current episode started more than 1 year ago. The problem occurs rarely. The problem has been waxing and waning.        Hemoglobin A1C (%)   Date Value   09/17/2019 5.6            No results found for: LABMICR  LDL Cholesterol (mg/dL)   Date Value   03/15/2021 66   09/15/2020 55   03/17/2020 135 (H)         AST (U/L)   Date Value   03/15/2021 14     ALT (U/L)   Date Value   03/15/2021 9     BUN (mg/dL)   Date Value   03/15/2021 29 (H)     BP Readings from Last 3 Encounters:   09/23/21 112/68   08/27/21 (!) 118/58   05/05/21 122/78              Past Medical History:   Diagnosis Date    Anemia 2016    ON IRON    BPH with elevated PSA     post biopsy    Colon polyps 2016    BENEIGN REMOVED WITH COLONOSCOPY    Elevated Gina-Barr virus antibody titer 1989    NEVER TREATED FOR    Elevated fasting glucose     History of blood transfusion 2016    R/T INTESTINAL BLEEDING    History of chronic fatigue syndrome 1989    RESOLVED     History of GI bleed 2016    BROUGHT ON BY MEDS---NIACIN, FISH OIL AND VIT C    Hyperlipidemia 2014    (triglycerides-ELEVATED, TRYING TO CONTROL WITH DIET    Hypertension 1999    ON RX    Wears glasses       Past Surgical History:   Procedure Laterality Date    COLONOSCOPY  2000    internal hemorrhoids    COLONOSCOPY  02/02/2015    polypx1, repeat 5yrs due to family hx & hx polyps- brannon    COLONOSCOPY  12/09/2015    polyp X2  int. Hemorrhoids  partial prolapse of ileocecal valve    COLONOSCOPY  11/2016    COLONOSCOPY N/A 11/12/2020    COLONOSCOPY POLYPECTOMY SNARE/HOT BIOPSY performed by Adolfo Desai DO at [facility] Bilateral 1958    to correct flat arches (age 9)    PROSTATE BIOPSY Bilateral 12/04/2012    benign    PROSTATE BIOPSY Bilateral 07/2014    benign    PROSTATECTOMY  12/10/2019    LAPAROSCOPIC ROBOTIC SIMPLE PROSTATECTOMY     PROSTATECTOMY N/A 12/10/2019    XI LAPAROSCOPIC ROBOTIC SIMPLE PROSTATECTOMY [facility]  12/8/15    Normal upper GI tract, no evidence of blood in upper GI tract, mild distal esophagitis    UPPER GASTROINTESTINAL ENDOSCOPY  11/2016       Family History   Problem Relation Age of Onset    Cancer Mother         LUNG    High Blood Pressure Mother     Diabetes Father         IDDM-LATE IN LIFE    Heart Disease Father         CHF    High Blood Pressure Father           Social History     Tobacco Use    Smoking status: Light Tobacco Smoker     Packs/day: 0.01     Years: 10.00     Pack years: 0.10     Types: Pipe     Start date: 1/1/1970    Smokeless tobacco: Never Used    Tobacco comment: Rare pipe smoker. 4 BOWLS A WEEK No inhalation. Has never smoked cigarettes. Substance Use Topics    Alcohol use:  Yes     Alcohol/week: 0.0 standard Negative for cough and shortness of breath. Cardiovascular: Negative for chest pain, palpitations and leg swelling. Gastrointestinal: Positive for heartburn. Negative for abdominal pain, blood in stool, constipation, diarrhea, nausea and vomiting. Endocrine: Negative for cold intolerance, polydipsia and polyuria. Genitourinary: Negative for difficulty urinating, dysuria and hematuria. Musculoskeletal: Negative for arthralgias, back pain, gait problem and neck pain. Skin: Negative for pallor and rash. Neurological: Negative for dizziness, weakness, numbness and headaches. Hematological: Negative for adenopathy. Does not bruise/bleed easily. Psychiatric/Behavioral: Negative for confusion. The patient is not nervous/anxious. Objective:     Physical Exam  Vitals reviewed. Constitutional:       Appearance: He is well-developed. HENT:      Head: Normocephalic and atraumatic. Eyes:      Pupils: Pupils are equal, round, and reactive to light. Cardiovascular:      Rate and Rhythm: Normal rate and regular rhythm. Heart sounds: No murmur heard. No friction rub. No gallop. Pulmonary:      Effort: Pulmonary effort is normal.      Breath sounds: Normal breath sounds. No wheezing or rales. Abdominal:      General: There is no distension. Palpations: Abdomen is soft. There is no mass. Tenderness: There is no abdominal tenderness. There is no rebound. Musculoskeletal:         General: Normal range of motion. Cervical back: Neck supple. Lymphadenopathy:      Cervical: No cervical adenopathy. Skin:     General: Skin is warm and dry. Findings: No rash. Neurological:      Mental Status: He is alert and oriented to person, place, and time. Cranial Nerves: No cranial nerve deficit (grossly). Psychiatric:         Thought Content:  Thought content normal.        /68 (Site: Right Upper Arm, Position: Sitting, Cuff Size: Large Adult)   Pulse 92   Resp 16 Ht 6' 1\" (1.854 m)   Wt 211 lb (95.7 kg)   BMI 27.84 kg/m²     Assessment:       Diagnosis Orders   1. Essential hypertension  Comprehensive Metabolic Panel   2. Gastroesophageal reflux disease without esophagitis     3. Other hyperlipidemia  Lipid Panel   4. Iron deficiency anemia, unspecified iron deficiency anemia type  Hemoglobin    Iron And TIBC    Hemoglobin    Iron And TIBC   5. Vitamin B 12 deficiency  Vitamin B12   Reviewed multiple test results for this appointment. 4/28/2021 normal diagnostic PSA at 0.95.    9/17/2021 low hemoglobin at 11.9.    9/17/2021 low iron at 36. We are increasing the ferrous sulfate from daily to twice a day. Patient also told to continue Lipitor and Prilosec. Plan:       Return in about 6 months (around 3/28/2022) for medicare AWV, Hypertension, Hyperlipidemia. Orders Placed This Encounter   Procedures    Vitamin B12     Standing Status:   Future     Standing Expiration Date:   9/23/2022    Comprehensive Metabolic Panel     Standing Status:   Future     Standing Expiration Date:   9/23/2022    Lipid Panel     Standing Status:   Future     Standing Expiration Date:   9/23/2022     Order Specific Question:   Is Patient Fasting?/# of Hours     Answer:   yes    Hemoglobin     Standing Status:   Future     Standing Expiration Date:   9/23/2022    Iron And TIBC     Standing Status:   Future     Standing Expiration Date:   9/23/2022     Order Specific Question:   Is Patient Fasting? Answer:   na     Order Specific Question:   No of Hours? Answer:   na    Hemoglobin     Standing Status:   Future     Standing Expiration Date:   9/23/2022    Iron And TIBC     Standing Status:   Future     Standing Expiration Date:   9/23/2022     Order Specific Question:   Is Patient Fasting? Answer:   na     Order Specific Question:   No of Hours? Answer:   na     No orders of the defined types were placed in this encounter.       Patientgiven educational materials - see patient instructions. Discussed use, benefit,and side effects of prescribed medications. All patient questions answered. Ptvoiced understanding. Reviewed health maintenance. Instructed to continue currentmedications, diet and exercise. Patient agreed with treatment plan. Follow up asdirected.      Electronically signed by Yuri Adams MD on 9/23/2021 at 3:03 PM

## 2021-09-23 NOTE — TELEPHONE ENCOUNTER
Patient was at the front window today. Patient states he is scheduled for surgery with Dr Leobardo German on 10/15/2021. Patient states he is suppose to take an antibiotic before the surgery. There is no medication at the pharmacy. Patient uses [facility] on [address]. Patient also has questions concerning the pain medication after the surgery.   Patient's call back WD#[phone]

## 2021-10-15 NOTE — BRIEF OP NOTE
Brief Postoperative Note      Patient: Rosalba Brown  YOB: 1950  MRN: [id number]    Date of Procedure: 10/15/2021    Pre-Op Diagnosis: Lesion Right cheek, Right nose, scalp, nasal tip with possible skin graft    Post-Op Diagnosis: Same       Procedure(s):  v-EXCISION Lesion Right cheek, Right nose, scalp, nasal tip    Surgeon(s):  Marisa Banuelos MD    Assistant:  * No surgical staff found *    Anesthesia: Local    Estimated Blood Loss (mL): Minimal    Complications: None    Specimens:   ID Type Source Tests Collected by Time Destination   A : sca;p lesion Tissue Scalp SURGICAL PATHOLOGY Marisa Banuelos MD 10/15/2021 1153    B : RIGHT CHEEK LESION Tissue Cheek SURGICAL PATHOLOGY Marisa Banuelos MD 10/15/2021 1207    C : RIGHT NOSE LESION Tissue Nose SURGICAL PATHOLOGY Marisa Banuelos MD 10/15/2021 1214    D : NASAL TIP LESION Tissue Nose SURGICAL PATHOLOGY Marisa Banuelos MD 10/15/2021 1220        Implants:  * No implants in log *      Drains:   [REMOVED] Urethral Catheter Triple-lumen (Removed)       Findings:     Electronically signed by Marisa Banuelos MD on 10/15/2021 at 12:39 PM

## 2021-10-15 NOTE — H&P
Subjective:      Patient ID: Gaston Childs is a 79 y.o. male.     HPI  Patient presents today for lesions right cheek, right nose, nasal tip and scalp.     Review of Systems   Constitutional: Negative. HENT: Negative for congestion and trouble swallowing. Respiratory: Negative for cough and shortness of breath. Cardiovascular: Negative for chest pain. Gastrointestinal: Negative for abdominal pain. Neurological: Negative for light-headedness and headaches. Psychiatric/Behavioral: Negative for dysphoric mood. Medical History    Medical History    Diagnosis Date Comment Source   Anemia 2016 ON IRON    History of blood transfusion 2016 R/T INTESTINAL BLEEDING       Other Medical History    Diagnosis Date Comment Source   BPH with elevated PSA  post biopsy    Colon polyps 2016 BENEIGN REMOVED WITH COLONOSCOPY    Elevated Gina-Barr virus antibody titer 1989 NEVER TREATED FOR    Elevated fasting glucose      History of chronic fatigue syndrome 1989 RESOLVED     History of GI bleed 2016 BROUGHT ON BY MEDS---NIACIN, FISH OIL AND VIT C    Hyperlipidemia 2014 (triglycerides-ELEVATED, TRYING TO CONTROL WITH DIET    Hypertension 1999 ON RX    Wears glasses         Family History    Problem Relation Age of Onset Comments   Cancer Mother  LUNG   Diabetes Father  IDDM-LATE IN LIFE   Heart Disease Father  CHF   High Blood Pressure Father     High Blood Pressure Mother     Social History    Tobacco History    Smoking Status   Light Tobacco Smoker Smoking Start Date   1/1/1970 Smoking Frequency   0.01 packs/day for 10 years (0.1 pk yrs) Smoking Tobacco Type   Pipe   Smokeless Tobacco Use   Never Used   Tobacco Comment   Rare pipe smoker. 4 BOWLS A WEEK No inhalation. Has never smoked cigarettes.     Alcohol History    Alcohol Use Status   Yes Drinks/Week   0 Standard drinks or equivalent per week Amount   0.0 standard drinks of alcohol/wk Comment   WHISKEY OR WINE- 1 OR 2 A MONTH   Drug Use    Drug Use Status No   Sexual Activity    Sexually Active   Not Asked    Surgical History    Procedure Laterality Date Comment Source   COLONOSCOPY  2000 internal hemorrhoids    COLONOSCOPY  02/02/2015 polypx1, repeat 5yrs due to family hx & hx polyps- brannon    COLONOSCOPY  12/09/2015 polyp X2  int. Hemorrhoids  partial prolapse of ileocecal valve    COLONOSCOPY  11/2016     COLONOSCOPY N/A 11/12/2020 COLONOSCOPY POLYPECTOMY SNARE/HOT BIOPSY performed by Clara Edmond DO at [address] [facility] to correct flat arches (age 9)    PROSTATE BIOPSY Bilateral 12/04/2012 benign    PROSTATE BIOPSY Bilateral 07/2014 benign    PROSTATECTOMY  12/10/2019 LAPAROSCOPIC ROBOTIC SIMPLE PROSTATECTOMY     PROSTATECTOMY N/A 12/10/2019 XI LAPAROSCOPIC ROBOTIC SIMPLE PROSTATECTOMY performed by Darrion Blank MD at [address] [facility]  12/8/15 Normal upper GI tract, no evidence of blood in upper GI tract, mild distal esophagitis    UPPER GASTROINTESTINAL ENDOSCOPY  11/2016     E-Cigarettes/Vaping Use    Questions   E-Cigarette/Vaping Use   Responses: Never User   Start Date   Passive Exposure   Quit Date   Counseling Given   Comments   Socioeconomic History    Employment History    No employment history on file. Family and Education    Marital Status   Single   Social Identity    Preferred Language Ethnicity Race   English Non- / Non  White (non-)           Outpatient Medications        cephALEXin (KEFLEX) 250 MG capsule Take one PO QID til gone. Start one day prior to surgery / procedure.     omeprazole (PRILOSEC) 20 MG delayed release capsule Take 1 capsule by mouth daily    atorvastatin (LIPITOR) 10 MG tablet take 1 tablet by mouth once daily    fenofibrate (TRIGLIDE) 160 MG tablet Take 1 tablet by mouth daily    hydroCHLOROthiazide (MICROZIDE) 12.5 MG capsule Take 1 capsule by mouth daily    amLODIPine (NORVASC) 5 MG tablet Take 1 tablet by mouth daily    benazepril (LOTENSIN) 40 MG tablet take 1 tablet by mouth once daily    ferrous sulfate 325 (65 FE) MG tablet Take 325 mg by mouth 2 times daily (with meals)       Clinic-Administered Medications        cyanocobalamin injection 1,000 mcg 1,000 mcg, IntraMUSCular, EVERY 30 DAYS    cyanocobalamin injection 1,000 mcg 1,000 mcg, IntraMUSCular, EVERY 30 DAYS             Objective:   Physical Exam  Vitals and nursing note reviewed. Exam conducted with a chaperone present. Constitutional:       Appearance: Normal appearance. HENT:      Head: Normocephalic and atraumatic. Nose:        Mouth/Throat:      Mouth: Mucous membranes are moist.   Eyes:      Extraocular Movements: Extraocular movements intact. Conjunctiva/sclera: Conjunctivae normal.      Pupils: Pupils are equal, round, and reactive to light. Pulmonary:      Effort: Pulmonary effort is normal.   Skin:     General: Skin is warm and dry. Neurological:      General: No focal deficit present. Mental Status: He is alert and oriented to person, place, and time.         Assessment:   Suspicious lesions x4. Plan:   Scheduled for excision x4. Nasal tip may require skin graft or flap. I have discussed with the patient the indication, alternatives, and the possible risks and/or complications which include but are not limited to those delineated on the consent form for the planned procedure and the anesthesia methods. All questions were answered to patient's satisfaction. Consent was reviewed and signed.

## 2021-10-18 NOTE — OP NOTE
[address]                 [address] [place] [id number]                                OPERATIVE REPORT    PATIENT NAME: Trent Medina                 :        1950  MED REC NO:   [id number]                             ROOM:  ACCOUNT NO:   [de-identified]                           ADMIT DATE: 10/15/2021  PROVIDER:     Nabeel Bautista    DATE OF PROCEDURE:  10/15/2021    PREOPERATIVE DIAGNOSIS:  Lesions of right cheek, right nose, nasal tip  and scalp. POSTOPERATIVE DIAGNOSIS:  Lesions of right cheek, right nose, nasal tip  and scalp. PROCEDURES:  1. Excision of lesion of right cheek (1.0 cm), taken with 4-mm margin  and with intermediate repair of 2.0 cm length. 2.  Excision of lesion of right nose (0.4 cm), taken with 4-mm margin  and with intermediate repair of 1.6 cm length. 3.  Excision of lesion of nasal tip (0.6 cm), taken with 3-mm margin and  with intermediate repair of 1.8 cm length. 4.  Excision of lesion of scalp (1.2 cm), taken with 4-mm margin and  with intermediate repair 5.5 cm length. SURGEON:  Nabeel Bautista MD    ANESTHESIA:  Local 1% Xylocaine With Epinephrine, buffered. INDICATIONS:  The patient is a 70-year-old male who presents with four  suspicious lesions, here for removal and diagnosis. The procedure, the  risks, the benefits were discussed with him. All questions answered to  his satisfaction. Consent was signed. NARRATIVE SUMMARY:  The patient was brought to the operating suite,  initially placed in the lateral position, left side up. The posterior  scalp was then prepped and draped in usual sterile fashion. Next, with  a marker, outline was made around the lesion including margin. Local  anesthetic infiltrated. Next, incision was carried out with scalpel  through the full-thickness of skin and the lesion was excised including  underlying subcutaneous tissues. It was sent off as specimen. Bovie  cautery was used for hemostasis. Wound closed in layers with  interrupted 4-0 Vicryl in the subcutaneous and running over-and-over 3-0  Prolene in the skin of the scalp. No dressing. The patient was then placed back supine. Attention taken to other three  lesions. The areas were then prepped and draped in usual sterile  fashion. Initially, using a marker, outline was made around each of the  three lesions, including margin. Local anesthetic infiltrated to each  area. Then with a scalpel, each was excised thru the full-thickness of  skin including underlying subcutaneous tissues and each was sent off  individually as specimen. Bovie cautery used for hemostasis. Wounds  closed in layers with interrupted 4-0 Vicryl in the subcutaneous and  running intracuticular 4-0 Prolene in the skin. Steri-Strips for  dressing. The patient tolerated the procedure well. Blood loss  minimal.  Specimens x4. Complications none. The patient transferred to  Recovery in stable condition attention.         Alvino Savage    D: 10/18/2021 9:35:01       T: 10/18/2021 9:37:27     [id number]  Job#: [id number]     Doc#: [id number]    CC:

## 2021-10-22 PROBLEM — C44.311 BASAL CELL CARCINOMA OF RIGHT NASAL SIDEWALL: Status: ACTIVE | Noted: 2021-01-01

## 2021-10-22 PROBLEM — C44.41 BASAL CELL CARCINOMA OF SCALP: Status: ACTIVE | Noted: 2021-01-01

## 2021-10-22 PROBLEM — C44.319 BASAL CELL CARCINOMA OF RIGHT CHEEK: Status: ACTIVE | Noted: 2021-01-01

## 2021-10-22 PROBLEM — C44.311 BASAL CELL CARCINOMA OF NASAL TIP: Status: ACTIVE | Noted: 2021-01-01

## 2021-10-22 NOTE — PROGRESS NOTES
Post Op Follow Up    70year old white male presents today for a post-op follow up of excision of right cheek, right nose, scalp and tip of nose which was completed on 10/15/21. Steri-strips and sutures are intact. Incision is flat, and free of excess redness, swelling, or heat. Area free of drainage or bleeding. Patient denies pain. I have reviewed the patient's medical history in detail and updated the computerized patient record.

## 2021-10-22 NOTE — PROGRESS NOTES
Subjective:      Patient ID: Rosalba Brown is a 70 y.o. male. HPI  Patient presents today for a post-op follow up of excision of right cheek, right nose, scalp and tip of nose which was completed on 10/15/21. Steri-strips and sutures are intact. Incision is flat, and free of excess redness, swelling, or heat. Area free of drainage or bleeding. Patient denies pain. Review of Systems    Objective:   Physical Exam  Vitals and nursing note reviewed. Constitutional:       Appearance: Normal appearance. HENT:      Head: Normocephalic and atraumatic. Mouth/Throat:      Mouth: Mucous membranes are moist.   Eyes:      Extraocular Movements: Extraocular movements intact. Conjunctiva/sclera: Conjunctivae normal.      Pupils: Pupils are equal, round, and reactive to light. Pulmonary:      Effort: Pulmonary effort is normal.   Skin:     General: Skin is warm and dry. Neurological:      General: No focal deficit present. Mental Status: He is alert and oriented to person, place, and time. Wounds healing well. No infection. Sutures removed except scalp. Path showed all 4 to be BCC, margins clear. Discussed with patient. Assessment:      BCC x4. Plan:      Recheck in 3 weeks for scalp sutures.           Marisa Banuelos MD

## 2021-11-12 NOTE — PROGRESS NOTES
Patient presents today for a post-op follow up of excision of right cheek, right nose, scalp and tip of nose which was completed on 10/15/21. sutures are intact on scalp.  Incision is flat, and free of excess redness, swelling, or heat.  Area free of drainage or bleeding.  Patient denies pain

## 2021-12-14 NOTE — PROGRESS NOTES
[place] [id number]  Dept: [phone]  Dept Fax: [phone]  Loc: [phone]     Alfie Romero is a 70 y.o. male who presents today for his medical conditions/complaintsas noted below. Alfie Romero is c/o of   Chief Complaint   Patient presents with    Hypertension     has felt lethargic and fatigued the last 3-4 days.  Fatigue         HPI:     Fatigue  This is a new problem. The current episode started in the past 7 days. The problem occurs constantly. The problem has been waxing and waning. Associated symptoms include fatigue. Pertinent negatives include no abdominal pain, arthralgias, chest pain, chills, coughing, fever, headaches, nausea, neck pain, numbness, rash, vomiting or weakness. Hypertension  This is a chronic problem. The current episode started more than 1 year ago. The problem has been waxing and waning since onset. The problem is controlled. Pertinent negatives include no chest pain, headaches, neck pain, palpitations or shortness of breath. Other  This is a chronic (3- anemia) problem. The current episode started more than 1 month ago. The problem occurs intermittently. The problem has been waxing and waning. Associated symptoms include fatigue. Pertinent negatives include no abdominal pain, arthralgias, chest pain, chills, coughing, fever, headaches, nausea, neck pain, numbness, rash, vomiting or weakness.        Hemoglobin A1C (%)   Date Value   09/17/2019 5.6            No results found for: LABMICR  LDL Cholesterol (mg/dL)   Date Value   03/15/2021 66   09/15/2020 55   03/17/2020 135 (H)         AST (U/L)   Date Value   03/15/2021 14     ALT (U/L)   Date Value   03/15/2021 9     BUN (mg/dL)   Date Value   03/15/2021 29 (H)     BP Readings from Last 3 Encounters:   12/14/21 82/60   11/12/21 132/86   10/22/21 116/78              Past Medical History: Pressure Father           Social History     Tobacco Use    Smoking status: Light Tobacco Smoker     Packs/day: 0.01     Years: 10.00     Pack years: 0.10     Types: Pipe     Start date: 1/1/1970    Smokeless tobacco: Never Used    Tobacco comment: Rare pipe smoker. 4 BOWLS A WEEK No inhalation. Has never smoked cigarettes. Substance Use Topics    Alcohol use:  Yes     Alcohol/week: 0.0 standard drinks     Comment: WHISKEY OR WINE- 1 OR 2 A MONTH         Current Outpatient Medications   Medication Sig Dispense Refill    omeprazole (PRILOSEC) 20 MG delayed release capsule Take 1 capsule by mouth daily 180 capsule 3    atorvastatin (LIPITOR) 10 MG tablet take 1 tablet by mouth once daily 90 tablet 3    fenofibrate (TRIGLIDE) 160 MG tablet Take 1 tablet by mouth daily 90 tablet 3    hydroCHLOROthiazide (MICROZIDE) 12.5 MG capsule Take 1 capsule by mouth daily 90 capsule 3    amLODIPine (NORVASC) 5 MG tablet Take 1 tablet by mouth daily 90 tablet 3    benazepril (LOTENSIN) 40 MG tablet take 1 tablet by mouth once daily 90 tablet 3    ferrous sulfate 325 (65 FE) MG tablet Take 325 mg by mouth 2 times daily (with meals)        Current Facility-Administered Medications   Medication Dose Route Frequency Provider Last Rate Last Admin    cyanocobalamin injection 1,000 mcg  1,000 mcg IntraMUSCular Q30 Days Kojo Cho MD   1,000 mcg at 11/30/21 7401    cyanocobalamin injection 1,000 mcg  1 mg IntraMUSCular Q30 Days William Cuadra MD   1,000 mcg at 02/19/21 1020     No Known Allergies    Health Maintenance   Topic Date Due    AAA screen  Never done    Hepatitis C screen  Never done    Shingles Vaccine (1 of 2) Never done    Flu vaccine (1) Never done    Lipid screen  03/15/2022    Potassium monitoring  03/15/2022    Creatinine monitoring  03/15/2022    Annual Wellness Visit (AWV)  03/26/2022    Colon cancer screen colonoscopy  11/12/2023    DTaP/Tdap/Td vaccine (4 - Td or Tdap) 03/24/2030    Pneumococcal 65+ years Vaccine  Completed    COVID-19 Vaccine  Completed    Hepatitis A vaccine  Aged Out    Hepatitis B vaccine  Aged Out    Hib vaccine  Aged Out    Meningococcal (ACWY) vaccine  Aged Out       Subjective:      Review of Systems   Constitutional: Positive for fatigue. Negative for chills and fever. HENT: Negative for hearing loss. Eyes: Negative for pain and visual disturbance. Respiratory: Negative for cough and shortness of breath. Cardiovascular: Negative for chest pain, palpitations and leg swelling. Gastrointestinal: Negative for abdominal pain, blood in stool, constipation, diarrhea, nausea and vomiting. Endocrine: Negative for cold intolerance, polydipsia and polyuria. Genitourinary: Negative for difficulty urinating, dysuria and hematuria. Musculoskeletal: Negative for arthralgias, back pain, gait problem and neck pain. Skin: Negative for pallor and rash. Neurological: Negative for dizziness, weakness, numbness and headaches. Hematological: Negative for adenopathy. Does not bruise/bleed easily. Psychiatric/Behavioral: Negative for confusion. The patient is not nervous/anxious. Objective:     Physical Exam  Vitals reviewed. Constitutional:       Appearance: He is well-developed. HENT:      Head: Normocephalic and atraumatic. Eyes:      Pupils: Pupils are equal, round, and reactive to light. Cardiovascular:      Rate and Rhythm: Normal rate and regular rhythm. Heart sounds: No murmur heard. No friction rub. No gallop. Pulmonary:      Effort: Pulmonary effort is normal.      Breath sounds: Normal breath sounds. No wheezing or rales. Abdominal:      General: There is no distension. Palpations: Abdomen is soft. There is no mass. Tenderness: There is no abdominal tenderness. There is no rebound. Musculoskeletal:         General: Normal range of motion. Cervical back: Neck supple.    Lymphadenopathy:      Cervical: No cervical adenopathy. Skin:     General: Skin is warm and dry. Findings: No rash. Comments: Positive pallor   Neurological:      Mental Status: He is alert and oriented to person, place, and time. Cranial Nerves: No cranial nerve deficit (grossly). Psychiatric:         Thought Content: Thought content normal.        BP 82/60 (Site: Right Upper Arm, Position: Sitting, Cuff Size: Large Adult)   Pulse 113   Resp 16   Ht 6' 1\" (1.854 m)   Wt 208 lb (94.3 kg)   BMI 27.44 kg/m²     Assessment:       Diagnosis Orders   1. Other fatigue  TSH    T4, Free   2. Iron deficiency anemia, unspecified iron deficiency anemia type  Hemoglobin    Iron And TIBC   3. Primary hypertension     Reviewed multiple test results for this appointment. 10/22/2021 last hemoglobin was okay at 12.8.    10/22/2021 iron slightly low at 53. At that point we continued the ferrous sulfate twice daily. 10/15/2021 removal of 4 small areas of basal cell carcinoma (from right cheek, right nose, scalp, and nasal tip). Pathology did show basal cell carcinoma. Patient told to hold his blood pressure medicines when the blood pressure is low including the Benzapril Norvasc and HCTZ. We told him to check his blood pressure at home and if systolic blood pressure is less than 869 and/or diastolic blood pressures less than 80 then to continue to hold all 3 blood pressure medications. We also told to make sure to continue to take the Prilosec daily and to drink plenty of fluids daily. We are going to check hemoglobin stat as he has had history of GI bleed before. We are going to have the patient come back from the lab and wait in our waiting room until hemoglobin result is available to decide if he needs transfusion today or not. He also told to make sure he is taking the iron pill twice daily. Plan:       Return if symptoms worsen or fail to improve, for Hypertension, Hyperlipidemia, Anemia.     Orders Placed This Encounter   Procedures    Hemoglobin     Standing Status:   Future     Standing Expiration Date:   12/14/2022    Iron And TIBC     Standing Status:   Future     Standing Expiration Date:   12/14/2022     Order Specific Question:   Is Patient Fasting? Answer:   na     Order Specific Question:   No of Hours? Answer:   na    TSH     Standing Status:   Future     Standing Expiration Date:   12/14/2022    T4, Free     Standing Status:   Future     Standing Expiration Date:   12/14/2022     No orders of the defined types were placed in this encounter. Patientgiven educational materials - see patient instructions. Discussed use, benefit,and side effects of prescribed medications. All patient questions answered. Ptvoiced understanding. Reviewed health maintenance. Instructed to continue currentmedications, diet and exercise. Patient agreed with treatment plan. Follow up asdirected.      Electronically signed by Berta Alegre MD on 12/14/2021 at 10:50 AM

## 2022-01-01 ENCOUNTER — APPOINTMENT (OUTPATIENT)
Dept: GENERAL RADIOLOGY | Age: 72
DRG: 023 | End: 2022-01-01
Payer: MEDICARE

## 2022-01-01 ENCOUNTER — APPOINTMENT (OUTPATIENT)
Dept: CT IMAGING | Age: 72
DRG: 023 | End: 2022-01-01
Payer: MEDICARE

## 2022-01-01 ENCOUNTER — HOSPITAL ENCOUNTER (INPATIENT)
Age: 72
LOS: 8 days | Discharge: HOSPICE | DRG: 023 | End: 2022-01-18
Attending: EMERGENCY MEDICINE | Admitting: PSYCHIATRY & NEUROLOGY
Payer: MEDICARE

## 2022-01-01 ENCOUNTER — ANESTHESIA (OUTPATIENT)
Dept: OPERATING ROOM | Age: 72
DRG: 023 | End: 2022-01-01
Payer: MEDICARE

## 2022-01-01 ENCOUNTER — APPOINTMENT (OUTPATIENT)
Dept: MRI IMAGING | Age: 72
DRG: 023 | End: 2022-01-01
Payer: MEDICARE

## 2022-01-01 ENCOUNTER — OFFICE VISIT (OUTPATIENT)
Dept: PRIMARY CARE CLINIC | Age: 72
End: 2022-01-01
Payer: MEDICARE

## 2022-01-01 ENCOUNTER — APPOINTMENT (OUTPATIENT)
Dept: CT IMAGING | Age: 72
End: 2022-01-01
Payer: MEDICARE

## 2022-01-01 ENCOUNTER — APPOINTMENT (OUTPATIENT)
Dept: GENERAL RADIOLOGY | Age: 72
End: 2022-01-01
Payer: MEDICARE

## 2022-01-01 ENCOUNTER — HOSPITAL ENCOUNTER (EMERGENCY)
Age: 72
Discharge: ANOTHER HOSPITAL | End: 2022-01-09
Attending: EMERGENCY MEDICINE
Payer: MEDICARE

## 2022-01-01 ENCOUNTER — ANESTHESIA EVENT (OUTPATIENT)
Dept: OPERATING ROOM | Age: 72
DRG: 023 | End: 2022-01-01
Payer: MEDICARE

## 2022-01-01 ENCOUNTER — HOSPITAL ENCOUNTER (OUTPATIENT)
Dept: CT IMAGING | Age: 72
Discharge: HOME | End: 2022-01-05
Payer: MEDICARE

## 2022-01-01 ENCOUNTER — APPOINTMENT (OUTPATIENT)
Dept: INTERVENTIONAL RADIOLOGY/VASCULAR | Age: 72
DRG: 023 | End: 2022-01-01
Payer: MEDICARE

## 2022-01-01 ENCOUNTER — HOSPITAL ENCOUNTER (OUTPATIENT)
Age: 72
Setting detail: SPECIMEN
Discharge: HOME | End: 2022-01-03
Payer: MEDICARE

## 2022-01-01 VITALS
WEIGHT: 202.38 LBS | OXYGEN SATURATION: 95 % | SYSTOLIC BLOOD PRESSURE: 132 MMHG | TEMPERATURE: 98.2 F | HEIGHT: 73 IN | HEART RATE: 127 BPM | BODY MASS INDEX: 26.82 KG/M2 | DIASTOLIC BLOOD PRESSURE: 86 MMHG | RESPIRATION RATE: 18 BRPM

## 2022-01-01 VITALS
BODY MASS INDEX: 28.63 KG/M2 | TEMPERATURE: 96.3 F | HEART RATE: 100 BPM | RESPIRATION RATE: 34 BRPM | OXYGEN SATURATION: 100 % | DIASTOLIC BLOOD PRESSURE: 103 MMHG | HEIGHT: 70 IN | WEIGHT: 200 LBS | SYSTOLIC BLOOD PRESSURE: 168 MMHG

## 2022-01-01 VITALS
HEART RATE: 130 BPM | TEMPERATURE: 98.2 F | SYSTOLIC BLOOD PRESSURE: 110 MMHG | RESPIRATION RATE: 41 BRPM | HEIGHT: 70 IN | OXYGEN SATURATION: 83 % | BODY MASS INDEX: 28.7 KG/M2 | DIASTOLIC BLOOD PRESSURE: 62 MMHG

## 2022-01-01 DIAGNOSIS — R41.82 ALTERED MENTAL STATUS, UNSPECIFIED ALTERED MENTAL STATUS TYPE: Primary | ICD-10-CM

## 2022-01-01 DIAGNOSIS — R51.9 ACUTE NONINTRACTABLE HEADACHE, UNSPECIFIED HEADACHE TYPE: Primary | ICD-10-CM

## 2022-01-01 DIAGNOSIS — R42 LIGHTHEADEDNESS: ICD-10-CM

## 2022-01-01 DIAGNOSIS — R51.9 ACUTE NONINTRACTABLE HEADACHE, UNSPECIFIED HEADACHE TYPE: ICD-10-CM

## 2022-01-01 DIAGNOSIS — R42 DIZZINESS: ICD-10-CM

## 2022-01-01 DIAGNOSIS — A41.9 SEPTICEMIA (HCC): ICD-10-CM

## 2022-01-01 DIAGNOSIS — T79.6XXA TRAUMATIC RHABDOMYOLYSIS, INITIAL ENCOUNTER (HCC): Primary | ICD-10-CM

## 2022-01-01 DIAGNOSIS — R41.82 ALTERED MENTAL STATUS, UNSPECIFIED ALTERED MENTAL STATUS TYPE: ICD-10-CM

## 2022-01-01 LAB
-: ABNORMAL
-: ABNORMAL
-: NORMAL
ABSOLUTE EOS #: 0 K/UL (ref 0–0.4)
ABSOLUTE IMMATURE GRANULOCYTE: 0 K/UL (ref 0–0.3)
ABSOLUTE LYMPH #: 0.55 K/UL (ref 1–4.8)
ABSOLUTE MONO #: 1.39 K/UL (ref 0.1–1.2)
ACETAMINOPHEN LEVEL: <5 UG/ML (ref 10–30)
ALBUMIN SERPL-MCNC: 3.6 G/DL (ref 3.5–5.2)
ALBUMIN/GLOBULIN RATIO: 0.8 (ref 1–2.5)
ALLEN TEST: ABNORMAL
ALLEN TEST: POSITIVE
ALP BLD-CCNC: 85 U/L (ref 40–129)
ALT SERPL-CCNC: 32 U/L (ref 5–41)
AMMONIA: 11 UMOL/L (ref 16–60)
AMORPHOUS: ABNORMAL
AMORPHOUS: ABNORMAL
AMPHETAMINE SCREEN URINE: NEGATIVE
ANION GAP SERPL CALCULATED.3IONS-SCNC: 10 MMOL/L (ref 9–17)
ANION GAP SERPL CALCULATED.3IONS-SCNC: 11 MMOL/L (ref 9–17)
ANION GAP SERPL CALCULATED.3IONS-SCNC: 12 MMOL/L (ref 9–17)
ANION GAP SERPL CALCULATED.3IONS-SCNC: 12 MMOL/L (ref 9–17)
ANION GAP SERPL CALCULATED.3IONS-SCNC: 15 MMOL/L (ref 9–17)
ANION GAP SERPL CALCULATED.3IONS-SCNC: 9 MMOL/L (ref 9–17)
ANION GAP: 14 MMOL/L (ref 7–16)
APPEARANCE CSF: ABNORMAL
AST SERPL-CCNC: 48 U/L
BACTERIA: ABNORMAL
BACTERIA: ABNORMAL
BANDS, CSF: NORMAL %
BARBITURATE SCREEN URINE: NEGATIVE
BASO CSF: NORMAL %
BASOPHILS # BLD: 0 % (ref 0–2)
BASOPHILS ABSOLUTE: 0 K/UL (ref 0–0.2)
BENZODIAZEPINE SCREEN, URINE: NEGATIVE
BILIRUB SERPL-MCNC: 0.93 MG/DL (ref 0.3–1.2)
BILIRUBIN DIRECT: 0.31 MG/DL
BILIRUBIN URINE: NEGATIVE
BILIRUBIN URINE: NEGATIVE
BILIRUBIN, INDIRECT: 0.62 MG/DL (ref 0–1)
BLAST CSF: NORMAL %
BUN BLDV-MCNC: 29 MG/DL (ref 8–23)
BUN BLDV-MCNC: 34 MG/DL (ref 8–23)
BUN BLDV-MCNC: 34 MG/DL (ref 8–23)
BUN BLDV-MCNC: 35 MG/DL (ref 8–23)
BUN BLDV-MCNC: 37 MG/DL (ref 8–23)
BUN BLDV-MCNC: 42 MG/DL (ref 8–23)
BUN BLDV-MCNC: 47 MG/DL (ref 8–23)
BUN BLDV-MCNC: 56 MG/DL (ref 8–23)
BUN/CREAT BLD: 41 (ref 9–20)
BUN/CREAT BLD: ABNORMAL (ref 9–20)
BUPRENORPHINE URINE: NORMAL
C-REACTIVE PROTEIN: 104.5 MG/L (ref 0–5)
CALCIUM IONIZED: 1.16 MMOL/L (ref 1.13–1.33)
CALCIUM SERPL-MCNC: 10 MG/DL (ref 8.6–10.4)
CALCIUM SERPL-MCNC: 8.1 MG/DL (ref 8.6–10.4)
CALCIUM SERPL-MCNC: 8.3 MG/DL (ref 8.6–10.4)
CALCIUM SERPL-MCNC: 8.4 MG/DL (ref 8.6–10.4)
CALCIUM SERPL-MCNC: 8.4 MG/DL (ref 8.6–10.4)
CALCIUM SERPL-MCNC: 8.5 MG/DL (ref 8.6–10.4)
CANNABINOID SCREEN URINE: NEGATIVE
CASE NUMBER:: NORMAL
CASTS UA: ABNORMAL /LPF (ref 0–2)
CHLORIDE BLD-SCNC: 103 MMOL/L (ref 98–107)
CHLORIDE BLD-SCNC: 104 MMOL/L (ref 98–107)
CHLORIDE BLD-SCNC: 111 MMOL/L (ref 98–107)
CHLORIDE BLD-SCNC: 114 MMOL/L (ref 98–107)
CHLORIDE BLD-SCNC: 114 MMOL/L (ref 98–107)
CHLORIDE BLD-SCNC: 115 MMOL/L (ref 98–107)
CHLORIDE BLD-SCNC: 118 MMOL/L (ref 98–107)
CHLORIDE BLD-SCNC: 97 MMOL/L (ref 98–107)
CK MB: 28.4 NG/ML
CO2: 18 MMOL/L (ref 20–31)
CO2: 21 MMOL/L (ref 20–31)
CO2: 21 MMOL/L (ref 20–31)
CO2: 22 MMOL/L (ref 20–31)
COCAINE METABOLITE, URINE: NEGATIVE
COLLAGEN ADENOSINE-5'-DIPHOSPHATE (ADP) TIME: 68 SEC (ref 67–112)
COLLAGEN EPINEPHRINE TIME: 75 SEC (ref 85–172)
COLOR: ABNORMAL
COLOR: YELLOW
COMMENT UA: ABNORMAL
COMMENT UA: ABNORMAL
CREAT SERPL-MCNC: 0.62 MG/DL (ref 0.7–1.2)
CREAT SERPL-MCNC: 0.78 MG/DL (ref 0.7–1.2)
CREAT SERPL-MCNC: 0.84 MG/DL (ref 0.7–1.2)
CREAT SERPL-MCNC: 0.84 MG/DL (ref 0.7–1.2)
CREAT SERPL-MCNC: 0.87 MG/DL (ref 0.7–1.2)
CREAT SERPL-MCNC: 1.04 MG/DL (ref 0.7–1.2)
CREAT SERPL-MCNC: 1.22 MG/DL (ref 0.7–1.2)
CREAT SERPL-MCNC: 1.35 MG/DL (ref 0.7–1.2)
CRYPTOCOCCUS NEOFORMANS/GATTI CSF FILM ARR.: NOT DETECTED
CRYSTALS, UA: ABNORMAL /HPF
CRYSTALS, UA: ABNORMAL /HPF
CULTURE: ABNORMAL
CULTURE: NO GROWTH
CULTURE: NORMAL
CYTOMEGALOVIRUS (CMV) CSF FILM ARRAY: NOT DETECTED
D-DIMER QUANTITATIVE: 3.48 MG/L FEU (ref 0–0.59)
DIFFERENTIAL TYPE: ABNORMAL
DIRECT EXAM: ABNORMAL
DIRECT EXAM: NORMAL
DIRECT EXAM: NORMAL
EKG ATRIAL RATE: 104 BPM
EKG ATRIAL RATE: 114 BPM
EKG P AXIS: 62 DEGREES
EKG P AXIS: 66 DEGREES
EKG P-R INTERVAL: 142 MS
EKG P-R INTERVAL: 152 MS
EKG Q-T INTERVAL: 346 MS
EKG Q-T INTERVAL: 356 MS
EKG QRS DURATION: 70 MS
EKG QRS DURATION: 72 MS
EKG QTC CALCULATION (BAZETT): 468 MS
EKG QTC CALCULATION (BAZETT): 476 MS
EKG R AXIS: 54 DEGREES
EKG R AXIS: 63 DEGREES
EKG T AXIS: 55 DEGREES
EKG T AXIS: 70 DEGREES
EKG VENTRICULAR RATE: 104 BPM
EKG VENTRICULAR RATE: 114 BPM
ENTEROVIRUS CSF FILM ARRAY: NOT DETECTED
EOS CSF: NORMAL %
EOSINOPHILS RELATIVE PERCENT: 0 % (ref 1–8)
EPITHELIAL CELLS UA: ABNORMAL /HPF (ref 0–5)
EPITHELIAL CELLS UA: ABNORMAL /HPF (ref 0–5)
ESCHERICHIA COLI K1 CSF FILM ARRAY: NOT DETECTED
ESTIMATED AVERAGE GLUCOSE: 146 MG/DL
ETHANOL PERCENT: <0.01 %
ETHANOL: <10 MG/DL
FERRITIN: 1233 UG/L (ref 30–400)
FIO2: 30
FIO2: 40
FIO2: ABNORMAL
FLUID DIFF COMMENT: NORMAL
FOLATE: <2 NG/ML
GFR AFRICAN AMERICAN: >60 ML/MIN
GFR NON-AFRICAN AMERICAN: 49 ML/MIN
GFR NON-AFRICAN AMERICAN: 52 ML/MIN
GFR NON-AFRICAN AMERICAN: 59 ML/MIN
GFR NON-AFRICAN AMERICAN: >60 ML/MIN
GFR SERPL CREATININE-BSD FRML MDRD: 60 ML/MIN
GFR SERPL CREATININE-BSD FRML MDRD: ABNORMAL ML/MIN/{1.73_M2}
GLOBULIN: 4.5 G/DL (ref 1.5–3.8)
GLUCOSE BLD-MCNC: 118 MG/DL (ref 75–110)
GLUCOSE BLD-MCNC: 123 MG/DL (ref 75–110)
GLUCOSE BLD-MCNC: 123 MG/DL (ref 75–110)
GLUCOSE BLD-MCNC: 130 MG/DL (ref 70–99)
GLUCOSE BLD-MCNC: 130 MG/DL (ref 75–110)
GLUCOSE BLD-MCNC: 133 MG/DL (ref 75–110)
GLUCOSE BLD-MCNC: 138 MG/DL (ref 70–99)
GLUCOSE BLD-MCNC: 143 MG/DL (ref 74–100)
GLUCOSE BLD-MCNC: 144 MG/DL (ref 75–110)
GLUCOSE BLD-MCNC: 149 MG/DL (ref 75–110)
GLUCOSE BLD-MCNC: 157 MG/DL (ref 75–110)
GLUCOSE BLD-MCNC: 161 MG/DL (ref 75–110)
GLUCOSE BLD-MCNC: 163 MG/DL (ref 75–110)
GLUCOSE BLD-MCNC: 164 MG/DL (ref 75–110)
GLUCOSE BLD-MCNC: 165 MG/DL (ref 75–110)
GLUCOSE BLD-MCNC: 170 MG/DL (ref 70–99)
GLUCOSE BLD-MCNC: 172 MG/DL (ref 70–99)
GLUCOSE BLD-MCNC: 172 MG/DL (ref 75–110)
GLUCOSE BLD-MCNC: 172 MG/DL (ref 75–110)
GLUCOSE BLD-MCNC: 173 MG/DL (ref 75–110)
GLUCOSE BLD-MCNC: 177 MG/DL (ref 74–100)
GLUCOSE BLD-MCNC: 183 MG/DL (ref 75–110)
GLUCOSE BLD-MCNC: 184 MG/DL (ref 70–99)
GLUCOSE BLD-MCNC: 184 MG/DL (ref 74–100)
GLUCOSE BLD-MCNC: 188 MG/DL (ref 70–99)
GLUCOSE BLD-MCNC: 191 MG/DL (ref 75–110)
GLUCOSE BLD-MCNC: 210 MG/DL (ref 70–99)
GLUCOSE BLD-MCNC: 211 MG/DL (ref 74–100)
GLUCOSE BLD-MCNC: 212 MG/DL (ref 70–99)
GLUCOSE BLD-MCNC: 212 MG/DL (ref 75–110)
GLUCOSE URINE: ABNORMAL
GLUCOSE URINE: ABNORMAL
GLUCOSE, CSF: <2 MG/DL (ref 40–70)
HAEMOPHILUS INFLUENZA CSF FILM ARRAY: NOT DETECTED
HAV IGM SER IA-ACNC: NONREACTIVE
HBA1C MFR BLD: 6.7 % (ref 4–6)
HCO3 VENOUS: 21 MMOL/L (ref 22–29)
HCT VFR BLD CALC: 31.6 % (ref 40.7–50.3)
HCT VFR BLD CALC: 32 % (ref 40.7–50.3)
HCT VFR BLD CALC: 32.6 % (ref 40.7–50.3)
HCT VFR BLD CALC: 34.6 % (ref 40.7–50.3)
HCT VFR BLD CALC: 34.9 % (ref 40.7–50.3)
HCT VFR BLD CALC: 37.6 % (ref 40.7–50.3)
HCT VFR BLD CALC: 43.1 % (ref 40.7–50.3)
HEMOGLOBIN: 10 G/DL (ref 13–17)
HEMOGLOBIN: 10 G/DL (ref 13–17)
HEMOGLOBIN: 10.4 G/DL (ref 13–17)
HEMOGLOBIN: 10.8 G/DL (ref 13–17)
HEMOGLOBIN: 11.5 G/DL (ref 13–17)
HEMOGLOBIN: 13.7 G/DL (ref 13–17)
HEMOGLOBIN: 9.7 G/DL (ref 13–17)
HEPATITIS B CORE IGM ANTIBODY: NONREACTIVE
HEPATITIS B SURFACE ANTIGEN: NONREACTIVE
HEPATITIS C ANTIBODY: NONREACTIVE
HHV-6 (HERPESVIRUS 6) CSF FILM ARRAY: NOT DETECTED
HISTOPLASMA ABS, ID: NORMAL
HISTOPLASMA ANTIBODY MYCELIAL CF: NORMAL
HISTOPLASMA ANTIBODY YEAST CF: NORMAL
HIV AG/AB: NONREACTIVE
HSV BY PCR: NOT DETECTED
HSV SOURCE: NORMAL
HSV-1 CSF FILM ARRAY: NOT DETECTED
HSV-2 CSF FILM ARRAY: NOT DETECTED
IMMATURE GRANULOCYTES: 0 %
INR BLD: 1.1
IRON SATURATION: 9 % (ref 20–55)
IRON: 11 UG/DL (ref 59–158)
KETONES, URINE: NEGATIVE
KETONES, URINE: NEGATIVE
LACTIC ACID, SEPSIS WHOLE BLOOD: ABNORMAL MMOL/L (ref 0.5–1.9)
LACTIC ACID, SEPSIS WHOLE BLOOD: NORMAL MMOL/L (ref 0.5–1.9)
LACTIC ACID, SEPSIS: 1.9 MMOL/L (ref 0.5–1.9)
LACTIC ACID, SEPSIS: 2.2 MMOL/L (ref 0.5–1.9)
LACTIC ACID, WHOLE BLOOD: 1.4 MMOL/L (ref 0.7–2.1)
LACTIC ACID, WHOLE BLOOD: 1.5 MMOL/L (ref 0.7–2.1)
LEUKOCYTE ESTERASE, URINE: NEGATIVE
LEUKOCYTE ESTERASE, URINE: NEGATIVE
LISTERIA MONOCYTOGENES CSF FILM ARRAY: NOT DETECTED
LV EF: 54 %
LVEF MODALITY: NORMAL
LYMPHOCYTES # BLD: 2 % (ref 15–43)
LYMPHS CSF: 0 %
LYMPHS CSF: 1 %
LYMPHS CSF: 1 %
LYMPHS CSF: 4 %
Lab: ABNORMAL
Lab: NORMAL
MAGNESIUM: 2.5 MG/DL (ref 1.6–2.6)
MAGNESIUM: 2.7 MG/DL (ref 1.6–2.6)
MAGNESIUM: 2.8 MG/DL (ref 1.6–2.6)
MAGNESIUM: 2.9 MG/DL (ref 1.6–2.6)
MAGNESIUM: 3 MG/DL (ref 1.6–2.6)
MCH RBC QN AUTO: 23.7 PG (ref 25.2–33.5)
MCH RBC QN AUTO: 23.9 PG (ref 25.2–33.5)
MCH RBC QN AUTO: 23.9 PG (ref 25.2–33.5)
MCH RBC QN AUTO: 24 PG (ref 25.2–33.5)
MCH RBC QN AUTO: 24.1 PG (ref 25.2–33.5)
MCH RBC QN AUTO: 24.2 PG (ref 25.2–33.5)
MCH RBC QN AUTO: 24.4 PG (ref 25.2–33.5)
MCHC RBC AUTO-ENTMCNC: 30.1 G/DL (ref 28.4–34.8)
MCHC RBC AUTO-ENTMCNC: 30.3 G/DL (ref 28.4–34.8)
MCHC RBC AUTO-ENTMCNC: 30.6 G/DL (ref 28.4–34.8)
MCHC RBC AUTO-ENTMCNC: 30.7 G/DL (ref 28.4–34.8)
MCHC RBC AUTO-ENTMCNC: 30.9 G/DL (ref 28.4–34.8)
MCHC RBC AUTO-ENTMCNC: 31.6 G/DL (ref 28.4–34.8)
MCHC RBC AUTO-ENTMCNC: 31.8 G/DL (ref 25.2–33.5)
MCV RBC AUTO: 75.3 FL (ref 82.6–102.9)
MCV RBC AUTO: 77.3 FL (ref 82.6–102.9)
MCV RBC AUTO: 77.4 FL (ref 82.6–102.9)
MCV RBC AUTO: 78 FL (ref 82.6–102.9)
MCV RBC AUTO: 78.7 FL (ref 82.6–102.9)
MCV RBC AUTO: 78.9 FL (ref 82.6–102.9)
MCV RBC AUTO: 79.5 FL (ref 82.6–102.9)
MDMA URINE: NORMAL
METAYELO CSF: NORMAL %
METHADONE SCREEN, URINE: NEGATIVE
METHAMPHETAMINE, URINE: NORMAL
MODE: ABNORMAL
MONO/MACROPHAGE CSF (MANUAL): NORMAL %
MONOCYTES # BLD: 5 % (ref 6–14)
MORPHOLOGY: ABNORMAL
MUCUS: ABNORMAL
MUCUS: ABNORMAL
MYELOCYTE CSF: NORMAL %
MYOGLOBIN: 2634 NG/ML (ref 28–72)
MYOGLOBIN: 2685 NG/ML (ref 28–72)
NEGATIVE BASE EXCESS, ART: 2 (ref 0–2)
NEGATIVE BASE EXCESS, ART: ABNORMAL (ref 0–2)
NEGATIVE BASE EXCESS, VEN: 1 (ref 0–2)
NEISSERIA MENIGITIDIS CSF FILM ARRAY: NOT DETECTED
NEUTROPHILS, CSF: 64 %
NEUTROPHILS, CSF: 82 %
NEUTROPHILS, CSF: 91 %
NEUTROPHILS, CSF: 93 %
NITRITE, URINE: NEGATIVE
NITRITE, URINE: NEGATIVE
NRBC AUTOMATED: 0 PER 100 WBC
O2 DEVICE/FLOW/%: ABNORMAL
O2 SAT, VEN: 65 % (ref 60–85)
OPIATES, URINE: NEGATIVE
OTHER CELLS FLUID: NORMAL %
OTHER OBSERVATIONS UA: ABNORMAL
OTHER OBSERVATIONS UA: ABNORMAL
OXYCODONE SCREEN URINE: NEGATIVE
PARECHOVIRUS CSF FILM ARRAY: NOT DETECTED
PARTIAL THROMBOPLASTIN TIME: 22.4 SEC (ref 20.5–30.5)
PATIENT TEMP: ABNORMAL
PCO2, VEN: 28.5 MM HG (ref 41–51)
PDW BLD-RTO: 16.4 % (ref 11.8–14.4)
PDW BLD-RTO: 16.7 % (ref 11.8–14.4)
PDW BLD-RTO: 17.1 % (ref 11.8–14.4)
PDW BLD-RTO: 17.1 % (ref 11.8–14.4)
PDW BLD-RTO: 17.2 % (ref 11.8–14.4)
PDW BLD-RTO: 17.5 % (ref 11.8–14.4)
PDW BLD-RTO: 17.9 % (ref 11.8–14.4)
PH UA: 5.5 (ref 5–6)
PH UA: 6 (ref 5–8)
PH VENOUS: 7.47 (ref 7.32–7.43)
PHENCYCLIDINE, URINE: NEGATIVE
PHOSPHORUS: 2.2 MG/DL (ref 2.5–4.5)
PHOSPHORUS: 2.5 MG/DL (ref 2.5–4.5)
PHOSPHORUS: 2.6 MG/DL (ref 2.5–4.5)
PHOSPHORUS: 2.8 MG/DL (ref 2.5–4.5)
PHOSPHORUS: 3 MG/DL (ref 2.5–4.5)
PHOSPHORUS: 3.1 MG/DL (ref 2.5–4.5)
PHOSPHORUS: 3.2 MG/DL (ref 2.5–4.5)
PLATELET # BLD: 300 K/UL (ref 138–453)
PLATELET # BLD: 309 K/UL (ref 138–453)
PLATELET # BLD: 333 K/UL (ref 138–453)
PLATELET # BLD: 341 K/UL (ref 138–453)
PLATELET # BLD: 347 K/UL (ref 138–453)
PLATELET # BLD: 368 K/UL (ref 138–453)
PLATELET # BLD: 523 K/UL (ref 138–453)
PLATELET ESTIMATE: ABNORMAL
PLATELET FUNCTION INTERP: ABNORMAL
PMV BLD AUTO: 10.3 FL (ref 8.1–13.5)
PMV BLD AUTO: 10.5 FL (ref 8.1–13.5)
PMV BLD AUTO: 10.5 FL (ref 8.1–13.5)
PMV BLD AUTO: 10.6 FL (ref 8.1–13.5)
PMV BLD AUTO: 10.9 FL (ref 8.1–13.5)
PMV BLD AUTO: 11.1 FL (ref 8.1–13.5)
PMV BLD AUTO: 11.2 FL (ref 8.1–13.5)
PO2, VEN: 30.6 MM HG (ref 30–50)
POC BUN: 48 MG/DL (ref 8–26)
POC CHLORIDE: 104 MMOL/L (ref 98–107)
POC CREATININE: 1.41 MG/DL (ref 0.51–1.19)
POC HCO3: 21.5 MMOL/L (ref 21–28)
POC HCO3: 22.1 MMOL/L (ref 21–28)
POC HCO3: 22.4 MMOL/L (ref 21–28)
POC HCO3: 24.7 MMOL/L (ref 21–28)
POC HCO3: 26.7 MMOL/L (ref 21–28)
POC HCO3: 26.9 MMOL/L (ref 21–28)
POC HCO3: 27.1 MMOL/L (ref 21–28)
POC HEMATOCRIT: 41 % (ref 41–53)
POC HEMOGLOBIN: 13.9 G/DL (ref 13.5–17.5)
POC IONIZED CALCIUM: 1.17 MMOL/L (ref 1.15–1.33)
POC LACTIC ACID: 0.97 MMOL/L (ref 0.56–1.39)
POC LACTIC ACID: 1.54 MMOL/L (ref 0.56–1.39)
POC O2 SATURATION: 100 % (ref 94–98)
POC O2 SATURATION: 98 % (ref 94–98)
POC O2 SATURATION: 99 % (ref 94–98)
POC PCO2 TEMP: ABNORMAL MM HG
POC PCO2: 25 MM HG (ref 35–48)
POC PCO2: 29.8 MM HG (ref 35–48)
POC PCO2: 31.2 MM HG (ref 35–48)
POC PCO2: 32.1 MM HG (ref 35–48)
POC PCO2: 32.7 MM HG (ref 35–48)
POC PCO2: 32.8 MM HG (ref 35–48)
POC PCO2: 34.6 MM HG (ref 35–48)
POC PH TEMP: ABNORMAL
POC PH: 7.42 (ref 7.35–7.45)
POC PH: 7.48 (ref 7.35–7.45)
POC PH: 7.49 (ref 7.35–7.45)
POC PH: 7.5 (ref 7.35–7.45)
POC PH: 7.52 (ref 7.35–7.45)
POC PH: 7.55 (ref 7.35–7.45)
POC PH: 7.55 (ref 7.35–7.45)
POC PO2 TEMP: ABNORMAL MM HG
POC PO2: 118.5 MM HG (ref 83–108)
POC PO2: 124.4 MM HG (ref 83–108)
POC PO2: 124.7 MM HG (ref 83–108)
POC PO2: 160.9 MM HG (ref 83–108)
POC PO2: 87.8 MM HG (ref 83–108)
POC PO2: 89.8 MM HG (ref 83–108)
POC PO2: 97.9 MM HG (ref 83–108)
POC POTASSIUM: 3.5 MMOL/L (ref 3.5–4.5)
POC SODIUM: 139 MMOL/L (ref 138–146)
POC TCO2: 22 MMOL/L (ref 22–30)
POSITIVE BASE EXCESS, ART: 0 (ref 0–3)
POSITIVE BASE EXCESS, ART: 1 (ref 0–3)
POSITIVE BASE EXCESS, ART: 2 (ref 0–3)
POSITIVE BASE EXCESS, ART: 4 (ref 0–3)
POSITIVE BASE EXCESS, ART: 4 (ref 0–3)
POSITIVE BASE EXCESS, ART: 5 (ref 0–3)
POSITIVE BASE EXCESS, ART: ABNORMAL (ref 0–3)
POSITIVE BASE EXCESS, VEN: ABNORMAL (ref 0–3)
POTASSIUM SERPL-SCNC: 3.5 MMOL/L (ref 3.7–5.3)
POTASSIUM SERPL-SCNC: 3.7 MMOL/L (ref 3.7–5.3)
POTASSIUM SERPL-SCNC: 4.2 MMOL/L (ref 3.7–5.3)
POTASSIUM SERPL-SCNC: 4.2 MMOL/L (ref 3.7–5.3)
POTASSIUM SERPL-SCNC: 4.3 MMOL/L (ref 3.7–5.3)
POTASSIUM SERPL-SCNC: 4.4 MMOL/L (ref 3.7–5.3)
POTASSIUM SERPL-SCNC: 4.6 MMOL/L (ref 3.7–5.3)
POTASSIUM SERPL-SCNC: 4.7 MMOL/L (ref 3.7–5.3)
PROCALCITONIN: 0.11 NG/ML
PROCALCITONIN: 0.64 NG/ML
PROPOXYPHENE, URINE: NORMAL
PROTEIN CSF: 312.4 MG/DL (ref 15–45)
PROTEIN CSF: 318.3 MG/DL (ref 15–45)
PROTEIN CSF: 752.7 MG/DL (ref 15–45)
PROTEIN CSF: 966.9 MG/DL (ref 15–45)
PROTEIN UA: ABNORMAL
PROTEIN UA: ABNORMAL
PROTHROMBIN TIME: 11.2 SEC (ref 9.1–12.3)
PTH INTACT: 32.23 PG/ML (ref 15–65)
PTH RELATED PEPTIDE: <2 PMOL/L (ref 0–2.3)
QUANTI TB GOLD PLUS: NORMAL
QUANTI TB1 MINUS NIL: 0 IU/ML (ref 0–0.34)
QUANTI TB2 MINUS NIL: 0 IU/ML (ref 0–0.34)
QUANTIFERON MITOGEN: 0.02 IU/ML
QUANTIFERON NIL: 0.01 IU/ML
RBC # BLD: 4.09 M/UL (ref 4.21–5.77)
RBC # BLD: 4.1 M/UL (ref 4.21–5.77)
RBC # BLD: 4.13 M/UL (ref 4.21–5.77)
RBC # BLD: 4.35 M/UL (ref 4.21–5.77)
RBC # BLD: 4.51 M/UL (ref 4.21–5.77)
RBC # BLD: 4.78 M/UL (ref 4.21–5.77)
RBC # BLD: 5.72 M/UL (ref 4.21–5.77)
RBC # BLD: ABNORMAL 10*6/UL
RBC CSF: 140 /MM3
RBC CSF: 1900 /MM3
RBC CSF: 28 /MM3
RBC CSF: 350 /MM3
RBC UA: ABNORMAL /HPF (ref 0–2)
RBC UA: ABNORMAL /HPF (ref 0–4)
REASON FOR REJECTION: NORMAL
RENAL EPITHELIAL, UA: ABNORMAL /HPF
RENAL EPITHELIAL, UA: ABNORMAL /HPF
SALICYLATE LEVEL: <1 MG/DL (ref 3–10)
SAMPLE SITE: ABNORMAL
SARS-COV-2, RAPID: NOT DETECTED
SARS-COV-2, RAPID: NOT DETECTED
SARS-COV-2: NORMAL
SARS-COV-2: NOT DETECTED
SEDIMENTATION RATE, ERYTHROCYTE: 55 MM (ref 0–20)
SEG NEUTROPHILS: 93 % (ref 44–74)
SEGMENTED NEUTROPHILS ABSOLUTE COUNT: 25.76 K/UL (ref 1.5–8.1)
SODIUM BLD-SCNC: 133 MMOL/L (ref 135–144)
SODIUM BLD-SCNC: 134 MMOL/L (ref 135–144)
SODIUM BLD-SCNC: 138 MMOL/L (ref 135–144)
SODIUM BLD-SCNC: 141 MMOL/L (ref 135–144)
SODIUM BLD-SCNC: 142 MMOL/L (ref 135–144)
SODIUM BLD-SCNC: 146 MMOL/L (ref 135–144)
SODIUM BLD-SCNC: 146 MMOL/L (ref 135–144)
SODIUM BLD-SCNC: 147 MMOL/L (ref 135–144)
SOURCE: NORMAL
SPECIFIC GRAVITY UA: 1.03 (ref 1.01–1.02)
SPECIFIC GRAVITY UA: 1.05 (ref 1–1.03)
SPECIMEN DESCRIPTION: ABNORMAL
SPECIMEN DESCRIPTION: NORMAL
STREPTOCOCCUS AGALACTIAE CSF FILM ARRAY: NOT DETECTED
STREPTOCOCCUS PNEUMONIAE CSF FILM ARRAY: NOT DETECTED
SUPERNAT COLOR CSF: ABNORMAL
SURGICAL PATHOLOGY REPORT: NORMAL
TCO2 (CALC), ART: ABNORMAL MMOL/L (ref 22–29)
TEST INFORMATION: NORMAL
TOTAL CK: 1959 U/L (ref 39–308)
TOTAL CK: 2165 U/L (ref 39–308)
TOTAL CK: 274 U/L (ref 39–308)
TOTAL CK: 525 U/L (ref 39–308)
TOTAL CK: 672 U/L (ref 39–308)
TOTAL CO2, VENOUS: ABNORMAL MMOL/L (ref 23–30)
TOTAL IRON BINDING CAPACITY: 127 UG/DL (ref 250–450)
TOTAL PROTEIN: 8.1 G/DL (ref 6.4–8.3)
TOXIC TRICYCLIC SC,BLOOD: NEGATIVE
TRICHOMONAS: ABNORMAL
TRICHOMONAS: ABNORMAL
TRICYCLIC ANTIDEPRESSANTS, UR: NORMAL
TRIGL SERPL-MCNC: 123 MG/DL
TROPONIN INTERP: NORMAL
TROPONIN INTERP: NORMAL
TROPONIN T: NORMAL NG/ML
TROPONIN T: NORMAL NG/ML
TROPONIN, HIGH SENSITIVITY: 17 NG/L (ref 0–22)
TROPONIN, HIGH SENSITIVITY: 17 NG/L (ref 0–22)
TSH SERPL DL<=0.05 MIU/L-ACNC: 0.5 MIU/L (ref 0.3–5)
TUBE NUMBER CSF: ABNORMAL
TURBIDITY: ABNORMAL
TURBIDITY: CLEAR
UNSATURATED IRON BINDING CAPACITY: 116 UG/DL (ref 112–347)
URINE HGB: ABNORMAL
URINE HGB: ABNORMAL
UROBILINOGEN, URINE: NORMAL
UROBILINOGEN, URINE: NORMAL
VARICELLA-ZOSTER CSF FILM ARRAY: NOT DETECTED
VDRL CSF SCREEN: NONREACTIVE
VITAMIN B-12: 1003 PG/ML (ref 232–1245)
VITAMIN D 25-HYDROXY: 17.9 NG/ML (ref 30–100)
VOLUME CSF: 1
VOLUME CSF: 4
VOLUME CSF: 4
VOLUME CSF: ABNORMAL
WBC # BLD: 11 K/UL (ref 3.5–11.3)
WBC # BLD: 13.1 K/UL (ref 3.5–11.3)
WBC # BLD: 16.5 K/UL (ref 3.5–11.3)
WBC # BLD: 17.2 K/UL (ref 3.5–11.3)
WBC # BLD: 19.2 K/UL (ref 3.5–11.3)
WBC # BLD: 22 K/UL (ref 3.5–11.3)
WBC # BLD: 27.7 K/UL (ref 3.5–11.3)
WBC # BLD: ABNORMAL 10*3/UL
WBC CSF: ABNORMAL /MM3
WBC UA: ABNORMAL /HPF (ref 0–4)
WBC UA: ABNORMAL /HPF (ref 0–5)
XANTHOCHROMIA: PRESENT
YEAST: ABNORMAL
YEAST: ABNORMAL
ZZ NTE CLEAN UP: ORDERED TEST: NORMAL
ZZ NTE WITH NAME CLEAN UP: SPECIMEN SOURCE: NORMAL

## 2022-01-01 PROCEDURE — 37799 UNLISTED PX VASCULAR SURGERY: CPT

## 2022-01-01 PROCEDURE — C9113 INJ PANTOPRAZOLE SODIUM, VIA: HCPCS | Performed by: STUDENT IN AN ORGANIZED HEALTH CARE EDUCATION/TRAINING PROGRAM

## 2022-01-01 PROCEDURE — 87181 SC STD AGAR DILUTION PER AGT: CPT

## 2022-01-01 PROCEDURE — 6360000004 HC RX CONTRAST MEDICATION: Performed by: STUDENT IN AN ORGANIZED HEALTH CARE EDUCATION/TRAINING PROGRAM

## 2022-01-01 PROCEDURE — 84520 ASSAY OF UREA NITROGEN: CPT

## 2022-01-01 PROCEDURE — 51701 INSERT BLADDER CATHETER: CPT

## 2022-01-01 PROCEDURE — 6370000000 HC RX 637 (ALT 250 FOR IP): Performed by: NURSE PRACTITIONER

## 2022-01-01 PROCEDURE — 99233 SBSQ HOSP IP/OBS HIGH 50: CPT | Performed by: INTERNAL MEDICINE

## 2022-01-01 PROCEDURE — 82945 GLUCOSE OTHER FLUID: CPT

## 2022-01-01 PROCEDURE — 2580000003 HC RX 258: Performed by: STUDENT IN AN ORGANIZED HEALTH CARE EDUCATION/TRAINING PROGRAM

## 2022-01-01 PROCEDURE — 6360000004 HC RX CONTRAST MEDICATION: Performed by: PSYCHIATRY & NEUROLOGY

## 2022-01-01 PROCEDURE — 99233 SBSQ HOSP IP/OBS HIGH 50: CPT | Performed by: NEUROLOGICAL SURGERY

## 2022-01-01 PROCEDURE — 86403 PARTICLE AGGLUT ANTBDY SCRN: CPT

## 2022-01-01 PROCEDURE — 87070 CULTURE OTHR SPECIMN AEROBIC: CPT

## 2022-01-01 PROCEDURE — A9576 INJ PROHANCE MULTIPACK: HCPCS | Performed by: NURSE PRACTITIONER

## 2022-01-01 PROCEDURE — 6370000000 HC RX 637 (ALT 250 FOR IP): Performed by: STUDENT IN AN ORGANIZED HEALTH CARE EDUCATION/TRAINING PROGRAM

## 2022-01-01 PROCEDURE — 94761 N-INVAS EAR/PLS OXIMETRY MLT: CPT

## 2022-01-01 PROCEDURE — 2580000003 HC RX 258: Performed by: INTERNAL MEDICINE

## 2022-01-01 PROCEDURE — 89051 BODY FLUID CELL COUNT: CPT

## 2022-01-01 PROCEDURE — 2000000003 HC NEURO ICU R&B

## 2022-01-01 PROCEDURE — 99291 CRITICAL CARE FIRST HOUR: CPT | Performed by: PSYCHIATRY & NEUROLOGY

## 2022-01-01 PROCEDURE — 6360000002 HC RX W HCPCS

## 2022-01-01 PROCEDURE — 82550 ASSAY OF CK (CPK): CPT

## 2022-01-01 PROCEDURE — 82330 ASSAY OF CALCIUM: CPT

## 2022-01-01 PROCEDURE — 6360000004 HC RX CONTRAST MEDICATION

## 2022-01-01 PROCEDURE — 95714 VEEG EA 12-26 HR UNMNTR: CPT

## 2022-01-01 PROCEDURE — 94003 VENT MGMT INPAT SUBQ DAY: CPT

## 2022-01-01 PROCEDURE — 2580000003 HC RX 258: Performed by: NURSE PRACTITIONER

## 2022-01-01 PROCEDURE — 51702 INSERT TEMP BLADDER CATH: CPT

## 2022-01-01 PROCEDURE — 99222 1ST HOSP IP/OBS MODERATE 55: CPT | Performed by: INTERNAL MEDICINE

## 2022-01-01 PROCEDURE — 89220 SPUTUM SPECIMEN COLLECTION: CPT

## 2022-01-01 PROCEDURE — 85014 HEMATOCRIT: CPT

## 2022-01-01 PROCEDURE — 2500000003 HC RX 250 WO HCPCS: Performed by: NURSE PRACTITIONER

## 2022-01-01 PROCEDURE — 6360000002 HC RX W HCPCS: Performed by: STUDENT IN AN ORGANIZED HEALTH CARE EDUCATION/TRAINING PROGRAM

## 2022-01-01 PROCEDURE — 70450 CT HEAD/BRAIN W/O DYE: CPT

## 2022-01-01 PROCEDURE — 85652 RBC SED RATE AUTOMATED: CPT

## 2022-01-01 PROCEDURE — 71045 X-RAY EXAM CHEST 1 VIEW: CPT

## 2022-01-01 PROCEDURE — 85576 BLOOD PLATELET AGGREGATION: CPT

## 2022-01-01 PROCEDURE — 84157 ASSAY OF PROTEIN OTHER: CPT

## 2022-01-01 PROCEDURE — 83735 ASSAY OF MAGNESIUM: CPT

## 2022-01-01 PROCEDURE — 73090 X-RAY EXAM OF FOREARM: CPT

## 2022-01-01 PROCEDURE — 82947 ASSAY GLUCOSE BLOOD QUANT: CPT

## 2022-01-01 PROCEDURE — 84484 ASSAY OF TROPONIN QUANT: CPT

## 2022-01-01 PROCEDURE — 87205 SMEAR GRAM STAIN: CPT

## 2022-01-01 PROCEDURE — 99231 SBSQ HOSP IP/OBS SF/LOW 25: CPT | Performed by: PSYCHIATRY & NEUROLOGY

## 2022-01-01 PROCEDURE — 83605 ASSAY OF LACTIC ACID: CPT

## 2022-01-01 PROCEDURE — 61651 EVASC PRLNG ADMN RX AGNT ADD: CPT

## 2022-01-01 PROCEDURE — 87077 CULTURE AEROBIC IDENTIFY: CPT

## 2022-01-01 PROCEDURE — 80048 BASIC METABOLIC PNL TOTAL CA: CPT

## 2022-01-01 PROCEDURE — G8484 FLU IMMUNIZE NO ADMIN: HCPCS | Performed by: FAMILY MEDICINE

## 2022-01-01 PROCEDURE — 93306 TTE W/DOPPLER COMPLETE: CPT

## 2022-01-01 PROCEDURE — 95718 EEG PHYS/QHP 2-12 HR W/VEEG: CPT | Performed by: PSYCHIATRY & NEUROLOGY

## 2022-01-01 PROCEDURE — 6370000000 HC RX 637 (ALT 250 FOR IP): Performed by: PSYCHIATRY & NEUROLOGY

## 2022-01-01 PROCEDURE — 84478 ASSAY OF TRIGLYCERIDES: CPT

## 2022-01-01 PROCEDURE — 80076 HEPATIC FUNCTION PANEL: CPT

## 2022-01-01 PROCEDURE — 82140 ASSAY OF AMMONIA: CPT

## 2022-01-01 PROCEDURE — 84100 ASSAY OF PHOSPHORUS: CPT

## 2022-01-01 PROCEDURE — 83874 ASSAY OF MYOGLOBIN: CPT

## 2022-01-01 PROCEDURE — 87040 BLOOD CULTURE FOR BACTERIA: CPT

## 2022-01-01 PROCEDURE — 99214 OFFICE O/P EST MOD 30 MIN: CPT | Performed by: FAMILY MEDICINE

## 2022-01-01 PROCEDURE — 85027 COMPLETE CBC AUTOMATED: CPT

## 2022-01-01 PROCEDURE — 99233 SBSQ HOSP IP/OBS HIGH 50: CPT | Performed by: PSYCHIATRY & NEUROLOGY

## 2022-01-01 PROCEDURE — 87015 SPECIMEN INFECT AGNT CONCNTJ: CPT

## 2022-01-01 PROCEDURE — 6360000002 HC RX W HCPCS: Performed by: NURSE PRACTITIONER

## 2022-01-01 PROCEDURE — 82607 VITAMIN B-12: CPT

## 2022-01-01 PROCEDURE — 87086 URINE CULTURE/COLONY COUNT: CPT

## 2022-01-01 PROCEDURE — 83036 HEMOGLOBIN GLYCOSYLATED A1C: CPT

## 2022-01-01 PROCEDURE — 1200000000 HC SEMI PRIVATE

## 2022-01-01 PROCEDURE — 2700000000 HC OXYGEN THERAPY PER DAY

## 2022-01-01 PROCEDURE — 86592 SYPHILIS TEST NON-TREP QUAL: CPT

## 2022-01-01 PROCEDURE — 70551 MRI BRAIN STEM W/O DYE: CPT

## 2022-01-01 PROCEDURE — 6370000000 HC RX 637 (ALT 250 FOR IP): Performed by: HEALTH CARE PROVIDER

## 2022-01-01 PROCEDURE — 81001 URINALYSIS AUTO W/SCOPE: CPT

## 2022-01-01 PROCEDURE — 99231 SBSQ HOSP IP/OBS SF/LOW 25: CPT | Performed by: INTERNAL MEDICINE

## 2022-01-01 PROCEDURE — 36415 COLL VENOUS BLD VENIPUNCTURE: CPT

## 2022-01-01 PROCEDURE — 96365 THER/PROPH/DIAG IV INF INIT: CPT

## 2022-01-01 PROCEDURE — 87635 SARS-COV-2 COVID-19 AMP PRB: CPT

## 2022-01-01 PROCEDURE — 82803 BLOOD GASES ANY COMBINATION: CPT

## 2022-01-01 PROCEDURE — 99291 CRITICAL CARE FIRST HOUR: CPT | Performed by: INTERNAL MEDICINE

## 2022-01-01 PROCEDURE — 6360000002 HC RX W HCPCS: Performed by: INTERNAL MEDICINE

## 2022-01-01 PROCEDURE — 82746 ASSAY OF FOLIC ACID SERUM: CPT

## 2022-01-01 PROCEDURE — 93005 ELECTROCARDIOGRAM TRACING: CPT | Performed by: EMERGENCY MEDICINE

## 2022-01-01 PROCEDURE — 86698 HISTOPLASMA ANTIBODY: CPT

## 2022-01-01 PROCEDURE — 73562 X-RAY EXAM OF KNEE 3: CPT

## 2022-01-01 PROCEDURE — 80143 DRUG ASSAY ACETAMINOPHEN: CPT

## 2022-01-01 PROCEDURE — 74018 RADEX ABDOMEN 1 VIEW: CPT

## 2022-01-01 PROCEDURE — 88112 CYTOPATH CELL ENHANCE TECH: CPT

## 2022-01-01 PROCEDURE — 96375 TX/PRO/DX INJ NEW DRUG ADDON: CPT

## 2022-01-01 PROCEDURE — 70487 CT MAXILLOFACIAL W/DYE: CPT

## 2022-01-01 PROCEDURE — 2500000003 HC RX 250 WO HCPCS: Performed by: STUDENT IN AN ORGANIZED HEALTH CARE EDUCATION/TRAINING PROGRAM

## 2022-01-01 PROCEDURE — 72157 MRI CHEST SPINE W/O & W/DYE: CPT

## 2022-01-01 PROCEDURE — 73080 X-RAY EXAM OF ELBOW: CPT

## 2022-01-01 PROCEDURE — 87483 CNS DNA AMP PROBE TYPE 12-25: CPT

## 2022-01-01 PROCEDURE — 72156 MRI NECK SPINE W/O & W/DYE: CPT

## 2022-01-01 PROCEDURE — 2500000003 HC RX 250 WO HCPCS

## 2022-01-01 PROCEDURE — 82306 VITAMIN D 25 HYDROXY: CPT

## 2022-01-01 PROCEDURE — 4004F PT TOBACCO SCREEN RCVD TLK: CPT | Performed by: FAMILY MEDICINE

## 2022-01-01 PROCEDURE — 70486 CT MAXILLOFACIAL W/O DYE: CPT

## 2022-01-01 PROCEDURE — 3209999900 CT THORACIC SPINE TRAUMA RECONSTRUCTION

## 2022-01-01 PROCEDURE — 36600 WITHDRAWAL OF ARTERIAL BLOOD: CPT

## 2022-01-01 PROCEDURE — PBSHW PBB SHADOW CHARGE: Performed by: FAMILY MEDICINE

## 2022-01-01 PROCEDURE — 6360000004 HC RX CONTRAST MEDICATION: Performed by: NURSE PRACTITIONER

## 2022-01-01 PROCEDURE — 72125 CT NECK SPINE W/O DYE: CPT

## 2022-01-01 PROCEDURE — 83519 RIA NONANTIBODY: CPT

## 2022-01-01 PROCEDURE — A9579 GAD-BASE MR CONTRAST NOS,1ML: HCPCS | Performed by: NURSE PRACTITIONER

## 2022-01-01 PROCEDURE — 95720 EEG PHY/QHP EA INCR W/VEEG: CPT | Performed by: PSYCHIATRY & NEUROLOGY

## 2022-01-01 PROCEDURE — 83540 ASSAY OF IRON: CPT

## 2022-01-01 PROCEDURE — 6360000004 HC RX CONTRAST MEDICATION: Performed by: EMERGENCY MEDICINE

## 2022-01-01 PROCEDURE — 80051 ELECTROLYTE PANEL: CPT

## 2022-01-01 PROCEDURE — 80179 DRUG ASSAY SALICYLATE: CPT

## 2022-01-01 PROCEDURE — 80307 DRUG TEST PRSMV CHEM ANLYZR: CPT

## 2022-01-01 PROCEDURE — 61650 EVASC PRLNG ADMN RX AGNT 1ST: CPT

## 2022-01-01 PROCEDURE — 80074 ACUTE HEPATITIS PANEL: CPT

## 2022-01-01 PROCEDURE — 93886 INTRACRANIAL COMPLETE STUDY: CPT

## 2022-01-01 PROCEDURE — APPSS30 APP SPLIT SHARED TIME 16-30 MINUTES: Performed by: PHYSICIAN ASSISTANT

## 2022-01-01 PROCEDURE — 95700 EEG CONT REC W/VID EEG TECH: CPT

## 2022-01-01 PROCEDURE — 84145 PROCALCITONIN (PCT): CPT

## 2022-01-01 PROCEDURE — 85025 COMPLETE CBC W/AUTO DIFF WBC: CPT

## 2022-01-01 PROCEDURE — 71260 CT THORAX DX C+: CPT

## 2022-01-01 PROCEDURE — 009630Z DRAINAGE OF CEREBRAL VENTRICLE WITH DRAINAGE DEVICE, PERCUTANEOUS APPROACH: ICD-10-PCS | Performed by: NEUROLOGICAL SURGERY

## 2022-01-01 PROCEDURE — G8417 CALC BMI ABV UP PARAM F/U: HCPCS | Performed by: FAMILY MEDICINE

## 2022-01-01 PROCEDURE — 6360000002 HC RX W HCPCS: Performed by: EMERGENCY MEDICINE

## 2022-01-01 PROCEDURE — 3209999900 CT LUMBAR SPINE TRAUMA RECONSTRUCTION

## 2022-01-01 PROCEDURE — 36224 PLACE CATH CAROTD ART: CPT | Performed by: PSYCHIATRY & NEUROLOGY

## 2022-01-01 PROCEDURE — 99223 1ST HOSP IP/OBS HIGH 75: CPT | Performed by: NEUROLOGICAL SURGERY

## 2022-01-01 PROCEDURE — 2580000003 HC RX 258: Performed by: PSYCHIATRY & NEUROLOGY

## 2022-01-01 PROCEDURE — 82553 CREATINE MB FRACTION: CPT

## 2022-01-01 PROCEDURE — 51798 US URINE CAPACITY MEASURE: CPT

## 2022-01-01 PROCEDURE — 86480 TB TEST CELL IMMUN MEASURE: CPT

## 2022-01-01 PROCEDURE — 3E063XZ INTRODUCTION OF VASOPRESSOR INTO CENTRAL ARTERY, PERCUTANEOUS APPROACH: ICD-10-PCS | Performed by: PSYCHIATRY & NEUROLOGY

## 2022-01-01 PROCEDURE — 70553 MRI BRAIN STEM W/O & W/DYE: CPT

## 2022-01-01 PROCEDURE — U0005 INFEC AGEN DETEC AMPLI PROBE: HCPCS

## 2022-01-01 PROCEDURE — 93010 ELECTROCARDIOGRAM REPORT: CPT | Performed by: INTERNAL MEDICINE

## 2022-01-01 PROCEDURE — 83550 IRON BINDING TEST: CPT

## 2022-01-01 PROCEDURE — 99285 EMERGENCY DEPT VISIT HI MDM: CPT

## 2022-01-01 PROCEDURE — 86140 C-REACTIVE PROTEIN: CPT

## 2022-01-01 PROCEDURE — 85379 FIBRIN DEGRADATION QUANT: CPT

## 2022-01-01 PROCEDURE — 84443 ASSAY THYROID STIM HORMONE: CPT

## 2022-01-01 PROCEDURE — 2709999900 HC NON-CHARGEABLE SUPPLY

## 2022-01-01 PROCEDURE — 72158 MRI LUMBAR SPINE W/O & W/DYE: CPT

## 2022-01-01 PROCEDURE — 85730 THROMBOPLASTIN TIME PARTIAL: CPT

## 2022-01-01 PROCEDURE — 0BH17EZ INSERTION OF ENDOTRACHEAL AIRWAY INTO TRACHEA, VIA NATURAL OR ARTIFICIAL OPENING: ICD-10-PCS | Performed by: PSYCHIATRY & NEUROLOGY

## 2022-01-01 PROCEDURE — APPSS30 APP SPLIT SHARED TIME 16-30 MINUTES: Performed by: NURSE PRACTITIONER

## 2022-01-01 PROCEDURE — 6360000002 HC RX W HCPCS: Performed by: PSYCHIATRY & NEUROLOGY

## 2022-01-01 PROCEDURE — 87529 HSV DNA AMP PROBE: CPT

## 2022-01-01 PROCEDURE — 99221 1ST HOSP IP/OBS SF/LOW 40: CPT | Performed by: NURSE PRACTITIONER

## 2022-01-01 PROCEDURE — 93970 EXTREMITY STUDY: CPT

## 2022-01-01 PROCEDURE — 99232 SBSQ HOSP IP/OBS MODERATE 35: CPT | Performed by: INTERNAL MEDICINE

## 2022-01-01 PROCEDURE — U0003 INFECTIOUS AGENT DETECTION BY NUCLEIC ACID (DNA OR RNA); SEVERE ACUTE RESPIRATORY SYNDROME CORONAVIRUS 2 (SARS-COV-2) (CORONAVIRUS DISEASE [COVID-19]), AMPLIFIED PROBE TECHNIQUE, MAKING USE OF HIGH THROUGHPUT TECHNOLOGIES AS DESCRIBED BY CMS-2020-01-R: HCPCS

## 2022-01-01 PROCEDURE — 87389 HIV-1 AG W/HIV-1&-2 AB AG IA: CPT

## 2022-01-01 PROCEDURE — G8427 DOCREV CUR MEDS BY ELIG CLIN: HCPCS | Performed by: FAMILY MEDICINE

## 2022-01-01 PROCEDURE — 83970 ASSAY OF PARATHORMONE: CPT

## 2022-01-01 PROCEDURE — 3017F COLORECTAL CA SCREEN DOC REV: CPT | Performed by: FAMILY MEDICINE

## 2022-01-01 PROCEDURE — 1123F ACP DISCUSS/DSCN MKR DOCD: CPT | Performed by: FAMILY MEDICINE

## 2022-01-01 PROCEDURE — G0480 DRUG TEST DEF 1-7 CLASSES: HCPCS

## 2022-01-01 PROCEDURE — 70498 CT ANGIOGRAPHY NECK: CPT

## 2022-01-01 PROCEDURE — 85610 PROTHROMBIN TIME: CPT

## 2022-01-01 PROCEDURE — 4040F PNEUMOC VAC/ADMIN/RCVD: CPT | Performed by: FAMILY MEDICINE

## 2022-01-01 PROCEDURE — 36620 INSERTION CATHETER ARTERY: CPT

## 2022-01-01 PROCEDURE — 95819 EEG AWAKE AND ASLEEP: CPT

## 2022-01-01 PROCEDURE — C1760 CLOSURE DEV, VASC: HCPCS

## 2022-01-01 PROCEDURE — 5A1955Z RESPIRATORY VENTILATION, GREATER THAN 96 CONSECUTIVE HOURS: ICD-10-PCS | Performed by: PSYCHIATRY & NEUROLOGY

## 2022-01-01 PROCEDURE — 2580000003 HC RX 258: Performed by: EMERGENCY MEDICINE

## 2022-01-01 PROCEDURE — 94002 VENT MGMT INPAT INIT DAY: CPT

## 2022-01-01 PROCEDURE — 82728 ASSAY OF FERRITIN: CPT

## 2022-01-01 PROCEDURE — C1887 CATHETER, GUIDING: HCPCS

## 2022-01-01 PROCEDURE — 96367 TX/PROPH/DG ADDL SEQ IV INF: CPT

## 2022-01-01 PROCEDURE — 99223 1ST HOSP IP/OBS HIGH 75: CPT | Performed by: PSYCHIATRY & NEUROLOGY

## 2022-01-01 PROCEDURE — 74176 CT ABD & PELVIS W/O CONTRAST: CPT

## 2022-01-01 PROCEDURE — 87150 DNA/RNA AMPLIFIED PROBE: CPT

## 2022-01-01 PROCEDURE — C1769 GUIDE WIRE: HCPCS

## 2022-01-01 PROCEDURE — 82565 ASSAY OF CREATININE: CPT

## 2022-01-01 PROCEDURE — 2709999900 CT CHEST PULMONARY EMBOLISM W CONTRAST

## 2022-01-01 PROCEDURE — APPSS15 APP SPLIT SHARED TIME 0-15 MINUTES: Performed by: NURSE PRACTITIONER

## 2022-01-01 PROCEDURE — 61107 TDH PNXR IMPLT VENTR CATH: CPT | Performed by: NEUROLOGICAL SURGERY

## 2022-01-01 PROCEDURE — C1894 INTRO/SHEATH, NON-LASER: HCPCS

## 2022-01-01 PROCEDURE — APPNB60 APP NON BILLABLE TIME 46-60 MINS: Performed by: NURSE PRACTITIONER

## 2022-01-01 PROCEDURE — 36226 PLACE CATH VERTEBRAL ART: CPT | Performed by: PSYCHIATRY & NEUROLOGY

## 2022-01-01 RX ORDER — KETOROLAC TROMETHAMINE 30 MG/ML
15 INJECTION, SOLUTION INTRAMUSCULAR; INTRAVENOUS ONCE
Status: COMPLETED | OUTPATIENT
Start: 2022-01-01 | End: 2022-01-01

## 2022-01-01 RX ORDER — FENTANYL CITRATE 50 UG/ML
100 INJECTION, SOLUTION INTRAMUSCULAR; INTRAVENOUS ONCE
Status: COMPLETED | OUTPATIENT
Start: 2022-01-01 | End: 2022-01-01

## 2022-01-01 RX ORDER — LEVETIRACETAM 10 MG/ML
INJECTION INTRAVASCULAR
Status: DISCONTINUED
Start: 2022-01-01 | End: 2022-01-01

## 2022-01-01 RX ORDER — NICOTINE POLACRILEX 4 MG
15 LOZENGE BUCCAL PRN
Status: DISCONTINUED | OUTPATIENT
Start: 2022-01-01 | End: 2022-01-01

## 2022-01-01 RX ORDER — MORPHINE SULFATE 2 MG/ML
2 INJECTION, SOLUTION INTRAMUSCULAR; INTRAVENOUS
Status: DISCONTINUED | OUTPATIENT
Start: 2022-01-01 | End: 2022-01-01 | Stop reason: HOSPADM

## 2022-01-01 RX ORDER — VERAPAMIL HYDROCHLORIDE 120 MG/1
120 TABLET, FILM COATED ORAL EVERY 8 HOURS SCHEDULED
Status: DISCONTINUED | OUTPATIENT
Start: 2022-01-01 | End: 2022-01-01

## 2022-01-01 RX ORDER — ACETAMINOPHEN 160 MG/5ML
960 SOLUTION ORAL ONCE
Status: COMPLETED | OUTPATIENT
Start: 2022-01-01 | End: 2022-01-01

## 2022-01-01 RX ORDER — LABETALOL HYDROCHLORIDE 5 MG/ML
10 INJECTION, SOLUTION INTRAVENOUS EVERY 4 HOURS PRN
Status: DISCONTINUED | OUTPATIENT
Start: 2022-01-01 | End: 2022-01-01

## 2022-01-01 RX ORDER — ONDANSETRON 4 MG/1
4 TABLET, ORALLY DISINTEGRATING ORAL EVERY 8 HOURS PRN
Status: DISCONTINUED | OUTPATIENT
Start: 2022-01-01 | End: 2022-01-01 | Stop reason: HOSPADM

## 2022-01-01 RX ORDER — SODIUM CHLORIDE 9 MG/ML
INJECTION, SOLUTION INTRAVENOUS CONTINUOUS
Status: DISCONTINUED | OUTPATIENT
Start: 2022-01-01 | End: 2022-01-01

## 2022-01-01 RX ORDER — FENTANYL CITRATE 50 UG/ML
25 INJECTION, SOLUTION INTRAMUSCULAR; INTRAVENOUS
Status: DISCONTINUED | OUTPATIENT
Start: 2022-01-01 | End: 2022-01-01

## 2022-01-01 RX ORDER — PROPOFOL 10 MG/ML
INJECTION, EMULSION INTRAVENOUS
Status: COMPLETED
Start: 2022-01-01 | End: 2022-01-01

## 2022-01-01 RX ORDER — 0.9 % SODIUM CHLORIDE 0.9 %
1000 INTRAVENOUS SOLUTION INTRAVENOUS ONCE
Status: COMPLETED | OUTPATIENT
Start: 2022-01-01 | End: 2022-01-01

## 2022-01-01 RX ORDER — ETOMIDATE 2 MG/ML
20 INJECTION INTRAVENOUS ONCE
Status: COMPLETED | OUTPATIENT
Start: 2022-01-01 | End: 2022-01-01

## 2022-01-01 RX ORDER — SODIUM CHLORIDE 0.9 % (FLUSH) 0.9 %
5-40 SYRINGE (ML) INJECTION EVERY 12 HOURS SCHEDULED
Status: DISCONTINUED | OUTPATIENT
Start: 2022-01-01 | End: 2022-01-01

## 2022-01-01 RX ORDER — SODIUM CHLORIDE 9 MG/ML
10 INJECTION INTRAVENOUS DAILY
Status: DISCONTINUED | OUTPATIENT
Start: 2022-01-01 | End: 2022-01-01

## 2022-01-01 RX ORDER — SODIUM CHLORIDE 0.9 % (FLUSH) 0.9 %
5-40 SYRINGE (ML) INJECTION PRN
Status: DISCONTINUED | OUTPATIENT
Start: 2022-01-01 | End: 2022-01-01 | Stop reason: HOSPADM

## 2022-01-01 RX ORDER — FENTANYL CITRATE 50 UG/ML
INJECTION, SOLUTION INTRAMUSCULAR; INTRAVENOUS
Status: COMPLETED | OUTPATIENT
Start: 2022-01-01 | End: 2022-01-01

## 2022-01-01 RX ORDER — SENNA AND DOCUSATE SODIUM 50; 8.6 MG/1; MG/1
2 TABLET, FILM COATED ORAL DAILY
Status: DISCONTINUED | OUTPATIENT
Start: 2022-01-01 | End: 2022-01-01

## 2022-01-01 RX ORDER — LABETALOL HYDROCHLORIDE 5 MG/ML
10 INJECTION, SOLUTION INTRAVENOUS
Status: DISCONTINUED | OUTPATIENT
Start: 2022-01-01 | End: 2022-01-01

## 2022-01-01 RX ORDER — SENNOSIDES 8.8 MG/5ML
5 LIQUID ORAL NIGHTLY
Status: DISCONTINUED | OUTPATIENT
Start: 2022-01-01 | End: 2022-01-01

## 2022-01-01 RX ORDER — ACETAMINOPHEN 325 MG/1
650 TABLET ORAL EVERY 4 HOURS PRN
Status: DISCONTINUED | OUTPATIENT
Start: 2022-01-01 | End: 2022-01-01

## 2022-01-01 RX ORDER — ROCURONIUM BROMIDE 10 MG/ML
70 INJECTION, SOLUTION INTRAVENOUS ONCE
Status: COMPLETED | OUTPATIENT
Start: 2022-01-01 | End: 2022-01-01

## 2022-01-01 RX ORDER — VERAPAMIL HYDROCHLORIDE 80 MG/1
80 TABLET ORAL EVERY 8 HOURS SCHEDULED
Status: DISCONTINUED | OUTPATIENT
Start: 2022-01-01 | End: 2022-01-01

## 2022-01-01 RX ORDER — MORPHINE SULFATE 4 MG/ML
4 INJECTION, SOLUTION INTRAMUSCULAR; INTRAVENOUS
Status: DISCONTINUED | OUTPATIENT
Start: 2022-01-01 | End: 2022-01-01 | Stop reason: HOSPADM

## 2022-01-01 RX ORDER — METOCLOPRAMIDE HYDROCHLORIDE 5 MG/ML
10 INJECTION INTRAMUSCULAR; INTRAVENOUS ONCE
Status: COMPLETED | OUTPATIENT
Start: 2022-01-01 | End: 2022-01-01

## 2022-01-01 RX ORDER — SIMETHICONE 20 MG/.3ML
40 EMULSION ORAL EVERY 6 HOURS PRN
Status: DISCONTINUED | OUTPATIENT
Start: 2022-01-01 | End: 2022-01-01

## 2022-01-01 RX ORDER — ACETAMINOPHEN 325 MG/1
650 TABLET ORAL EVERY 6 HOURS PRN
Status: DISCONTINUED | OUTPATIENT
Start: 2022-01-01 | End: 2022-01-01 | Stop reason: HOSPADM

## 2022-01-01 RX ORDER — KETOROLAC TROMETHAMINE 30 MG/ML
15 INJECTION, SOLUTION INTRAMUSCULAR; INTRAVENOUS ONCE
Qty: 0.5 ML | Refills: 0
Start: 2022-01-01 | End: 2022-01-01 | Stop reason: CLARIF

## 2022-01-01 RX ORDER — ERYTHROMYCIN 5 MG/G
OINTMENT OPHTHALMIC 2 TIMES DAILY
Status: DISCONTINUED | OUTPATIENT
Start: 2022-01-01 | End: 2022-01-01

## 2022-01-01 RX ORDER — PROPOFOL 10 MG/ML
5-50 INJECTION, EMULSION INTRAVENOUS
Status: DISCONTINUED | OUTPATIENT
Start: 2022-01-01 | End: 2022-01-01

## 2022-01-01 RX ORDER — VITAMIN B COMPLEX
1000 TABLET ORAL DAILY
Status: DISCONTINUED | OUTPATIENT
Start: 2022-01-01 | End: 2022-01-01

## 2022-01-01 RX ORDER — SODIUM CHLORIDE 0.9 % (FLUSH) 0.9 %
10 SYRINGE (ML) INJECTION PRN
Status: DISCONTINUED | OUTPATIENT
Start: 2022-01-01 | End: 2022-01-01 | Stop reason: HOSPADM

## 2022-01-01 RX ORDER — IODIXANOL 270 MG/ML
100 INJECTION, SOLUTION INTRAVASCULAR
Status: COMPLETED | OUTPATIENT
Start: 2022-01-01 | End: 2022-01-01

## 2022-01-01 RX ORDER — POLYETHYLENE GLYCOL 3350 17 G/17G
17 POWDER, FOR SOLUTION ORAL DAILY
Status: DISCONTINUED | OUTPATIENT
Start: 2022-01-01 | End: 2022-01-01

## 2022-01-01 RX ORDER — DEXAMETHASONE SODIUM PHOSPHATE 10 MG/ML
10 INJECTION INTRAMUSCULAR; INTRAVENOUS EVERY 6 HOURS
Status: COMPLETED | OUTPATIENT
Start: 2022-01-01 | End: 2022-01-01

## 2022-01-01 RX ORDER — BISACODYL 10 MG
10 SUPPOSITORY, RECTAL RECTAL ONCE
Status: COMPLETED | OUTPATIENT
Start: 2022-01-01 | End: 2022-01-01

## 2022-01-01 RX ORDER — PANTOPRAZOLE SODIUM 40 MG/1
40 TABLET, DELAYED RELEASE ORAL
Status: DISCONTINUED | OUTPATIENT
Start: 2022-01-01 | End: 2022-01-01

## 2022-01-01 RX ORDER — SODIUM PHOSPHATE, DIBASIC AND SODIUM PHOSPHATE, MONOBASIC 7; 19 G/133ML; G/133ML
1 ENEMA RECTAL
Status: ACTIVE | OUTPATIENT
Start: 2022-01-01 | End: 2022-01-01

## 2022-01-01 RX ORDER — DEXMEDETOMIDINE HYDROCHLORIDE 4 UG/ML
.2-1.4 INJECTION, SOLUTION INTRAVENOUS CONTINUOUS
Status: DISCONTINUED | OUTPATIENT
Start: 2022-01-01 | End: 2022-01-01

## 2022-01-01 RX ORDER — ONDANSETRON 2 MG/ML
4 INJECTION INTRAMUSCULAR; INTRAVENOUS EVERY 6 HOURS PRN
Status: DISCONTINUED | OUTPATIENT
Start: 2022-01-01 | End: 2022-01-01 | Stop reason: HOSPADM

## 2022-01-01 RX ORDER — SODIUM CHLORIDE, SODIUM LACTATE, POTASSIUM CHLORIDE, CALCIUM CHLORIDE 600; 310; 30; 20 MG/100ML; MG/100ML; MG/100ML; MG/100ML
INJECTION, SOLUTION INTRAVENOUS CONTINUOUS
Status: DISCONTINUED | OUTPATIENT
Start: 2022-01-01 | End: 2022-01-01

## 2022-01-01 RX ORDER — GLYCOPYRROLATE 0.2 MG/ML
0.2 INJECTION INTRAMUSCULAR; INTRAVENOUS EVERY 4 HOURS PRN
Status: DISCONTINUED | OUTPATIENT
Start: 2022-01-01 | End: 2022-01-01 | Stop reason: HOSPADM

## 2022-01-01 RX ORDER — LANOLIN ALCOHOL/MO/W.PET/CERES
325 CREAM (GRAM) TOPICAL
Status: DISCONTINUED | OUTPATIENT
Start: 2022-01-01 | End: 2022-01-01

## 2022-01-01 RX ORDER — CHLORHEXIDINE GLUCONATE 0.12 MG/ML
15 RINSE ORAL 2 TIMES DAILY
Status: DISCONTINUED | OUTPATIENT
Start: 2022-01-01 | End: 2022-01-01

## 2022-01-01 RX ORDER — LORAZEPAM 2 MG/ML
1 INJECTION INTRAMUSCULAR
Status: DISCONTINUED | OUTPATIENT
Start: 2022-01-01 | End: 2022-01-01 | Stop reason: HOSPADM

## 2022-01-01 RX ORDER — SODIUM CHLORIDE 9 MG/ML
25 INJECTION, SOLUTION INTRAVENOUS PRN
Status: DISCONTINUED | OUTPATIENT
Start: 2022-01-01 | End: 2022-01-01

## 2022-01-01 RX ORDER — ACETAMINOPHEN 650 MG/1
650 SUPPOSITORY RECTAL EVERY 6 HOURS PRN
Status: DISCONTINUED | OUTPATIENT
Start: 2022-01-01 | End: 2022-01-01 | Stop reason: HOSPADM

## 2022-01-01 RX ORDER — VANCOMYCIN HYDROCHLORIDE 1 G/200ML
1000 INJECTION, SOLUTION INTRAVENOUS EVERY 12 HOURS
Status: DISCONTINUED | OUTPATIENT
Start: 2022-01-01 | End: 2022-01-01

## 2022-01-01 RX ORDER — ATORVASTATIN CALCIUM 10 MG/1
10 TABLET, FILM COATED ORAL NIGHTLY
Status: DISCONTINUED | OUTPATIENT
Start: 2022-01-01 | End: 2022-01-01

## 2022-01-01 RX ORDER — HYDRALAZINE HYDROCHLORIDE 20 MG/ML
5 INJECTION INTRAMUSCULAR; INTRAVENOUS EVERY 4 HOURS PRN
Status: DISCONTINUED | OUTPATIENT
Start: 2022-01-01 | End: 2022-01-01

## 2022-01-01 RX ORDER — PROPRANOLOL HYDROCHLORIDE 10 MG/1
10 TABLET ORAL 2 TIMES DAILY
Status: DISCONTINUED | OUTPATIENT
Start: 2022-01-01 | End: 2022-01-01

## 2022-01-01 RX ORDER — DEXTROSE MONOHYDRATE 50 MG/ML
100 INJECTION, SOLUTION INTRAVENOUS PRN
Status: DISCONTINUED | OUTPATIENT
Start: 2022-01-01 | End: 2022-01-01

## 2022-01-01 RX ORDER — POLYETHYLENE GLYCOL 3350 17 G/17G
17 POWDER, FOR SOLUTION ORAL DAILY PRN
Status: DISCONTINUED | OUTPATIENT
Start: 2022-01-01 | End: 2022-01-01

## 2022-01-01 RX ORDER — KETOROLAC TROMETHAMINE 30 MG/ML
15 INJECTION, SOLUTION INTRAMUSCULAR; INTRAVENOUS ONCE
Status: DISCONTINUED | OUTPATIENT
Start: 2022-01-01 | End: 2022-01-01

## 2022-01-01 RX ORDER — VANCOMYCIN HYDROCHLORIDE 1 G/20ML
INJECTION, POWDER, LYOPHILIZED, FOR SOLUTION INTRAVENOUS
Status: COMPLETED
Start: 2022-01-01 | End: 2022-01-01

## 2022-01-01 RX ORDER — PANTOPRAZOLE SODIUM 40 MG/10ML
40 INJECTION, POWDER, LYOPHILIZED, FOR SOLUTION INTRAVENOUS DAILY
Status: DISCONTINUED | OUTPATIENT
Start: 2022-01-01 | End: 2022-01-01

## 2022-01-01 RX ORDER — LIDOCAINE HYDROCHLORIDE 10 MG/ML
5 INJECTION, SOLUTION INFILTRATION; PERINEURAL ONCE
Status: DISCONTINUED | OUTPATIENT
Start: 2022-01-01 | End: 2022-01-01

## 2022-01-01 RX ORDER — LIDOCAINE HYDROCHLORIDE 10 MG/ML
INJECTION, SOLUTION INFILTRATION; PERINEURAL
Status: DISPENSED
Start: 2022-01-01 | End: 2022-01-01

## 2022-01-01 RX ORDER — CALCIUM GLUCONATE 20 MG/ML
2000 INJECTION, SOLUTION INTRAVENOUS ONCE
Status: COMPLETED | OUTPATIENT
Start: 2022-01-01 | End: 2022-01-01

## 2022-01-01 RX ORDER — DEXTROSE MONOHYDRATE 25 G/50ML
12.5 INJECTION, SOLUTION INTRAVENOUS PRN
Status: DISCONTINUED | OUTPATIENT
Start: 2022-01-01 | End: 2022-01-01

## 2022-01-01 RX ADMIN — ERYTHROMYCIN: 5 OINTMENT OPHTHALMIC at 16:28

## 2022-01-01 RX ADMIN — VERAPAMIL HYDROCHLORIDE 120 MG: 120 TABLET, FILM COATED ORAL at 16:34

## 2022-01-01 RX ADMIN — DOCUSATE SODIUM 100 MG: 50 LIQUID ORAL at 11:47

## 2022-01-01 RX ADMIN — DEXAMETHASONE SODIUM PHOSPHATE 10 MG: 10 INJECTION INTRAMUSCULAR; INTRAVENOUS at 20:26

## 2022-01-01 RX ADMIN — Medication 1500 MG: at 12:58

## 2022-01-01 RX ADMIN — DEXAMETHASONE SODIUM PHOSPHATE 10 MG: 10 INJECTION INTRAMUSCULAR; INTRAVENOUS at 14:10

## 2022-01-01 RX ADMIN — SODIUM CHLORIDE, PRESERVATIVE FREE 10 ML: 5 INJECTION INTRAVENOUS at 10:30

## 2022-01-01 RX ADMIN — GADOTERIDOL 20 ML: 279.3 INJECTION, SOLUTION INTRAVENOUS at 17:35

## 2022-01-01 RX ADMIN — SIMETHICONE 40 MG: 20 SUSPENSION/ DROPS ORAL at 09:11

## 2022-01-01 RX ADMIN — AMPICILLIN SODIUM 2000 MG: 2 INJECTION, POWDER, FOR SOLUTION INTRAMUSCULAR; INTRAVENOUS at 10:05

## 2022-01-01 RX ADMIN — LORAZEPAM 1 MG: 2 INJECTION INTRAMUSCULAR at 07:46

## 2022-01-01 RX ADMIN — ERYTHROMYCIN: 5 OINTMENT OPHTHALMIC at 05:33

## 2022-01-01 RX ADMIN — MORPHINE SULFATE 4 MG: 4 INJECTION, SOLUTION INTRAMUSCULAR; INTRAVENOUS at 21:23

## 2022-01-01 RX ADMIN — ENOXAPARIN SODIUM 40 MG: 100 INJECTION SUBCUTANEOUS at 09:29

## 2022-01-01 RX ADMIN — VERAPAMIL HYDROCHLORIDE 80 MG: 80 TABLET ORAL at 13:46

## 2022-01-01 RX ADMIN — SODIUM CHLORIDE, PRESERVATIVE FREE 5 ML: 5 INJECTION INTRAVENOUS at 20:28

## 2022-01-01 RX ADMIN — FOLIC ACID 1 MG: 5 INJECTION, SOLUTION INTRAMUSCULAR; INTRAVENOUS; SUBCUTANEOUS at 17:26

## 2022-01-01 RX ADMIN — FOLIC ACID 1 MG: 5 INJECTION, SOLUTION INTRAMUSCULAR; INTRAVENOUS; SUBCUTANEOUS at 10:30

## 2022-01-01 RX ADMIN — AMPICILLIN SODIUM 2000 MG: 2 INJECTION, POWDER, FOR SOLUTION INTRAMUSCULAR; INTRAVENOUS at 16:27

## 2022-01-01 RX ADMIN — SODIUM CHLORIDE 1000 ML: 9 INJECTION, SOLUTION INTRAVENOUS at 10:07

## 2022-01-01 RX ADMIN — FENTANYL CITRATE 100 MCG: 50 INJECTION, SOLUTION INTRAMUSCULAR; INTRAVENOUS at 03:03

## 2022-01-01 RX ADMIN — FENTANYL CITRATE 50 MCG: 50 INJECTION, SOLUTION INTRAMUSCULAR; INTRAVENOUS at 18:00

## 2022-01-01 RX ADMIN — ATORVASTATIN CALCIUM 10 MG: 10 TABLET, FILM COATED ORAL at 20:30

## 2022-01-01 RX ADMIN — AMPICILLIN SODIUM 2000 MG: 2 INJECTION, POWDER, FOR SOLUTION INTRAMUSCULAR; INTRAVENOUS at 20:10

## 2022-01-01 RX ADMIN — ATORVASTATIN CALCIUM 10 MG: 10 TABLET, FILM COATED ORAL at 20:26

## 2022-01-01 RX ADMIN — CEFTRIAXONE SODIUM 2000 MG: 2 INJECTION, POWDER, FOR SOLUTION INTRAMUSCULAR; INTRAVENOUS at 21:39

## 2022-01-01 RX ADMIN — PROPOFOL 10 MCG/KG/MIN: 10 INJECTION, EMULSION INTRAVENOUS at 01:37

## 2022-01-01 RX ADMIN — MORPHINE SULFATE 4 MG: 4 INJECTION, SOLUTION INTRAMUSCULAR; INTRAVENOUS at 12:41

## 2022-01-01 RX ADMIN — VERAPAMIL HYDROCHLORIDE 80 MG: 80 TABLET ORAL at 06:24

## 2022-01-01 RX ADMIN — DEXAMETHASONE SODIUM PHOSPHATE 10 MG: 10 INJECTION INTRAMUSCULAR; INTRAVENOUS at 09:11

## 2022-01-01 RX ADMIN — Medication 10 MG: at 12:33

## 2022-01-01 RX ADMIN — LORAZEPAM 1 MG: 2 INJECTION INTRAMUSCULAR at 20:58

## 2022-01-01 RX ADMIN — MORPHINE SULFATE 4 MG: 4 INJECTION, SOLUTION INTRAMUSCULAR; INTRAVENOUS at 17:52

## 2022-01-01 RX ADMIN — METOCLOPRAMIDE 10 MG: 5 INJECTION, SOLUTION INTRAMUSCULAR; INTRAVENOUS at 20:27

## 2022-01-01 RX ADMIN — MORPHINE SULFATE 4 MG: 4 INJECTION, SOLUTION INTRAMUSCULAR; INTRAVENOUS at 17:54

## 2022-01-01 RX ADMIN — ENOXAPARIN SODIUM 40 MG: 100 INJECTION SUBCUTANEOUS at 09:10

## 2022-01-01 RX ADMIN — CEFTRIAXONE SODIUM 2000 MG: 2 INJECTION, POWDER, FOR SOLUTION INTRAMUSCULAR; INTRAVENOUS at 04:41

## 2022-01-01 RX ADMIN — CEFTRIAXONE SODIUM 2000 MG: 2 INJECTION, POWDER, FOR SOLUTION INTRAMUSCULAR; INTRAVENOUS at 09:29

## 2022-01-01 RX ADMIN — ERYTHROMYCIN: 5 OINTMENT OPHTHALMIC at 17:20

## 2022-01-01 RX ADMIN — VERAPAMIL HYDROCHLORIDE 80 MG: 80 TABLET ORAL at 05:40

## 2022-01-01 RX ADMIN — VERAPAMIL HYDROCHLORIDE 80 MG: 80 TABLET ORAL at 14:11

## 2022-01-01 RX ADMIN — CHLORHEXIDINE GLUCONATE 0.12% ORAL RINSE 15 ML: 1.2 LIQUID ORAL at 08:31

## 2022-01-01 RX ADMIN — SODIUM CHLORIDE, PRESERVATIVE FREE 10 ML: 5 INJECTION INTRAVENOUS at 08:31

## 2022-01-01 RX ADMIN — SODIUM CHLORIDE, POTASSIUM CHLORIDE, SODIUM LACTATE AND CALCIUM CHLORIDE: 600; 310; 30; 20 INJECTION, SOLUTION INTRAVENOUS at 00:21

## 2022-01-01 RX ADMIN — MORPHINE SULFATE 2 MG: 2 INJECTION, SOLUTION INTRAMUSCULAR; INTRAVENOUS at 20:50

## 2022-01-01 RX ADMIN — INSULIN LISPRO 2 UNITS: 100 INJECTION, SOLUTION INTRAVENOUS; SUBCUTANEOUS at 23:36

## 2022-01-01 RX ADMIN — ERYTHROMYCIN: 5 OINTMENT OPHTHALMIC at 05:04

## 2022-01-01 RX ADMIN — IODIXANOL 86 ML: 270 INJECTION, SOLUTION INTRAVASCULAR at 19:39

## 2022-01-01 RX ADMIN — PANTOPRAZOLE SODIUM 40 MG: 40 INJECTION, POWDER, FOR SOLUTION INTRAVENOUS at 09:29

## 2022-01-01 RX ADMIN — VERAPAMIL HYDROCHLORIDE 80 MG: 80 TABLET ORAL at 05:04

## 2022-01-01 RX ADMIN — CEFTRIAXONE SODIUM 2000 MG: 2 INJECTION, POWDER, FOR SOLUTION INTRAMUSCULAR; INTRAVENOUS at 08:27

## 2022-01-01 RX ADMIN — MORPHINE SULFATE 4 MG: 4 INJECTION, SOLUTION INTRAMUSCULAR; INTRAVENOUS at 10:39

## 2022-01-01 RX ADMIN — Medication 1000 UNITS: at 08:26

## 2022-01-01 RX ADMIN — VERAPAMIL HYDROCHLORIDE 120 MG: 120 TABLET, FILM COATED ORAL at 20:27

## 2022-01-01 RX ADMIN — FENTANYL CITRATE 25 MCG: 50 INJECTION, SOLUTION INTRAMUSCULAR; INTRAVENOUS at 00:19

## 2022-01-01 RX ADMIN — DEXAMETHASONE SODIUM PHOSPHATE 10 MG: 10 INJECTION INTRAMUSCULAR; INTRAVENOUS at 02:50

## 2022-01-01 RX ADMIN — SENNOSIDES 8.8 MG: 8.8 LIQUID ORAL at 20:27

## 2022-01-01 RX ADMIN — IOPAMIDOL 75 ML: 755 INJECTION, SOLUTION INTRAVENOUS at 15:20

## 2022-01-01 RX ADMIN — DOCUSATE SODIUM 100 MG: 50 LIQUID ORAL at 09:11

## 2022-01-01 RX ADMIN — CEFTRIAXONE SODIUM 2000 MG: 2 INJECTION, POWDER, FOR SOLUTION INTRAMUSCULAR; INTRAVENOUS at 10:30

## 2022-01-01 RX ADMIN — DEXAMETHASONE SODIUM PHOSPHATE 10 MG: 10 INJECTION INTRAMUSCULAR; INTRAVENOUS at 20:29

## 2022-01-01 RX ADMIN — VERAPAMIL HYDROCHLORIDE 80 MG: 80 TABLET ORAL at 16:16

## 2022-01-01 RX ADMIN — FENTANYL CITRATE 25 MCG: 50 INJECTION, SOLUTION INTRAMUSCULAR; INTRAVENOUS at 18:13

## 2022-01-01 RX ADMIN — VERAPAMIL HYDROCHLORIDE 80 MG: 80 TABLET ORAL at 22:40

## 2022-01-01 RX ADMIN — PROPOFOL 40 MCG/KG/MIN: 10 INJECTION, EMULSION INTRAVENOUS at 05:22

## 2022-01-01 RX ADMIN — VERAPAMIL HYDROCHLORIDE 120 MG: 120 TABLET, FILM COATED ORAL at 05:34

## 2022-01-01 RX ADMIN — ENOXAPARIN SODIUM 40 MG: 100 INJECTION SUBCUTANEOUS at 08:31

## 2022-01-01 RX ADMIN — POLYETHYLENE GLYCOL 3350 17 G: 17 POWDER, FOR SOLUTION ORAL at 09:30

## 2022-01-01 RX ADMIN — DOCUSATE SODIUM 100 MG: 50 LIQUID ORAL at 10:53

## 2022-01-01 RX ADMIN — INSULIN LISPRO 2 UNITS: 100 INJECTION, SOLUTION INTRAVENOUS; SUBCUTANEOUS at 10:21

## 2022-01-01 RX ADMIN — CEFTRIAXONE SODIUM 2000 MG: 2 INJECTION, POWDER, FOR SOLUTION INTRAMUSCULAR; INTRAVENOUS at 20:26

## 2022-01-01 RX ADMIN — SODIUM CHLORIDE, PRESERVATIVE FREE 10 ML: 5 INJECTION INTRAVENOUS at 09:29

## 2022-01-01 RX ADMIN — LORAZEPAM 1 MG: 2 INJECTION INTRAMUSCULAR at 17:55

## 2022-01-01 RX ADMIN — DOCUSATE SODIUM 50MG AND SENNOSIDES 8.6MG 2 TABLET: 8.6; 5 TABLET, FILM COATED ORAL at 08:31

## 2022-01-01 RX ADMIN — PROPOFOL 30 ML/HR: 10 INJECTION, EMULSION INTRAVENOUS at 10:52

## 2022-01-01 RX ADMIN — Medication 25 MCG/HR: at 18:08

## 2022-01-01 RX ADMIN — SODIUM CHLORIDE, PRESERVATIVE FREE 10 ML: 5 INJECTION INTRAVENOUS at 08:27

## 2022-01-01 RX ADMIN — BISACODYL 10 MG: 10 SUPPOSITORY RECTAL at 09:30

## 2022-01-01 RX ADMIN — CEFTRIAXONE SODIUM 2000 MG: 2 INJECTION, POWDER, FOR SOLUTION INTRAMUSCULAR; INTRAVENOUS at 09:10

## 2022-01-01 RX ADMIN — FOLIC ACID 1 MG: 5 INJECTION, SOLUTION INTRAMUSCULAR; INTRAVENOUS; SUBCUTANEOUS at 08:25

## 2022-01-01 RX ADMIN — PANTOPRAZOLE SODIUM 40 MG: 40 INJECTION, POWDER, FOR SOLUTION INTRAVENOUS at 10:20

## 2022-01-01 RX ADMIN — DOCUSATE SODIUM 50MG AND SENNOSIDES 8.6MG 2 TABLET: 8.6; 5 TABLET, FILM COATED ORAL at 08:27

## 2022-01-01 RX ADMIN — IOPAMIDOL 80 ML: 755 INJECTION, SOLUTION INTRAVENOUS at 19:43

## 2022-01-01 RX ADMIN — Medication 1000 UNITS: at 08:31

## 2022-01-01 RX ADMIN — LEVETIRACETAM 2000 MG: 100 INJECTION, SOLUTION INTRAVENOUS at 01:59

## 2022-01-01 RX ADMIN — VERAPAMIL HYDROCHLORIDE 120 MG: 120 TABLET, FILM COATED ORAL at 13:45

## 2022-01-01 RX ADMIN — Medication 1000 UNITS: at 09:29

## 2022-01-01 RX ADMIN — SODIUM CHLORIDE, POTASSIUM CHLORIDE, SODIUM LACTATE AND CALCIUM CHLORIDE: 600; 310; 30; 20 INJECTION, SOLUTION INTRAVENOUS at 23:52

## 2022-01-01 RX ADMIN — MORPHINE SULFATE 4 MG: 4 INJECTION, SOLUTION INTRAMUSCULAR; INTRAVENOUS at 15:08

## 2022-01-01 RX ADMIN — DEXAMETHASONE SODIUM PHOSPHATE 10 MG: 10 INJECTION INTRAMUSCULAR; INTRAVENOUS at 03:52

## 2022-01-01 RX ADMIN — INSULIN LISPRO 2 UNITS: 100 INJECTION, SOLUTION INTRAVENOUS; SUBCUTANEOUS at 05:27

## 2022-01-01 RX ADMIN — ETOMIDATE 20 MG: 2 INJECTION INTRAVENOUS at 01:34

## 2022-01-01 RX ADMIN — SODIUM CHLORIDE, PRESERVATIVE FREE 10 ML: 5 INJECTION INTRAVENOUS at 20:28

## 2022-01-01 RX ADMIN — SODIUM CHLORIDE: 9 INJECTION, SOLUTION INTRAVENOUS at 09:06

## 2022-01-01 RX ADMIN — MORPHINE SULFATE 4 MG: 4 INJECTION, SOLUTION INTRAMUSCULAR; INTRAVENOUS at 04:51

## 2022-01-01 RX ADMIN — ENOXAPARIN SODIUM 40 MG: 100 INJECTION SUBCUTANEOUS at 10:13

## 2022-01-01 RX ADMIN — DEXAMETHASONE SODIUM PHOSPHATE 10 MG: 10 INJECTION INTRAMUSCULAR; INTRAVENOUS at 10:54

## 2022-01-01 RX ADMIN — VANCOMYCIN HYDROCHLORIDE 1000 MG: 1 INJECTION, POWDER, LYOPHILIZED, FOR SOLUTION INTRAVENOUS at 20:36

## 2022-01-01 RX ADMIN — ERYTHROMYCIN: 5 OINTMENT OPHTHALMIC at 05:17

## 2022-01-01 RX ADMIN — ATORVASTATIN CALCIUM 10 MG: 10 TABLET, FILM COATED ORAL at 20:27

## 2022-01-01 RX ADMIN — VERAPAMIL HYDROCHLORIDE 80 MG: 80 TABLET ORAL at 05:36

## 2022-01-01 RX ADMIN — INSULIN LISPRO 2 UNITS: 100 INJECTION, SOLUTION INTRAVENOUS; SUBCUTANEOUS at 17:12

## 2022-01-01 RX ADMIN — PANTOPRAZOLE SODIUM 40 MG: 40 INJECTION, POWDER, FOR SOLUTION INTRAVENOUS at 10:30

## 2022-01-01 RX ADMIN — CEFTRIAXONE SODIUM 2000 MG: 2 INJECTION, POWDER, FOR SOLUTION INTRAMUSCULAR; INTRAVENOUS at 20:27

## 2022-01-01 RX ADMIN — Medication 10 MG: at 20:28

## 2022-01-01 RX ADMIN — MAGNESIUM CITRATE 296 ML: 1.75 LIQUID ORAL at 17:51

## 2022-01-01 RX ADMIN — INSULIN LISPRO 2 UNITS: 100 INJECTION, SOLUTION INTRAVENOUS; SUBCUTANEOUS at 17:10

## 2022-01-01 RX ADMIN — FENTANYL CITRATE 25 MCG: 50 INJECTION, SOLUTION INTRAMUSCULAR; INTRAVENOUS at 15:04

## 2022-01-01 RX ADMIN — ACETAMINOPHEN 960 MG: 650 SOLUTION ORAL at 03:53

## 2022-01-01 RX ADMIN — GADOTERIDOL 18 ML: 279.3 INJECTION, SOLUTION INTRAVENOUS at 14:05

## 2022-01-01 RX ADMIN — IOPAMIDOL 75 ML: 755 INJECTION, SOLUTION INTRAVENOUS at 09:27

## 2022-01-01 RX ADMIN — GLYCERIN 2 G: 2 SUPPOSITORY RECTAL at 10:13

## 2022-01-01 RX ADMIN — IOPAMIDOL 90 ML: 755 INJECTION, SOLUTION INTRAVENOUS at 08:16

## 2022-01-01 RX ADMIN — ERYTHROMYCIN: 5 OINTMENT OPHTHALMIC at 05:26

## 2022-01-01 RX ADMIN — GLYCERIN 2 G: 2 SUPPOSITORY RECTAL at 17:51

## 2022-01-01 RX ADMIN — ERYTHROMYCIN: 5 OINTMENT OPHTHALMIC at 18:09

## 2022-01-01 RX ADMIN — CALCIUM GLUCONATE 2000 MG: 20 INJECTION, SOLUTION INTRAVENOUS at 18:50

## 2022-01-01 RX ADMIN — SODIUM CHLORIDE, PRESERVATIVE FREE 10 ML: 5 INJECTION INTRAVENOUS at 08:25

## 2022-01-01 RX ADMIN — CEFTRIAXONE 1000 MG: 1 INJECTION, POWDER, FOR SOLUTION INTRAMUSCULAR; INTRAVENOUS at 19:54

## 2022-01-01 RX ADMIN — CHLORHEXIDINE GLUCONATE 0.12% ORAL RINSE 15 ML: 1.2 LIQUID ORAL at 09:09

## 2022-01-01 RX ADMIN — PROPOFOL 15 MCG/KG/MIN: 10 INJECTION, EMULSION INTRAVENOUS at 01:54

## 2022-01-01 RX ADMIN — CEFTRIAXONE SODIUM 2000 MG: 2 INJECTION, POWDER, FOR SOLUTION INTRAMUSCULAR; INTRAVENOUS at 21:00

## 2022-01-01 RX ADMIN — SENNOSIDES 8.8 MG: 8.8 LIQUID ORAL at 21:30

## 2022-01-01 RX ADMIN — FENTANYL CITRATE 25 MCG: 50 INJECTION, SOLUTION INTRAMUSCULAR; INTRAVENOUS at 18:08

## 2022-01-01 RX ADMIN — ERYTHROMYCIN: 5 OINTMENT OPHTHALMIC at 16:19

## 2022-01-01 RX ADMIN — DEXAMETHASONE SODIUM PHOSPHATE 10 MG: 10 INJECTION INTRAMUSCULAR; INTRAVENOUS at 08:31

## 2022-01-01 RX ADMIN — INSULIN LISPRO 2 UNITS: 100 INJECTION, SOLUTION INTRAVENOUS; SUBCUTANEOUS at 18:22

## 2022-01-01 RX ADMIN — VERAPAMIL HYDROCHLORIDE 80 MG: 80 TABLET ORAL at 21:13

## 2022-01-01 RX ADMIN — PROPOFOL 10 MCG/KG/MIN: 10 INJECTION, EMULSION INTRAVENOUS at 01:39

## 2022-01-01 RX ADMIN — ENOXAPARIN SODIUM 40 MG: 100 INJECTION SUBCUTANEOUS at 08:27

## 2022-01-01 RX ADMIN — CHLORHEXIDINE GLUCONATE 0.12% ORAL RINSE 15 ML: 1.2 LIQUID ORAL at 20:26

## 2022-01-01 RX ADMIN — DEXAMETHASONE SODIUM PHOSPHATE 10 MG: 10 INJECTION INTRAMUSCULAR; INTRAVENOUS at 21:13

## 2022-01-01 RX ADMIN — SODIUM CHLORIDE 1000 ML: 9 INJECTION, SOLUTION INTRAVENOUS at 18:45

## 2022-01-01 RX ADMIN — INSULIN LISPRO 2 UNITS: 100 INJECTION, SOLUTION INTRAVENOUS; SUBCUTANEOUS at 02:45

## 2022-01-01 RX ADMIN — CHLORHEXIDINE GLUCONATE 0.12% ORAL RINSE 15 ML: 1.2 LIQUID ORAL at 21:00

## 2022-01-01 RX ADMIN — CEFTRIAXONE SODIUM 2000 MG: 2 INJECTION, POWDER, FOR SOLUTION INTRAMUSCULAR; INTRAVENOUS at 08:29

## 2022-01-01 RX ADMIN — Medication 1000 UNITS: at 09:09

## 2022-01-01 RX ADMIN — CHLORHEXIDINE GLUCONATE 0.12% ORAL RINSE 15 ML: 1.2 LIQUID ORAL at 20:30

## 2022-01-01 RX ADMIN — INSULIN LISPRO 2 UNITS: 100 INJECTION, SOLUTION INTRAVENOUS; SUBCUTANEOUS at 12:57

## 2022-01-01 RX ADMIN — INSULIN LISPRO 2 UNITS: 100 INJECTION, SOLUTION INTRAVENOUS; SUBCUTANEOUS at 00:09

## 2022-01-01 RX ADMIN — INSULIN LISPRO 2 UNITS: 100 INJECTION, SOLUTION INTRAVENOUS; SUBCUTANEOUS at 12:13

## 2022-01-01 RX ADMIN — FENTANYL CITRATE 25 MCG: 50 INJECTION, SOLUTION INTRAMUSCULAR; INTRAVENOUS at 20:28

## 2022-01-01 RX ADMIN — FOLIC ACID 1 MG: 5 INJECTION, SOLUTION INTRAMUSCULAR; INTRAVENOUS; SUBCUTANEOUS at 09:21

## 2022-01-01 RX ADMIN — IOPAMIDOL 75 ML: 755 INJECTION, SOLUTION INTRAVENOUS at 13:11

## 2022-01-01 RX ADMIN — SODIUM CHLORIDE, PRESERVATIVE FREE 10 ML: 5 INJECTION INTRAVENOUS at 09:11

## 2022-01-01 RX ADMIN — VANCOMYCIN HYDROCHLORIDE 1000 MG: 1 INJECTION, SOLUTION INTRAVENOUS at 11:01

## 2022-01-01 RX ADMIN — CHLORHEXIDINE GLUCONATE 0.12% ORAL RINSE 15 ML: 1.2 LIQUID ORAL at 09:30

## 2022-01-01 RX ADMIN — Medication 500 MG: at 00:28

## 2022-01-01 RX ADMIN — SIMETHICONE 40 MG: 20 SUSPENSION/ DROPS ORAL at 11:47

## 2022-01-01 RX ADMIN — VERAPAMIL HYDROCHLORIDE 80 MG: 80 TABLET ORAL at 20:29

## 2022-01-01 RX ADMIN — PANTOPRAZOLE SODIUM 40 MG: 40 INJECTION, POWDER, FOR SOLUTION INTRAVENOUS at 08:27

## 2022-01-01 RX ADMIN — FENTANYL CITRATE 25 MCG: 50 INJECTION, SOLUTION INTRAMUSCULAR; INTRAVENOUS at 12:52

## 2022-01-01 RX ADMIN — ERYTHROMYCIN: 5 OINTMENT OPHTHALMIC at 20:10

## 2022-01-01 RX ADMIN — KETOROLAC TROMETHAMINE 15 MG: 30 INJECTION, SOLUTION INTRAMUSCULAR; INTRAVENOUS at 12:12

## 2022-01-01 RX ADMIN — VERAPAMIL HYDROCHLORIDE 80 MG: 80 TABLET ORAL at 14:44

## 2022-01-01 RX ADMIN — DOCUSATE SODIUM 50MG AND SENNOSIDES 8.6MG 2 TABLET: 8.6; 5 TABLET, FILM COATED ORAL at 08:25

## 2022-01-01 RX ADMIN — CHLORHEXIDINE GLUCONATE 0.12% ORAL RINSE 15 ML: 1.2 LIQUID ORAL at 08:26

## 2022-01-01 RX ADMIN — Medication 10 MG: at 13:35

## 2022-01-01 RX ADMIN — SODIUM CHLORIDE, PRESERVATIVE FREE 5 ML: 5 INJECTION INTRAVENOUS at 09:25

## 2022-01-01 RX ADMIN — DEXMEDETOMIDINE HYDROCHLORIDE 0.2 MCG/KG/HR: 4 INJECTION, SOLUTION INTRAVENOUS at 12:12

## 2022-01-01 RX ADMIN — CHLORHEXIDINE GLUCONATE 0.12% ORAL RINSE 15 ML: 1.2 LIQUID ORAL at 09:29

## 2022-01-01 RX ADMIN — ROCURONIUM BROMIDE 70 MG: 10 INJECTION INTRAVENOUS at 01:35

## 2022-01-01 RX ADMIN — ERYTHROMYCIN: 5 OINTMENT OPHTHALMIC at 05:34

## 2022-01-01 RX ADMIN — VANCOMYCIN HYDROCHLORIDE: 1 INJECTION, POWDER, LYOPHILIZED, FOR SOLUTION INTRAVENOUS at 20:37

## 2022-01-01 RX ADMIN — SODIUM CHLORIDE, PRESERVATIVE FREE 10 ML: 5 INJECTION INTRAVENOUS at 20:31

## 2022-01-01 RX ADMIN — HYDRALAZINE HYDROCHLORIDE 5 MG: 20 INJECTION INTRAMUSCULAR; INTRAVENOUS at 21:40

## 2022-01-01 RX ADMIN — SODIUM CHLORIDE, PRESERVATIVE FREE 10 ML: 5 INJECTION INTRAVENOUS at 21:00

## 2022-01-01 RX ADMIN — POLYETHYLENE GLYCOL 3350 17 G: 17 POWDER, FOR SOLUTION ORAL at 09:11

## 2022-01-01 RX ADMIN — ERYTHROMYCIN: 5 OINTMENT OPHTHALMIC at 17:51

## 2022-01-01 RX ADMIN — DEXAMETHASONE SODIUM PHOSPHATE 10 MG: 10 INJECTION INTRAMUSCULAR; INTRAVENOUS at 16:25

## 2022-01-01 RX ADMIN — DEXAMETHASONE SODIUM PHOSPHATE 10 MG: 10 INJECTION INTRAMUSCULAR; INTRAVENOUS at 14:44

## 2022-01-01 RX ADMIN — SODIUM CHLORIDE, PRESERVATIVE FREE 5 ML: 5 INJECTION INTRAVENOUS at 08:18

## 2022-01-01 RX ADMIN — FENTANYL CITRATE 25 MCG: 50 INJECTION, SOLUTION INTRAMUSCULAR; INTRAVENOUS at 10:02

## 2022-01-01 RX ADMIN — AMPICILLIN SODIUM 2000 MG: 2 INJECTION, POWDER, FOR SOLUTION INTRAMUSCULAR; INTRAVENOUS at 12:21

## 2022-01-01 RX ADMIN — INSULIN LISPRO 2 UNITS: 100 INJECTION, SOLUTION INTRAVENOUS; SUBCUTANEOUS at 11:55

## 2022-01-01 RX ADMIN — ERYTHROMYCIN: 5 OINTMENT OPHTHALMIC at 05:40

## 2022-01-01 RX ADMIN — CHLORHEXIDINE GLUCONATE 0.12% ORAL RINSE 15 ML: 1.2 LIQUID ORAL at 21:39

## 2022-01-01 RX ADMIN — CEFTRIAXONE SODIUM 2000 MG: 2 INJECTION, POWDER, FOR SOLUTION INTRAMUSCULAR; INTRAVENOUS at 20:30

## 2022-01-01 RX ADMIN — CEFTRIAXONE SODIUM 2000 MG: 2 INJECTION, POWDER, FOR SOLUTION INTRAMUSCULAR; INTRAVENOUS at 10:10

## 2022-01-01 RX ADMIN — DEXAMETHASONE SODIUM PHOSPHATE 10 MG: 10 INJECTION INTRAMUSCULAR; INTRAVENOUS at 02:45

## 2022-01-01 RX ADMIN — FOLIC ACID 1 MG: 5 INJECTION, SOLUTION INTRAMUSCULAR; INTRAVENOUS; SUBCUTANEOUS at 10:20

## 2022-01-01 RX ADMIN — ATORVASTATIN CALCIUM 10 MG: 10 TABLET, FILM COATED ORAL at 21:30

## 2022-01-01 RX ADMIN — CHLORHEXIDINE GLUCONATE 0.12% ORAL RINSE 15 ML: 1.2 LIQUID ORAL at 21:30

## 2022-01-01 RX ADMIN — CEFTRIAXONE SODIUM 2000 MG: 2 INJECTION, POWDER, FOR SOLUTION INTRAMUSCULAR; INTRAVENOUS at 17:26

## 2022-01-01 RX ADMIN — SODIUM CHLORIDE, PRESERVATIVE FREE 10 ML: 5 INJECTION INTRAVENOUS at 21:13

## 2022-01-01 RX ADMIN — INSULIN LISPRO 2 UNITS: 100 INJECTION, SOLUTION INTRAVENOUS; SUBCUTANEOUS at 00:24

## 2022-01-01 RX ADMIN — FOLIC ACID 1 MG: 5 INJECTION, SOLUTION INTRAMUSCULAR; INTRAVENOUS; SUBCUTANEOUS at 11:38

## 2022-01-01 RX ADMIN — PANTOPRAZOLE SODIUM 40 MG: 40 INJECTION, POWDER, FOR SOLUTION INTRAVENOUS at 09:11

## 2022-01-01 RX ADMIN — MORPHINE SULFATE 4 MG: 4 INJECTION, SOLUTION INTRAMUSCULAR; INTRAVENOUS at 00:22

## 2022-01-01 RX ADMIN — DEXMEDETOMIDINE HYDROCHLORIDE 0.2 MCG/KG/HR: 4 INJECTION, SOLUTION INTRAVENOUS at 02:40

## 2022-01-01 RX ADMIN — ERYTHROMYCIN: 5 OINTMENT OPHTHALMIC at 06:26

## 2022-01-01 RX ADMIN — SODIUM CHLORIDE, PRESERVATIVE FREE 5 ML: 5 INJECTION INTRAVENOUS at 08:27

## 2022-01-01 RX ADMIN — Medication 1000 UNITS: at 20:26

## 2022-01-01 RX ADMIN — MORPHINE SULFATE 4 MG: 4 INJECTION, SOLUTION INTRAMUSCULAR; INTRAVENOUS at 07:46

## 2022-01-01 RX ADMIN — GLYCOPYRROLATE 0.2 MG: 0.2 INJECTION INTRAMUSCULAR; INTRAVENOUS at 15:09

## 2022-01-01 RX ADMIN — SODIUM CHLORIDE, POTASSIUM CHLORIDE, SODIUM LACTATE AND CALCIUM CHLORIDE: 600; 310; 30; 20 INJECTION, SOLUTION INTRAVENOUS at 11:09

## 2022-01-01 RX ADMIN — ATORVASTATIN CALCIUM 10 MG: 10 TABLET, FILM COATED ORAL at 21:13

## 2022-01-01 RX ADMIN — SODIUM CHLORIDE, PRESERVATIVE FREE 10 ML: 5 INJECTION INTRAVENOUS at 20:27

## 2022-01-01 RX ADMIN — DEXMEDETOMIDINE HYDROCHLORIDE 0.2 MCG/KG/HR: 4 INJECTION, SOLUTION INTRAVENOUS at 18:09

## 2022-01-01 RX ADMIN — Medication 1000 UNITS: at 08:27

## 2022-01-01 RX ADMIN — PANTOPRAZOLE SODIUM 40 MG: 40 INJECTION, POWDER, FOR SOLUTION INTRAVENOUS at 08:25

## 2022-01-01 RX ADMIN — CHLORHEXIDINE GLUCONATE 0.12% ORAL RINSE 15 ML: 1.2 LIQUID ORAL at 08:28

## 2022-01-01 RX ADMIN — SODIUM CHLORIDE, PRESERVATIVE FREE 10 ML: 5 INJECTION INTRAVENOUS at 21:32

## 2022-01-01 RX ADMIN — VERAPAMIL HYDROCHLORIDE 80 MG: 80 TABLET ORAL at 21:30

## 2022-01-01 RX ADMIN — CHLORHEXIDINE GLUCONATE 0.12% ORAL RINSE 15 ML: 1.2 LIQUID ORAL at 20:27

## 2022-01-01 RX ADMIN — DOCUSATE SODIUM 50MG AND SENNOSIDES 8.6MG 2 TABLET: 8.6; 5 TABLET, FILM COATED ORAL at 20:26

## 2022-01-01 RX ADMIN — ERYTHROMYCIN: 5 OINTMENT OPHTHALMIC at 17:10

## 2022-01-01 RX ADMIN — FOLIC ACID 1 MG: 5 INJECTION, SOLUTION INTRAMUSCULAR; INTRAVENOUS; SUBCUTANEOUS at 08:27

## 2022-01-01 RX ADMIN — INSULIN LISPRO 2 UNITS: 100 INJECTION, SOLUTION INTRAVENOUS; SUBCUTANEOUS at 05:37

## 2022-01-01 RX ADMIN — ACYCLOVIR SODIUM 750 MG: 50 INJECTION, SOLUTION INTRAVENOUS at 10:07

## 2022-01-01 ASSESSMENT — PULMONARY FUNCTION TESTS
PIF_VALUE: 16
PIF_VALUE: 18
PIF_VALUE: 16
PIF_VALUE: 16
PIF_VALUE: 0
PIF_VALUE: 19
PIF_VALUE: 13
PIF_VALUE: 18
PIF_VALUE: 18
PIF_VALUE: 17
PIF_VALUE: 0
PIF_VALUE: 19
PIF_VALUE: 16
PIF_VALUE: 14
PIF_VALUE: 16
PIF_VALUE: 0
PIF_VALUE: 19
PIF_VALUE: 20
PIF_VALUE: 16
PIF_VALUE: 20
PIF_VALUE: 14
PIF_VALUE: 21
PIF_VALUE: 0
PIF_VALUE: 0
PIF_VALUE: 11
PIF_VALUE: 15
PIF_VALUE: 18
PIF_VALUE: 16
PIF_VALUE: 16
PIF_VALUE: 15
PIF_VALUE: 17
PIF_VALUE: 16
PIF_VALUE: 0
PIF_VALUE: 18
PIF_VALUE: 16
PIF_VALUE: 16
PIF_VALUE: 0
PIF_VALUE: 18
PIF_VALUE: 16
PIF_VALUE: 0
PIF_VALUE: 16
PIF_VALUE: 19
PIF_VALUE: 18
PIF_VALUE: 18
PIF_VALUE: 0
PIF_VALUE: 20
PIF_VALUE: 0
PIF_VALUE: 15
PIF_VALUE: 0
PIF_VALUE: 18
PIF_VALUE: 15
PIF_VALUE: 0
PIF_VALUE: 15
PIF_VALUE: 16
PIF_VALUE: 0
PIF_VALUE: 11
PIF_VALUE: 15
PIF_VALUE: 15
PIF_VALUE: 18
PIF_VALUE: 0
PIF_VALUE: 17
PIF_VALUE: 0
PIF_VALUE: 18
PIF_VALUE: 20
PIF_VALUE: 0
PIF_VALUE: 16
PIF_VALUE: 35
PIF_VALUE: 16
PIF_VALUE: 0
PIF_VALUE: 14
PIF_VALUE: 21
PIF_VALUE: 2
PIF_VALUE: 1
PIF_VALUE: 24
PIF_VALUE: 15
PIF_VALUE: 15
PIF_VALUE: 0
PIF_VALUE: 17
PIF_VALUE: 16
PIF_VALUE: 0
PIF_VALUE: 19
PIF_VALUE: 0

## 2022-01-01 ASSESSMENT — ENCOUNTER SYMPTOMS
TROUBLE SWALLOWING: 0
RHINORRHEA: 1
TACHYPNEA: 1
PHOTOPHOBIA: 0
WHEEZING: 0
CONSTIPATION: 0
ABDOMINAL PAIN: 0
EYE DISCHARGE: 0
SHORTNESS OF BREATH: 0
SINUS PAIN: 0
BLURRED VISION: 0
COUGH: 1
VISUAL CHANGE: 0
SCALP TENDERNESS: 0
EYE REDNESS: 0
NAUSEA: 0
SORE THROAT: 0
SINUS PRESSURE: 0
VOMITING: 0
DIARRHEA: 0

## 2022-01-01 ASSESSMENT — PAIN SCALES - GENERAL: PAINLEVEL_OUTOF10: 5

## 2022-01-01 ASSESSMENT — PATIENT HEALTH QUESTIONNAIRE - PHQ9
2. FEELING DOWN, DEPRESSED OR HOPELESS: 0
SUM OF ALL RESPONSES TO PHQ QUESTIONS 1-9: 0
1. LITTLE INTEREST OR PLEASURE IN DOING THINGS: 0
SUM OF ALL RESPONSES TO PHQ9 QUESTIONS 1 & 2: 0
SUM OF ALL RESPONSES TO PHQ QUESTIONS 1-9: 0

## 2022-01-03 NOTE — PROGRESS NOTES
1/3/2022     Usha Harrington (:  1950) is a 70 y.o. male, here for evaluation of the following medical concerns:    Headache   This is a new problem. The current episode started in the past 7 days (has had headache since Friday). The problem occurs constantly. The problem has been unchanged. The pain is located in the bilateral region. The pain does not radiate. The pain quality is not similar to prior headaches. The quality of the pain is described as throbbing and sharp. Associated symptoms include coughing (worse with cough), rhinorrhea and weakness. Pertinent negatives include no abdominal pain, blurred vision, dizziness, ear pain, eye redness, fever, muscle aches, nausea, neck pain, numbness, phonophobia, photophobia, scalp tenderness, sinus pressure, sore throat, visual change or vomiting. Associated symptoms comments: Has had some lightheadedness and dizziness on Saturday. None today. Treatments tried: tylenol. The treatment provided mild relief. Did review patient's med list, allergies, social history,pmhx and pshx today as noted in the record. Review of Systems   Constitutional: Positive for fatigue. Negative for chills and fever. HENT: Positive for congestion, postnasal drip and rhinorrhea. Negative for ear pain, sinus pressure, sinus pain, sore throat and trouble swallowing. Eyes: Negative for blurred vision, photophobia, discharge and redness. Respiratory: Positive for cough (worse with cough). Negative for shortness of breath and wheezing. Cardiovascular: Negative for chest pain. Gastrointestinal: Negative for abdominal pain, constipation, diarrhea, nausea and vomiting. Genitourinary: Negative for dysuria, flank pain, frequency and urgency. Musculoskeletal: Negative for arthralgias, myalgias and neck pain. Skin: Negative for rash and wound. Allergic/Immunologic: Negative for environmental allergies. Neurological: Positive for weakness and headaches.  Negative for dizziness, light-headedness and numbness. Hematological: Negative for adenopathy. Psychiatric/Behavioral: Negative. Prior to Visit Medications    Medication Sig Taking? Authorizing Provider   omeprazole (PRILOSEC) 20 MG delayed release capsule Take 1 capsule by mouth daily Yes Asher Patino MD   atorvastatin (LIPITOR) 10 MG tablet take 1 tablet by mouth once daily Yes Asher Patino MD   fenofibrate (TRIGLIDE) 160 MG tablet Take 1 tablet by mouth daily Yes Asher Patino MD   hydroCHLOROthiazide (MICROZIDE) 12.5 MG capsule Take 1 capsule by mouth daily Yes Asher Patino MD   amLODIPine (NORVASC) 5 MG tablet Take 1 tablet by mouth daily Yes Asher Patino MD   benazepril (LOTENSIN) 40 MG tablet take 1 tablet by mouth once daily Yes Asher Patino MD   ferrous sulfate 325 (65 FE) MG tablet Take 325 mg by mouth 3 times daily (with meals)  Yes Historical Provider, MD        Social History     Tobacco Use    Smoking status: Light Tobacco Smoker     Packs/day: 0.01     Years: 10.00     Pack years: 0.10     Types: Pipe     Start date: 1/1/1970    Smokeless tobacco: Never Used    Tobacco comment: Rare pipe smoker. 4 BOWLS A WEEK No inhalation. Has never smoked cigarettes. Substance Use Topics    Alcohol use: Yes     Alcohol/week: 0.0 standard drinks     Comment: WHISKEY OR WINE- 1 OR 2 A MONTH        Vitals:    01/03/22 1133   BP: 132/86   Site: Right Upper Arm   Position: Sitting   Cuff Size: Large Adult   Pulse: 127   Resp: 18   Temp: 98.2 °F (36.8 °C)   TempSrc: Temporal   SpO2: 95%   Weight: 202 lb 6 oz (91.8 kg)   Height: 6' 1\" (1.854 m)     Estimated body mass index is 26.7 kg/m² as calculated from the following:    Height as of this encounter: 6' 1\" (1.854 m). Weight as of this encounter: 202 lb 6 oz (91.8 kg). Physical Exam  Vitals and nursing note reviewed. Constitutional:       General: He is not in acute distress. Appearance: Normal appearance.  He is well-developed. He is not diaphoretic. HENT:      Head: Normocephalic and atraumatic. Right Ear: External ear normal.      Left Ear: External ear normal.      Ears:      Comments: TMs dull with fluid behind the TM     Nose: Congestion and rhinorrhea present. Mouth/Throat:      Pharynx: No posterior oropharyngeal erythema. Comments: Post nasal drainage noted  Eyes:      General: No scleral icterus. Right eye: No discharge. Left eye: No discharge. Conjunctiva/sclera: Conjunctivae normal.      Pupils: Pupils are equal, round, and reactive to light. Neck:      Thyroid: No thyromegaly. Cardiovascular:      Rate and Rhythm: Normal rate and regular rhythm. Heart sounds: Normal heart sounds. Pulmonary:      Effort: Pulmonary effort is normal. No respiratory distress. Breath sounds: Normal breath sounds. No wheezing. Musculoskeletal:      Cervical back: Normal range of motion and neck supple. Lymphadenopathy:      Cervical: Cervical adenopathy present. Skin:     General: Skin is warm and dry. Findings: No rash. Neurological:      General: No focal deficit present. Mental Status: He is alert and oriented to person, place, and time. Cranial Nerves: Cranial nerves are intact. Sensory: Sensation is intact. Motor: Motor function is intact. Deep Tendon Reflexes:      Reflex Scores:       Patellar reflexes are 2+ on the right side and 2+ on the left side. Psychiatric:         Behavior: Behavior normal.         Thought Content: Thought content normal.         Judgment: Judgment normal.         ASSESSMENT/PLAN:  Encounter Diagnoses   Name Primary?     Acute nonintractable headache, unspecified headache type Yes    Lightheadedness     Dizziness      Orders Placed This Encounter   Medications    ketorolac (TORADOL) injection 15 mg    DISCONTD: ketorolac (TORADOL) 30 MG/ML injection     Sig: Inject 0.5 mLs into the muscle once for 1 dose Dispense:  0.5 mL     Refill:  0    ketorolac (TORADOL) injection 15 mg     Orders Placed This Encounter   Procedures    CT HEAD WO CONTRAST     Standing Status:   Future     Number of Occurrences:   1     Standing Expiration Date:   1/3/2023     Order Specific Question:   Reason for exam:     Answer:   acute headache. Atypical for patient. Lightheadedness and dizziness    COVID-19     Standing Status:   Future     Number of Occurrences:   1     Standing Expiration Date:   1/3/2023     Scheduling Instructions:      1) Due to current limited availability of the COVID-19 test, tests will be prioritized based on responses to questions above. Testing may be delayed due to volume. 2) Print and instruct patient to adhere to CDC home isolation program. (Link Above)              3) Set up or refer patient for a monitoring program.              4) Have patient sign up for and leverage [employer] (if not previously done). Order Specific Question:   Is this test for diagnosis or screening? Answer:   Diagnosis of ill patient     Order Specific Question:   Symptomatic for COVID-19 as defined by CDC? Answer:   Yes     Order Specific Question:   Date of Symptom Onset     Answer:   12/31/2021     Order Specific Question:   Hospitalized for COVID-19? Answer:   No     Order Specific Question:   Admitted to ICU for COVID-19? Answer:   No     Order Specific Question:   Employed in healthcare setting? Answer:   No     Order Specific Question:   Resident in a congregate (group) care setting? Answer:   No     Order Specific Question:   Pregnant: Answer:   No     Order Specific Question:   Previously tested for COVID-19?      Answer:   Yes     CT HEAD WO CONTRAST  Narrative: EXAMINATION:  CT OF THE HEAD WITHOUT CONTRAST  1/3/2022 12:30 pm    TECHNIQUE:  CT of the head was performed without the administration of intravenous  contrast. Dose modulation, iterative reconstruction, and/or weight based  adjustment of the mA/kV was utilized to reduce the radiation dose to as low  as reasonably achievable. COMPARISON:  None. HISTORY:  ORDERING SYSTEM PROVIDED HISTORY: Acute nonintractable headache, unspecified  headache type  TECHNOLOGIST PROVIDED HISTORY:  acute headache. Atypical for patient. Lightheadedness and dizziness  Reason for Exam: Throbbing headache, lightheaded and dizzy    FINDINGS:  BRAIN/VENTRICLES: There is no acute intracranial hemorrhage, mass effect or  midline shift. No abnormal extra-axial fluid collection. The gray-white  differentiation is maintained without evidence of an acute infarct. There is  no evidence of hydrocephalus. There is extensive calcification of the falx  and tentorium. Findings are nonspecific but could be related to remote  infection or other insult. ORBITS: The visualized portion of the orbits demonstrate no acute abnormality. SINUSES: The visualized paranasal sinuses and mastoid air cells demonstrate  no acute abnormality. SOFT TISSUES/SKULL:  No acute abnormality of the visualized skull or soft  tissues. Impression: No acute intracranial abnormality. RECOMMENDATIONS:  Unavailable    CT brain did not show acute pathology. Some symptoms suggestive of viral infection. Will rule out covid. Patient to remain in quarantine until results available. Did give IM toradol today which did help with headache during his visit. If recurrence, then can use tylenol as needed. Increase fluids and rest    Return  if no improvement in symptoms or if any further symptoms arise. No follow-ups on file. An electronic signature was used to authenticate this note.     --Angélica Martines,  on 1/3/2022 at 11:44 AM

## 2022-01-09 NOTE — ED PROVIDER NOTES
[facility] ED  [address]  [place] [address]  Phone: [phone]        Pt Name: Dante Vyas  MRN: [id number]  [place]gfkitty 1950  Date of evaluation: 1/9/22      CHIEF COMPLAINT     Chief Complaint   Patient presents with    Other     was found lying face down on floor at home after neighbor called, unknown how long was down         HISTORY OF PRESENT ILLNESS  (Location/Symptom, Timing/Onset, Context/Setting, Quality, Duration, Modifying Factors, Severity.)    Dante Vyas is a 70 y.o. male who presents with altered mental status. The patient was found lying facedown on the floor at his house by a neighbor unknown how long he had been down EMS was called they transported him into our facility for evaluation family members to arrive and they state for the past month the patient has been more fatigued than normal that his blood pressure has been fluctuating that 4 days ago he did have a Covid test that was negative family members report this is the first time they have actually seen him in the last several days as they have been out of the area I am not able to get any information from the patient secondary to the altered mental status      REVIEW OF SYSTEMS    (2-9 systems for level 4, 10 or more for level 5)     Review of Systems   Unable to perform ROS: Mental status change       PAST MEDICAL HISTORY    has a past medical history of Anemia, BPH with elevated PSA, Colon polyps, Elevated Gina-Barr virus antibody titer, Elevated fasting glucose, History of blood transfusion, History of chronic fatigue syndrome, History of GI bleed, Hyperlipidemia, Hypertension, and Wears glasses. SURGICAL HISTORY      has a past surgical history that includes Foot surgery (Bilateral, 1958); Prostate biopsy (Bilateral, 12/04/2012); Prostate biopsy (Bilateral, 07/2014); Colonoscopy (2000); Colonoscopy (02/02/2015); Colonoscopy (12/09/2015); Colonoscopy (11/2016);  Upper gastrointestinal endoscopy (12/8/15); Upper gastrointestinal endoscopy (2016); Prostatectomy (12/10/2019); Prostatectomy (N/A, 12/10/2019); Colonoscopy (N/A, 2020); and pre-malignant / benign skin lesion excision (Right, 10/15/2021). CURRENTMEDICATIONS       Previous Medications    AMLODIPINE (NORVASC) 5 MG TABLET    Take 1 tablet by mouth daily    ATORVASTATIN (LIPITOR) 10 MG TABLET    take 1 tablet by mouth once daily    BENAZEPRIL (LOTENSIN) 40 MG TABLET    take 1 tablet by mouth once daily    FENOFIBRATE (TRIGLIDE) 160 MG TABLET    Take 1 tablet by mouth daily    FERROUS SULFATE 325 (65 FE) MG TABLET    Take 325 mg by mouth 3 times daily (with meals)     HYDROCHLOROTHIAZIDE (MICROZIDE) 12.5 MG CAPSULE    Take 1 capsule by mouth daily    OMEPRAZOLE (PRILOSEC) 20 MG DELAYED RELEASE CAPSULE    Take 1 capsule by mouth daily       ALLERGIES     has No Known Allergies. FAMILY HISTORY     He indicated that his mother is . He indicated that his father is alive. He indicated that all of his three brothers are alive. He indicated that his maternal grandmother is . He indicated that his maternal grandfather is . He indicated that his paternal grandmother is . He indicated that his paternal grandfather is . family history includes Cancer in his mother; Diabetes in his father; Heart Disease in his father; High Blood Pressure in his father and mother. SOCIAL HISTORY      reports that he has been smoking pipe. He started smoking about 52 years ago. He has a 0.10 pack-year smoking history. He has never used smokeless tobacco. He reports current alcohol use. He reports that he does not use drugs. PHYSICAL EXAM    (up to 7 for level 4, 8 or more for level 5)   INITIAL VITALS:  height is 5' 10\" (1.778 m) and weight is 200 lb (90.7 kg). His tympanic temperature is 96.4 °F (35.8 °C). His blood pressure is 182/107 (abnormal) and his pulse is 100.  His respiration is 16 and oxygen saturation is 96%. Physical Exam  Vitals and nursing note reviewed. Constitutional:       Comments: The patient will follow some simple commands with moving arms and legs to verbal command   HENT:      Head:      Comments: Patient is noted to have what appears to be a pressure wound to the right side of the face the pupils are equal round reactive to light no overt hemotympanum appreciated     Right Ear: Tympanic membrane normal.      Left Ear: Tympanic membrane normal.   Eyes:      Pupils: Pupils are equal, round, and reactive to light. Cardiovascular:      Rate and Rhythm: Regular rhythm. Tachycardia present. Pulmonary:      Effort: Pulmonary effort is normal. No respiratory distress. Breath sounds: Normal breath sounds. No stridor. No wheezing, rhonchi or rales. Comments: No focal rales or rhonchi or wheezing  Abdominal:      General: Bowel sounds are normal. There is no distension. Palpations: Abdomen is soft. There is no mass. Tenderness: There is no abdominal tenderness. There is no guarding or rebound. Musculoskeletal:      Cervical back: No tenderness. Comments: The patient will follow simple commands he will  both hands appropriately he is moving his feet appropriately   Lymphadenopathy:      Cervical: No cervical adenopathy. Skin:     Comments:  There is a slight contusion of the right side of the face otherwise without further rashes or lesions   Neurological:      Comments: Patient will follow some simple commands moves his arms and legs equally he does attempt to communicate         DIFFERENTIAL DIAGNOSIS/ MDM:     [address] TO ARRIVAL []No []Yes    Variable  Score   Variable  Score  Eye opening [x]Spontaneous 4 Verbal  []Oriented  5     []To voice  3   []Confused  4    []To pain  2   []Inapp words  3    []None  1   [x]Incomp words 2       []None  1   Motor   [x]Obeys  6    []Localizes pain 5    []Withdraws(pain) 4    []Flexion(pain) 3  []Extension(pain) 2    []None  1     GCS Total = 12      I will get a CT head CT C-spine chest x-ray EKG labs UA we will place the patient on a bear hugger is he is noted to be slightly hypothermic    DIAGNOSTIC RESULTS     EKG: All EKG's are interpreted by the Emergency Department Physician who either signs or Co-signs this chart in the absence of a cardiologist.      Interpreted by Chinmay Reynoso MD     Rhythm: Sinus tachycardia  Rate: 104  Axis: 54  Ectopy: none  Conduction: normal  ST Segments: no acute change  T Waves: no acute change  Q Waves: none    Clinical Impression: Sinus tachycardia with no acute changes/normal EKG. No acute infarction/ischemia noted. RADIOLOGY:        Interpretation per the Radiologist below, if available at the time of this note:    [address] (Final result)  Result time 01/09/22 19:25:19  Final result by Royal Corey MD (01/09/22 19:25:19)                Impression:    Debris within the bilateral lateral ventricles more pronounced on the right   of uncertain clinical significance or etiology.  MRI of the brain with   contrast is recommended for further evaluation.  Differential diagnosis would   include subacute isodense hemorrhage versus possible infectious process. Neoplastic process is not considered due to the normal head CT 1 week ago. Bilateral mastoid effusion     Limited evaluation of the cervical spine due to motion.  Multilevel   degenerative change in the cervical spine. Findings were discussed with Dr. Chinmay Reynoso on 01/09/2022 at 7 20   p.m. Wilber Bateman      RECOMMENDATIONS:   Unavailable             Narrative:    EXAMINATION:   CT OF THE HEAD WITHOUT CONTRAST; CT OF THE CERVICAL SPINE WITHOUT CONTRAST   1/9/2022 7:02 pm     TECHNIQUE:   CT of the head was performed without the administration of intravenous   contrast. Dose modulation, iterative reconstruction, and/or weight based adjustment of the mA/kV was utilized to reduce the radiation dose to as low   as reasonably achievable.; CT of the cervical spine was performed without the   administration of intravenous contrast. Multiplanar reformatted images are   provided for review. Dose modulation, iterative reconstruction, and/or weight   based adjustment of the mA/kV was utilized to reduce the radiation dose to as   low as reasonably achievable. COMPARISON:   01/03/2022     HISTORY:   ORDERING SYSTEM PROVIDED HISTORY: altered mental status   TECHNOLOGIST PROVIDED HISTORY:     altered mental status   Decision Support Exception - unselect if not a suspected or confirmed   emergency medical condition->Emergency Medical Condition (MA)   Reason for Exam: Altered mental status, found on the floor of his home today,   unknown time of fall     FINDINGS:   BRAIN/VENTRICLES: There is no evidence for acute intracranial hemorrhage. There is debris within the bilateral lateral ventricles (right greater than   left).  There is no evidence for acute intracranial hemorrhage or mass   lesion.  No obvious acute infarct is identified. ORBITS: The visualized portion of the orbits demonstrate no acute abnormality. SINUSES: There is right mastoid effusion. SOFT TISSUES/SKULL:  No acute abnormality of the visualized skull or soft   tissues.      CT cervical spine: Bilateral mastoid effusion exam is limited by motion   artifact.  There is multilevel degenerative change in the cervical spine with   decreased disc space height and osteophytosis at multiple levels.  The exam   is limited by motion artifact.  There is loss of the normal cervical lordosis   with kyphosis.  No obvious displaced fracture fragments are identified.                         CT CERVICAL SPINE WO CONTRAST (Final result)  Result time 01/09/22 19:25:19  Final result by Ilya Barrios MD (01/09/22 19:25:19)                Impression:    Debris within the bilateral lateral ventricles more pronounced on the right   of uncertain clinical significance or etiology.  MRI of the brain with   contrast is recommended for further evaluation.  Differential diagnosis would   include subacute isodense hemorrhage versus possible infectious process. Neoplastic process is not considered due to the normal head CT 1 week ago. Bilateral mastoid effusion     Limited evaluation of the cervical spine due to motion.  Multilevel   degenerative change in the cervical spine. Findings were discussed with Dr. Vivi Wong on 01/09/2022 at 7 20   p.m. Cynthia Syed RECOMMENDATIONS:   Unavailable             Narrative:    EXAMINATION:   CT OF THE HEAD WITHOUT CONTRAST; CT OF THE CERVICAL SPINE WITHOUT CONTRAST   1/9/2022 7:02 pm     TECHNIQUE:   CT of the head was performed without the administration of intravenous   contrast. Dose modulation, iterative reconstruction, and/or weight based   adjustment of the mA/kV was utilized to reduce the radiation dose to as low   as reasonably achievable.; CT of the cervical spine was performed without the   administration of intravenous contrast. Multiplanar reformatted images are   provided for review. Dose modulation, iterative reconstruction, and/or weight   based adjustment of the mA/kV was utilized to reduce the radiation dose to as   low as reasonably achievable. COMPARISON:   01/03/2022     HISTORY:   ORDERING SYSTEM PROVIDED HISTORY: altered mental status   TECHNOLOGIST PROVIDED HISTORY:     altered mental status   Decision Support Exception - unselect if not a suspected or confirmed   emergency medical condition->Emergency Medical Condition (MA)   Reason for Exam: Altered mental status, found on the floor of his home today,   unknown time of fall     FINDINGS:   BRAIN/VENTRICLES: There is no evidence for acute intracranial hemorrhage.    There is debris within the bilateral lateral ventricles (right greater than   left).  There is no evidence for acute intracranial hemorrhage or mass   lesion.  No obvious acute infarct is identified. ORBITS: The visualized portion of the orbits demonstrate no acute abnormality. SINUSES: There is right mastoid effusion. SOFT TISSUES/SKULL:  No acute abnormality of the visualized skull or soft   tissues. CT cervical spine: Bilateral mastoid effusion exam is limited by motion   artifact.  There is multilevel degenerative change in the cervical spine with   decreased disc space height and osteophytosis at multiple levels.  The exam   is limited by motion artifact.  There is loss of the normal cervical lordosis   with kyphosis.  No obvious displaced fracture fragments are identified.                    LABS:  Results for orders placed or performed during the hospital encounter of 13/84/27   Basic Metabolic Panel   Result Value Ref Range    Glucose 210 (H) 70 - 99 mg/dL    BUN 56 (H) 8 - 23 mg/dL    CREATININE 1.35 (H) 0.70 - 1.20 mg/dL    Bun/Cre Ratio 41 (H) 9 - 20    Calcium 10.0 8.6 - 10.4 mg/dL    Sodium 134 (L) 135 - 144 mmol/L    Potassium 3.7 3.7 - 5.3 mmol/L    Chloride 97 (L) 98 - 107 mmol/L    CO2 22 20 - 31 mmol/L    Anion Gap 15 9 - 17 mmol/L    GFR Non-African American 52 (L) >60 mL/min    GFR African American >60 >60 mL/min    GFR Comment          GFR Staging NOT REPORTED    CBC Auto Differential   Result Value Ref Range    WBC 27.7 (H) 3.5 - 11.3 k/uL    RBC 5.72 4.21 - 5.77 m/uL    Hemoglobin 13.7 13.0 - 17.0 g/dL    Hematocrit 43.1 40.7 - 50.3 %    MCV 75.3 (L) 82.6 - 102.9 fL    MCH 24.0 (L) 25.2 - 33.5 pg    MCHC 31.8 25.2 - 33.5 g/dL    RDW 16.4 (H) 11.8 - 14.4 %    Platelets 211 (H) 132 - 453 k/uL    MPV 10.3 8.1 - 13.5 fL    NRBC Automated 0.0 0.0 per 100 WBC    Differential Type NOT REPORTED     WBC Morphology NOT REPORTED     RBC Morphology NOT REPORTED     Platelet Estimate NOT REPORTED     Monocytes 5 (L) 6 - 14 %    Lymphocytes 2 (L) 15 - 43 %    Seg Neutrophils 93 (H) 44 - 74 %    Eosinophils % 0 (L) 1 - 8 %    Basophils 0 0 - 2 %    Immature Granulocytes 0 0 %    Absolute Mono # 1.39 (H) 0.1 - 1.2 k/uL    Absolute Lymph # 0.55 (L) 1.0 - 4.8 k/uL    Segs Absolute 25.76 (H) 1.50 - 8.10 k/uL    Absolute Eos # 0.00 0.0 - 0.4 k/uL    Basophils Absolute 0.00 0.0 - 0.2 k/uL    Absolute Immature Granulocyte 0.00 0.00 - 0.30 k/uL    Morphology Platelet count adequate    Hepatic Function Panel   Result Value Ref Range    Albumin 3.6 3.5 - 5.2 g/dL    Alkaline Phosphatase 85 40 - 129 U/L    ALT 32 5 - 41 U/L    AST 48 (H) <40 U/L    Total Bilirubin 0.93 0.3 - 1.2 mg/dL    Bilirubin, Direct 0.31 (H) <0.31 mg/dL    Bilirubin, Indirect 0.62 0.00 - 1.00 mg/dL    Total Protein 8.1 6.4 - 8.3 g/dL    Globulin 4.5 (H) 1.5 - 3.8 g/dL    Albumin/Globulin Ratio 0.8 (L) 1.0 - 2.5   Lactate, Sepsis   Result Value Ref Range    Lactic Acid, Sepsis 2.2 (H) 0.5 - 1.9 mmol/L    Lactic Acid, Sepsis, Whole Blood NOT REPORTED 0.5 - 1.9 mmol/L   CK   Result Value Ref Range    Total CK 2,165 (H) 39 - 308 U/L   CK MB   Result Value Ref Range    CK-MB 28.4 (H) <10.5 ng/mL   Troponin   Result Value Ref Range    Troponin, High Sensitivity PENDING 0 - 22 ng/L    Troponin T NOT REPORTED <0.03 ng/mL    Troponin Interp NOT REPORTED    Ammonia   Result Value Ref Range    Ammonia 11 (L) 16 - 60 umol/L   D-Dimer, Quantitative   Result Value Ref Range    D-Dimer, Quant 3.48 (H) 0.00 - 0.59 mg/L FEU           EMERGENCY DEPARTMENT COURSE:   Vitals:    Vitals:    01/09/22 1817 01/09/22 1837 01/09/22 1916   BP: (!) 174/116 (!) 165/104 (!) 182/107   Pulse: 117 109 100   Resp: 20 16 16   Temp: 95.3 °F (35.2 °C)  96.4 °F (35.8 °C)   TempSrc: Tympanic  Tympanic   SpO2: 97% 98% 96%   Weight: 200 lb (90.7 kg)     Height: 5' 10\" (1.778 m)       -------------------------  BP: (!) 182/107, Temp: 96.4 °F (35.8 °C), Pulse: 100, Resp: 16      RE-EVALUATION:  The patient on CT scan showed debris within the bilateral lateral ventricles more pronounced on the right and an uncertain etiology this was discussed with radiology differential including a subacute hemorrhage versus a possible infectious process given the elevated white blood cell count I did empirically treat the patient with Rocephin and vancomycin the care of the patient will be assigned to Dr. Ruben Sumner per shift change    CONSULTS:  Dr. Ruben Sumner will take over care of the patient per shift change    PROCEDURES:  None    FINAL IMPRESSION    No diagnosis found. DISPOSITION/PLAN   DISPOSITION            PATIENT REFERRED TO:  No follow-up provider specified. DISCHARGE MEDICATIONS:  New Prescriptions    No medications on file       (Please note that portions of this note were completed with a voicerecognition program.  Efforts were made to edit the dictations but occasionally words are mis-transcribed.)    Cathleen Smyth MD,, MD, F.A.C.E.P.   Attending Emergency Medicine Physician       Cathleen Smyth MD  01/09/22 [id number]

## 2022-01-09 NOTE — LETTER
To Whom it may Concern,    Abdoul Knox is currently in critical condition in the Neuro ICU at [facility]. Due to current condition, he is unable to make financial decisions at this time.     ELROY Flower - DOMENIC  1/17/2022

## 2022-01-10 PROBLEM — M62.82 RHABDOMYOLYSIS: Status: ACTIVE | Noted: 2022-01-01

## 2022-01-10 PROBLEM — A41.9 SEPTICEMIA (HCC): Status: ACTIVE | Noted: 2022-01-01

## 2022-01-10 PROBLEM — T79.6XXA TRAUMATIC RHABDOMYOLYSIS (HCC): Status: RESOLVED | Noted: 2022-01-01 | Resolved: 2022-01-01

## 2022-01-10 PROBLEM — R41.82 ALTERED MENTAL STATE: Status: ACTIVE | Noted: 2022-01-01

## 2022-01-10 PROBLEM — J96.00 ACUTE RESPIRATORY FAILURE (HCC): Status: ACTIVE | Noted: 2022-01-01

## 2022-01-10 PROBLEM — N17.9 AKI (ACUTE KIDNEY INJURY) (HCC): Status: ACTIVE | Noted: 2022-01-01

## 2022-01-10 PROBLEM — J18.9 PNEUMONIA OF RIGHT MIDDLE LOBE DUE TO INFECTIOUS ORGANISM: Status: ACTIVE | Noted: 2022-01-01

## 2022-01-10 PROBLEM — T79.6XXA TRAUMATIC RHABDOMYOLYSIS (HCC): Status: ACTIVE | Noted: 2022-01-01

## 2022-01-10 PROBLEM — S05.02XA ABRASION OF LEFT CORNEA: Status: ACTIVE | Noted: 2022-01-01

## 2022-01-10 PROBLEM — R41.82 ALTERED MENTAL STATUS: Status: ACTIVE | Noted: 2022-01-01

## 2022-01-10 PROBLEM — S22.42XA CLOSED FRACTURE OF MULTIPLE RIBS OF LEFT SIDE: Status: ACTIVE | Noted: 2022-01-01

## 2022-01-10 NOTE — ED NOTES
ED attending Artur informed patients rectal temp 101.6 post tylenol      Mary Mota RN  01/10/22 1412

## 2022-01-10 NOTE — H&P
Neuro ICU History & Physical    Patient Name: Haley Lundy  Patient : 1950  Room/Bed:   Code Status: Prior  Allergies: No Known Allergies    CHIEF COMPLAINT     Altered mental status    HPI    History Obtained From: Chart review    The patient is a 70 y.o. male w/hx of HTN presenting w/AMS. He reportedly saw his PCP one week ago for headaches, CTH at that time was without acute abnormality. His POA tried calling him and felt that he sounded confused. Patient is normal A&O x4 at baseline. EMS was called to the home and patient was found down, unknown amount of time but suspected prolonged down time as long as 48 hours. ED w/u demonstrated debris within bilateral ventricles secondary to subacute hemorrhage vs infectious, negative CT C-spine, and possible cavitary lung lesion in the right middle lobe. Labs were remarkable for elevated CK consistent with rhabdomyolysis, mild JOZEF, leukocytosis to 27.7, and mild elevation of lactate. Patient was transferred for neurosurgical evaluation for debris in the ventricles. After arrival patient GCS declined and he became tachypneic in the ED, decision was made to intubate. Trauma was consulted due to possible traumatic etiology of ventricular debris and imaging demonstrated two left sided rib fractures but otherwise negative for traumatic injury.        Admitted to ICU From: ED   Reason for ICU Admission: AMS, presumed infectious ventriculitis       PATIENT HISTORY   Past Medical History:        Diagnosis Date    Anemia 2016    ON IRON    BPH with elevated PSA     post biopsy    Colon polyps 2016    BENEIGN REMOVED WITH COLONOSCOPY    Elevated Gina-Barr virus antibody titer     NEVER TREATED FOR    Elevated fasting glucose     History of blood transfusion 2016    R/T INTESTINAL BLEEDING    History of chronic fatigue syndrome     RESOLVED     History of GI bleed 2016    BROUGHT ON BY MEDS---NIACIN, FISH OIL AND VIT C    Hyperlipidemia  (triglycerides-ELEVATED, TRYING TO CONTROL WITH DIET    Hypertension 1999    ON RX    Wears glasses        Past Surgical History:        Procedure Laterality Date    COLONOSCOPY  2000    internal hemorrhoids    COLONOSCOPY  02/02/2015    polypx1, repeat 5yrs due to family hx & hx polyps- brannon    COLONOSCOPY  12/09/2015    polyp X2  int. Hemorrhoids  partial prolapse of ileocecal valve    COLONOSCOPY  11/2016    COLONOSCOPY N/A 11/12/2020    COLONOSCOPY POLYPECTOMY SNARE/HOT BIOPSY performed by Mobile Theoryjennifer Danielle DO at [address]    to correct flat arches (age 9)    PRE-MALIGNANT / BENIGN SKIN LESION EXCISION Right 10/15/2021    v-EXCISION Lesion Right cheek, Right nose, scalp, nasal tip performed by Lynsey Martin MD at [place] [address] Bilateral 12/04/2012    benign    PROSTATE BIOPSY Bilateral 07/2014    benign    PROSTATECTOMY  12/10/2019    LAPAROSCOPIC ROBOTIC SIMPLE PROSTATECTOMY     PROSTATECTOMY N/A 12/10/2019    XI LAPAROSCOPIC ROBOTIC SIMPLE PROSTATECTOMY performed by Dave Desai MD at [facility]  12/8/15    Normal upper GI tract, no evidence of blood in upper GI tract, mild distal esophagitis    UPPER GASTROINTESTINAL ENDOSCOPY  11/2016       Social History:   Social History     Socioeconomic History    Marital status: Single     Spouse name: Not on file    Number of children: Not on file    Years of education: Not on file    Highest education level: Not on file   Occupational History    Not on file   Tobacco Use    Smoking status: Light Tobacco Smoker     Packs/day: 0.01     Years: 10.00     Pack years: 0.10     Types: Pipe     Start date: 1/1/1970    Smokeless tobacco: Never Used    Tobacco comment: Rare pipe smoker. 4 BOWLS A WEEK No inhalation. Has never smoked cigarettes. Vaping Use    Vaping Use: Never used   Substance and Sexual Activity    Alcohol use:  Yes     Alcohol/week: 0.0 standard drinks     Comment: WHISKEY OR WINE- 1 OR 2 A MONTH    Drug use: No    Sexual activity: Not on file   Other Topics Concern    Not on file   Social History Narrative    Not on file     Social Determinants of Health     Financial Resource Strain: Low Risk     Difficulty of Paying Living Expenses: Not hard at all   Food Insecurity: No Food Insecurity    Worried About Running Out of Food in the Last Year: Never true    Eric of Food in the Last Year: Never true   Transportation Needs:     Lack of Transportation (Medical): Not on file    Lack of Transportation (Non-Medical): Not on file   Physical Activity:     Days of Exercise per Week: Not on file    Minutes of Exercise per Session: Not on file   Stress:     Feeling of Stress : Not on file   Social Connections:     Frequency of Communication with Friends and Family: Not on file    Frequency of Social Gatherings with Friends and Family: Not on file    Attends Voodoo Services: Not on file    Active Member of [address] [place] [address] or Organizations: Not on file    Attends Club or Organization Meetings: Not on file    Marital Status: Not on file   Intimate Partner Violence:     Fear of Current or Ex-Partner: Not on file    Emotionally Abused: Not on file    Physically Abused: Not on file    Sexually Abused: Not on file   Housing Stability:     Unable to Pay for Housing in the Last Year: Not on file    Number of [place] in the Last Year: Not on file    Unstable Housing in the Last Year: Not on file       Family History:       Problem Relation Age of Onset    Cancer Mother         LUNG    High Blood Pressure Mother     Diabetes Father         IDDM-LATE IN LIFE    Heart Disease Father         CHF    High Blood Pressure Father        Allergies:    Patient has no known allergies. Medications Prior to Admission:    Not in a hospital admission.     Current Medications:  Current Facility-Administered Medications: propofol injection, 5-50 mcg/kg/min, IntraVENous, Titrated  levETIRAcetam in NaCl (KEPPRA) 1000 MG/100ML solution, , ,   erythromycin (ROMYCIN) ophthalmic ointment, , Left Eye, BID  dexamethasone (DECADRON) injection 10 mg, 10 mg, IntraVENous, Q6H  cefTRIAXone (ROCEPHIN) 2000 mg IVPB in D5W 50ml minibag, 2,000 mg, IntraVENous, Q12H  vancomycin (VANCOCIN) 1500 mg in dextrose 5 % 250 mL IVPB, 1,500 mg, IntraVENous, Q24H  vancomycin (VANCOCIN) intermittent dosing (placeholder), , Other, RX Placeholder  ampicillin 2000 mg ivpb mini bag, 2,000 mg, IntraVENous, 6 times per day  acyclovir (ZOVIRAX) 1,100 mg in dextrose 5 % 250 mL IVPB, 15 mg/kg (Ideal), IntraVENous, Q8H  lactated ringers infusion, , IntraVENous, Continuous  cyanocobalamin injection 1,000 mcg, 1,000 mcg, IntraMUSCular, Q30 Days  cyanocobalamin injection 1,000 mcg, 1 mg, IntraMUSCular, Q30 Days    REVIEW OF SYSTEMS   Cannot be obtained due to mental status. PHYSICAL EXAM:     BP (!) 144/98   Pulse 126   Temp 101.6 °F (38.7 °C) (Rectal)   Resp 16   SpO2 100%     PHYSICAL EXAM:  CONSTITUTIONAL:  Acutely ill appearing man, opens eyes spontaneously, not following commands. Moving all extremities spontaneously but not purposefully. Occasional vocalization but no words   HEAD:  normocephalic, atraumatic    EYES:  PERRLA, EOMI.    ENT:  moist mucous membranes   NECK:  supple, symmetric   LUNGS:  Equal air entry bilaterally, tachypneic   CARDIOVASCULAR:  normal s1 / s2, RRR, distal pulses intact   ABDOMEN:  Soft, no rigidity   NEUROLOGIC:  Mental Status:  Acutely altered, awake             Cranial Nerves:    Does not participate in examination. PERRL. Mild right sided facial swelling. Motor Exam:    Drift:  Not following commands, not tested  Tone:  normal    MOTOR:  Moves all extremities spontaneously but does not consistently withdraw from pain. No localization to painful stimulus.   Sensory:    Touch:    Cannot be tested      Plantar Response:  Right:  downgoing  Left: downgoing    Coordination/Dysmetria:  Heel to Shin:  Cannot be tested  Finger to Nose:   Cannot be tested         LABS AND IMAGING:     RECENT LABS:  CBC with Differential:    Lab Results   Component Value Date    WBC 27.7 01/09/2022    RBC 5.72 01/09/2022    HGB 13.7 01/09/2022    HCT 43.1 01/09/2022     01/09/2022    MCV 75.3 01/09/2022    MCH 24.0 01/09/2022    MCHC 31.8 01/09/2022    RDW 16.4 01/09/2022    LYMPHOPCT 2 01/09/2022    MONOPCT 5 01/09/2022    BASOPCT 0 01/09/2022    MONOSABS 1.39 01/09/2022    LYMPHSABS 0.55 01/09/2022    EOSABS 0.00 01/09/2022    BASOSABS 0.00 01/09/2022    DIFFTYPE NOT REPORTED 01/09/2022     BMP:    Lab Results   Component Value Date     01/10/2022    K 3.5 01/10/2022     01/10/2022    CO2 18 01/10/2022    BUN 47 01/10/2022    LABALBU 3.6 01/09/2022    CREATININE 1.41 01/10/2022    CREATININE 1.22 01/10/2022    CALCIUM 8.5 01/10/2022    GFRAA >60 01/10/2022    LABGLOM 49 01/10/2022    GLUCOSE 172 01/10/2022       RADIOLOGY:  CT LUMBAR SPINE TRAUMA RECONSTRUCTION   Final Result   Unremarkable non-contrast CT of the lumbar spine. CT ABDOMEN PELVIS WO CONTRAST Additional Contrast? None   Final Result   Examination is partially degraded by motion related artifact. Focal area of soft tissue thickening which appears to involve a loop of small   bowel in the central right mid abdomen. This may be partially exaggerated by   the patient motion. Localized infectious or inflammatory versus neoplastic   process however is not excluded and short-term follow-up is recommended. Nondisplaced left 6th and 7th rib fractures. Masslike process exophytic from the lateral margin of the right-side of the   prostate gland. Correlate with PSA values. Cholelithiasis. RECOMMENDATIONS:   Unavailable         CT HEAD WO CONTRAST   Final Result   Persistent layering debris within the lateral ventricles.   There is mild   dilatation of the ventricular system. Differential includes isodense   subacute hemorrhage versus infection. Follow-up MRI brain with and without   contrast is recommended for further evaluation. No acute traumatic injury of the facial bones. RECOMMENDATIONS:   Unavailable         CT FACIAL BONES WO CONTRAST   Final Result   Persistent layering debris within the lateral ventricles. There is mild   dilatation of the ventricular system. Differential includes isodense   subacute hemorrhage versus infection. Follow-up MRI brain with and without   contrast is recommended for further evaluation. No acute traumatic injury of the facial bones. RECOMMENDATIONS:   Unavailable         XR CHEST PORTABLE   Final Result   1. Support devices as above. 2. No significant change in the focal opacity within the right lower lung. XR ELBOW RIGHT (MIN 3 VIEWS)   Final Result   No acute osseous abnormality or dislocation involving the right elbow or   right forearm. XR RADIUS ULNA RIGHT (2 VIEWS)   Final Result   No acute osseous abnormality or dislocation involving the right elbow or   right forearm. XR KNEE RIGHT (3 VIEWS)   Final Result   1. No convincing acute osseous abnormality. 2. Degenerative changes of the right knee. 3. Trace joint effusion. 4. There appears to be soft tissue swelling anterior to the patella. XR CHEST PORTABLE   Final Result      Right lung base opacity again identified, nonspecific. Please correlate exam   findings and exam findings and history. CT THORACIC SPINE TRAUMA RECONSTRUCTION    (Results Pending)             Labs and Images reviewed with:    [] Tin Deleon MD    [x] Shahla Silva MD  [] Nito Vazquez MD  --[] there are no new interval images to review. ASSESSMENT AND PLAN:         ASSESSMENT:     This is a 70 y.o. male with AMS and CTH demonstrating ventricular debris concerning for infectious etiology, and rhabdomyolysis.  NSG consulted and potential EVD placement. Patient care will be discussed with attending, will reevaluate patient along with attending. PLAN/MEDICAL DECISION MAKING:    NEUROLOGIC:  - Imaging    - f/u CTA head and neck  - AEDs loaded w/keppra  - normal BP goals  - abx as ordered  - decadron ordered by neurology team  - f/u ID recommendations    CARDIOVASCULAR:  BP Range: Systolic (32YHN), YQM:641 , Min:133 , RCM:036     Diastolic (81NRZ), HYF:524, Min:98, Max:116    Pulse Range: Pulse  Av.6  Min: 97  Max: 136    PULMONARY:  Respiration Range: Resp  Av.2  Min: 16  Max: 42  Current Pulse Ox: SpO2: 100 %  24HR Pulse Ox Range: SpO2  Av.8 %  Min: 96 %  Max: 100 %  - Vent Settings: PRVC 16/500/8/40%  - AB.475/28.5/30.6/21.0  - CXR right middle lobe cavitary lung lesion.  - f/u pulmonology consult    RENAL/FLUID/ELECTROLYTE:  - BUN 47/ Creatinine 1.22  - I/O: In: -   Out: 475 [Urine:475]  - IVF: 100cc/hr NS    GI/NUTRITION:  NUTRITION:  No diet orders on file  - Bowel regimen:  MoM, Zofran,   - GI prophylaxis: home protonix    ID:  Tmax: Temp (24hrs), Av.4 °F (36.9 °C), Min:95.3 °F (35.2 °C), Max:101.6 °F (38.7 °C)    Temperature Range: Temp: 101.6 °F (38.7 °C) Temp  Av.4 °F (36.9 °C)  Min: 95.3 °F (35.2 °C)  Max: 101.6 °F (38.7 °C)  - WBC   Lab Results   Component Value Date    WBC 27.7 (H) 2022     - Antimicrobials: ceftriaxone, acyclovir, vancomycin, azithromycin ordered   - f/u ID consult    HEME:   Recent Labs     22  1835   HGB 13.7    stable  - Platelets 052  - Coags pending  - follow up platelet function test    ENDOCRINE:  - Continue to monitor blood glucose, goal <180  - glucose controlled - most recent BGL is   Recent Labs     22  1835 01/10/22  0008   GLUCOSE 210* 172*       OTHER:  - PT/OT/ST   - Code Status: Prior    PROPHYLAXIS:  Stress ulcer: PPI    DVT PROPHYLAXIS:  - SCD sleeves - Thigh High       Emily Cohen DO  Neuro Critical Care Service   1/10/2022     6:28 AM

## 2022-01-10 NOTE — ED NOTES
MD Alex Mendenhall ED informed patient began shaking again at this time and urine output 50ml last two hours      Caroline Son RN  01/10/22 0094

## 2022-01-10 NOTE — CONSULTS
[facility] -   Infectious diseases evaluation  admission date 1/9/2022    reason for consultation:   · Meningitis    Impression :       · Acute metabolic encephalopathy  · Unlikely bacterial Meningitis. More likely subacute SAH  · Right lung consolidation with cavitary lesion  · Bilateral mastoid effusion  · Rhabdomyolysis  · JOZEF    Discussion / summary of stay / plan of care   ·   Recommendations   · D/C antibiotics  · D/C Acyclovir  · On Decadron 10 mg IV every 6 hours  · Follow-up blood culture, sputum culture, urine culture sensitivity and CSF culture  · TB quantiferon test    Infection Control Recommendations   · [place] Precautions    Antimicrobial Stewardship Recommendations   · Simplification of therapy  · Targeted therapy    Coordination ofOutpatient Care:   · Estimated Length of IV antimicrobials: TBD  · Patient will need Midline / picc Catheter Insertion: TBD  · Patient will need SNF: TBD  · Patient will need outpatient wound care: No    History of Present Illness:     INITIAL HISTORY:    Prakash Francis is a 70y.o.-year-old male who was found down at home. Last well-known 48 hours prior to admission. Patient's cousin was talking to him over the phone frequently and stated that he has been confused for the past couple of weeks and was complaining of headaches. Patient apparently had a dental procedure done 3 weeks back. His baseline mental status is ANO x4. Patient admitted inpatient for management of suspected meningitis. Imaging revealed debris's within the bilateral ventricles of his brain likely secondary to subacute hemorrhage versus infection. CT chest showed right middle lobe cavitary lesion. He has clear secretions from ET tube.     Past medical history:  Essential hypertension  Chronic anemia  History of GI bleed      CURRENT EVALUATION : 1/10/2022   Patient Vitals for the past 8 hrs:   BP Temp Temp src Pulse Resp SpO2 Height 01/10/22 1538    106  96 %    01/10/22 1500 122/77   110  95 %    01/10/22 1452       5' 10\" (1.778 m)   01/10/22 1300 93/71   119 (!) 34 98 %    01/10/22 1200 97/66   124 (!) 32 97 %    01/10/22 1155    123 (!) 34 99 %    01/10/22 1100 84/66   121 (!) 32 99 %    01/10/22 1000 103/72   125 (!) 32 99 %    01/10/22 0900 129/85 100.9 °F (38.3 °C) Oral 125 (!) 38 99 %      On my examination, patient intubated with 30% FiO2 and 8 of PEEP. Sedated on 10 of propofol. Not on any pressor support. T-max of 101.6 hemodynamically stable  Unarousable and sedated    Impression:  Patient with progressive decline in mentation leading to admission  Very high protein in CSF and very low glucose are not consistent with bacterial meningitis. Those changes suggest a more subacute process such as a tuberculous or fungal meningitis, or a non infectious cause such as a subarachnoid hemorrhage. CSF PCR corroborates above. Will await fungal and TB cultures. Test for TB exposure. Lung lesion is not suggestive of TB. Its etiology may require a fine needle biopsy. Summary of relevant labs: 1/10/2022    Labs:    BUN: 47  Creatinine 1.35-->1.2     CK 2.1K-->1.9K--->672    WBC 27  Hb 13.7  Plat 523    LFT WNL     CSF analysis suggestive of inflammation-turbid in appearance:  · Protein 966  · WBC 13 K  ·    · Glucose<2    Meningitis panel negative    D-dimer 3.48    Lactate 2.2    Micro:  CSF culture 1/10  pending   Blood culture 1/10 pending  Sputum culture 1/10  Urine culture 1/10    Imaging:    CT chest 1/9/22:          I have personally reviewed the past medical history, past surgical history, medications, social history, and family history, and I haveupdated the database accordingly. Allergies:   Patient has no known allergies.      Review of Systems:     Review of Systems   Unable to perform ROS: Intubated       Physical Examination :       Physical Exam  Constitutional: Appearance: He is ill-appearing. HENT:      Head: Normocephalic and atraumatic. Right Ear: External ear normal.      Left Ear: External ear normal.      Nose: Nose normal.   Cardiovascular:      Rate and Rhythm: Normal rate and regular rhythm. Pulses: Normal pulses. Heart sounds: Normal heart sounds. Pulmonary:      Breath sounds: Normal breath sounds. Abdominal:      General: Abdomen is flat. Palpations: Abdomen is soft. Skin:     General: Skin is warm. Capillary Refill: Capillary refill takes less than 2 seconds. Neurological:      Comments: Could not be assessed due AMS         Past Medical History:     Past Medical History:   Diagnosis Date    Anemia 2016    ON IRON    BPH with elevated PSA     post biopsy    Colon polyps 2016    BENEIGN REMOVED WITH COLONOSCOPY    Elevated Gina-Barr virus antibody titer 1989    NEVER TREATED FOR    Elevated fasting glucose     History of blood transfusion 2016    R/T INTESTINAL BLEEDING    History of chronic fatigue syndrome 1989    RESOLVED     History of GI bleed 2016    BROUGHT ON BY MEDS---NIACIN, FISH OIL AND VIT C    Hyperlipidemia 2014    (triglycerides-ELEVATED, TRYING TO CONTROL WITH DIET    Hypertension 1999    ON RX    Wears glasses        Past Surgical  History:     Past Surgical History:   Procedure Laterality Date    COLONOSCOPY  2000    internal hemorrhoids    COLONOSCOPY  02/02/2015    polypx1, repeat 5yrs due to family hx & hx polyps- brannon    COLONOSCOPY  12/09/2015    polyp X2  int. Hemorrhoids  partial prolapse of ileocecal valve    COLONOSCOPY  11/2016    COLONOSCOPY N/A 11/12/2020    COLONOSCOPY POLYPECTOMY SNARE/HOT BIOPSY performed by Brianna Mcfarlane DO at [facility]    to correct flat arches (age 9)    PRE-MALIGNANT / BENIGN SKIN LESION EXCISION Right 10/15/2021    v-EXCISION Lesion Right cheek, Right nose, scalp, nasal tip performed by Leonardo Johnson MD at [facility]  PROSTATE BIOPSY Bilateral 12/04/2012    benign    PROSTATE BIOPSY Bilateral 07/2014    benign    PROSTATECTOMY  12/10/2019    LAPAROSCOPIC ROBOTIC SIMPLE PROSTATECTOMY     PROSTATECTOMY N/A 12/10/2019    XI LAPAROSCOPIC ROBOTIC SIMPLE PROSTATECTOMY performed by Paris Paz MD at [address]  12/8/15    Normal upper GI tract, no evidence of blood in upper GI tract, mild distal esophagitis    UPPER GASTROINTESTINAL ENDOSCOPY  11/2016       Medications:      erythromycin   Left Eye BID    dexamethasone  10 mg IntraVENous Q6H    cefTRIAXone (ROCEPHIN) IV  2,000 mg IntraVENous Q12H    vancomycin  1,500 mg IntraVENous Q24H    vancomycin (VANCOCIN) intermittent dosing (placeholder)   Other RX Placeholder    ampicillin IV  2,000 mg IntraVENous 6 times per day    atorvastatin  10 mg Per NG tube Nightly    sodium chloride flush  5-40 mL IntraVENous 2 times per day    [START ON 1/11/2022] pantoprazole  40 mg IntraVENous Daily    And    [START ON 1/11/2022] sodium chloride (PF)  10 mL IntraVENous Daily    folic acid IVPB  1 mg IntraVENous Daily    [START ON 1/11/2022] ferrous sulfate  325 mg Oral Daily with breakfast    sennosides-docusate sodium  2 tablet Oral Daily    Vitamin D  1,000 Units Oral Daily    chlorhexidine  15 mL Mouth/Throat BID    lidocaine           Social History:     Social History     Socioeconomic History    Marital status: Single     Spouse name: Not on file    Number of children: Not on file    Years of education: Not on file    Highest education level: Not on file   Occupational History    Not on file   Tobacco Use    Smoking status: Light Tobacco Smoker     Packs/day: 0.01     Years: 10.00     Pack years: 0.10     Types: Pipe     Start date: 1/1/1970    Smokeless tobacco: Never Used    Tobacco comment: Rare pipe smoker. 4 BOWLS A WEEK No inhalation. Has never smoked cigarettes.     Vaping Use    Vaping Use: Never used   Substance and Sexual Activity    Alcohol use: Yes     Alcohol/week: 0.0 standard drinks     Comment: WHISKEY OR WINE- 1 OR 2 A MONTH    Drug use: No    Sexual activity: Not on file   Other Topics Concern    Not on file   Social History Narrative    Not on file     Social Determinants of Health     Financial Resource Strain: Low Risk     Difficulty of Paying Living Expenses: Not hard at all   Food Insecurity: No Food Insecurity    Worried About Running Out of Food in the Last Year: Never true    [address] in the Last Year: Never true   Transportation Needs:     Lack of Transportation (Medical): Not on file    Lack of Transportation (Non-Medical):  Not on file   Physical Activity:     Days of Exercise per Week: Not on file    Minutes of Exercise per Session: Not on file   Stress:     Feeling of Stress : Not on file   Social Connections:     Frequency of Communication with Friends and Family: Not on file    Frequency of Social Gatherings with Friends and Family: Not on file    Attends Worship Services: Not on file    Active Member of [address] [place] [address] or Organizations: Not on file    Attends Club or Organization Meetings: Not on file    Marital Status: Not on file   Intimate Partner Violence:     Fear of Current or Ex-Partner: Not on file    Emotionally Abused: Not on file    Physically Abused: Not on file    Sexually Abused: Not on file   Housing Stability:     Unable to Pay for Housing in the Last Year: Not on file    Number of [place] in the Last Year: Not on file    Unstable Housing in the Last Year: Not on file       Family History:     Family History   Problem Relation Age of Onset    Cancer Mother         LUNG    High Blood Pressure Mother     Diabetes Father         IDDM-LATE IN LIFE    Heart Disease Father         CHF    High Blood Pressure Father       Medical Decision Making:   I have independently reviewed/ordered the following labs:    CBC with Differential:   Recent Labs 01/09/22 1835   WBC 27.7*   HGB 13.7   HCT 43.1   *   LYMPHOPCT 2*   MONOPCT 5*     BMP:  Recent Labs     01/09/22  1835 01/09/22  1835 01/10/22  0008 01/10/22  0104   *  --  133*  --    K 3.7  --  3.5*  --    CL 97*  --  103  --    CO2 22  --  18*  --    BUN 56*  --  47*  --    CREATININE 1.35*   < > 1.22* 1.41*   MG  --   --  2.5  --     < > = values in this interval not displayed. Hepatic Function Panel:   Recent Labs     01/09/22 1835   PROT 8.1   LABALBU 3.6   BILIDIR 0.31*   IBILI 0.62   BILITOT 0.93   ALKPHOS 85   ALT 32   AST 48*     No results for input(s): RPR in the last 72 hours. No results for input(s): HIV in the last 72 hours. No results for input(s): BC in the last 72 hours. Lab Results   Component Value Date    CREATININE 1.41 01/10/2022    CREATININE 1.22 01/10/2022    GLUCOSE 172 01/10/2022       Detailed results: Thank you for allowing us to participate in the care of this patient. Please call with questions. This note is created with the assistance of a speech recognition program.  While intending to generate adocument that actually reflects the content of the visit, the document can still have some errors including those of syntax and sound a like substitutions which may escape proof reading. It such instances, actual meaningcan be extrapolated by contextual diversion. Rah José MD  PGY-3, Internal Medicine Resident  Linda Lorenzo.:    I have discussed the case, including pertinent history and exam findings with the APRN. I have evaluated the  History, physical findings and pictures of the patient and the key elements of the encounter have been performed by me. I have reviewed the laboratory data, other diagnostic studies and discussed them with the APRN. I have updated the medical record where necessary. I agree with the assessment, plan and orders as documented by the APRN.     Glen Allen, MD.      1/10/2022 4:43 PM

## 2022-01-10 NOTE — ED NOTES
Per ED resident Alex Mendenhall patient okay for CT Scan at this time, aware , /108      Caroline Son, RN  01/10/22 5209

## 2022-01-10 NOTE — ED NOTES
Rectal temp 100.0, bear hugger removed at this time.  MD informed      Ander Roberts, RN  01/10/22 0139

## 2022-01-10 NOTE — PROCEDURES
Intubation Procedure Note    Performed by: Lesli Hinton MD    Indication: impending respiratory failure    Consent: The POA counseled regarding the procedure, its indications, risks, potential complications and alternatives, and any questions were answered. Consent was obtained to proceed. Time out performed: Immediately prior to the procedure a \"time out\" was called to verify the correct patient, the correct procedure, equipment, support staff and site/side marked as required. Medications Used: etomidate intravenously and rocuronium intravenously    Procedure: The patient was placed in the appropriate position. Intubation was performed by video laryngoscopy using a laryngoscope and an 8.0 cuffed endotracheal tube. The cuff was then inflated and the tube was secured appropriately at a distance of 24 cm to the dental ridge. Initial confirmation of placement included bilateral breath sounds, an end tidal CO2 detector, absence of sounds over the stomach and tube fogging. A chest x-ray to verify correct placement of the tube showed appropriate tube position. The patient tolerated the procedure well.      Complications: None

## 2022-01-10 NOTE — ED NOTES
Patient returned to bed 16 from CT at this time.  /109,  MD aware      Erica Lemon, RN  01/10/22 8660

## 2022-01-10 NOTE — PROGRESS NOTES
Charting intubations and reintubations    ETT Size (e.g. 7.5) 8.0  ETT Placement (e.g. 23 cm at lips) 24cm at the lips  ETT secured with (commercial device name) lo    Tube placement verified by:  Auscultation (yes/no) yes  Positive color change on end tidal CO2 detector (yes/no) yes  CXR (yes/no) yes    Reason for intubation (jot a quick note/phrase e.g. Impending respiratory failure) airway protection    If difficult airway, was red tape placed (yes/no) no  If code situation, was rescue pod used? (yes/no) no    -- Notify charge therapist regarding all intubations, extubations, reintubations and self extubations    Ed Harris RCP  1:47 AM

## 2022-01-10 NOTE — ED NOTES
Suction paused at this time d/t recent medication administration      Lanny Camacho, SHANTANU  01/10/22 2519

## 2022-01-10 NOTE — PROGRESS NOTES
Comprehensive Nutrition Assessment    Type and Reason for Visit:  Initial    Nutrition Recommendations/Plan:    - If nutrition support desired, suggest peptide based formula (Vital AF 1.2), goal of 75 mL/hr (with current rate of Propofol) to provide 2160 kcals, 135 gm protein. Nutrition Assessment:  Pt admitted with AMS, septicemia. Currently intubated. No family at bedside. Per EHR, pt has been feeling ill x 4 weeks. Malnutrition Assessment:  Malnutrition Status:  Insufficient data    Context:  Acute Illness     Findings of the 6 clinical characteristics of malnutrition:  Energy Intake:  Unable to assess  Weight Loss:   (9.7% loss x 8 months per EHR')     Body Fat Loss:  Unable to assess     Muscle Mass Loss:  Unable to assess    Fluid Accumulation:  No significant fluid accumulation     Strength:  Not Performed    Estimated Daily Nutrient Needs:  Energy (kcal):  8555-7339 kcals/day; Weight Used for Energy Requirements:  Current     Protein (g):  100-115 gm/day; Weight Used for Protein Requirements:  Ideal (1.3-1.5)        Fluid (ml/day):  per MD    Nutrition Related Findings:  meds/labs reviewed        Current Nutrition Therapies:    Diet NPO  Additional Calorie Sources:   Propofol at 5.4 mL/hr = 143 kcals/day    Anthropometric Measures:  · Height: 5' 10\" (177.8 cm)  · Current Body Weight: 202 lb 6.1 oz (91.8 kg) (1/3/22)   · Ideal Body Weight: 166 lbs; % Ideal Body Weight 121.9 %   · BMI: 29  · BMI Categories: Overweight (BMI 25.0-29. 9)       Nutrition Diagnosis:   · Inadequate oral intake related to impaired respiratory function as evidenced by NPO or clear liquid status due to medical condition      Nutrition Interventions:   Food and/or Nutrient Delivery:  Continue NPO  Nutrition Education/Counseling:  No recommendation at this time   Coordination of Nutrition Care:  Continue to monitor while inpatient    Goals:  Meet greater than 50% of estimated nutrient needs       Nutrition Monitoring and Evaluation:   Behavioral-Environmental Outcomes:  None Identified   Food/Nutrient Intake Outcomes:  Diet Advancement/Tolerance  Physical Signs/Symptoms Outcomes:  Weight,Biochemical Data,Nutrition Focused Physical Findings     Discharge Planning:     Too soon to determine     Electronically signed by Dante Sutherland MS, RD, LD on 1/10/22 at 2:59 PM EST    Contact: [phone]

## 2022-01-10 NOTE — ED NOTES
Report given to [facility] [place] [facility] Neuro-ICU. Report also passed on to [facility] ED. No longer in care of patient.       Jessica Donis RN  01/10/22 [id number]

## 2022-01-10 NOTE — ED PROVIDER NOTES
[facility]     Emergency Department     Faculty Attestation    I performed a history and physical examination of the patient and discussed management with the resident. I have reviewed and agree with the residents findings including all diagnostic interpretations, and treatment plans as written. Any areas of disagreement are noted on the chart. I was personally present for the key portions of any procedures. I have documented in the chart those procedures where I was not present during the key portions. I have reviewed the emergency nurses triage note. I agree with the chief complaint, past medical history, past surgical history, allergies, medications, social and family history as documented unless otherwise noted below. Documentation of the HPI, Physical Exam and Medical Decision Making performed by scribshay is based on my personal performance of the HPI, PE and MDM. For Physician Assistant/ Nurse Practitioner cases/documentation I have personally evaluated this patient and have completed at least one if not all key elements of the E/M (history, physical exam, and MDM). Additional findings are as noted.     71 yo M transfer from [address] [place] [address] Rd [address] for Rhabdo, pt non verbal,  Ct head was reported with possible \"matter\" unclear if pt had bleed,   pe airway intact, t 97, moans to touch, chris, no cervical deformity or tenderness, abdomen slightly firm, no rebound, no distension, back no thoracic or lumbar tenderness, back exam skin intact, no erythema or finding of injury \ infection, extremities nv intact,     -decrease mentation, intubated to protect airway, ett visualized through vocal cord, complication none,   Consulting neuro & neurosurgery,     Critical care admit, adding abx,   Rib fx, > consulting trauma  Neuro consult      EKG Interpretation    Interpreted by me  Sinus tachycardia, hr 114, no ischemia, normal axis, qtc 476    CRITICAL CARE: There was a high probability of clinically significant/life threatening deterioration in this patient's condition which required my urgent intervention. Total critical care time was 35 minutes. This excludes any time for separately reportable procedures.        Joe-Quan [address], DO  01/09/22 [address], DO  01/09/22 [address], DO  01/10/22 [address], DO  01/10/22 [id number]

## 2022-01-10 NOTE — CONSULTS
Neurology Consult Note        Reason for Consult:  AMS  Requesting Physician:  Flores Mancilla    CHIEF COMPLAINT:  AMS found down at home    History Obtained From:  electronic medical record, Resident Sign out in ED       HISTORY OF PRESENT ILLNESS:              The patient is a 70 y.o. male with significant past medical history of hypertension, hyperlipidemia, BPH, anemia who presents as a transfer from [facility] ER to [facility] emergency department with chief complaint of worsening altered mental status. As per signout patient had worsening mentation over the last 4 weeks and was found down at home by a neighbor. Last well-known unknown patient has been laying on floor for unknown time. Initial evaluation patient was found to be having confused speech he was later transferred to [facility] emergency department given altered mental status. Initial evaluation in [facility] with imaging CT head without contrast showed decrease in the bilateral lateral ventricles more pronounced on the right, bilateral mastoid effusion, multilevel degenerative changes in cervical spine, given these findings patient was initially treated for possible infectious process given elevated white blood cell count was treated with Rocephin and vancomycin in [facility] before being transferred to [facility] ED. In [facility] emergency department patient was found to be having traumatic rhabdomyolysis and patient was started on Ringer lactate was initially responding to pain and protecting his airway given finding of debris's in CT scan neurosurgery, trauma and neurology was consulted for possible intervention. Patient was later intubated for airway protection currently on mechanical ventilation with propofol.     Patient looks toxic, tachycardic at 132 and hypertensive at 164/104  Patient unable to respond to commands given on sedation, intact pupillary reflex    Neurology was consulted given CT findings and concern for infection    Past Medical History:        Diagnosis Date    Anemia 2016    ON IRON    BPH with elevated PSA     post biopsy    Colon polyps 2016    BENEIGN REMOVED WITH COLONOSCOPY    Elevated Gina-Barr virus antibody titer 1989    NEVER TREATED FOR    Elevated fasting glucose     History of blood transfusion 2016    R/T INTESTINAL BLEEDING    History of chronic fatigue syndrome 1989    RESOLVED     History of GI bleed 2016    BROUGHT ON BY MEDS---NIACIN, FISH OIL AND VIT C    Hyperlipidemia 2014    (triglycerides-ELEVATED, TRYING TO CONTROL WITH DIET    Hypertension 1999    ON RX    Wears glasses      Past Surgical History:        Procedure Laterality Date    COLONOSCOPY  2000    internal hemorrhoids    COLONOSCOPY  02/02/2015    polypx1, repeat 5yrs due to family hx & hx polyps- brannon    COLONOSCOPY  12/09/2015    polyp X2  int. Hemorrhoids  partial prolapse of ileocecal valve    COLONOSCOPY  11/2016    COLONOSCOPY N/A 11/12/2020    COLONOSCOPY POLYPECTOMY SNARE/HOT BIOPSY performed by Sofie Elliott DO at [address] [place] [address]    to correct flat arches (age 9)    PRE-MALIGNANT / BENIGN SKIN LESION EXCISION Right 10/15/2021    v-EXCISION Lesion Right cheek, Right nose, scalp, nasal tip performed by Thea Acevedo MD at [place] [address] Bilateral 12/04/2012    benign    PROSTATE BIOPSY Bilateral 07/2014    benign    PROSTATECTOMY  12/10/2019    LAPAROSCOPIC ROBOTIC SIMPLE PROSTATECTOMY     PROSTATECTOMY N/A 12/10/2019    XI LAPAROSCOPIC ROBOTIC SIMPLE PROSTATECTOMY performed by Sandra Lund MD at [address] [place] [address]  12/8/15    Normal upper GI tract, no evidence of blood in upper GI tract, mild distal esophagitis    UPPER GASTROINTESTINAL ENDOSCOPY  11/2016     Current Medications:   Current Facility-Administered Medications: propofol injection, 5-50 mcg/kg/min, IntraVENous, Titrated  levETIRAcetam in NaCl (KEPPRA) 1000 MG/100ML solution, , ,   erythromycin (ROMYCIN) ophthalmic ointment, , Left Eye, BID  dexamethasone (DECADRON) injection 10 mg, 10 mg, IntraVENous, Q6H  cefTRIAXone (ROCEPHIN) 2000 mg IVPB in D5W 50ml minibag, 2,000 mg, IntraVENous, Q12H  vancomycin (VANCOCIN) 1500 mg in dextrose 5 % 250 mL IVPB, 1,500 mg, IntraVENous, Q24H  vancomycin (VANCOCIN) intermittent dosing (placeholder), , Other, RX Placeholder  ampicillin 2000 mg ivpb mini bag, 2,000 mg, IntraVENous, 6 times per day  acyclovir (ZOVIRAX) 1,100 mg in dextrose 5 % 250 mL IVPB, 15 mg/kg (Ideal), IntraVENous, Q8H  lactated ringers infusion, , IntraVENous, Continuous  cyanocobalamin injection 1,000 mcg, 1,000 mcg, IntraMUSCular, Q30 Days  cyanocobalamin injection 1,000 mcg, 1 mg, IntraMUSCular, Q30 Days  Allergies:  Patient has no known allergies. Social History:  TOBACCO:   reports that he has been smoking pipe. He started smoking about 52 years ago. He has a 0.10 pack-year smoking history. He has never used smokeless tobacco.  ETOH:   reports current alcohol use. DRUGS:   reports no history of drug use.   SEXUAL HISTORY:    ACTIVITIES OF DAILY LIVING:    INSTRUMENTAL ACTIVITIES OF DAILY LIVING: Unknown baseline currently  MARITAL STATUS:    OCCUPATION:    LEVEL OF EDUCATION:    Patient currently lives alone    Family History:       Problem Relation Age of Onset    Cancer Mother         LUNG    High Blood Pressure Mother     Diabetes Father         IDDM-LATE IN LIFE    Heart Disease Father         CHF    High Blood Pressure Father        REVIEW OF SYSTEMS:  Review of systems not obtained due to patient factors - intubation    PHYSICAL EXAM:    Vitals:  BP (!) 144/98   Pulse 126   Temp 101.6 °F (38.7 °C) (Rectal)   Resp 16   SpO2 100%      General Appearance: Toxic, tachycardic, intubated and sedated with propofol    Mental Status Exam: Unable to examine given intubation and sedation               Mini Mental Status : .  Unable to examine given intubation sedation    Funduscopic Exam: : Not done    Cranial Nerves    Intact pupillary reflex rest of the cranial nerves are unable to exam    Motor:  : Patient was previously moving all extremities            Sensory: Unable to examine given current intubation         Coordination: Not done                   Reflexes:             Deep Tendon Reflexes:    Right Knee:  2+  Left Knee:  2+           Plantar response:                Right:  downgoing               Left:  downgoing    Vascular:  Cardiac Exam:  abnormal -tachycardic                        Additional Examination Elements and Findings: Febrile    CONSTITUTIONAL: Intubated and sedated look toxic and mild distress  LUNGS: Intubated and mechanically ventilated  CARDIOVASCULAR: Tachycardic and normal apical pulses    DATA  LABS:  General Labs:    CBC with Differential:    Lab Results   Component Value Date    WBC 27.7 01/09/2022    RBC 5.72 01/09/2022    HGB 13.7 01/09/2022    HCT 43.1 01/09/2022     01/09/2022    MCV 75.3 01/09/2022    MCH 24.0 01/09/2022    MCHC 31.8 01/09/2022    RDW 16.4 01/09/2022    LYMPHOPCT 2 01/09/2022    MONOPCT 5 01/09/2022    BASOPCT 0 01/09/2022    MONOSABS 1.39 01/09/2022    LYMPHSABS 0.55 01/09/2022    EOSABS 0.00 01/09/2022    BASOSABS 0.00 01/09/2022    DIFFTYPE NOT REPORTED 01/09/2022     WBC:    Lab Results   Component Value Date    WBC 27.7 01/09/2022     Platelets:    Lab Results   Component Value Date     01/09/2022     Hemoglobin/Hematocrit:    Lab Results   Component Value Date    HGB 13.7 01/09/2022    HCT 43.1 01/09/2022     CMP:    Lab Results   Component Value Date     01/10/2022    K 3.5 01/10/2022     01/10/2022    CO2 18 01/10/2022    BUN 47 01/10/2022    CREATININE 1.41 01/10/2022    CREATININE 1.22 01/10/2022    GFRAA >60 01/10/2022    LABGLOM 49 01/10/2022    GLUCOSE 172 01/10/2022    PROT 8.1 01/09/2022    LABALBU 3.6 01/09/2022    CALCIUM 8.5 01/10/2022    BILITOT 0.93 01/09/2022    ALKPHOS 85 01/09/2022    AST 48 01/09/2022    ALT 32 01/09/2022     BMP:    Lab Results   Component Value Date     01/10/2022    K 3.5 01/10/2022     01/10/2022    CO2 18 01/10/2022    BUN 47 01/10/2022    LABALBU 3.6 01/09/2022    CREATININE 1.41 01/10/2022    CREATININE 1.22 01/10/2022    CALCIUM 8.5 01/10/2022    GFRAA >60 01/10/2022    LABGLOM 49 01/10/2022    GLUCOSE 172 01/10/2022     Sodium:    Lab Results   Component Value Date     01/10/2022     Potassium:    Lab Results   Component Value Date    K 3.5 01/10/2022     BUN/Creatinine:    Lab Results   Component Value Date    BUN 47 01/10/2022    CREATININE 1.41 01/10/2022    CREATININE 1.22 01/10/2022     Hepatic Function Panel:    Lab Results   Component Value Date    ALKPHOS 85 01/09/2022    ALT 32 01/09/2022    AST 48 01/09/2022    PROT 8.1 01/09/2022    BILITOT 0.93 01/09/2022    BILIDIR 0.31 01/09/2022    IBILI 0.62 01/09/2022    LABALBU 3.6 01/09/2022     Albumin:    Lab Results   Component Value Date    LABALBU 3.6 01/09/2022     Calcium:    Lab Results   Component Value Date    CALCIUM 8.5 01/10/2022     Phosphorus:    Lab Results   Component Value Date    PHOS 2.6 01/10/2022     LDH:  No results found for: LDH  Uric Acid:  No results found for: LABURIC, URICACID  PT/INR:    Lab Results   Component Value Date    PROTIME 11.3 11/16/2016    INR 1.0 11/16/2016     Warfarin PT/INR:  No components found for: PTPATWAR, PTINRWAR  PTT:  No results found for: APTT, PTT[APTT}  Troponin:  No results found for: TROPONINI  Coag Profile:  Homocysteine:  No components found for: HMCYS  Factor V Leiden:  No components found for: FVREPORT, FIIREPORT  Protein C Activity:  No components found for: PROCACT  Protein C Antigen:  No components found for: PROCANT  Protein S Activity:  No results found for: PROSACT  Microbiology Review:  Urine Culture:  No components found for: CURINE  Blood Culture:  No components found for: CBLOOD, CFUNGUSBL  Stool Culture:   No components found for: CSTOOL  CSF Orders:  CSF CELL COUNT DIFF:  No components found for: FCOLORCSF, FCLARITYCSF, FBLOODCSF, CWBC, CRBC, FSEGCSF, FBANDCSF, FLYMPHCS, FMONOCSF, FEOSCSF, FBASOCSF, FPLASMACSF, FPCLCSF, FMETACSF, FMYELOCSF, FBLASTCSF, FOTHERCSF, FNRBCCSF, FSLIDECSF, CSFTUBENUMBE  CSF CHLORIDE:  No components found for: CCL  CSF GLUCOSE:  No components found for: CGLU  CSF IGG/ALBUMIN:  No components found for: IGGALBRA  CSF LACTIC ACID:  No components found for: CLACT  CSF LDH:  No components found for: CLDH  CSF PROTEIN:  No components found for: CPRO, CPROTEIN  CSF PROTEIN 14-3-3:  No components found for: FOHP1430  CSF PANEL(GLU and PRO):  No components found for: CGLU, CPROTEIN  CSF TOTAL BILIRUBIN:  No components found for: CTBIL  CSF VDRL:  No components found for: CVDRL  Radiology Review: Reviewed images trauma panel, CT head without contrast, CT chest    IMPRESSION/RECOMMENDATIONS:     AMS with questionable meningitis ? Vs intraventricular bleed vs  Pneumonia with sepsis    -Appreciate neurosurgical evaluation for possible intervention given intraventricular debris's vs intracranial bleed  - lumbar puncture as per neurosurgery, CSF analysis to rule out meningitis   -S/p 1 g ceftriaxone, vancomycin,  - Meningitis empiric antibiotic with vancomycin and ceftriaxone    -Decadron 10 mg 4 doses. -S/p Keppra 2 g for seizure prophylaxis  -Follow-up procalcitonin  -We will continue to monitor      Case has been discussed with Dr. Michele Andrade, will follow neurosurgery recommendation, possible neurosurgical ICU admission. Ervin Primrose, MD  Internal Medicine Resident, PGY- [facility];  [place], [place]  1/10/2022, 6:29 AM

## 2022-01-10 NOTE — ED NOTES
RT, ED Resident, Trauma Surgery x2 in room preparied for intubation. Trauma surgery and ED resident currently speaking with patients POA in waiting room to confirm patients wishes. Patient prepared to be intubated at this time.        Matthew Jurado RN  01/10/22 [id number]

## 2022-01-10 NOTE — FLOWSHEET NOTE
[address] [facility] [address]     Emergency/Trauma Note    PATIENT NAME: Staci Pablo    Shift date: 1/09/2022  Shift day: Sunday   Shift # 3    Room # 16/16   Name: Staci Pablo            Age: 70 y.o. Gender: male          [employer]ist: Gaye Burrell  of [employer]: [address], [place], [place]    Trauma/Incident type: Adult Trauma Consult  Admit Date & Time: 1/9/2022 11:26 PM  TRAUMA NAME: N/A    ADVANCE DIRECTIVES IN CHART? Yes    NAME OF DECISION MAKER: Per scanned Advance Directives, Jaswinder Menjivar ([phone]), is patient's primary decision maker. RELATIONSHIP OF DECISION MAKER TO PATIENT: Patient's Cousin    PATIENT/EVENT DESCRIPTION:  Staci Pablo is a 70 y.o. male who arrived to ED16 as a transfer from [facility] and was paged out as an [address] \" Per report, patient was \"found down by neighbor and cousin, after not answering his phone. \" Per report, patient was \"possibly down for 30 hours. \" Pt to be admitted to 16/16. SPIRITUAL ASSESSMENT/INTERVENTION:   responded to page and gathered patient information in room.  introduced herself to patient's family member in the ED Waiting Room. Patient's cousin, Isaac Heimlich, is also patient's POA-H. Patient's cousin shared about the events of the day.  was present for part of medical update with family and also facilitated bedside visitation in ED12.  left voicemail with patient's parish to inform them of patient's transfer from [place]. Patient's family thanked  for support and care. PATIENT BELONGINGS:  No belongings noted    ANY BELONGINGS OF SIGNIFICANT VALUE NOTED:  N/A    REGISTRATION STAFF NOTIFIED?   Yes      WHAT IS YOUR SPIRITUAL CARE PLAN FOR THIS PATIENT?:  [facility] can make follow-up visit, per request. Pat Rao can be reached 24/7 via [facility].     01/10/22 0144   Encounter Summary   Services provided to: Family   Referral/Consult From: Multi-disciplinary team  (Adult Trauma Consult)   Support System Family members   Place [facility]  ([address])   Contact Taoism Yes   Continue Visiting   (1/09/2022)   Complexity of Encounter High   Length of Encounter 1 hour;30 minutes   Spiritual Assessment Completed Yes   Advance Care Planning Yes  (Scanned in Chart)   Routine   Type Initial   Crisis   Type Trauma  (Adult Trauma Consult)   Assessment Calm; Approachable;Grieving;Fearful   Intervention Active listening;Explored feelings, thoughts, concerns;Explored coping resources;Nurtured hope;Sustaining presence/ Ministry of presence; Discussed belief system/Sabianism practices/kavon;Discussed illness/injury and it's impact   Outcome Comfort;Expressed gratitude;Coping;Receptive   Spiritual/Restorationism   Type Spiritual support     Electronically signed by Shanique Irby on 1/10/2022 at 5:39 AM.  Vargas Cantu  [phone]

## 2022-01-10 NOTE — ED PROVIDER NOTES
[facility]  ED  Emergency Department  Emergency Medicine Resident Sign-out     Care of Prakash Francis was assumed from Dr. Jany Capellan and is being seen for No chief complaint on file. .  The patient's initial evaluation and plan have been discussed with the prior provider who initially evaluated the patient.      EMERGENCY DEPARTMENT COURSE / MEDICAL DECISION MAKING:       MEDICATIONS GIVEN:  Orders Placed This Encounter   Medications    azithromycin (ZITHROMAX) 500 mg in dextrose 5% 250 mL IVPB     Order Specific Question:   Antimicrobial Indications     Answer:   Pneumonia (CAP)    lactated ringers infusion    levETIRAcetam (KEPPRA) 2,000 mg in sodium chloride 0.9 % 100 mL IVPB    propofol 1000 MG/100ML injection     Frederick Espinosai: cabinet override    etomidate (AMIDATE) injection 20 mg    rocuronium (ZEMURON) injection 70 mg    propofol injection    levETIRAcetam in NaCl (KEPPRA) 1000 MG/100ML solution     Gwen Espinosa: cabinet override    erythromycin (ROMYCIN) ophthalmic ointment    fentaNYL (SUBLIMAZE) injection 100 mcg    dexamethasone (DECADRON) injection 10 mg    acetaminophen (TYLENOL) 160 MG/5ML solution 960 mg    cefTRIAXone (ROCEPHIN) 2000 mg IVPB in D5W 50ml minibag     Order Specific Question:   Antimicrobial Indications     Answer:   Central Nervous System Infection    DISCONTD: vancomycin (VANCOCIN) 1750 mg in dextrose 5% 500 mL IVPB     Order Specific Question:   Antimicrobial Indications     Answer:   Central Nervous System Infection    vancomycin (VANCOCIN) 1500 mg in dextrose 5 % 250 mL IVPB     Order Specific Question:   Antimicrobial Indications     Answer:   Central Nervous System Infection    vancomycin (VANCOCIN) intermittent dosing (placeholder)     Order Specific Question:   Antimicrobial Indications     Answer:   Central Nervous System Infection       LABS / RADIOLOGY:     Labs Reviewed   BASIC METABOLIC PANEL W/ REFLEX TO MG FOR LOW K - Abnormal; Notable for the following components:       Result Value    Glucose 172 (*)     BUN 47 (*)     CREATININE 1.22 (*)     Calcium 8.5 (*)     Sodium 133 (*)     Potassium 3.5 (*)     CO2 18 (*)     GFR Non- 59 (*)     All other components within normal limits   TOX SCR, BLD, ED - Abnormal; Notable for the following components:    Acetaminophen Level <5 (*)     Salicylate Lvl <1 (*)     All other components within normal limits   CK - Abnormal; Notable for the following components:     Total CK 1,959 (*)     All other components within normal limits   MYOGLOBIN, SERUM - Abnormal; Notable for the following components:    Myoglobin 2,685 (*)     All other components within normal limits   PROCALCITONIN - Abnormal; Notable for the following components:    Procalcitonin 0.64 (*)     All other components within normal limits   VENOUS BLOOD GAS, POINT OF CARE - Abnormal; Notable for the following components:    pH, Abel 7.475 (*)     pCO2, Abel 28.5 (*)     HCO3, Venous 21.0 (*)     All other components within normal limits   CREATININE W/GFR POINT OF CARE - Abnormal; Notable for the following components:    POC Creatinine 1.41 (*)     GFR Comment 60 (*)     GFR Non- 49 (*)     All other components within normal limits   UREA N (POC) - Abnormal; Notable for the following components:    POC BUN 48 (*)     All other components within normal limits   LACTIC ACID,POINT OF CARE - Abnormal; Notable for the following components:    POC Lactic Acid 1.54 (*)     All other components within normal limits   POCT GLUCOSE - Abnormal; Notable for the following components:    POC Glucose 211 (*)     All other components within normal limits   COVID-19, RAPID   TROPONIN   PHOSPHORUS   LACTIC ACID, WHOLE BLOOD   URINE DRUG SCREEN   TSH WITH REFLEX   MAGNESIUM   ELECTROLYTES PLUS   HGB/HCT   CALCIUM, IONIC (POC)   POC BLOOD GAS AND CHEMISTRY       CT ABDOMEN PELVIS WO CONTRAST Additional Contrast? None    Result Date: 1/10/2022  EXAMINATION: CT OF THE ABDOMEN AND PELVIS WITHOUT CONTRAST 1/10/2022 1:23 am TECHNIQUE: CT of the abdomen and pelvis was performed without the administration of intravenous contrast. Multiplanar reformatted images are provided for review. Dose modulation, iterative reconstruction, and/or weight based adjustment of the mA/kV was utilized to reduce the radiation dose to as low as reasonably achievable. COMPARISON: None. HISTORY: ORDERING SYSTEM PROVIDED HISTORY: found down TECHNOLOGIST PROVIDED HISTORY: found down Decision Support Exception - unselect if not a suspected or confirmed emergency medical condition->Emergency Medical Condition (MA) Reason for Exam: Patient found down, AMS, POA in [facility] called yetruday pt ams, POA found patient today lying on floor, head facing right ulcer to right cheek. unknown amount of time approximately 48hr estimate. Patient transfer from [facility]. DX: Rhabdo, glucose 221 FINDINGS: Some images are mildly degraded by patient motion. Cholelithiasis without evidence of cholecystitis. Exam is limited without intravenous contrast. Liver is homogeneous. Spleen is normal in size. Pancreas is unremarkable. Thickening of the adrenal glands, likely due to hyperplasia. No hydronephrosis. Dominant cyst in the right central kidney measuring 5.6 cm and 6 Hounsfield units. Tiny presumed cyst along the lateral margin of the lower left kidney. Scattered vascular calcifications. Abdominal aorta is normal in caliber. Assessment of bowel is limited without oral contrast.  The nasogastric tube terminates in the proximal to mid stomach. No bowel obstruction. Appendix is normal.  Mild diverticulosis without evidence of acute diverticulitis. In the central aspect of the mid abdomen just to the right of midline, on images 71-84, is a focal area of soft tissue thickening and minimal fat stranding which appears to be associated with a loop of small bowel.   Some small bowel loops within the central pelvis are mildly fluid distended. The urinary bladder is largely decompressed with Rangel catheter. Heterogeneous enlargement of the prostate gland asymmetric laterally towards the right where there is an exophytic masslike process. Degenerative changes are present in the spine and pelvis. Please see separate report for CT of the chest performed approximately 6 hours prior on 01/09/2022. Nondisplaced fracture of the lateral left 7th and 6th ribs. Examination is partially degraded by motion related artifact. Focal area of soft tissue thickening which appears to involve a loop of small bowel in the central right mid abdomen. This may be partially exaggerated by the patient motion. Localized infectious or inflammatory versus neoplastic process however is not excluded and short-term follow-up is recommended. Nondisplaced left 6th and 7th rib fractures. Masslike process exophytic from the lateral margin of the right-side of the prostate gland. Correlate with PSA values. Cholelithiasis. RECOMMENDATIONS: Unavailable     XR ELBOW RIGHT (MIN 3 VIEWS)    Result Date: 1/10/2022  EXAMINATION: THREE XRAY VIEWS OF THE RIGHT ELBOW; TWO XRAY VIEWS OF THE RIGHT FOREARM 1/10/2022 12:56 am COMPARISON: None. HISTORY: ORDERING SYSTEM PROVIDED HISTORY: fall, AMS, prolonged down time TECHNOLOGIST PROVIDED HISTORY: fall, AMS, prolonged down time Reason for Exam: found down Initial evaluation. FINDINGS: No acute osseous abnormality seen of the right elbow or right forearm. There is no evidence of dislocation. Minimal degenerative changes of the right elbow noted. 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 No acute osseous abnormality or dislocation involving the right elbow or right forearm. XR RADIUS ULNA RIGHT (2 VIEWS)    Result Date: 1/10/2022  EXAMINATION: THREE XRAY VIEWS OF THE RIGHT ELBOW; TWO XRAY VIEWS OF THE RIGHT FOREARM 1/10/2022 12:56 am COMPARISON: None.  HISTORY: ORDERING SYSTEM PROVIDED HISTORY: fall, AMS, prolonged down time TECHNOLOGIST PROVIDED HISTORY: fall, AMS, prolonged down time Reason for Exam: found down Initial evaluation. FINDINGS: No acute osseous abnormality seen of the right elbow or right forearm. There is no evidence of dislocation. Minimal degenerative changes of the right elbow noted. 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 No acute osseous abnormality or dislocation involving the right elbow or right forearm. XR KNEE RIGHT (3 VIEWS)    Result Date: 1/10/2022  EXAMINATION: THREE XRAY VIEWS OF THE RIGHT KNEE 1/10/2022 12:56 am COMPARISON: None. HISTORY: ORDERING SYSTEM PROVIDED HISTORY: Possible fall - abraisons TECHNOLOGIST PROVIDED HISTORY: Possible fall - abraisons Reason for Exam: found down Initial evaluation. FINDINGS: No acute osseous abnormality is seen. There is mild-to-moderate tricompartmental joint arthrosis. There is spurring of the patella at the attachment of the quadriceps tendon. There appears to be a trace joint effusion. Soft tissue swelling is seen anterior to the patella. 1. No convincing acute osseous abnormality. 2. Degenerative changes of the right knee. 3. Trace joint effusion. 4. There appears to be soft tissue swelling anterior to the patella. CT HEAD WO CONTRAST    Result Date: 1/10/2022  EXAMINATION: CT OF THE HEAD WITHOUT CONTRAST; CT OF THE FACE WITHOUT CONTRAST 1/10/2022 1:20 am TECHNIQUE: CT of the head was performed without the administration of intravenous contrast. Dose modulation, iterative reconstruction, and/or weight based adjustment of the mA/kV was utilized to reduce the radiation dose to as low as reasonably achievable.; CT of the face was performed without the administration of intravenous contrast. Multiplanar reformatted images are provided for review.  Dose modulation, iterative reconstruction, and/or weight based adjustment of the mA/kV was utilized to reduce the radiation dose to as low as reasonably achievable. COMPARISON: 01/09/2022 HISTORY: Trauma FINDINGS: CT HEAD: BRAIN/VENTRICLES: Persistent layering debris within the lateral ventricles. There is mild dilatation of the ventricular system. There are changes chronic small vessel ischemic disease and mild generalized atrophy. There is no acute intracranial hemorrhage, mass effect or midline shift. The gray-white differentiation is maintained without evidence of an acute infarct. CT FACIAL BONES: FACIAL BONES: The maxilla, pterygoid plates and zygomatic arches are intact. The mandible is intact. The mandibular condyles are normally situated. The nasal bones and maxillary nasal processes are intact. ORBITS: The globes appear intact. The extraocular muscles, optic nerve sheath complexes and lacrimal glands appear unremarkable. No retrobulbar hematoma or mass is seen. The orbital walls and rims are intact. SINUSES/MASTOIDS: The paranasal sinuses are well aerated. No acute fracture is seen. Partial opacification of mastoid air cells. SOFT TISSUES: No acute abnormality of the visualized skull or soft tissues. Persistent layering debris within the lateral ventricles. There is mild dilatation of the ventricular system. Differential includes isodense subacute hemorrhage versus infection. Follow-up MRI brain with and without contrast is recommended for further evaluation. No acute traumatic injury of the facial bones.  RECOMMENDATIONS: Unavailable     CT HEAD WO CONTRAST    Result Date: 1/9/2022  EXAMINATION: CT OF THE HEAD WITHOUT CONTRAST; CT OF THE CERVICAL SPINE WITHOUT CONTRAST 1/9/2022 7:02 pm TECHNIQUE: CT of the head was performed without the administration of intravenous contrast. Dose modulation, iterative reconstruction, and/or weight based adjustment of the mA/kV was utilized to reduce the radiation dose to as low as reasonably achievable.; CT of the cervical spine was performed without the administration of intravenous contrast. Multiplanar reformatted images are provided for review. Dose modulation, iterative reconstruction, and/or weight based adjustment of the mA/kV was utilized to reduce the radiation dose to as low as reasonably achievable. COMPARISON: 01/03/2022 HISTORY: ORDERING SYSTEM PROVIDED HISTORY: altered mental status TECHNOLOGIST PROVIDED HISTORY: altered mental status Decision Support Exception - unselect if not a suspected or confirmed emergency medical condition->Emergency Medical Condition (MA) Reason for Exam: Altered mental status, found on the floor of his home today, unknown time of fall FINDINGS: BRAIN/VENTRICLES: There is no evidence for acute intracranial hemorrhage. There is debris within the bilateral lateral ventricles (right greater than left). There is no evidence for acute intracranial hemorrhage or mass lesion. No obvious acute infarct is identified. ORBITS: The visualized portion of the orbits demonstrate no acute abnormality. SINUSES: There is right mastoid effusion. SOFT TISSUES/SKULL:  No acute abnormality of the visualized skull or soft tissues. CT cervical spine: Bilateral mastoid effusion exam is limited by motion artifact. There is multilevel degenerative change in the cervical spine with decreased disc space height and osteophytosis at multiple levels. The exam is limited by motion artifact. There is loss of the normal cervical lordosis with kyphosis. No obvious displaced fracture fragments are identified. Debris within the bilateral lateral ventricles more pronounced on the right of uncertain clinical significance or etiology. MRI of the brain with contrast is recommended for further evaluation. Differential diagnosis would include subacute isodense hemorrhage versus possible infectious process. Neoplastic process is not considered due to the normal head CT 1 week ago. Bilateral mastoid effusion Limited evaluation of the cervical spine due to motion.   Multilevel degenerative change in the cervical spine. Findings were discussed with Dr. Gilda Metzger on 01/09/2022 at 7 20 p.m. Joe Vincent RECOMMENDATIONS: Unavailable     CT HEAD WO CONTRAST    Result Date: 1/3/2022  EXAMINATION: CT OF THE HEAD WITHOUT CONTRAST  1/3/2022 12:30 pm TECHNIQUE: CT of the head was performed without the administration of intravenous contrast. Dose modulation, iterative reconstruction, and/or weight based adjustment of the mA/kV was utilized to reduce the radiation dose to as low as reasonably achievable. COMPARISON: None. HISTORY: ORDERING SYSTEM PROVIDED HISTORY: Acute nonintractable headache, unspecified headache type TECHNOLOGIST PROVIDED HISTORY: acute headache. Atypical for patient. Lightheadedness and dizziness Reason for Exam: Throbbing headache, lightheaded and dizzy FINDINGS: BRAIN/VENTRICLES: There is no acute intracranial hemorrhage, mass effect or midline shift. No abnormal extra-axial fluid collection. The gray-white differentiation is maintained without evidence of an acute infarct. There is no evidence of hydrocephalus. There is extensive calcification of the falx and tentorium. Findings are nonspecific but could be related to remote infection or other insult. ORBITS: The visualized portion of the orbits demonstrate no acute abnormality. SINUSES: The visualized paranasal sinuses and mastoid air cells demonstrate no acute abnormality. SOFT TISSUES/SKULL:  No acute abnormality of the visualized skull or soft tissues. No acute intracranial abnormality.  RECOMMENDATIONS: Unavailable     CT FACIAL BONES WO CONTRAST    Result Date: 1/10/2022  EXAMINATION: CT OF THE HEAD WITHOUT CONTRAST; CT OF THE FACE WITHOUT CONTRAST 1/10/2022 1:20 am TECHNIQUE: CT of the head was performed without the administration of intravenous contrast. Dose modulation, iterative reconstruction, and/or weight based adjustment of the mA/kV was utilized to reduce the radiation dose to as low as reasonably achievable.; CT of the face was performed without the administration of intravenous contrast. Multiplanar reformatted images are provided for review. Dose modulation, iterative reconstruction, and/or weight based adjustment of the mA/kV was utilized to reduce the radiation dose to as low as reasonably achievable. COMPARISON: 01/09/2022 HISTORY: Trauma FINDINGS: CT HEAD: BRAIN/VENTRICLES: Persistent layering debris within the lateral ventricles. There is mild dilatation of the ventricular system. There are changes chronic small vessel ischemic disease and mild generalized atrophy. There is no acute intracranial hemorrhage, mass effect or midline shift. The gray-white differentiation is maintained without evidence of an acute infarct. CT FACIAL BONES: FACIAL BONES: The maxilla, pterygoid plates and zygomatic arches are intact. The mandible is intact. The mandibular condyles are normally situated. The nasal bones and maxillary nasal processes are intact. ORBITS: The globes appear intact. The extraocular muscles, optic nerve sheath complexes and lacrimal glands appear unremarkable. No retrobulbar hematoma or mass is seen. The orbital walls and rims are intact. SINUSES/MASTOIDS: The paranasal sinuses are well aerated. No acute fracture is seen. Partial opacification of mastoid air cells. SOFT TISSUES: No acute abnormality of the visualized skull or soft tissues. Persistent layering debris within the lateral ventricles. There is mild dilatation of the ventricular system. Differential includes isodense subacute hemorrhage versus infection. Follow-up MRI brain with and without contrast is recommended for further evaluation. No acute traumatic injury of the facial bones.  RECOMMENDATIONS: Unavailable     CT CERVICAL SPINE WO CONTRAST    Result Date: 1/9/2022  EXAMINATION: CT OF THE HEAD WITHOUT CONTRAST; CT OF THE CERVICAL SPINE WITHOUT CONTRAST 1/9/2022 7:02 pm TECHNIQUE: CT of the head was performed without the administration of intravenous contrast. Dose modulation, iterative reconstruction, and/or weight based adjustment of the mA/kV was utilized to reduce the radiation dose to as low as reasonably achievable.; CT of the cervical spine was performed without the administration of intravenous contrast. Multiplanar reformatted images are provided for review. Dose modulation, iterative reconstruction, and/or weight based adjustment of the mA/kV was utilized to reduce the radiation dose to as low as reasonably achievable. COMPARISON: 01/03/2022 HISTORY: ORDERING SYSTEM PROVIDED HISTORY: altered mental status TECHNOLOGIST PROVIDED HISTORY: altered mental status Decision Support Exception - unselect if not a suspected or confirmed emergency medical condition->Emergency Medical Condition (MA) Reason for Exam: Altered mental status, found on the floor of his home today, unknown time of fall FINDINGS: BRAIN/VENTRICLES: There is no evidence for acute intracranial hemorrhage. There is debris within the bilateral lateral ventricles (right greater than left). There is no evidence for acute intracranial hemorrhage or mass lesion. No obvious acute infarct is identified. ORBITS: The visualized portion of the orbits demonstrate no acute abnormality. SINUSES: There is right mastoid effusion. SOFT TISSUES/SKULL:  No acute abnormality of the visualized skull or soft tissues. CT cervical spine: Bilateral mastoid effusion exam is limited by motion artifact. There is multilevel degenerative change in the cervical spine with decreased disc space height and osteophytosis at multiple levels. The exam is limited by motion artifact. There is loss of the normal cervical lordosis with kyphosis. No obvious displaced fracture fragments are identified. Debris within the bilateral lateral ventricles more pronounced on the right of uncertain clinical significance or etiology. MRI of the brain with contrast is recommended for further evaluation.   Differential diagnosis would include subacute isodense hemorrhage versus possible infectious process. Neoplastic process is not considered due to the normal head CT 1 week ago. Bilateral mastoid effusion Limited evaluation of the cervical spine due to motion. Multilevel degenerative change in the cervical spine. Findings were discussed with Dr. Jhon Marinelli on 01/09/2022 at 7 20 p.m. Matthew Lopez RECOMMENDATIONS: Unavailable     XR CHEST PORTABLE    Result Date: 1/10/2022  EXAMINATION: ONE XRAY VIEW OF THE CHEST 1/10/2022 1:53 am COMPARISON: 01/10/2022. HISTORY: ORDERING SYSTEM PROVIDED HISTORY: ett TECHNOLOGIST PROVIDED HISTORY: ett Reason for Exam: et tube and ng placement   supine port Initial evaluation. FINDINGS: Endotracheal tube with the tip projecting approximately 6 cm above the emile. Enteric tube coursing below the diaphragm. The side port projects in the region the gastric cardia. The cardiac silhouette is within normal limits for size. There is no significant change in the focal opacity within the right lower lung. No focal consolidation within the left lung. No pleural effusion or pneumothorax. 1. Support devices as above. 2. No significant change in the focal opacity within the right lower lung. XR CHEST PORTABLE    Result Date: 1/10/2022  EXAMINATION: ONE XRAY VIEW OF THE CHEST 1/9/2022 11:49 pm COMPARISON: 01/09/2022 HISTORY: ORDERING SYSTEM PROVIDED HISTORY: transfer TECHNOLOGIST PROVIDED HISTORY: transfer Reason for Exam: found down   supine port FINDINGS: Right lung base masslike opacity opacity again identified. Otherwise unremarkable size. Lungs clear. No pleural effusion pneumothorax. Right lung base opacity again identified, nonspecific. Please correlate exam findings and exam findings and history.      XR CHEST PORTABLE    Result Date: 1/9/2022  EXAMINATION: ONE XRAY VIEW OF THE CHEST 1/9/2022 7:25 pm COMPARISON: 12/07/2015 HISTORY: ORDERING SYSTEM PROVIDED HISTORY: altered mental status TECHNOLOGIST PROVIDED HISTORY: altered mental status Reason for Exam: Altered mental status FINDINGS: The cardiomediastinal silhouette is normal in size and contour. Right lung base masslike opacity identified. No pleural effusion or pneumothorax is present. Right lung base opacity. Please correlate exam findings. This could represent a focus of pneumonia versus underlying mass. CT CHEST PULMONARY EMBOLISM W CONTRAST    Result Date: 1/9/2022  EXAMINATION: CTA OF THE CHEST 1/9/2022 7:40 pm TECHNIQUE: CTA of the chest was performed after the administration of intravenous contrast.  Multiplanar reformatted images are provided for review. MIP images are provided for review. Dose modulation, iterative reconstruction, and/or weight based adjustment of the mA/kV was utilized to reduce the radiation dose to as low as reasonably achievable. COMPARISON: Chest x-ray 01/09/2022 HISTORY: ORDERING SYSTEM PROVIDED HISTORY: elevated d-dimer, mental status change TECHNOLOGIST PROVIDED HISTORY: elevated d-dimer, mental status change Decision Support Exception - unselect if not a suspected or confirmed emergency medical condition->Emergency Medical Condition (MA) Reason for Exam: Elevated d-dimer, mental status change FINDINGS: Pulmonary Arteries: No central lobar pulmonary emboli. Main pulmonary artery is unremarkable caliber. Mediastinum: Air-fluid identified involving esophagus. Slight heterogeneous appearance of the thyroid gland on left. Heart is mildly prominent size. No definite bulky hilar mediastinal adenopathy. Lungs/pleura: Right middle lobe atelectasis versus scarring identified. Within the lateral aspect right middle lobe, there is consolidative versus masslike opacity with air-fluid level. There vessels corresponding through this opacity. The tiny locules of gas as well. Otherwise mild hypoventilatory changes elsewhere the lungs. Upper Abdomen: Evidence of cholelithiasis. Fatty infiltration liver.  Soft Tissues/Bones: Degenerate changes of the thoracic spine. No evidence of pulmonary embolism to the segmental level. Nonspecific opacification and when question air-fluid level involving the right middle lobe laterally. This demonstrates tiny locules of gas. Infection/Pneumonia and/or cavitary mass lesion may be considered. .  Consider pulmonology consultation. RECOMMENDATIONS: Unavailable     CT LUMBAR SPINE TRAUMA RECONSTRUCTION    Result Date: 1/10/2022  EXAMINATION: CT OF THE LUMBAR SPINE WITHOUT CONTRAST  1/10/2022 TECHNIQUE: CT of the lumbar spine was performed without the administration of intravenous contrast. Multiplanar reformatted images are provided for review. Adjustment of mA and/or kV according to patient size was utilized. Dose modulation, iterative reconstruction, and/or weight based adjustment of the mA/kV was utilized to reduce the radiation dose to as low as reasonably achievable. COMPARISON: None HISTORY: ORDERING SYSTEM PROVIDED HISTORY: found down altered TECHNOLOGIST PROVIDED HISTORY: found down altered Reason for Exam: Patient found down, POA in [facility] called [facility]ruday pt ams, POA found patient today lying on floor, head facing right ulcer to right cheek. unknown amount of time approximately 48hr estimate. Patient transfer from [place]. DX: Rhabdo, glucose 221 FINDINGS: BONES/ALIGNMENT: There is normal alignment of the spine. The vertebral body heights are maintained. No osseous destructive lesion is seen. DEGENERATIVE CHANGES: No significant degenerative changes of the lumbar spine. SOFT TISSUES/RETROPERITONEUM: No paraspinal mass is seen. Unremarkable non-contrast CT of the lumbar spine. RECENT VITALS:     Temp: 101.5 °F (38.6 °C),  Pulse: 126, Resp: 16, BP: (!) 144/98, SpO2: 100 %    This patient is a 70 y.o. Male with altered mental status and rhabdo after being found down for an unknown duration of time up to 48 hours.   Patient lives independently at home, has had 4 weeks progressively worsening mental status, subjective chills and headache. Evaluated by PCP in outside facility multiple times over this duration with unknown etiology. He already on aspirin and nitro found down yesterday by POA, abnormal findings on head CT with concern for infection versus intracranial bleed and the ventricles. Treated with vancomycin and ceftriaxone for CNS coverage, azithromycin for pneumonia coverage. CT PE negative, left middle lobe re markable consolidation w/ fluid level, sepsis work-up at outside facility including blood cultures. Lactic downtrending. Intubated for airway protection and mental status as patient had sonorous respirations on arrival.  2 rib fractures noted on abdomen pelvis imaging. Awaiting neurosurgery evaluation of head CT. Neurology following for concern for possible seizures and need for EEG versus meningitis. Neurosurgery believes abnormality seen on CT scan more likely infectious than hemorrhagic. Plan to place EVD. Therefore patient should be admitted to neuro ICU. Given concerns for infection and continued fevers despite Tylenol administration will broaden meningitis coverage by adding ampicillin and acyclovir. OUTSTANDING TASKS / RECOMMENDATIONS:    1. Follow-up neurosurgery recommendations  2. Follow-up trauma recommendations  3. Admit to medical ICU versus neuro ICU pending neurosurgery, neurology, and trauma recommendations     FINAL IMPRESSION:     1. Altered mental status, unspecified altered mental status type        DISPOSITION:         DISPOSITION:  []  Discharge   []  Transfer -    [x]  Admission -  NICU   []  Against Medical Advice   []  Eloped   FOLLOW-UP: No follow-up provider specified.    DISCHARGE MEDICATIONS: New Prescriptions    No medications on file           Shantelle Sorensen DO  Emergency Medicine Resident  [facility]        Shantelle Sorensen [address] [facility]  Resident  01/10/22 [address] [place] [address] DO  Resident  01/10/22 [id number]

## 2022-01-10 NOTE — PLAN OF CARE
Problem: OXYGENATION/RESPIRATORY FUNCTION  Goal: Patient will maintain patent airway  Outcome: Ongoing     Problem: OXYGENATION/RESPIRATORY FUNCTION  Goal: Patient will achieve/maintain normal respiratory rate/effort  Description: Respiratory rate and effort will be within normal limits for the patient  Outcome: Ongoing  Note:   PROVIDE ADEQUATE OXYGENATION WITH ACCEPTABLE SP02/ABG'S    [x]  IDENTIFY APPROPRIATE OXYGEN THERAPY  [x]   MONITOR SP02/ABG'S AS NEEDED   [x]   PATIENT EDUCATION AS NEEDED        Problem: MECHANICAL VENTILATION  Goal: Patient will maintain patent airway  Outcome: Ongoing  Note: MECHANICAL VENTILATION     [x]  PROVIDE OPTIMAL VENTILATION  [x]   ASSESS FOR EXTUBATION READINESS  [x]   ASSESS FOR WEANING READINESS  [x]  EXTUBATE AS TOLERATED  [x]  IMPLEMENT ADULT MECHANICAL VENTILATION PROTOCOL  [x]  MAINTAIN ADEQUATE OXYGENATION  [x]  PERFORM SPONTANEOUS WEANING TRIAL AS TOLERATED        Problem: MECHANICAL VENTILATION  Goal: Oral health is maintained or improved  Outcome: Ongoing     Problem: MECHANICAL VENTILATION  Goal: ET tube will be managed safely  Outcome: Ongoing     Problem: MECHANICAL VENTILATION  Goal: Ability to express needs and understand communication  Outcome: Ongoing     Problem: MECHANICAL VENTILATION  Goal: Mobility/activity is maintained at optimum level for patient  Outcome: Ongoing     Problem: SKIN INTEGRITY  Goal: Skin integrity is maintained or improved  Outcome: Ongoing

## 2022-01-10 NOTE — ED PROVIDER NOTES
[facility] ED  Emergency Department Encounter  Emergency Medicine Resident     Pt Name:Vamshi Rojas  MRN: [id number]  [place] 1950  Date of evaluation: 1/10/22  PCP:  Kingston Santana MD    CHIEF COMPLAINT       No chief complaint on file. Altered mental status, fall, unknown downtime    HISTORY OF PRESENT ILLNESS  (Location/Symptom, Timing/Onset, Context/Setting, Quality, Duration, Modifying Factors, Severity.)      Prakash Francis is a 70 y.o. male who presents as transfer from outside facility where patient was initially brought in for altered mental status and being found down at home. Per family member patient has had persistent for weeks progressively worsening subjective chills, hypotension requiring discontinuation of antihypertensives, and and worsening mental status. POA has been checking in via phone over the last week, notes patient was less sharp in conversation and seemed more confused. Did not answer phone call today, went to check on where he was found down in the kitchen. At outside facility had abnormal findings on the CT head and the ventricles concerning for hemorrhage versus infection. Also with elevated CK and myoglobin, Rangel placed and started on IV fluids for rhabdo. Potentially down for up to 48 hours. Previous hospital noted edema and pressure ulcers on the right side from patient being down. Per POA patient normally A&O x4, is very articulate. Notes patient's current status of nonverbal is abnormal.    PAST MEDICAL / SURGICAL / SOCIAL / FAMILY HISTORY      has a past medical history of Anemia, BPH with elevated PSA, Colon polyps, Elevated Gina-Barr virus antibody titer, Elevated fasting glucose, History of blood transfusion, History of chronic fatigue syndrome, History of GI bleed, Hyperlipidemia, Hypertension, and Wears glasses. has a past surgical history that includes Foot surgery (Bilateral, 1958);  Prostate biopsy (Bilateral, 12/04/2012); Prostate biopsy (Bilateral, 07/2014); Colonoscopy (2000); Colonoscopy (02/02/2015); Colonoscopy (12/09/2015); Colonoscopy (11/2016); Upper gastrointestinal endoscopy (12/8/15); Upper gastrointestinal endoscopy (11/2016); Prostatectomy (12/10/2019); Prostatectomy (N/A, 12/10/2019); Colonoscopy (N/A, 11/12/2020); and pre-malignant / benign skin lesion excision (Right, 10/15/2021). Social History     Socioeconomic History    Marital status: Single     Spouse name: Not on file    Number of children: Not on file    Years of education: Not on file    Highest education level: Not on file   Occupational History    Not on file   Tobacco Use    Smoking status: Light Tobacco Smoker     Packs/day: 0.01     Years: 10.00     Pack years: 0.10     Types: Pipe     Start date: 1/1/1970    Smokeless tobacco: Never Used    Tobacco comment: Rare pipe smoker. 4 BOWLS A WEEK No inhalation. Has never smoked cigarettes. Vaping Use    Vaping Use: Never used   Substance and Sexual Activity    Alcohol use: Yes     Alcohol/week: 0.0 standard drinks     Comment: WHISKEY OR WINE- 1 OR 2 A MONTH    Drug use: No    Sexual activity: Not on file   Other Topics Concern    Not on file   Social History Narrative    Not on file     Social Determinants of Health     Financial Resource Strain: Low Risk     Difficulty of Paying Living Expenses: Not hard at all   Food Insecurity: No Food Insecurity    Worried About Running Out of Food in the Last Year: Never true    [address] in the Last Year: Never true   Transportation Needs:     Lack of Transportation (Medical): Not on file    Lack of Transportation (Non-Medical):  Not on file   Physical Activity:     Days of Exercise per Week: Not on file    Minutes of Exercise per Session: Not on file   Stress:     Feeling of Stress : Not on file   Social Connections:     Frequency of Communication with Friends and Family: Not on file    Frequency of Social Gatherings with Friends and Family: Not on file    Attends Christianity Services: Not on file    Active Member of Clubs or Organizations: Not on file    Attends Club or Organization Meetings: Not on file    Marital Status: Not on file   Intimate Partner Violence:     Fear of Current or Ex-Partner: Not on file    Emotionally Abused: Not on file    Physically Abused: Not on file    Sexually Abused: Not on file   Housing Stability:     Unable to Pay for Housing in the Last Year: Not on file    Number of [place] in the Last Year: Not on file    Unstable Housing in the Last Year: Not on file       Family History   Problem Relation Age of Onset    Cancer Mother         LUNG    High Blood Pressure Mother     Diabetes Father         IDDM-LATE IN LIFE    Heart Disease Father         CHF    High Blood Pressure Father        Allergies:  Patient has no known allergies. Home Medications:  Prior to Admission medications    Medication Sig Start Date End Date Taking?  Authorizing Provider   omeprazole (PRILOSEC) 20 MG delayed release capsule Take 1 capsule by mouth daily 3/25/21   Tiffanie Duenas MD   atorvastatin (LIPITOR) 10 MG tablet take 1 tablet by mouth once daily 3/25/21   Tiffanie Duenas MD   fenofibrate (TRIGLIDE) 160 MG tablet Take 1 tablet by mouth daily 3/22/21   Tiffanie Duenas MD   hydroCHLOROthiazide (MICROZIDE) 12.5 MG capsule Take 1 capsule by mouth daily 3/9/21   Tiffanie Duenas MD   amLODIPine (NORVASC) 5 MG tablet Take 1 tablet by mouth daily 3/9/21   Tiffanie Duenas MD   benazepril (LOTENSIN) 40 MG tablet take 1 tablet by mouth once daily 2/18/21   Tiffanie Duenas MD   ferrous sulfate 325 (65 FE) MG tablet Take 325 mg by mouth 3 times daily (with meals)     Historical Provider, MD       REVIEW OF SYSTEMS    (2-9 systems for level 4, 10 or more for level 5)      Review of Systems   Unable to perform ROS: Mental status change       PHYSICAL EXAM   (up to 7 for level 4, 8 or more for level 5) INITIAL VITALS:   BP (!) 144/98   Pulse 126   Temp 101.5 °F (38.6 °C) (Rectal)   Resp 16   SpO2 100%     Physical Exam  Vitals and nursing note reviewed. Exam conducted with a chaperone present. Constitutional:       General: He is in acute distress. Appearance: He is ill-appearing and toxic-appearing. HENT:      Head: Normocephalic. Comments: Right periorbital edema. Mouth/Throat:      Mouth: Mucous membranes are dry. Eyes:      General: No scleral icterus. Right eye: No discharge. Left eye: No discharge. Pupils: Pupils are equal, round, and reactive to light. Cardiovascular:      Rate and Rhythm: Regular rhythm. Tachycardia present. Pulses: Normal pulses. Pulmonary:      Effort: Tachypnea present. Breath sounds: Transmitted upper airway sounds present. Musculoskeletal:         General: No deformity. Normal range of motion. Cervical back: Rigidity present. Lymphadenopathy:      Cervical: No cervical adenopathy. Skin:     General: Skin is warm and dry. Capillary Refill: Capillary refill takes less than 2 seconds. Neurological:      Mental Status: He is disoriented and confused. GCS: GCS eye subscore is 4. GCS verbal subscore is 1. GCS motor subscore is 4. Comments: Withdrawing from pain, not following commands but appears to understand them. Not clenching jaw at baseline but when asking to open mouth to take oral temperature patient clenches jaw tighter closed.          DIFFERENTIAL  DIAGNOSIS     PLAN (LABS / IMAGING / EKG):  Orders Placed This Encounter   Procedures    COVID-19, Rapid    XR CHEST PORTABLE    XR ELBOW RIGHT (MIN 3 VIEWS)    XR RADIUS ULNA RIGHT (2 VIEWS)    XR KNEE RIGHT (3 VIEWS)    CT HEAD WO CONTRAST    CT FACIAL BONES WO CONTRAST    CT THORACIC SPINE TRAUMA RECONSTRUCTION    CT LUMBAR SPINE TRAUMA RECONSTRUCTION    CT ABDOMEN PELVIS WO CONTRAST Additional Contrast? None    XR CHEST PORTABLE  Troponin    Basic Metabolic Panel w/ Reflex to MG    PHOSPHORUS    Lactic Acid, Whole Blood    DRUG SCREEN MULTI URINE    TSH with Reflex    TOX SCR, BLD, ED    CK    MYOGLOBIN, SERUM    Magnesium    ELECTROLYTES PLUS    Hemoglobin and hematocrit, blood    CALCIUM, IONIC (POC)    Procalcitonin    Intake and output    Inpatient consult to Trauma Surgery    Inpatient consult to Neurosurgery    Inpatient consult to Neurology    Pharmacy to Dose: Vancomycin    Mechanical ventilation    Pulse oximetry, continuous    End Tidal CO2 Continuous    POC Blood Gas and Chemistry    Venous Blood Gas, POC    Creatinine W/GFR Point of Care    POCT urea (BUN)    Lactic Acid, POC    POCT Glucose    EKG 12 Lead       MEDICATIONS ORDERED:  Orders Placed This Encounter   Medications    azithromycin (ZITHROMAX) 500 mg in dextrose 5% 250 mL IVPB     Order Specific Question:   Antimicrobial Indications     Answer:   Pneumonia (CAP)    lactated ringers infusion    levETIRAcetam (KEPPRA) 2,000 mg in sodium chloride 0.9 % 100 mL IVPB    propofol 1000 MG/100ML injection     Gwen Espinosa: cabinet override    etomidate (AMIDATE) injection 20 mg    rocuronium (ZEMURON) injection 70 mg    propofol injection    levETIRAcetam in NaCl (KEPPRA) 1000 MG/100ML solution     Gwen Keenan: cabinet override    erythromycin (ROMYCIN) ophthalmic ointment    fentaNYL (SUBLIMAZE) injection 100 mcg    dexamethasone (DECADRON) injection 10 mg    acetaminophen (TYLENOL) 160 MG/5ML solution 960 mg    cefTRIAXone (ROCEPHIN) 2000 mg IVPB in D5W 50ml minibag     Order Specific Question:   Antimicrobial Indications     Answer:   Central Nervous System Infection    DISCONTD: vancomycin (VANCOCIN) 1750 mg in dextrose 5% 500 mL IVPB     Order Specific Question:   Antimicrobial Indications     Answer:   Central Nervous System Infection    vancomycin (VANCOCIN) 1500 mg in dextrose 5 % 250 mL IVPB     Order Specific Question: Antimicrobial Indications     Answer:   Central Nervous System Infection    vancomycin (VANCOCIN) intermittent dosing (placeholder)     Order Specific Question:   Antimicrobial Indications     Answer:   Central Nervous System Infection       DDX: CVA versus ICH versus infection versus rhabdo versus metabolic derangement versus malignancy versus other    DIAGNOSTIC RESULTS / EMERGENCY DEPARTMENT COURSE / MDM     LABS:  Results for orders placed or performed during the hospital encounter of 01/09/22   COVID-19, Rapid    Specimen: Nasopharyngeal Swab   Result Value Ref Range    Specimen Description . NASOPHARYNGEAL SWAB     SARS-CoV-2, Rapid Not Detected Not Detected   Troponin   Result Value Ref Range    Troponin, High Sensitivity 17 0 - 22 ng/L    Troponin T NOT REPORTED <0.03 ng/mL    Troponin Interp NOT REPORTED    Basic Metabolic Panel w/ Reflex to MG   Result Value Ref Range    Glucose 172 (H) 70 - 99 mg/dL    BUN 47 (H) 8 - 23 mg/dL    CREATININE 1.22 (H) 0.70 - 1.20 mg/dL    Bun/Cre Ratio NOT REPORTED 9 - 20    Calcium 8.5 (L) 8.6 - 10.4 mg/dL    Sodium 133 (L) 135 - 144 mmol/L    Potassium 3.5 (L) 3.7 - 5.3 mmol/L    Chloride 103 98 - 107 mmol/L    CO2 18 (L) 20 - 31 mmol/L    Anion Gap 12 9 - 17 mmol/L    GFR Non-African American 59 (L) >60 mL/min    GFR African American >60 >60 mL/min    GFR Comment          GFR Staging NOT REPORTED    PHOSPHORUS   Result Value Ref Range    Phosphorus 2.6 2.5 - 4.5 mg/dL   Lactic Acid, Whole Blood   Result Value Ref Range    Lactic Acid, Whole Blood 1.5 0.7 - 2.1 mmol/L   DRUG SCREEN MULTI URINE   Result Value Ref Range    Amphetamine Screen, Ur NEGATIVE NEGATIVE    Barbiturate Screen, Ur NEGATIVE NEGATIVE    Benzodiazepine Screen, Urine NEGATIVE NEGATIVE    Cocaine Metabolite, Urine NEGATIVE NEGATIVE    Methadone Screen, Urine NEGATIVE NEGATIVE    Opiates, Urine NEGATIVE NEGATIVE    Phencyclidine, Urine NEGATIVE NEGATIVE    Propoxyphene, Urine NOT REPORTED NEGATIVE Cannabinoid Scrn, Ur NEGATIVE NEGATIVE    Oxycodone Screen, Ur NEGATIVE NEGATIVE    Methamphetamine, Urine NOT REPORTED NEGATIVE    Tricyclic Antidepressants, Urine NOT REPORTED NEGATIVE    MDMA, Urine NOT REPORTED NEGATIVE    Buprenorphine Urine NOT REPORTED NEGATIVE    Test Information       Assay provides medical screening only. The absence of expected drug(s) and/or metabolite(s) may indicate diluted or adulterated urine, limitations of testing or timing of collection.    TSH with Reflex   Result Value Ref Range    TSH 0.50 0.30 - 5.00 mIU/L   TOX SCR, BLD, ED   Result Value Ref Range    Acetaminophen Level <5 (L) 10 - 30 ug/mL    Ethanol <10 <10 mg/dL    Ethanol percent <9.873 <2.192 %    Salicylate Lvl <1 (L) 3 - 10 mg/dL    Toxic Tricyclic Sc,Blood NEGATIVE NEGATIVE   CK   Result Value Ref Range    Total CK 1,959 (H) 39 - 308 U/L   MYOGLOBIN, SERUM   Result Value Ref Range    Myoglobin 2,685 (H) 28 - 72 ng/mL   Magnesium   Result Value Ref Range    Magnesium 2.5 1.6 - 2.6 mg/dL   ELECTROLYTES PLUS   Result Value Ref Range    POC Sodium 139 138 - 146 mmol/L    POC Potassium 3.5 3.5 - 4.5 mmol/L    POC Chloride 104 98 - 107 mmol/L    POC TCO2 22 22 - 30 mmol/L    Anion Gap 14 7 - 16 mmol/L   Hemoglobin and hematocrit, blood   Result Value Ref Range    POC Hemoglobin 13.9 13.5 - 17.5 g/dL    POC Hematocrit 41 41 - 53 %   CALCIUM, IONIC (POC)   Result Value Ref Range    POC Ionized Calcium 1.17 1.15 - 1.33 mmol/L   Procalcitonin   Result Value Ref Range    Procalcitonin 0.64 (H) <0.09 ng/mL   Venous Blood Gas, POC   Result Value Ref Range    pH, Abel 7.475 (H) 7.320 - 7.430    pCO2, Abel 28.5 (L) 41.0 - 51.0 mm Hg    pO2, Abel 30.6 30.0 - 50.0 mm Hg    HCO3, Venous 21.0 (L) 22.0 - 29.0 mmol/L    Total CO2, Venous NOT REPORTED 23.0 - 30.0 mmol/L    Negative Base Excess, Abel 1 0.0 - 2.0    Positive Base Excess, Abel NOT REPORTED 0.0 - 3.0    O2 Sat, Abel 65 60.0 - 85.0 %    O2 Device/Flow/% NOT REPORTED     [employer] likely due to hyperplasia. No hydronephrosis. Dominant cyst in the right central kidney measuring 5.6 cm and 6 Hounsfield units. Tiny presumed cyst along the lateral margin of the lower left kidney. Scattered vascular calcifications. Abdominal aorta is normal in caliber. Assessment of bowel is limited without oral contrast.  The nasogastric tube terminates in the proximal to mid stomach. No bowel obstruction. Appendix is normal.  Mild diverticulosis without evidence of acute diverticulitis. In the central aspect of the mid abdomen just to the right of midline, on images 71-84, is a focal area of soft tissue thickening and minimal fat stranding which appears to be associated with a loop of small bowel. Some small bowel loops within the central pelvis are mildly fluid distended. The urinary bladder is largely decompressed with Rangel catheter. Heterogeneous enlargement of the prostate gland asymmetric laterally towards the right where there is an exophytic masslike process. Degenerative changes are present in the spine and pelvis. Please see separate report for CT of the chest performed approximately 6 hours prior on 01/09/2022. Nondisplaced fracture of the lateral left 7th and 6th ribs. Examination is partially degraded by motion related artifact. Focal area of soft tissue thickening which appears to involve a loop of small bowel in the central right mid abdomen. This may be partially exaggerated by the patient motion. Localized infectious or inflammatory versus neoplastic process however is not excluded and short-term follow-up is recommended. Nondisplaced left 6th and 7th rib fractures. Masslike process exophytic from the lateral margin of the right-side of the prostate gland. Correlate with PSA values. Cholelithiasis.  RECOMMENDATIONS: Unavailable     XR ELBOW RIGHT (MIN 3 VIEWS)    Result Date: 1/10/2022  EXAMINATION: THREE XRAY VIEWS OF THE RIGHT ELBOW; TWO XRAY VIEWS OF THE RIGHT FOREARM 1/10/2022 12:56 am COMPARISON: None. HISTORY: ORDERING SYSTEM PROVIDED HISTORY: fall, AMS, prolonged down time TECHNOLOGIST PROVIDED HISTORY: fall, AMS, prolonged down time Reason for Exam: found down Initial evaluation. FINDINGS: No acute osseous abnormality seen of the right elbow or right forearm. There is no evidence of dislocation. Minimal degenerative changes of the right elbow noted. 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 No acute osseous abnormality or dislocation involving the right elbow or right forearm. XR RADIUS ULNA RIGHT (2 VIEWS)    Result Date: 1/10/2022  EXAMINATION: THREE XRAY VIEWS OF THE RIGHT ELBOW; TWO XRAY VIEWS OF THE RIGHT FOREARM 1/10/2022 12:56 am COMPARISON: None. HISTORY: ORDERING SYSTEM PROVIDED HISTORY: fall, AMS, prolonged down time TECHNOLOGIST PROVIDED HISTORY: fall, AMS, prolonged down time Reason for Exam: found down Initial evaluation. FINDINGS: No acute osseous abnormality seen of the right elbow or right forearm. There is no evidence of dislocation. Minimal degenerative changes of the right elbow noted. 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 No acute osseous abnormality or dislocation involving the right elbow or right forearm. XR KNEE RIGHT (3 VIEWS)    Result Date: 1/10/2022  EXAMINATION: THREE XRAY VIEWS OF THE RIGHT KNEE 1/10/2022 12:56 am COMPARISON: None. HISTORY: ORDERING SYSTEM PROVIDED HISTORY: Possible fall - abraisons TECHNOLOGIST PROVIDED HISTORY: Possible fall - abraisons Reason for Exam: found down Initial evaluation. FINDINGS: No acute osseous abnormality is seen. There is mild-to-moderate tricompartmental joint arthrosis. There is spurring of the patella at the attachment of the quadriceps tendon. There appears to be a trace joint effusion. Soft tissue swelling is seen anterior to the patella. 1. No convincing acute osseous abnormality. 2. Degenerative changes of the right knee. 3. Trace joint effusion.  4. There appears to be soft tissue swelling anterior to the patella. CT HEAD WO CONTRAST    Result Date: 1/10/2022  EXAMINATION: CT OF THE HEAD WITHOUT CONTRAST; CT OF THE FACE WITHOUT CONTRAST 1/10/2022 1:20 am TECHNIQUE: CT of the head was performed without the administration of intravenous contrast. Dose modulation, iterative reconstruction, and/or weight based adjustment of the mA/kV was utilized to reduce the radiation dose to as low as reasonably achievable.; CT of the face was performed without the administration of intravenous contrast. Multiplanar reformatted images are provided for review. Dose modulation, iterative reconstruction, and/or weight based adjustment of the mA/kV was utilized to reduce the radiation dose to as low as reasonably achievable. COMPARISON: 01/09/2022 HISTORY: Trauma FINDINGS: CT HEAD: BRAIN/VENTRICLES: Persistent layering debris within the lateral ventricles. There is mild dilatation of the ventricular system. There are changes chronic small vessel ischemic disease and mild generalized atrophy. There is no acute intracranial hemorrhage, mass effect or midline shift. The gray-white differentiation is maintained without evidence of an acute infarct. CT FACIAL BONES: FACIAL BONES: The maxilla, pterygoid plates and zygomatic arches are intact. The mandible is intact. The mandibular condyles are normally situated. The nasal bones and maxillary nasal processes are intact. ORBITS: The globes appear intact. The extraocular muscles, optic nerve sheath complexes and lacrimal glands appear unremarkable. No retrobulbar hematoma or mass is seen. The orbital walls and rims are intact. SINUSES/MASTOIDS: The paranasal sinuses are well aerated. No acute fracture is seen. Partial opacification of mastoid air cells. SOFT TISSUES: No acute abnormality of the visualized skull or soft tissues. Persistent layering debris within the lateral ventricles. There is mild dilatation of the ventricular system. cervical spine with decreased disc space height and osteophytosis at multiple levels. The exam is limited by motion artifact. There is loss of the normal cervical lordosis with kyphosis. No obvious displaced fracture fragments are identified. Debris within the bilateral lateral ventricles more pronounced on the right of uncertain clinical significance or etiology. MRI of the brain with contrast is recommended for further evaluation. Differential diagnosis would include subacute isodense hemorrhage versus possible infectious process. Neoplastic process is not considered due to the normal head CT 1 week ago. Bilateral mastoid effusion Limited evaluation of the cervical spine due to motion. Multilevel degenerative change in the cervical spine. Findings were discussed with Dr. Allison Dominguez on 01/09/2022 at 7 20 p.m. Ethan Chou RECOMMENDATIONS: Unavailable     CT FACIAL BONES WO CONTRAST    Result Date: 1/10/2022  EXAMINATION: CT OF THE HEAD WITHOUT CONTRAST; CT OF THE FACE WITHOUT CONTRAST 1/10/2022 1:20 am TECHNIQUE: CT of the head was performed without the administration of intravenous contrast. Dose modulation, iterative reconstruction, and/or weight based adjustment of the mA/kV was utilized to reduce the radiation dose to as low as reasonably achievable.; CT of the face was performed without the administration of intravenous contrast. Multiplanar reformatted images are provided for review. Dose modulation, iterative reconstruction, and/or weight based adjustment of the mA/kV was utilized to reduce the radiation dose to as low as reasonably achievable. COMPARISON: 01/09/2022 HISTORY: Trauma FINDINGS: CT HEAD: BRAIN/VENTRICLES: Persistent layering debris within the lateral ventricles. There is mild dilatation of the ventricular system. There are changes chronic small vessel ischemic disease and mild generalized atrophy. There is no acute intracranial hemorrhage, mass effect or midline shift.   The gray-white differentiation is maintained without evidence of an acute infarct. CT FACIAL BONES: FACIAL BONES: The maxilla, pterygoid plates and zygomatic arches are intact. The mandible is intact. The mandibular condyles are normally situated. The nasal bones and maxillary nasal processes are intact. ORBITS: The globes appear intact. The extraocular muscles, optic nerve sheath complexes and lacrimal glands appear unremarkable. No retrobulbar hematoma or mass is seen. The orbital walls and rims are intact. SINUSES/MASTOIDS: The paranasal sinuses are well aerated. No acute fracture is seen. Partial opacification of mastoid air cells. SOFT TISSUES: No acute abnormality of the visualized skull or soft tissues. Persistent layering debris within the lateral ventricles. There is mild dilatation of the ventricular system. Differential includes isodense subacute hemorrhage versus infection. Follow-up MRI brain with and without contrast is recommended for further evaluation. No acute traumatic injury of the facial bones. RECOMMENDATIONS: Unavailable     CT CERVICAL SPINE WO CONTRAST    Result Date: 1/9/2022  EXAMINATION: CT OF THE HEAD WITHOUT CONTRAST; CT OF THE CERVICAL SPINE WITHOUT CONTRAST 1/9/2022 7:02 pm TECHNIQUE: CT of the head was performed without the administration of intravenous contrast. Dose modulation, iterative reconstruction, and/or weight based adjustment of the mA/kV was utilized to reduce the radiation dose to as low as reasonably achievable.; CT of the cervical spine was performed without the administration of intravenous contrast. Multiplanar reformatted images are provided for review. Dose modulation, iterative reconstruction, and/or weight based adjustment of the mA/kV was utilized to reduce the radiation dose to as low as reasonably achievable.  COMPARISON: 01/03/2022 HISTORY: ORDERING SYSTEM PROVIDED HISTORY: altered mental status TECHNOLOGIST PROVIDED HISTORY: altered mental status Decision Support Exception - unselect if not a suspected or confirmed emergency medical condition->Emergency Medical Condition (MA) Reason for Exam: Altered mental status, found on the floor of his home today, unknown time of fall FINDINGS: BRAIN/VENTRICLES: There is no evidence for acute intracranial hemorrhage. There is debris within the bilateral lateral ventricles (right greater than left). There is no evidence for acute intracranial hemorrhage or mass lesion. No obvious acute infarct is identified. ORBITS: The visualized portion of the orbits demonstrate no acute abnormality. SINUSES: There is right mastoid effusion. SOFT TISSUES/SKULL:  No acute abnormality of the visualized skull or soft tissues. CT cervical spine: Bilateral mastoid effusion exam is limited by motion artifact. There is multilevel degenerative change in the cervical spine with decreased disc space height and osteophytosis at multiple levels. The exam is limited by motion artifact. There is loss of the normal cervical lordosis with kyphosis. No obvious displaced fracture fragments are identified. Debris within the bilateral lateral ventricles more pronounced on the right of uncertain clinical significance or etiology. MRI of the brain with contrast is recommended for further evaluation. Differential diagnosis would include subacute isodense hemorrhage versus possible infectious process. Neoplastic process is not considered due to the normal head CT 1 week ago. Bilateral mastoid effusion Limited evaluation of the cervical spine due to motion. Multilevel degenerative change in the cervical spine. Findings were discussed with Dr. Lindsey Velarde on 01/09/2022 at 7 20 p.m. Conrad Hoyos RECOMMENDATIONS: Unavailable     XR CHEST PORTABLE    Result Date: 1/10/2022  EXAMINATION: ONE XRAY VIEW OF THE CHEST 1/10/2022 1:53 am COMPARISON: 01/10/2022.  HISTORY: ORDERING SYSTEM PROVIDED HISTORY: ett TECHNOLOGIST PROVIDED HISTORY: ett Reason for Exam: et tube and ng placement   supine port Initial evaluation. FINDINGS: Endotracheal tube with the tip projecting approximately 6 cm above the emile. Enteric tube coursing below the diaphragm. The side port projects in the region the gastric cardia. The cardiac silhouette is within normal limits for size. There is no significant change in the focal opacity within the right lower lung. No focal consolidation within the left lung. No pleural effusion or pneumothorax. 1. Support devices as above. 2. No significant change in the focal opacity within the right lower lung. XR CHEST PORTABLE    Result Date: 1/10/2022  EXAMINATION: ONE XRAY VIEW OF THE CHEST 1/9/2022 11:49 pm COMPARISON: 01/09/2022 HISTORY: ORDERING SYSTEM PROVIDED HISTORY: transfer TECHNOLOGIST PROVIDED HISTORY: transfer Reason for Exam: found down   supine port FINDINGS: Right lung base masslike opacity opacity again identified. Otherwise unremarkable size. Lungs clear. No pleural effusion pneumothorax. Right lung base opacity again identified, nonspecific. Please correlate exam findings and exam findings and history. XR CHEST PORTABLE    Result Date: 1/9/2022  EXAMINATION: ONE XRAY VIEW OF THE CHEST 1/9/2022 7:25 pm COMPARISON: 12/07/2015 HISTORY: ORDERING SYSTEM PROVIDED HISTORY: altered mental status TECHNOLOGIST PROVIDED HISTORY: altered mental status Reason for Exam: Altered mental status FINDINGS: The cardiomediastinal silhouette is normal in size and contour. Right lung base masslike opacity identified. No pleural effusion or pneumothorax is present. Right lung base opacity. Please correlate exam findings. This could represent a focus of pneumonia versus underlying mass. CT CHEST PULMONARY EMBOLISM W CONTRAST    Result Date: 1/9/2022  EXAMINATION: CTA OF THE CHEST 1/9/2022 7:40 pm TECHNIQUE: CTA of the chest was performed after the administration of intravenous contrast.  Multiplanar reformatted images are provided for review. MIP images are provided for review. Dose modulation, iterative reconstruction, and/or weight based adjustment of the mA/kV was utilized to reduce the radiation dose to as low as reasonably achievable. COMPARISON: Chest x-ray 01/09/2022 HISTORY: ORDERING SYSTEM PROVIDED HISTORY: elevated d-dimer, mental status change TECHNOLOGIST PROVIDED HISTORY: elevated d-dimer, mental status change Decision Support Exception - unselect if not a suspected or confirmed emergency medical condition->Emergency Medical Condition (MA) Reason for Exam: Elevated d-dimer, mental status change FINDINGS: Pulmonary Arteries: No central lobar pulmonary emboli. Main pulmonary artery is unremarkable caliber. Mediastinum: Air-fluid identified involving esophagus. Slight heterogeneous appearance of the thyroid gland on left. Heart is mildly prominent size. No definite bulky hilar mediastinal adenopathy. Lungs/pleura: Right middle lobe atelectasis versus scarring identified. Within the lateral aspect right middle lobe, there is consolidative versus masslike opacity with air-fluid level. There vessels corresponding through this opacity. The tiny locules of gas as well. Otherwise mild hypoventilatory changes elsewhere the lungs. Upper Abdomen: Evidence of cholelithiasis. Fatty infiltration liver. Soft Tissues/Bones: Degenerate changes of the thoracic spine. No evidence of pulmonary embolism to the segmental level. Nonspecific opacification and when question air-fluid level involving the right middle lobe laterally. This demonstrates tiny locules of gas. Infection/Pneumonia and/or cavitary mass lesion may be considered. .  Consider pulmonology consultation.  RECOMMENDATIONS: Unavailable     CT LUMBAR SPINE TRAUMA RECONSTRUCTION    Result Date: 1/10/2022  EXAMINATION: CT OF THE LUMBAR SPINE WITHOUT CONTRAST  1/10/2022 TECHNIQUE: CT of the lumbar spine was performed without the administration of intravenous contrast. Multiplanar reformatted images are provided for review. Adjustment of mA and/or kV according to patient size was utilized. Dose modulation, iterative reconstruction, and/or weight based adjustment of the mA/kV was utilized to reduce the radiation dose to as low as reasonably achievable. COMPARISON: None HISTORY: ORDERING SYSTEM PROVIDED HISTORY: found down altered TECHNOLOGIST PROVIDED HISTORY: found down altered Reason for Exam: Patient found down, POA in [facility] called yetruday pt ams, POA found patient today lying on floor, head facing right ulcer to right cheek. unknown amount of time approximately 48hr estimate. Patient transfer from [facility]. DX: Rhabdo, glucose 221 FINDINGS: BONES/ALIGNMENT: There is normal alignment of the spine. The vertebral body heights are maintained. No osseous destructive lesion is seen. DEGENERATIVE CHANGES: No significant degenerative changes of the lumbar spine. SOFT TISSUES/RETROPERITONEUM: No paraspinal mass is seen. Unremarkable non-contrast CT of the lumbar spine. EKG  EKG Interpretation    Interpreted by me    Rhythm: Sinus tachycardia  Rate: Tachycardic at 114 bpm  Axis: normal  Ectopy: none  Conduction: normal  ST Segments: Nonspecific  T Waves: no acute change  Q Waves: none    Clinical Impression: Sinus tachycardia otherwise nonspecific EKG without prior available for comparison    All EKG's are interpreted by the Emergency Department Physician who either signs or Co-signs this chart in the absence of a cardiologist.    EMERGENCY DEPARTMENT COURSE:  Patient met in room on arrival with EMS. Patient protecting his own airway, O2 sats in the high 90s on room air. Patient is tachypneic, tachycardic, normothermic and normotensive. Wounds of the right face right upper and lower extremity consistent with prolonged downtime. X-ray imaging obtained without acute pathology.   Sepsis work-up initiated at prior hospital, lactate normalized on arrival.  Reviewed outside hospital work-up, repeated BMP, creatinine improving. CK myoglobin remain elevated. Repeat chest x-ray unchanged, persistent right middle lobe consolidation. Patient received vancomycin and ceftriaxone provide CNS coverage, add azithromycin for community-acquired pneumonia coverage as sepsis etiology. ED tox and urine drug without acute findings, normal TSH. Mild electrolyte derangements but not significant enough to cause patient's underlying etiology. Consult trauma surgery and neurosurgery given head CT findings. Repeated head CT without significant change, CT abdomen pelvis notable for rib fractures enlarged prostate, and possible small bowel thickening in the right mid abdomen which may be motion artifact or neoplastic in etiology. On repeat evaluation patient with absent response to pain and more sonorous respirations. Decision made to intubate patient, see separate procedure note. Patient sedated with propofol. If ventricle findings do represent bleed concern for seizure activity as possible explanation for patient's mental status, added 2 g Keppra and consulted neurology. We will continue ceftriaxone vancomycin and Decadron for meningitis coverage at the recommendation of neurology. Given the dilated ventricles on CT scan will not LP at this time, awaiting neurosurgical recommendations. Awaiting trauma decision at time of signout. Patient care signed out to oncoming resident at time of shift change for final disposition and admission. Patient remained relatively stable while in the emergency department after intubation. PROCEDURES:  None    CONSULTS:  IP CONSULT TO TRAUMA SURGERY  IP CONSULT TO NEUROSURGERY  IP CONSULT TO NEUROLOGY  PHARMACY TO DOSE VANCOMYCIN    CRITICAL CARE:  None    FINAL IMPRESSION      1.  Altered mental status, unspecified altered mental status type          DISPOSITION / PLAN     DISPOSITION Decision To Admit 01/09/2022 11:47:17 PM      PATIENT REFERRED TO:  No follow-up provider specified.     DISCHARGE MEDICATIONS:  New Prescriptions    No medications on file       Ginny Hayes MD  Emergency Medicine Resident    (Please note that portions of this note were completed with a voice recognition program.  Efforts were made to edit the dictations but occasionally words are mis-transcribed.)        Ginny Hayes MD  Resident  01/10/22 [id number]

## 2022-01-10 NOTE — CONSULTS
Department of Neurosurgery                                       Resident Consult Note      Reason for Consult:  AMS  Requesting Physician:  Dr. Lb Allen:   []Dr. Destiny Fraire  []Dr. Kacey Torres  []Dr. Aguila Dos Santos     History Obtained From:  electronic medical record    CHIEF COMPLAINT:         AMS    HISTORY OF PRESENT ILLNESS:       The patient is a 70 y.o. male w/hx of HTN and previous GI bleed and chronic anemia presenting w/AMS. He reportedly saw his PCP one week ago for headaches, CTH at that time was without acute abnormality. His family member tried calling him and felt that he sounded confused. Patient is normal A&O x4 at baseline. EMS was called to the home and patient was found down, unknown amount of time but suspected prolonged down time as long as 48 hours. ED w/u demonstrated debris within bilateral ventricles secondary to subacute hemorrhage vs infectious, negative CT C-spine, and possible cavitary lung lesion in the right middle lobe. Labs were remarkable for elevated CK consistent with rhabdomyolysis, mild JOZEF, leukocytosis to 27.7, and mild elevation of lactate. Patient was transferred for neurosurgical evaluation for debris in the ventricles.          PAST MEDICAL HISTORY :       Past Medical History:        Diagnosis Date    Anemia 2016    ON IRON    BPH with elevated PSA     post biopsy    Colon polyps 2016    BENEIGN REMOVED WITH COLONOSCOPY    Elevated Gina-Barr virus antibody titer 1989    NEVER TREATED FOR    Elevated fasting glucose     History of blood transfusion 2016    R/T INTESTINAL BLEEDING    History of chronic fatigue syndrome 1989    RESOLVED     History of GI bleed 2016    BROUGHT ON BY MEDS---NIACIN, FISH OIL AND VIT C    Hyperlipidemia 2014    (triglycerides-ELEVATED, TRYING TO CONTROL WITH DIET    Hypertension 1999    ON RX    Wears glasses        Past Surgical History:        Procedure Laterality Date    COLONOSCOPY  2000    internal hemorrhoids    COLONOSCOPY  02/02/2015    polypx1, repeat 5yrs due to family hx & hx polyps- brannon    COLONOSCOPY  12/09/2015    polyp X2  int. Hemorrhoids  partial prolapse of ileocecal valve    COLONOSCOPY  11/2016    COLONOSCOPY N/A 11/12/2020    COLONOSCOPY POLYPECTOMY SNARE/HOT BIOPSY performed by Brandon Connors DO at [facility]    to correct flat arches (age 9)    PRE-MALIGNANT / BENIGN SKIN LESION EXCISION Right 10/15/2021    v-EXCISION Lesion Right cheek, Right nose, scalp, nasal tip performed by Mario Minor MD at [place] [address] Bilateral 12/04/2012    benign    PROSTATE BIOPSY Bilateral 07/2014    benign    PROSTATECTOMY  12/10/2019    LAPAROSCOPIC ROBOTIC SIMPLE PROSTATECTOMY     PROSTATECTOMY N/A 12/10/2019    XI LAPAROSCOPIC ROBOTIC SIMPLE PROSTATECTOMY performed by Jillian Wilson MD at [address] [place] [address]  12/8/15    Normal upper GI tract, no evidence of blood in upper GI tract, mild distal esophagitis    UPPER GASTROINTESTINAL ENDOSCOPY  11/2016       Social History:   Social History     Socioeconomic History    Marital status: Single     Spouse name: Not on file    Number of children: Not on file    Years of education: Not on file    Highest education level: Not on file   Occupational History    Not on file   Tobacco Use    Smoking status: Light Tobacco Smoker     Packs/day: 0.01     Years: 10.00     Pack years: 0.10     Types: Pipe     Start date: 1/1/1970    Smokeless tobacco: Never Used    Tobacco comment: Rare pipe smoker. 4 BOWLS A WEEK No inhalation. Has never smoked cigarettes. Vaping Use    Vaping Use: Never used   Substance and Sexual Activity    Alcohol use:  Yes     Alcohol/week: 0.0 standard drinks     Comment: WHISKEY OR WINE- 1 OR 2 A MONTH    Drug use: No    Sexual activity: Not on file   Other Topics Concern    Not on file   Social History Narrative    Not on file     Social Determinants of Health     Financial Resource Strain: Low Risk     Difficulty of Paying Living Expenses: Not hard at all   Food Insecurity: No Food Insecurity    Worried About Running Out of Food in the Last Year: Never true    Eric of Food in the Last Year: Never true   Transportation Needs:     Lack of Transportation (Medical): Not on file    Lack of Transportation (Non-Medical): Not on file   Physical Activity:     Days of Exercise per Week: Not on file    Minutes of Exercise per Session: Not on file   Stress:     Feeling of Stress : Not on file   Social Connections:     Frequency of Communication with Friends and Family: Not on file    Frequency of Social Gatherings with Friends and Family: Not on file    Attends Taoist Services: Not on file    Active Member of [address] [place] [address] Loop [employer] or Organizations: Not on file    Attends Club or Organization Meetings: Not on file    Marital Status: Not on file   Intimate Partner Violence:     Fear of Current or Ex-Partner: Not on file    Emotionally Abused: Not on file    Physically Abused: Not on file    Sexually Abused: Not on file   Housing Stability:     Unable to Pay for Housing in the Last Year: Not on file    Number of [place] in the Last Year: Not on file    Unstable Housing in the Last Year: Not on file       Family History:       Problem Relation Age of Onset    Cancer Mother         LUNG    High Blood Pressure Mother     Diabetes Father         IDDM-LATE IN LIFE    Heart Disease Father         CHF    High Blood Pressure Father        Allergies:  Patient has no known allergies. Home Medications:  Prior to Admission medications    Medication Sig Start Date End Date Taking?  Authorizing Provider   omeprazole (PRILOSEC) 20 MG delayed release capsule Take 1 capsule by mouth daily 3/25/21   Andrea Rai MD   atorvastatin (LIPITOR) 10 MG tablet take 1 tablet by mouth once daily 3/25/21   Andrea Rai MD   fenofibrate (TRIGLIDE) 160 MG corneal ulcer noted w/fluorescein stain, normal IOP   ENT:  moist mucous membranes   NECK:  supple, symmetric, no midline tenderness to palpation    BACK:  without midline tenderness, step-offs or deformities    LUNGS:  Equal air entry bilaterally, tachypneic   CARDIOVASCULAR:  normal s1 / s2   ABDOMEN:  Soft, no rigidity   NEUROLOGIC:  EYE OPENING     Spontaneous - 4 [x]       To voice - 3 []       To pain - 2 []       None - 1 []    VERBAL RESPONSE     Appropriate, oriented - 5 []       Dazed or confused - 4 []       Syllables, expletives - 3 []       Grunts - 2 [x]       None - 1 []    MOTOR RESPONSE     Spontaneous, command - 6 []       Localizes pain - 5 []       Withdraws pain - 4 [x]       Abnormal flexion - 3 []       Abnormal extension - 2 []       None - 1 []            Total GCS: 10    Mental Status:  Awake, not oriented             Cranial Nerves:    Not participating in exam. PERRL    Motor Exam:    Drift:  Cannot be tested  Tone:  normal    Moves all extremities spontaneously but not purposeful.  Does not consistently withdraw from pain    Sensory:    Touch:    Cannot be tested      Plantar Response:    Right:  downgoing  Left:  downgoing    Coordination/Dysmetria:  Heel to Shin:  Cannot be tested  Finger to Nose:   Cannot be tested     SKIN:  no rash      LABS AND IMAGING:     CBC with Differential:    Lab Results   Component Value Date    WBC 27.7 01/09/2022    RBC 5.72 01/09/2022    HGB 13.7 01/09/2022    HCT 43.1 01/09/2022     01/09/2022    MCV 75.3 01/09/2022    MCH 24.0 01/09/2022    MCHC 31.8 01/09/2022    RDW 16.4 01/09/2022    LYMPHOPCT 2 01/09/2022    MONOPCT 5 01/09/2022    BASOPCT 0 01/09/2022    MONOSABS 1.39 01/09/2022    LYMPHSABS 0.55 01/09/2022    EOSABS 0.00 01/09/2022    BASOSABS 0.00 01/09/2022    DIFFTYPE NOT REPORTED 01/09/2022     BMP:    Lab Results   Component Value Date     01/10/2022    K 3.5 01/10/2022     01/10/2022    CO2 18 01/10/2022    BUN 47 01/10/2022    LABALBU 3.6 01/09/2022    CREATININE 1.41 01/10/2022    CREATININE 1.22 01/10/2022    CALCIUM 8.5 01/10/2022    GFRAA >60 01/10/2022    LABGLOM 49 01/10/2022    GLUCOSE 172 01/10/2022       Radiology Review:  CT ABDOMEN PELVIS WO CONTRAST Additional Contrast? None    Result Date: 1/10/2022  Examination is partially degraded by motion related artifact. Focal area of soft tissue thickening which appears to involve a loop of small bowel in the central right mid abdomen. This may be partially exaggerated by the patient motion. Localized infectious or inflammatory versus neoplastic process however is not excluded and short-term follow-up is recommended. Nondisplaced left 6th and 7th rib fractures. Masslike process exophytic from the lateral margin of the right-side of the prostate gland. Correlate with PSA values. Cholelithiasis. RECOMMENDATIONS: Unavailable     XR ELBOW RIGHT (MIN 3 VIEWS)    Result Date: 1/10/2022  No acute osseous abnormality or dislocation involving the right elbow or right forearm. XR RADIUS ULNA RIGHT (2 VIEWS)    Result Date: 1/10/2022  No acute osseous abnormality or dislocation involving the right elbow or right forearm. XR KNEE RIGHT (3 VIEWS)    Result Date: 1/10/2022  1. No convincing acute osseous abnormality. 2. Degenerative changes of the right knee. 3. Trace joint effusion. 4. There appears to be soft tissue swelling anterior to the patella. CT HEAD WO CONTRAST    Result Date: 1/10/2022  Persistent layering debris within the lateral ventricles. There is mild dilatation of the ventricular system. Differential includes isodense subacute hemorrhage versus infection. Follow-up MRI brain with and without contrast is recommended for further evaluation. No acute traumatic injury of the facial bones.  RECOMMENDATIONS: Unavailable     CT HEAD WO CONTRAST    Result Date: 1/9/2022  Debris within the bilateral lateral ventricles more pronounced on the right of uncertain clinical significance or etiology. MRI of the brain with contrast is recommended for further evaluation. Differential diagnosis would include subacute isodense hemorrhage versus possible infectious process. Neoplastic process is not considered due to the normal head CT 1 week ago. Bilateral mastoid effusion Limited evaluation of the cervical spine due to motion. Multilevel degenerative change in the cervical spine. Findings were discussed with Dr. Tosin Au on 01/09/2022 at 7 20 p.m. Inessa Rued RECOMMENDATIONS: Unavailable     CT HEAD WO CONTRAST    Result Date: 1/3/2022  No acute intracranial abnormality. RECOMMENDATIONS: Unavailable     CT FACIAL BONES WO CONTRAST    Result Date: 1/10/2022  Persistent layering debris within the lateral ventricles. There is mild dilatation of the ventricular system. Differential includes isodense subacute hemorrhage versus infection. Follow-up MRI brain with and without contrast is recommended for further evaluation. No acute traumatic injury of the facial bones. RECOMMENDATIONS: Unavailable     CT CERVICAL SPINE WO CONTRAST    Result Date: 1/9/2022  Debris within the bilateral lateral ventricles more pronounced on the right of uncertain clinical significance or etiology. MRI of the brain with contrast is recommended for further evaluation. Differential diagnosis would include subacute isodense hemorrhage versus possible infectious process. Neoplastic process is not considered due to the normal head CT 1 week ago. Bilateral mastoid effusion Limited evaluation of the cervical spine due to motion. Multilevel degenerative change in the cervical spine. Findings were discussed with Dr. Tosin Au on 01/09/2022 at 7 20 p.m. Inessa Rued RECOMMENDATIONS: Unavailable     XR CHEST PORTABLE    Result Date: 1/10/2022  1. Support devices as above. 2. No significant change in the focal opacity within the right lower lung.      XR CHEST PORTABLE    Result Date: 1/10/2022  Right lung base opacity again identified, nonspecific. Please correlate exam findings and exam findings and history. XR CHEST PORTABLE    Result Date: 1/9/2022  Right lung base opacity. Please correlate exam findings. This could represent a focus of pneumonia versus underlying mass. CT CHEST PULMONARY EMBOLISM W CONTRAST    Result Date: 1/9/2022  No evidence of pulmonary embolism to the segmental level. Nonspecific opacification and when question air-fluid level involving the right middle lobe laterally. This demonstrates tiny locules of gas. Infection/Pneumonia and/or cavitary mass lesion may be considered. .  Consider pulmonology consultation. RECOMMENDATIONS: Unavailable     CT LUMBAR SPINE TRAUMA RECONSTRUCTION    Result Date: 1/10/2022  Unremarkable non-contrast CT of the lumbar spine. ASSESSMENT AND PLAN:       Patient Active Problem List   Diagnosis    Hypertension    Hyperlipidemia    BPH with elevated PSA    History of chronic fatigue syndrome    GI bleed    Symptomatic anemia    Iron deficiency anemia    Gastroesophageal reflux disease without esophagitis    Gastritis without bleeding    Vitamin B12 deficiency    BPH with obstruction/lower urinary tract symptoms    Neoplasm of uncertain behavior of skin    Basal cell carcinoma (BCC) of left cheek    Basal cell carcinoma of right nasal sidewall    Basal cell carcinoma of right cheek    Basal cell carcinoma of nasal tip    Basal cell carcinoma of scalp    JOZEF (acute kidney injury) (HCC)    Acute respiratory failure (HCC)    Rhabdomyolysis    Altered mental status    Septicemia (HCC)    Closed fracture of multiple ribs of left side    Abrasion of left cornea    Pneumonia of right middle lobe due to infectious organism    Altered mental state         A/P:  This is a 70 y.o. male with AMS. Evaluated for HA one week ago. Found down for unknown amount of time. Trauma workup negative except for 2 rib fractures.  [facility] w/ventricular debris blood vs infectious etiology. No fevers reported but significant leukocytosis, metabolic labs consistent with rhabdomyolysis, mild JOZEF and elevated lactate. Transferred from OSH for [facility] evaluation. Patient care will be discussed with attending, will reevaluate patient along with attending     - f/u coags   - likely EVD placement   - pan-infectious w/u  - CTLS recommendations: C-spine clear  - HOB: 30 degrees   - Neuro checks per protocol  - Hold all antiplatelets and anticoagulants  - However, we recommend careful evaluation of all other risk factors associated with anticoagulation therapy as applied to this patient's medical condition  - normal blood pressure goals   - Determine the lower limit of SBP clinically based on mentation      Additional recommendations may follow    Please contact neurosurgery with any changes in patients neurologic status. Thank you for your consult.        Bianca Aguillon DO   1/10/2022  7:11 AM

## 2022-01-10 NOTE — ED NOTES
0134: 50mg Etomidae administered RAC 18G followed by 10ml NS flush   0135: 70Mg Ian administered RAC 18G followed by 10ml NS flush   - by SHANTANU Hidalgo   0135: , 100%, RR33, /104 (125)  0135:24 @ mouth   0136: bilateral breath sounds , no sound epigastric   0137: Patient bolus propofal 100mg/100ml vial, 10ml over 2 minuets   0140: NG placed, 75cm @ lip   0145: xray to bedside     Matthew Jurado RN  01/10/22 0145

## 2022-01-10 NOTE — PROGRESS NOTES
[address] [facility] Pharmacokinetic Monitoring Service - Vancomycin     Sanjeev Navarro is a 70 y.o. 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 Pharmacy consulted by Lucila Diaz for monitoring and adjustment. Target Concentration: Goal AUC/CESIA 400-600 mg*hr/L    Additional Antimicrobials: ceftriaxone    Pertinent Laboratory Values: Wt Readings from Last 1 Encounters:   01/09/22 200 lb (90.7 kg)     Temp Readings from Last 1 Encounters:   01/10/22 101.5 °F (38.6 °C) (Rectal)     Estimated Creatinine Clearance: 54 mL/min (A) (based on SCr of 1.41 mg/dL (H)). Recent Labs     01/09/22  1835 01/09/22  1835 01/10/22  0008 01/10/22  0104   CREATININE 1.35*   < > 1.22* 1.41*   WBC 27.7*  --   --   --     < > = values in this interval not displayed.      Procalcitonin:     Pertinent Cultures:  Culture Date Source Results   1/9/22 blood pending   MRSA Nasal Swab:     Plan:    Start vancomycin 1500 mg IV every 24 hours  Anticipated AUC of 493 and trough concentration of 14.4 at steady state  Renal labs as indicated   Pharmacy will continue to monitor patient and adjust therapy as indicated    Thank you for the consult,  Naima Breen, [facility]  1/10/2022 4:10 AM

## 2022-01-10 NOTE — ED NOTES
Assisting with EVD placement at bedside with Neuo- NP  at this time. Call placed to Neuro ICU for RN accompany in assistance of setting up EVD drain .      Jaswinder Atwood RN  01/10/22 [id number]

## 2022-01-10 NOTE — ED NOTES
Patient RR increased with Abdomen breathing. Patient moaning. Vital signs obtained. Trauma Surgery x5 personal at bedside as well as ED resident and RN x2.       Constantino Schultz RN  01/10/22 [id number]

## 2022-01-10 NOTE — PROGRESS NOTES
Neurosurgery MARYANNE/Resident    Daily Progress Note   CC:No chief complaint on file. 1/10/2022  11:05 AM    Chart reviewed. No acute events overnight. No new complaints. Intubated while in ED for decreased mentation and to protect airway. Per POA Siva patient has been sick for past 4 weeks, hypotension and worsening altered mental status. CT head showing hemorrhage vs infectious etiology     Vitals:    01/10/22 0433 01/10/22 0530 01/10/22 0836 01/10/22 0841   BP: (!) 144/98      Pulse: 126      Resp: 16  27    Temp:  101.6 °F (38.7 °C)     TempSrc:  Rectal     SpO2: 100%      Height:    5' 10\" (1.778 m)       PE:   E1 V1T M4   Intubated  PERRL right 1.36 reactive , left 1.42 reactive  Absent cough for me   Absent corneal reflexes   Very minimal withdraw to BUE and BLE to peripheral stimulation  No movement to central stimulation  He does not open his eyes to voice of noxious stimulation    Right frontal EVD placed this morning  Incision: clean dry intact       Lab Results   Component Value Date    WBC 27.7 (H) 01/09/2022    HGB 13.7 01/09/2022    HCT 43.1 01/09/2022     (H) 01/09/2022    CHOL 127 03/15/2021    TRIG 169 (H) 03/15/2021    HDL 27 (L) 03/15/2021    ALT 32 01/09/2022    AST 48 (H) 01/09/2022     (L) 01/10/2022    K 3.5 (L) 01/10/2022     01/10/2022    CREATININE 1.41 (H) 01/10/2022    BUN 47 (H) 01/10/2022    CO2 18 (L) 01/10/2022    TSH 0.50 01/10/2022    PSA 0.95 04/28/2021    INR 1.1 01/10/2022    LABA1C 5.6 09/17/2019       Radiology   CT ABDOMEN PELVIS WO CONTRAST Additional Contrast? None    Result Date: 1/10/2022  EXAMINATION: CT OF THE ABDOMEN AND PELVIS WITHOUT CONTRAST 1/10/2022 1:23 am TECHNIQUE: CT of the abdomen and pelvis was performed without the administration of intravenous contrast. Multiplanar reformatted images are provided for review.  Dose modulation, iterative reconstruction, and/or weight based adjustment of the mA/kV was utilized to reduce the radiation dose to as low as reasonably achievable. COMPARISON: None. HISTORY: ORDERING SYSTEM PROVIDED HISTORY: found down TECHNOLOGIST PROVIDED HISTORY: found down Decision Support Exception - unselect if not a suspected or confirmed emergency medical condition->Emergency Medical Condition (MA) Reason for Exam: Patient found down, AMS, [facility] ams POA found patient today lying on floor, head facing right ulcer to right cheek. unknown amount of time approximately 48hr estimate. Patient transfer from [place]. DX: Rhabdo, glucose 221 FINDINGS: Some images are mildly degraded by patient motion. Cholelithiasis without evidence of cholecystitis. Exam is limited without intravenous contrast. Liver is homogeneous. Spleen is normal in size. Pancreas is unremarkable. Thickening of the adrenal glands, likely due to hyperplasia. No hydronephrosis. Dominant cyst in the right central kidney measuring 5.6 cm and 6 Hounsfield units. Tiny presumed cyst along the lateral margin of the lower left kidney. Scattered vascular calcifications. Abdominal aorta is normal in caliber. Assessment of bowel is limited without oral contrast.  The nasogastric tube terminates in the proximal to mid stomach. No bowel obstruction. Appendix is normal.  Mild diverticulosis without evidence of acute diverticulitis. In the central aspect of the mid abdomen just to the right of midline, on images 71-84, is a focal area of soft tissue thickening and minimal fat stranding which appears to be associated with a loop of small bowel. Some small bowel loops within the central pelvis are mildly fluid distended. The urinary bladder is largely decompressed with Rangel catheter. Heterogeneous enlargement of the prostate gland asymmetric laterally towards the right where there is an exophytic masslike process. Degenerative changes are present in the spine and pelvis.  Please see separate report for CT of the chest performed approximately 6 hours prior on 01/09/2022. Nondisplaced fracture of the lateral left 7th and 6th ribs. Examination is partially degraded by motion related artifact. Focal area of soft tissue thickening which appears to involve a loop of small bowel in the central right mid abdomen. This may be partially exaggerated by the patient motion. Localized infectious or inflammatory versus neoplastic process however is not excluded and short-term follow-up is recommended. Nondisplaced left 6th and 7th rib fractures. Masslike process exophytic from the lateral margin of the right-side of the prostate gland. Correlate with PSA values. Cholelithiasis. RECOMMENDATIONS: Unavailable   CT HEAD WO CONTRAST    Result Date: 1/10/2022  EXAMINATION: CT OF THE HEAD WITHOUT CONTRAST; CT OF THE FACE WITHOUT CONTRAST 1/10/2022 1:20 am TECHNIQUE: CT of the head was performed without the administration of intravenous contrast. Dose modulation, iterative reconstruction, and/or weight based adjustment of the mA/kV was utilized to reduce the radiation dose to as low as reasonably achievable.; CT of the face was performed without the administration of intravenous contrast. Multiplanar reformatted images are provided for review. Dose modulation, iterative reconstruction, and/or weight based adjustment of the mA/kV was utilized to reduce the radiation dose to as low as reasonably achievable. COMPARISON: 01/09/2022 HISTORY: Trauma FINDINGS: CT HEAD: BRAIN/VENTRICLES: Persistent layering debris within the lateral ventricles. There is mild dilatation of the ventricular system. There are changes chronic small vessel ischemic disease and mild generalized atrophy. There is no acute intracranial hemorrhage, mass effect or midline shift. The gray-white differentiation is maintained without evidence of an acute infarct. CT FACIAL BONES: FACIAL BONES: The maxilla, pterygoid plates and zygomatic arches are intact. The mandible is intact.   The mandibular condyles are normally situated. The nasal bones and maxillary nasal processes are intact. ORBITS: The globes appear intact. The extraocular muscles, optic nerve sheath complexes and lacrimal glands appear unremarkable. No retrobulbar hematoma or mass is seen. The orbital walls and rims are intact. SINUSES/MASTOIDS: The paranasal sinuses are well aerated. No acute fracture is seen. Partial opacification of mastoid air cells. SOFT TISSUES: No acute abnormality of the visualized skull or soft tissues. Persistent layering debris within the lateral ventricles. There is mild dilatation of the ventricular system. Differential includes isodense subacute hemorrhage versus infection. Follow-up MRI brain with and without contrast is recommended for further evaluation. No acute traumatic injury of the facial bones. RECOMMENDATIONS: Unavailable     CT HEAD WO CONTRAST    Result Date: 1/9/2022  EXAMINATION: CT OF THE HEAD WITHOUT CONTRAST; CT OF THE CERVICAL SPINE WITHOUT CONTRAST 1/9/2022 7:02 pm TECHNIQUE: CT of the head was performed without the administration of intravenous contrast. Dose modulation, iterative reconstruction, and/or weight based adjustment of the mA/kV was utilized to reduce the radiation dose to as low as reasonably achievable.; CT of the cervical spine was performed without the administration of intravenous contrast. Multiplanar reformatted images are provided for review. Dose modulation, iterative reconstruction, and/or weight based adjustment of the mA/kV was utilized to reduce the radiation dose to as low as reasonably achievable.  COMPARISON: 01/03/2022 HISTORY: ORDERING SYSTEM PROVIDED HISTORY: altered mental status TECHNOLOGIST PROVIDED HISTORY: altered mental status Decision Support Exception - unselect if not a suspected or confirmed emergency medical condition->Emergency Medical Condition (MA) Reason for Exam: Altered mental status, found on the floor of his home today, unknown time of fall FINDINGS: BRAIN/VENTRICLES: There is no evidence for acute intracranial hemorrhage. There is debris within the bilateral lateral ventricles (right greater than left). There is no evidence for acute intracranial hemorrhage or mass lesion. No obvious acute infarct is identified. ORBITS: The visualized portion of the orbits demonstrate no acute abnormality. SINUSES: There is right mastoid effusion. SOFT TISSUES/SKULL:  No acute abnormality of the visualized skull or soft tissues. CT cervical spine: Bilateral mastoid effusion exam is limited by motion artifact. There is multilevel degenerative change in the cervical spine with decreased disc space height and osteophytosis at multiple levels. The exam is limited by motion artifact. There is loss of the normal cervical lordosis with kyphosis. No obvious displaced fracture fragments are identified. Debris within the bilateral lateral ventricles more pronounced on the right of uncertain clinical significance or etiology. MRI of the brain with contrast is recommended for further evaluation. Differential diagnosis would include subacute isodense hemorrhage versus possible infectious process. Neoplastic process is not considered due to the normal head CT 1 week ago. Bilateral mastoid effusion Limited evaluation of the cervical spine due to motion. Multilevel degenerative change in the cervical spine. Findings were discussed with Dr. Selena Levy on 01/09/2022 at 7 20 p.m. Jesus Kumari RECOMMENDATIONS: Unavailable     CT HEAD WO CONTRAST    Result Date: 1/3/2022  EXAMINATION: CT OF THE HEAD WITHOUT CONTRAST  1/3/2022 12:30 pm TECHNIQUE: CT of the head was performed without the administration of intravenous contrast. Dose modulation, iterative reconstruction, and/or weight based adjustment of the mA/kV was utilized to reduce the radiation dose to as low as reasonably achievable. COMPARISON: None.  HISTORY: ORDERING SYSTEM PROVIDED HISTORY: Acute nonintractable headache, unspecified headache type TECHNOLOGIST PROVIDED HISTORY: acute headache. Atypical for patient. Lightheadedness and dizziness Reason for Exam: Throbbing headache, lightheaded and dizzy FINDINGS: BRAIN/VENTRICLES: There is no acute intracranial hemorrhage, mass effect or midline shift. No abnormal extra-axial fluid collection. The gray-white differentiation is maintained without evidence of an acute infarct. There is no evidence of hydrocephalus. There is extensive calcification of the falx and tentorium. Findings are nonspecific but could be related to remote infection or other insult. ORBITS: The visualized portion of the orbits demonstrate no acute abnormality. SINUSES: The visualized paranasal sinuses and mastoid air cells demonstrate no acute abnormality. SOFT TISSUES/SKULL:  No acute abnormality of the visualized skull or soft tissues. No acute intracranial abnormality. RECOMMENDATIONS: Unavailable     CT FACIAL BONES WO CONTRAST    Result Date: 1/10/2022  EXAMINATION: CT OF THE HEAD WITHOUT CONTRAST; CT OF THE FACE WITHOUT CONTRAST 1/10/2022 1:20 am TECHNIQUE: CT of the head was performed without the administration of intravenous contrast. Dose modulation, iterative reconstruction, and/or weight based adjustment of the mA/kV was utilized to reduce the radiation dose to as low as reasonably achievable.; CT of the face was performed without the administration of intravenous contrast. Multiplanar reformatted images are provided for review. Dose modulation, iterative reconstruction, and/or weight based adjustment of the mA/kV was utilized to reduce the radiation dose to as low as reasonably achievable. COMPARISON: 01/09/2022 HISTORY: Trauma FINDINGS: CT HEAD: BRAIN/VENTRICLES: Persistent layering debris within the lateral ventricles. There is mild dilatation of the ventricular system. There are changes chronic small vessel ischemic disease and mild generalized atrophy.   There is no acute intracranial hemorrhage, mass effect or midline shift. The gray-white differentiation is maintained without evidence of an acute infarct. CT FACIAL BONES: FACIAL BONES: The maxilla, pterygoid plates and zygomatic arches are intact. The mandible is intact. The mandibular condyles are normally situated. The nasal bones and maxillary nasal processes are intact. ORBITS: The globes appear intact. The extraocular muscles, optic nerve sheath complexes and lacrimal glands appear unremarkable. No retrobulbar hematoma or mass is seen. The orbital walls and rims are intact. SINUSES/MASTOIDS: The paranasal sinuses are well aerated. No acute fracture is seen. Partial opacification of mastoid air cells. SOFT TISSUES: No acute abnormality of the visualized skull or soft tissues. Persistent layering debris within the lateral ventricles. There is mild dilatation of the ventricular system. Differential includes isodense subacute hemorrhage versus infection. Follow-up MRI brain with and without contrast is recommended for further evaluation. No acute traumatic injury of the facial bones. RECOMMENDATIONS: Unavailable     CT CERVICAL SPINE WO CONTRAST    Result Date: 1/9/2022  EXAMINATION: CT OF THE HEAD WITHOUT CONTRAST; CT OF THE CERVICAL SPINE WITHOUT CONTRAST 1/9/2022 7:02 pm TECHNIQUE: CT of the head was performed without the administration of intravenous contrast. Dose modulation, iterative reconstruction, and/or weight based adjustment of the mA/kV was utilized to reduce the radiation dose to as low as reasonably achievable.; CT of the cervical spine was performed without the administration of intravenous contrast. Multiplanar reformatted images are provided for review. Dose modulation, iterative reconstruction, and/or weight based adjustment of the mA/kV was utilized to reduce the radiation dose to as low as reasonably achievable.  COMPARISON: 01/03/2022 HISTORY: ORDERING SYSTEM PROVIDED HISTORY: altered mental status TECHNOLOGIST PROVIDED HISTORY: altered mental status Decision Support Exception - unselect if not a suspected or confirmed emergency medical condition->Emergency Medical Condition (MA) Reason for Exam: Altered mental status, found on the floor of his home today, unknown time of fall FINDINGS: BRAIN/VENTRICLES: There is no evidence for acute intracranial hemorrhage. There is debris within the bilateral lateral ventricles (right greater than left). There is no evidence for acute intracranial hemorrhage or mass lesion. No obvious acute infarct is identified. ORBITS: The visualized portion of the orbits demonstrate no acute abnormality. SINUSES: There is right mastoid effusion. SOFT TISSUES/SKULL:  No acute abnormality of the visualized skull or soft tissues. CT cervical spine: Bilateral mastoid effusion exam is limited by motion artifact. There is multilevel degenerative change in the cervical spine with decreased disc space height and osteophytosis at multiple levels. The exam is limited by motion artifact. There is loss of the normal cervical lordosis with kyphosis. No obvious displaced fracture fragments are identified. Debris within the bilateral lateral ventricles more pronounced on the right of uncertain clinical significance or etiology. MRI of the brain with contrast is recommended for further evaluation. Differential diagnosis would include subacute isodense hemorrhage versus possible infectious process. Neoplastic process is not considered due to the normal head CT 1 week ago. Bilateral mastoid effusion Limited evaluation of the cervical spine due to motion. Multilevel degenerative change in the cervical spine. Findings were discussed with Dr. Gilda Metzger on 01/09/2022 at 7 20 p.m. Joe Vincent RECOMMENDATIONS: Unavailable     XR CHEST PORTABLE    Result Date: 1/10/2022  EXAMINATION: ONE XRAY VIEW OF THE CHEST 1/10/2022 1:53 am COMPARISON: 01/10/2022.  HISTORY: ORDERING SYSTEM PROVIDED HISTORY: ett TECHNOLOGIST PROVIDED HISTORY: ett Reason for Exam: et tube and ng placement   supine port Initial evaluation. FINDINGS: Endotracheal tube with the tip projecting approximately 6 cm above the emile. Enteric tube coursing below the diaphragm. The side port projects in the region the gastric cardia. The cardiac silhouette is within normal limits for size. There is no significant change in the focal opacity within the right lower lung. No focal consolidation within the left lung. No pleural effusion or pneumothorax. 1. Support devices as above. 2. No significant change in the focal opacity within the right lower lung. XR CHEST PORTABLE    Result Date: 1/10/2022  EXAMINATION: ONE XRAY VIEW OF THE CHEST 1/9/2022 11:49 pm COMPARISON: 01/09/2022 HISTORY: ORDERING SYSTEM PROVIDED HISTORY: transfer TECHNOLOGIST PROVIDED HISTORY: transfer Reason for Exam: found down   supine port FINDINGS: Right lung base masslike opacity opacity again identified. Otherwise unremarkable size. Lungs clear. No pleural effusion pneumothorax. Right lung base opacity again identified, nonspecific. Please correlate exam findings and exam findings and history. XR CHEST PORTABLE    Result Date: 1/9/2022  EXAMINATION: ONE XRAY VIEW OF THE CHEST 1/9/2022 7:25 pm COMPARISON: 12/07/2015 HISTORY: ORDERING SYSTEM PROVIDED HISTORY: altered mental status TECHNOLOGIST PROVIDED HISTORY: altered mental status Reason for Exam: Altered mental status FINDINGS: The cardiomediastinal silhouette is normal in size and contour. Right lung base masslike opacity identified. No pleural effusion or pneumothorax is present. Right lung base opacity. Please correlate exam findings. This could represent a focus of pneumonia versus underlying mass.      CT CHEST PULMONARY EMBOLISM W CONTRAST    Result Date: 1/9/2022  EXAMINATION: CTA OF THE CHEST 1/9/2022 7:40 pm TECHNIQUE: CTA of the chest was performed after the administration of intravenous contrast.  Multiplanar reformatted images are provided for review. MIP images are provided for review. Dose modulation, iterative reconstruction, and/or weight based adjustment of the mA/kV was utilized to reduce the radiation dose to as low as reasonably achievable. COMPARISON: Chest x-ray 01/09/2022 HISTORY: ORDERING SYSTEM PROVIDED HISTORY: elevated d-dimer, mental status change TECHNOLOGIST PROVIDED HISTORY: elevated d-dimer, mental status change Decision Support Exception - unselect if not a suspected or confirmed emergency medical condition->Emergency Medical Condition (MA) Reason for Exam: Elevated d-dimer, mental status change FINDINGS: Pulmonary Arteries: No central lobar pulmonary emboli. Main pulmonary artery is unremarkable caliber. Mediastinum: Air-fluid identified involving esophagus. Slight heterogeneous appearance of the thyroid gland on left. Heart is mildly prominent size. No definite bulky hilar mediastinal adenopathy. Lungs/pleura: Right middle lobe atelectasis versus scarring identified. Within the lateral aspect right middle lobe, there is consolidative versus masslike opacity with air-fluid level. There vessels corresponding through this opacity. The tiny locules of gas as well. Otherwise mild hypoventilatory changes elsewhere the lungs. Upper Abdomen: Evidence of cholelithiasis. Fatty infiltration liver. Soft Tissues/Bones: Degenerate changes of the thoracic spine. No evidence of pulmonary embolism to the segmental level. Nonspecific opacification and when question air-fluid level involving the right middle lobe laterally. This demonstrates tiny locules of gas. Infection/Pneumonia and/or cavitary mass lesion may be considered. .  Consider pulmonology consultation.  RECOMMENDATIONS: Unavailable     CTA HEAD NECK W CONTRAST    Result Date: 1/10/2022  EXAMINATION: CTA OF THE HEAD AND NECK WITH CONTRAST 1/10/2022 8:09 am: TECHNIQUE: CTA of the head and neck was performed with the administration of intravenous contrast. Multiplanar reformatted images are provided for review. MIP images are provided for review. Stenosis of the internal carotid arteries measured using NASCET criteria. Dose modulation, iterative reconstruction, and/or weight based adjustment of the mA/kV was utilized to reduce the radiation dose to as low as reasonably achievable. COMPARISON: CT brain performed 01/10/2022. HISTORY: ORDERING SYSTEM PROVIDED HISTORY: assess for vasculitis TECHNOLOGIST PROVIDED HISTORY: assess for vasculitis Decision Support Exception - unselect if not a suspected or confirmed emergency medical condition->Emergency Medical Condition (MA) Reason for Exam: assess for vasculitis FINDINGS: CTA NECK: AORTIC ARCH/ARCH VESSELS: No dissection or arterial injury. No significant stenosis of the brachiocephalic or subclavian arteries. CAROTID ARTERIES: No dissection, arterial injury, or hemodynamically significant stenosis by NASCET criteria. VERTEBRAL ARTERIES: No dissection, arterial injury, or significant stenosis. There is a dominant right vertebral artery. SOFT TISSUES: The lung apices are clear. No cervical or superior mediastinal lymphadenopathy. The larynx and pharynx are unremarkable. No acute abnormality of the salivary and thyroid glands. BONES: No acute osseous abnormality. CTA HEAD: ANTERIOR CIRCULATION: The internal carotid arteries are patent. The anterior cerebral arteries are patent and mildly diminutive. The middle cerebral arteries demonstrate a mildly beaded pattern in the M1 portions with intermittent foci of mild stenosis. The distal middle cerebral artery branches are diminutive and this is more pronounced on the left compared to the right. POSTERIOR CIRCULATION: There is a dominant right vertebral artery. The vertebral arteries are patent. There is a focus of apparent fenestration within the basilar artery.   There is a focus of mild-to-moderate stenosis in the distal aspect of the basilar artery. The posterior cerebral arteries are patent and mildly diminutive. OTHER: No dural venous sinus thrombosis on this non-dedicated study. BRAIN: No mass effect or midline shift. No extra-axial fluid collection. The gray-white differentiation is maintained. Diminutive anterior cerebral arteries and posterior cerebral arteries. Mildly beaded appearance to the M1 portions of the middle cerebral arteries bilaterally with intermittent foci of mild stenosis. Subsequent diminutive middle cerebral artery branches distally and this is more pronounced on the left compared to the right. These findings are of uncertain etiology although vasculitis is a consideration. RECOMMENDATIONS: Unavailable     CT LUMBAR SPINE TRAUMA RECONSTRUCTION    Result Date: 1/10/2022  EXAMINATION: CT OF THE LUMBAR SPINE WITHOUT CONTRAST  1/10/2022 TECHNIQUE: CT of the lumbar spine was performed without the administration of intravenous contrast. Multiplanar reformatted images are provided for review. Adjustment of mA and/or kV according to patient size was utilized. Dose modulation, iterative reconstruction, and/or weight based adjustment of the mA/kV was utilized to reduce the radiation dose to as low as reasonably achievable. COMPARISON: None HISTORY: ORDERING SYSTEM PROVIDED HISTORY: found down altered TECHNOLOGIST PROVIDED HISTORY: found down altered Reason for Exam: Patient found down, POA in [facility] called [facility]ruday pt ams, POA found patient today lying on floor, head facing right ulcer to right cheek. unknown amount of time approximately 48hr estimate. Patient transfer from [place]. DX: Rhabdo, glucose 221 FINDINGS: BONES/ALIGNMENT: There is normal alignment of the spine. The vertebral body heights are maintained. No osseous destructive lesion is seen. DEGENERATIVE CHANGES: No significant degenerative changes of the lumbar spine. SOFT TISSUES/RETROPERITONEUM: No paraspinal mass is seen.      Unremarkable non-contrast CT of the lumbar spine. A/P  70 y.o. male who presents with altered mental status, ventriculitis     EVD placed this morning pulled back 1 cm  Keep EVD open at 15 cm H20, monitor ICP and record   Continue to monitor neuro status   CSF studies sent   Pending dual MRI brain and cervical spine       Please contact neurosurgery with any changes in patients neurologic status.        Roland Grady CNP  1/10/22  11:05 AM

## 2022-01-10 NOTE — PROCEDURES
INSERTION OF EXTERNAL VENTRICULAR DRAIN     Emergency EVD placement, attempted to call Abhishek High [phone] without answer      Time Out Performed     Preop : mild hydrocephalus and meningitis   Postop: same      Surgeon: Mayra Fernandez NP supervised by Dr. Martin Fonseca     Anesthesia: Local     Procedure: Right frontal ventriculostomy     Imaging and labs reviewed. Right kochers point shaved, prepped, draped in sterile fashion. Local anesthetic with Lidocaine was infiltrated along the planned incision, Sagittal incision with 15 blade over kochers made. A retractor was placed and a loraine hole was created with the twist drill. The dura was opened with the EVD trochar and at [facility], operated by [facility] EVD cathter was passed to a depth of about 6.5cm on the stylet, then advanced off stylet to about 7cm . Catheter tunnelled and secured with nylon. Incision closed and catheter secured to head using combination of nylon running and staples. The EVD was connected to a Louie drainage system and left open at 20cm H2O. The patient tolerated the procedure well without apparent complication. Complications: None     Specimens: CSF studies - cloudy CSF     Findings: normal ICP     Ebl: minimal     Plan: Keep EVD open at 20cm H2O, level with tragus with continuous drainage. Measure ICP; notify physician if ICP >20 for >5mins.   Repeat CTH in 6 hours     ELROY Mai NP  7:31 AM  1/10/2022

## 2022-01-10 NOTE — ED NOTES
Bed: 16  Expected date: 1/9/22  Expected time: 11:20 PM  Means of arrival: Ambulance  Comments:  Shukri Carbajal  72yo  [birth year]  Fall  Found down  AMS  Rhabdo  High WBC count  Debris in ventricles  Unclear if blood  Abx and fluids  Coming by ground   Accepted by Dr Sis Ng, RN  01/09/22 2477

## 2022-01-10 NOTE — ED PROVIDER NOTES
capsule by mouth daily    OMEPRAZOLE (PRILOSEC) 20 MG DELAYED RELEASE CAPSULE    Take 1 capsule by mouth daily       ALLERGIES     has No Known Allergies. FAMILY HISTORY     He indicated that his mother is . He indicated that his father is alive. He indicated that all of his three brothers are alive. He indicated that his maternal grandmother is . He indicated that his maternal grandfather is . He indicated that his paternal grandmother is . He indicated that his paternal grandfather is . family history includes Cancer in his mother; Diabetes in his father; Heart Disease in his father; High Blood Pressure in his father and mother. SOCIAL HISTORY      reports that he has been smoking pipe. He started smoking about 52 years ago. He has a 0.10 pack-year smoking history. He has never used smokeless tobacco. He reports current alcohol use. He reports that he does not use drugs. DIAGNOSTIC RESULTS     EKG: All EKG's are interpreted by the Emergency Department Physician who either signs or Co-signs this chart in the absence of a cardiologist.        RADIOLOGY:   Non-plain film images such as CT, Ultrasound and MRI are read by the radiologist. Plain radiographic images are visualized and the radiologist interpretations are reviewed as follows:   XR CHEST PORTABLE   Final Result   Right lung base opacity. Please correlate exam findings. This could   represent a focus of pneumonia versus underlying mass. CT HEAD WO CONTRAST   Final Result   Debris within the bilateral lateral ventricles more pronounced on the right   of uncertain clinical significance or etiology. MRI of the brain with   contrast is recommended for further evaluation. Differential diagnosis would   include subacute isodense hemorrhage versus possible infectious process. Neoplastic process is not considered due to the normal head CT 1 week ago.       Bilateral mastoid effusion      Limited evaluation of the cervical spine due to motion. Multilevel   degenerative change in the cervical spine. Findings were discussed with Dr. Domenico Best on 01/09/2022 at 7 20   p.m. Rometta Favre RECOMMENDATIONS:   Unavailable         CT CERVICAL SPINE WO CONTRAST   Final Result   Debris within the bilateral lateral ventricles more pronounced on the right   of uncertain clinical significance or etiology. MRI of the brain with   contrast is recommended for further evaluation. Differential diagnosis would   include subacute isodense hemorrhage versus possible infectious process. Neoplastic process is not considered due to the normal head CT 1 week ago. Bilateral mastoid effusion      Limited evaluation of the cervical spine due to motion. Multilevel   degenerative change in the cervical spine. Findings were discussed with Dr. Domenico Best on 01/09/2022 at 7 20   p.m. Rometta Favre RECOMMENDATIONS:   Unavailable         CT CHEST PULMONARY EMBOLISM W CONTRAST    (Results Pending)       CT HEAD WO CONTRAST    Result Date: 1/9/2022  EXAMINATION: CT OF THE HEAD WITHOUT CONTRAST; CT OF THE CERVICAL SPINE WITHOUT CONTRAST 1/9/2022 7:02 pm TECHNIQUE: CT of the head was performed without the administration of intravenous contrast. Dose modulation, iterative reconstruction, and/or weight based adjustment of the mA/kV was utilized to reduce the radiation dose to as low as reasonably achievable.; CT of the cervical spine was performed without the administration of intravenous contrast. Multiplanar reformatted images are provided for review. Dose modulation, iterative reconstruction, and/or weight based adjustment of the mA/kV was utilized to reduce the radiation dose to as low as reasonably achievable.  COMPARISON: 01/03/2022 HISTORY: ORDERING SYSTEM PROVIDED HISTORY: altered mental status TECHNOLOGIST PROVIDED HISTORY: altered mental status Decision Support Exception - unselect if not a suspected or confirmed emergency medical condition->Emergency Medical Condition (MA) Reason for Exam: Altered mental status, found on the floor of his home today, unknown time of fall FINDINGS: BRAIN/VENTRICLES: There is no evidence for acute intracranial hemorrhage. There is debris within the bilateral lateral ventricles (right greater than left). There is no evidence for acute intracranial hemorrhage or mass lesion. No obvious acute infarct is identified. ORBITS: The visualized portion of the orbits demonstrate no acute abnormality. SINUSES: There is right mastoid effusion. SOFT TISSUES/SKULL:  No acute abnormality of the visualized skull or soft tissues. CT cervical spine: Bilateral mastoid effusion exam is limited by motion artifact. There is multilevel degenerative change in the cervical spine with decreased disc space height and osteophytosis at multiple levels. The exam is limited by motion artifact. There is loss of the normal cervical lordosis with kyphosis. No obvious displaced fracture fragments are identified. Debris within the bilateral lateral ventricles more pronounced on the right of uncertain clinical significance or etiology. MRI of the brain with contrast is recommended for further evaluation. Differential diagnosis would include subacute isodense hemorrhage versus possible infectious process. Neoplastic process is not considered due to the normal head CT 1 week ago. Bilateral mastoid effusion Limited evaluation of the cervical spine due to motion. Multilevel degenerative change in the cervical spine. Findings were discussed with Dr. Army Flores on 01/09/2022 at 7 20 p.m. Ga Shaver  RECOMMENDATIONS: Unavailable     CT HEAD WO CONTRAST    Result Date: 1/3/2022  EXAMINATION: CT OF THE HEAD WITHOUT CONTRAST  1/3/2022 12:30 pm TECHNIQUE: CT of the head was performed without the administration of intravenous contrast. Dose modulation, iterative reconstruction, and/or weight based adjustment of the mA/kV was utilized to reduce the radiation dose to as low as reasonably achievable. COMPARISON: None. HISTORY: ORDERING SYSTEM PROVIDED HISTORY: Acute nonintractable headache, unspecified headache type TECHNOLOGIST PROVIDED HISTORY: acute headache. Atypical for patient. Lightheadedness and dizziness Reason for Exam: Throbbing headache, lightheaded and dizzy FINDINGS: BRAIN/VENTRICLES: There is no acute intracranial hemorrhage, mass effect or midline shift. No abnormal extra-axial fluid collection. The gray-white differentiation is maintained without evidence of an acute infarct. There is no evidence of hydrocephalus. There is extensive calcification of the falx and tentorium. Findings are nonspecific but could be related to remote infection or other insult. ORBITS: The visualized portion of the orbits demonstrate no acute abnormality. SINUSES: The visualized paranasal sinuses and mastoid air cells demonstrate no acute abnormality. SOFT TISSUES/SKULL:  No acute abnormality of the visualized skull or soft tissues. No acute intracranial abnormality. RECOMMENDATIONS: Unavailable     CT CERVICAL SPINE WO CONTRAST    Result Date: 1/9/2022  EXAMINATION: CT OF THE HEAD WITHOUT CONTRAST; CT OF THE CERVICAL SPINE WITHOUT CONTRAST 1/9/2022 7:02 pm TECHNIQUE: CT of the head was performed without the administration of intravenous contrast. Dose modulation, iterative reconstruction, and/or weight based adjustment of the mA/kV was utilized to reduce the radiation dose to as low as reasonably achievable.; CT of the cervical spine was performed without the administration of intravenous contrast. Multiplanar reformatted images are provided for review. Dose modulation, iterative reconstruction, and/or weight based adjustment of the mA/kV was utilized to reduce the radiation dose to as low as reasonably achievable.  COMPARISON: 01/03/2022 HISTORY: ORDERING SYSTEM PROVIDED HISTORY: altered mental status TECHNOLOGIST PROVIDED HISTORY: altered mental status Decision Support Exception - unselect if not a suspected or confirmed emergency medical condition->Emergency Medical Condition (MA) Reason for Exam: Altered mental status, found on the floor of his home today, unknown time of fall FINDINGS: BRAIN/VENTRICLES: There is no evidence for acute intracranial hemorrhage. There is debris within the bilateral lateral ventricles (right greater than left). There is no evidence for acute intracranial hemorrhage or mass lesion. No obvious acute infarct is identified. ORBITS: The visualized portion of the orbits demonstrate no acute abnormality. SINUSES: There is right mastoid effusion. SOFT TISSUES/SKULL:  No acute abnormality of the visualized skull or soft tissues. CT cervical spine: Bilateral mastoid effusion exam is limited by motion artifact. There is multilevel degenerative change in the cervical spine with decreased disc space height and osteophytosis at multiple levels. The exam is limited by motion artifact. There is loss of the normal cervical lordosis with kyphosis. No obvious displaced fracture fragments are identified. Debris within the bilateral lateral ventricles more pronounced on the right of uncertain clinical significance or etiology. MRI of the brain with contrast is recommended for further evaluation. Differential diagnosis would include subacute isodense hemorrhage versus possible infectious process. Neoplastic process is not considered due to the normal head CT 1 week ago. Bilateral mastoid effusion Limited evaluation of the cervical spine due to motion. Multilevel degenerative change in the cervical spine. Findings were discussed with Dr. Rene Ceron on 01/09/2022 at 7 20 p.m. Silvia Freedman  RECOMMENDATIONS: Unavailable     XR CHEST PORTABLE    Result Date: 1/9/2022  EXAMINATION: ONE XRAY VIEW OF THE CHEST 1/9/2022 7:25 pm COMPARISON: 12/07/2015 HISTORY: ORDERING SYSTEM PROVIDED HISTORY: altered mental status TECHNOLOGIST PROVIDED HISTORY: altered mental status Reason for Exam: Altered mental status FINDINGS: The cardiomediastinal silhouette is normal in size and contour. Right lung base masslike opacity identified. No pleural effusion or pneumothorax is present. Right lung base opacity. Please correlate exam findings. This could represent a focus of pneumonia versus underlying mass. LABS:  Results for orders placed or performed during the hospital encounter of 01/09/22   COVID-19, Rapid    Specimen: Nasopharyngeal Swab   Result Value Ref Range    Specimen Description . NASOPHARYNGEAL SWAB     SARS-CoV-2, Rapid Not Detected Not Detected   Basic Metabolic Panel   Result Value Ref Range    Glucose 210 (H) 70 - 99 mg/dL    BUN 56 (H) 8 - 23 mg/dL    CREATININE 1.35 (H) 0.70 - 1.20 mg/dL    Bun/Cre Ratio 41 (H) 9 - 20    Calcium 10.0 8.6 - 10.4 mg/dL    Sodium 134 (L) 135 - 144 mmol/L    Potassium 3.7 3.7 - 5.3 mmol/L    Chloride 97 (L) 98 - 107 mmol/L    CO2 22 20 - 31 mmol/L    Anion Gap 15 9 - 17 mmol/L    GFR Non-African American 52 (L) >60 mL/min    GFR African American >60 >60 mL/min    GFR Comment          GFR Staging NOT REPORTED    CBC Auto Differential   Result Value Ref Range    WBC 27.7 (H) 3.5 - 11.3 k/uL    RBC 5.72 4.21 - 5.77 m/uL    Hemoglobin 13.7 13.0 - 17.0 g/dL    Hematocrit 43.1 40.7 - 50.3 %    MCV 75.3 (L) 82.6 - 102.9 fL    MCH 24.0 (L) 25.2 - 33.5 pg    MCHC 31.8 25.2 - 33.5 g/dL    RDW 16.4 (H) 11.8 - 14.4 %    Platelets 719 (H) 298 - 453 k/uL    MPV 10.3 8.1 - 13.5 fL    NRBC Automated 0.0 0.0 per 100 WBC    Differential Type NOT REPORTED     WBC Morphology NOT REPORTED     RBC Morphology NOT REPORTED     Platelet Estimate NOT REPORTED     Monocytes 5 (L) 6 - 14 %    Lymphocytes 2 (L) 15 - 43 %    Seg Neutrophils 93 (H) 44 - 74 %    Eosinophils % 0 (L) 1 - 8 %    Basophils 0 0 - 2 %    Immature Granulocytes 0 0 %    Absolute Mono # 1.39 (H) 0.1 - 1.2 k/uL    Absolute Lymph # 0.55 (L) 1.0 - 4.8 k/uL    Segs Absolute 25.76 (H) 1.50 - 8.10 k/uL    Absolute Eos # 0.00 0.0 - 0.4 k/uL    Basophils Absolute 0.00 0.0 - 0.2 k/uL    Absolute Immature Granulocyte 0.00 0.00 - 0.30 k/uL    Morphology Platelet count adequate    Hepatic Function Panel   Result Value Ref Range    Albumin 3.6 3.5 - 5.2 g/dL    Alkaline Phosphatase 85 40 - 129 U/L    ALT 32 5 - 41 U/L    AST 48 (H) <40 U/L    Total Bilirubin 0.93 0.3 - 1.2 mg/dL    Bilirubin, Direct 0.31 (H) <0.31 mg/dL    Bilirubin, Indirect 0.62 0.00 - 1.00 mg/dL    Total Protein 8.1 6.4 - 8.3 g/dL    Globulin 4.5 (H) 1.5 - 3.8 g/dL    Albumin/Globulin Ratio 0.8 (L) 1.0 - 2.5   Lactate, Sepsis   Result Value Ref Range    Lactic Acid, Sepsis 2.2 (H) 0.5 - 1.9 mmol/L    Lactic Acid, Sepsis, Whole Blood NOT REPORTED 0.5 - 1.9 mmol/L   Myoglobin, Serum   Result Value Ref Range    Myoglobin 2,634 (H) 28 - 72 ng/mL   CK   Result Value Ref Range    Total CK 2,165 (H) 39 - 308 U/L   CK MB   Result Value Ref Range    CK-MB 28.4 (H) <10.5 ng/mL   Troponin   Result Value Ref Range    Troponin, High Sensitivity 17 0 - 22 ng/L    Troponin T NOT REPORTED <0.03 ng/mL    Troponin Interp NOT REPORTED    Ammonia   Result Value Ref Range    Ammonia 11 (L) 16 - 60 umol/L   D-Dimer, Quantitative   Result Value Ref Range    D-Dimer, Quant 3.48 (H) 0.00 - 0.59 mg/L FEU         EMERGENCY DEPARTMENT COURSE:   Recent Vitals:    Vitals:    01/09/22 1817 01/09/22 1837 01/09/22 1916   BP: (!) 174/116 (!) 165/104 (!) 182/107   Pulse: 117 109 100   Resp: 20 16 16   Temp: 95.3 °F (35.2 °C)  96.4 °F (35.8 °C)   TempSrc: Tympanic  Tympanic   SpO2: 97% 98% 96%   Weight: 200 lb (90.7 kg)     Height: 5' 10\" (1.778 m)       -------------------------  BP: (!) 182/107, Temp: 96.4 °F (35.8 °C), Pulse: 100, Resp: 16      The patient was given the following medications:  Orders Placed This Encounter   Medications    0.9 % sodium chloride bolus    cefTRIAXone (ROCEPHIN) 1000 mg IVPB in 50 mL D5W minibag Order Specific Question:   Antimicrobial Indications     Answer:   Sepsis of Unknown Etiology    vancomycin 1000 mg IVPB in 250 mL D5W addavial     Order Specific Question:   Antimicrobial Indications     Answer:   Sepsis of Unknown Etiology    iopamidol (ISOVUE-370) 76 % injection 80 mL    vancomycin (VANCOCIN) 1 g injection     ASAD CARUSO: isabela override           MEDICAL DECISION MAKING:     Patient presents with traumatic rhabdomyolysis. There is some question of ventricular debris on CT of the head. There is also question of infection. Patient is receiving antibiotics and IV fluids. Renal function is slightly elevated but not at acute kidney injury at this time. Leukocytosis noted. Patient does respond to pain and voice but does not otherwise interact with me. Does not follow commands. His eyes are open and he is protecting his airway well. I spoke with Dr. Laila Christian at [facility] emergency department as I feel the patient may need neurosurgical consult and possible intervention. They accepted transfer of the patient. Currently awaiting EMS transport. We will continue to monitor until time of transport. CONSULTS:    None    CRITICAL CARE:     None    PROCEDURES:    None    FINAL IMPRESSION      1. Traumatic rhabdomyolysis, initial encounter ([facility] [address]    2. Altered mental status, unspecified altered mental status type          DISPOSITION/PLAN   DISPOSITION Decision To Transfer 01/09/2022 07:57:53 PM      Condition on Disposition    Improved    PATIENT REFERRED TO:  No follow-up provider specified. DISCHARGE MEDICATIONS:  New Prescriptions    No medications on file       (Please note that portions of this note were completed with a voice recognition program.  Efforts were made to edit the dictations but occasionally words are mis-transcribed.)        Arian Jiang D.O.   Emergency Medicine Attending        Martell Dickinson, DO  01/09/22 2043

## 2022-01-10 NOTE — PROGRESS NOTES
The transport originated from ED room 16. Pt. was transported to CT and back to ED room 16. [facility] Appropriate devices were applied to monitor the patient's condition during transport. Patient transported  via 100% O2 via ventilator. Patient tolerated well.         Alexx Gray RCP  2:45 AM

## 2022-01-10 NOTE — H&P
TRAUMA HISTORY AND PHYSICAL EXAMINATION    PATIENT NAME: Peg Muniz  YOB: 1950  MEDICAL RECORD NO. [id number]   DATE: 1/10/2022  PRIMARY CARE PHYSICIAN: Maria A Carrero MD  PATIENT EVALUATED AT THE REQUEST OF DRMarina: [address] [facility] [address]   []Trauma Alert     [] Trauma Priority     [x]Trauma Consult     IMPRESSION:     Patient Active Problem List   Diagnosis    Hypertension    Hyperlipidemia    BPH with elevated PSA    History of chronic fatigue syndrome    GI bleed    Symptomatic anemia    Iron deficiency anemia    Gastroesophageal reflux disease without esophagitis    Gastritis without bleeding    Vitamin B12 deficiency    BPH with obstruction/lower urinary tract symptoms    Neoplasm of uncertain behavior of skin    Basal cell carcinoma (BCC) of left cheek    Basal cell carcinoma of right nasal sidewall    Basal cell carcinoma of right cheek    Basal cell carcinoma of nasal tip    Basal cell carcinoma of scalp    JOZEF (acute kidney injury) (HCC)    Acute respiratory failure (HCC)    Rhabdomyolysis    Altered mental status    Septicemia (HCC)    Closed fracture of multiple ribs of left side    Abrasion of left cornea    Pneumonia of right middle lobe due to infectious organism       MEDICAL DECISION MAKING AND PLAN:     F/u scans  F/u Neuro and NS recs  Rec meningitis eval and treatment  Agree w/PNA treatment  MRI brain w and w/out    CONSULT SERVICES    [x] Neurosurgery     [] Orthopedic Surgery    [] Cardiothoracic     [] Facial Trauma    [] Plastic Surgery (Burn)    [] Pediatric Surgery     [] Internal Medicine    [] Pulmonary Medicine    [x] Other: Neurology       HISTORY:     Chief Complaint:  \"fall with rhabdo\"    INJURY SUMMARY  Rib fx - left, 6-7  Rt middle lobe opacification with air-fluid level - poss PNA, also with elevated procal, d-dimer, wbc, lactic  Debris in lateral ventricles - infection vs isodense subacute hemorrhage    GENERAL DATA  Age 70 y.o.  male Patient information was obtained from relative(s) and past medical records. History/Exam limitations: mental status. Patient presented to the Emergency Department by ambulance where the patient received vancomycin and ceftriaxone prior to arrival.  Injury Date: 1/10/2022   Approximate Injury Time: unknown        Transport mode:   [x]Ambulance      [] Helicopter     []Car       [] Other  Referring Hospital: [place] [id number] (e.g., home, farm, industry, street)  Specific Details of Location (e.g., bedroom, kitchen, garage): kitchen  Type of Residence (if occurred in home setting) (e.g., apartment, mobile home, single family home): unknown    MECHANISM OF INJURY    [x] Fall    []From Standing     []From Height  Ft     []Down Stairs ___steps    HISTORY:     Carson Low is a 70 y.o. male that presented to the Emergency Department as a transfer from OSH after being found down in his home. Last known well about 48 hrs prior. He was seen by his PCP one week prior for cough, congestion, and severe headache for which a head CT was completed. Cousin states he has been having headaches for several weeks and his blood pressure medication had been adjusted (was told to stop but then restarted due to high home readings). At OSH a stroke evaluation was completed as he was altered and had some poss right sided weakness. CT head with debris in the lateral ventricles. He was transferred to [facility] for NS evaluation. He arrived hypothermic and with rigors and was placed on a Pritesh hugger, later was febrile and rigoring (vs poss seizure like activity). Shortly after arrival to [facility] he became less responsive with marked tachypnea and was intubated.          Loss of Consciousness []No   []Yes Duration(min)       [x] Unknown     Total Fluids Given Prior To Arrival  mL    MEDICATIONS:   []  None     []  Information not available due to exam limitations documented above  Prior to Admission medications    Medication his mother; Diabetes in his father; Heart Disease in his father; High Blood Pressure in his father and mother. SOCIAL HISTORY  []   Information not available due to exam limitations documented above     reports that he has been smoking pipe. He started smoking about 52 years ago. He has a 0.10 pack-year smoking history. He has never used smokeless tobacco.   reports current alcohol use. reports no history of drug use. PERTINENT SYSTEMIC REVIEW:    []   Information not available due to exam limitations documented above    Pertinent items are noted in HPI. PHYSICAL EXAMINATION:     GLASCOW COMA SCALE  NEUROMUSCULAR BLOCKADE PRIOR TO ARRIVAL     [x]No        []Yes      Variable  Score   Variable  Score  Eye opening []Spontaneous 4 Verbal  []Oriented  5     []To voice  3   []Confused  4    [x]To pain  2   []Inapp words  3    []None  1   [x]Incomp words 2       []None  1   Motor   []Obeys  6    []Localizes pain 5    [x]Withdraws(pain) 4    []Flexion(pain) 3  []Extension(pain) 2    []None  1     GCS Total = 8 (11 on arrival)    PHYSICAL EXAMINATION    VITAL SIGNS:   Vitals:    01/10/22 0433   BP: (!) 144/98   Pulse: 126   Resp: 16   Temp:    SpO2: 100%         Physical Exam  Constitutional:       General: He is in acute distress. Appearance: He is ill-appearing and toxic-appearing. HENT:      Head:      Comments: Left eye with conjunctival swelling and erythema, abrasion to left cheek     Nose: Nose normal.      Mouth/Throat:      Comments: Jaw clenched  Eyes:      Comments: Unable to participate in exam, clenches eyes in response to light, pupils 2mm and sluggish   Cardiovascular:      Rate and Rhythm: Tachycardia present. Pulses: Normal pulses. Pulmonary:      Effort: Respiratory distress present. Abdominal:      General: Abdomen is flat. Palpations: Abdomen is soft. Musculoskeletal:         General: Signs of injury present. Cervical back: Rigidity and tenderness present. Comments: Extremities shaking and rigid with flexion  Right knee bruise   Skin:     General: Skin is warm and dry. Capillary Refill: Capillary refill takes 2 to 3 seconds. Coloration: Skin is pale. Neurological:      Comments: Holding head and staring to the left, no meaningful eye contact, some slight retraction from pain        FOCUSED ABDOMINAL SONOGRAM FOR TRAUMA (FAST): A good  quality examination was performed by Dr. Kane Parekh and representative images were obtained. [x] No free fluid in the abdomen   [] Free fluid in RUQ   [] Free fluid in LUQ  [] Free fluid in Pelvis  [] Pericardial fluid  [] Other: left renal cyst        RADIOLOGY  CT LUMBAR SPINE TRAUMA RECONSTRUCTION   Final Result   Unremarkable non-contrast CT of the lumbar spine. CT ABDOMEN PELVIS WO CONTRAST Additional Contrast? None   Final Result   Examination is partially degraded by motion related artifact. Focal area of soft tissue thickening which appears to involve a loop of small   bowel in the central right mid abdomen. This may be partially exaggerated by   the patient motion. Localized infectious or inflammatory versus neoplastic   process however is not excluded and short-term follow-up is recommended. Nondisplaced left 6th and 7th rib fractures. Masslike process exophytic from the lateral margin of the right-side of the   prostate gland. Correlate with PSA values. Cholelithiasis. RECOMMENDATIONS:   Unavailable         CT HEAD WO CONTRAST   Final Result   Persistent layering debris within the lateral ventricles. There is mild   dilatation of the ventricular system. Differential includes isodense   subacute hemorrhage versus infection. Follow-up MRI brain with and without   contrast is recommended for further evaluation. No acute traumatic injury of the facial bones.       RECOMMENDATIONS:   Unavailable         CT FACIAL BONES WO CONTRAST   Final Result   Persistent layering debris within the lateral ventricles. There is mild   dilatation of the ventricular system. Differential includes isodense   subacute hemorrhage versus infection. Follow-up MRI brain with and without   contrast is recommended for further evaluation. No acute traumatic injury of the facial bones. RECOMMENDATIONS:   Unavailable         XR CHEST PORTABLE   Final Result   1. Support devices as above. 2. No significant change in the focal opacity within the right lower lung. XR ELBOW RIGHT (MIN 3 VIEWS)   Final Result   No acute osseous abnormality or dislocation involving the right elbow or   right forearm. XR RADIUS ULNA RIGHT (2 VIEWS)   Final Result   No acute osseous abnormality or dislocation involving the right elbow or   right forearm. XR KNEE RIGHT (3 VIEWS)   Final Result   1. No convincing acute osseous abnormality. 2. Degenerative changes of the right knee. 3. Trace joint effusion. 4. There appears to be soft tissue swelling anterior to the patella. XR CHEST PORTABLE   Final Result      Right lung base opacity again identified, nonspecific. Please correlate exam   findings and exam findings and history. CT THORACIC SPINE TRAUMA RECONSTRUCTION    (Results Pending)         LABS    Labs Reviewed   BASIC METABOLIC PANEL W/ REFLEX TO MG FOR LOW K - Abnormal; Notable for the following components:       Result Value    Glucose 172 (*)     BUN 47 (*)     CREATININE 1.22 (*)     Calcium 8.5 (*)     Sodium 133 (*)     Potassium 3.5 (*)     CO2 18 (*)     GFR Non- 59 (*)     All other components within normal limits   TOX SCR, BLD, ED - Abnormal; Notable for the following components:    Acetaminophen Level <5 (*)     Salicylate Lvl <1 (*)     All other components within normal limits   CK - Abnormal; Notable for the following components:     Total CK 1,959 (*)     All other components within normal limits   MYOGLOBIN, SERUM - Abnormal; Notable for the following components:    Myoglobin 2,685 (*)     All other components within normal limits   PROCALCITONIN - Abnormal; Notable for the following components:    Procalcitonin 0.64 (*)     All other components within normal limits   VENOUS BLOOD GAS, POINT OF CARE - Abnormal; Notable for the following components:    pH, Abel 7.475 (*)     pCO2, Abel 28.5 (*)     HCO3, Venous 21.0 (*)     All other components within normal limits   CREATININE W/GFR POINT OF CARE - Abnormal; Notable for the following components:    POC Creatinine 1.41 (*)     GFR Comment 60 (*)     GFR Non- 49 (*)     All other components within normal limits   UREA N (POC) - Abnormal; Notable for the following components:    POC BUN 48 (*)     All other components within normal limits   LACTIC ACID,POINT OF CARE - Abnormal; Notable for the following components:    POC Lactic Acid 1.54 (*)     All other components within normal limits   POCT GLUCOSE - Abnormal; Notable for the following components:    POC Glucose 211 (*)     All other components within normal limits   COVID-19, RAPID   TROPONIN   PHOSPHORUS   LACTIC ACID, WHOLE BLOOD   URINE DRUG SCREEN   TSH WITH REFLEX   MAGNESIUM   ELECTROLYTES PLUS   HGB/HCT   CALCIUM, IONIC (POC)   POC BLOOD GAS AND CHEMISTRY         Coty Barclay MD  1/10/22, 5:01 AM

## 2022-01-11 NOTE — PLAN OF CARE
Problem: Falls - Risk of:  Goal: Will remain free from falls  Description: Will remain free from falls  Outcome: Ongoing  Goal: Absence of physical injury  Description: Absence of physical injury  Outcome: Ongoing     Problem: Nutrition  Goal: Optimal nutrition therapy  Outcome: Ongoing     Problem: OXYGENATION/RESPIRATORY FUNCTION  Goal: Patient will maintain patent airway  1/11/2022 0541 by Lázaro Myers RN  Outcome: Ongoing  1/10/2022 2142 by Rajinder Quinteros RCP  Outcome: Ongoing  Goal: Patient will achieve/maintain normal respiratory rate/effort  Description: Respiratory rate and effort will be within normal limits for the patient  1/11/2022 0541 by Lázaro Myers RN  Outcome: Ongoing  1/10/2022 2142 by Rajinder Quinteros RCP  Outcome: Ongoing     Problem: MECHANICAL VENTILATION  Goal: Patient will maintain patent airway  1/11/2022 0541 by Lázaro Myers RN  Outcome: Ongoing  1/10/2022 2142 by Rajinder Quinteros RCP  Outcome: Ongoing  Goal: Oral health is maintained or improved  1/11/2022 0541 by Lázaro Myers RN  Outcome: Ongoing  1/10/2022 2142 by Rajinder Quinteros RCP  Outcome: Ongoing  Goal: ET tube will be managed safely  1/11/2022 0541 by Lázaro Myers RN  Outcome: Ongoing  1/10/2022 2142 by Rajinder Quinteros RCP  Outcome: Ongoing  Goal: Ability to express needs and understand communication  1/11/2022 0541 by Lázaro Myers RN  Outcome: Ongoing  1/10/2022 2142 by Rajinder Quinteros RCP  Outcome: Ongoing  Goal: Mobility/activity is maintained at optimum level for patient  1/11/2022 0541 by Lázaro Myers RN  Outcome: Ongoing  1/10/2022 2142 by Rajinder Quinteros RCP  Outcome: Ongoing     Problem: SKIN INTEGRITY  Goal: Skin integrity is maintained or improved  1/11/2022 0541 by Lázaro Myers RN  Outcome: Ongoing  1/10/2022 2142 by Rajinder Quinteros RCP  Outcome: Ongoing     Problem: NUTRITION  Goal: Nutritional status is improving  Outcome: Ongoing     Problem: Skin Integrity:  Goal: Will show no infection signs and symptoms  Description: Will show no infection signs and symptoms  Outcome: Ongoing  Goal: Absence of new skin breakdown  Description: Absence of new skin breakdown  Outcome: Ongoing

## 2022-01-11 NOTE — PROGRESS NOTES
PULMONARY & CRITICAL CARE MEDICINE PROGRESS NOTE     Patient:  Collin Palmer  MRN: [id number]  [facility] date: 1/9/2022  Primary Care Physician: Asher Patino MD  Consulting Physician: Ida Valencia MD  CODE Status: Full Code  LOS: 1     SUBJECTIVE     CHIEF COMPLAINT/REASON FOR INITIAL CONSULT: RML cavitary lesion and acute hypoxic respiratory failure in the setting of CNS infection    BRIEF HOSPITAL COURSE:   History obtained from chart review and unobtainable from patient due to mental status and and being on the ventilator. Collin Palmer is a 70 y.o. white gentleman was admitted with altered mental status with complaining of having headaches. Patient was apparently found down at home which was felt to be could be as long as 48 hours. In the emergency room radiologic studies showed debris within the bilateral lateral ventricles of the brain which was felt to be related to hemorrhage or infection. Radiologic studies also revealed right middle lobe cavitary lesion. Patient had leukocytosis; microbiologic studies so far are unrevealing for any infectious process. Patient is currently on ceftriaxone, vancomycin and ampicillin.   Having moderate amount of clear secretions from the endotracheal tube  Hemodynamically stable not requiring any pressors  No purulent secretions or blood from the endotracheal tube  Patient is having a fever      INTERVAL HISTORY:  01/11/22  Patient remains on mechanical ventilation, FiO2 30%, PEEP 5, sat 99%  No purulent secretions or blood from ETT  T-max in the past 48 hours is 101.6, fever now resolved  WBC 27->19  Off sedation, not following commands  HDS, not on pressors  JOZEF improving      REVIEW OF SYSTEMS:  Unobtainable from patient due to sedation/mechanical ventilation    OBJECTIVE     VENTILATOR SETTINGS:  Vent Information  $Ventilation: $Initial Day  Skin Assessment: Clean, dry, & intact  Equipment Changed: HME  Vent Type: Servo i  Vent Mode: (S) PRVC  Vt Ordered: 580 mL  Rate Set: 16 bmp  Pressure Support: (S) 7 cmH20  FiO2 : 30 %  SpO2: 100 %  SpO2/FiO2 ratio: 330  Sensitivity: 3  PEEP/CPAP: 5  I Time/ I Time %: 39 s  Humidification Source: HME     PaO2/FiO2 RATIO:  Recent Labs     22  0252   POCPO2 124.7*      FiO2 : 30 %     VITAL SIGNS:   LAST:  BP (!) 154/91   Pulse 81   Temp 98 °F (36.7 °C)   Resp 16   Ht 5' 10\" (1.778 m)   SpO2 100%   BMI 28.70 kg/m²   8-24 HR RANGE:  TEMP Temp  Av.9 °F (36.6 °C)  Min: 97.7 °F (36.5 °C)  Max: 98.3 °F (44.6 °C)   BP Systolic (45DDT), TPS:485 , Min:84 , PDN:526      Diastolic (63NLB), WHD:56, Min:66, Max:91     PULSE Pulse  Av.7  Min: 81  Max: 124   RR Resp  Av.1  Min: 16  Max: 20   O2 SAT SpO2  Av.3 %  Min: 98 %  Max: 100 %   OXYGEN DELIVERY No data recorded        SYSTEMIC EXAMINATION:   General appearance - Mechanically ventilated, ill-appearing  Mental status - somnolent  Eyes - pupils equal and reactive, sclera anicteric  Mouth - mucous membranes moist, pharynx normal without lesions  Neck - supple, no significant adenopathy, carotids upstroke normal bilaterally, no bruits  Chest - Breath sounds bilaterally were dimnished to auscultation at bases. There were no wheezes, rhonchi or rales.   There is no intercostal recession or use of accessory muscles  Heart - normal rate, regular rhythm, normal S1, S2, no murmurs, rubs, clicks or gallops  Abdomen - soft, nontender, nondistended, no masses or organomegaly  Neurological - DTR's normal and symmetric, motor and sensory grossly normal bilaterally  Extremities - peripheral pulses normal, no pedal edema, no clubbing or cyanosis  Skin - normal coloration and turgor, no rashes, no suspicious skin lesions noted     DATA REVIEW     Medications:  Scheduled Meds:   insulin lispro  0-6 Units SubCUTAneous 4 times per day    ampicillin IV  2,000 mg IntraVENous 6 times per day    cefTRIAXone (ROCEPHIN) IV  2,000 mg IntraVENous Q12H    vancomycin (VANCOCIN) intermittent dosing (placeholder)   Other RX Placeholder    vancomycin  1,000 mg IntraVENous Q12H    enoxaparin  40 mg SubCUTAneous Daily    erythromycin   Left Eye BID    atorvastatin  10 mg Per NG tube Nightly    sodium chloride flush  5-40 mL IntraVENous 2 times per day    pantoprazole  40 mg IntraVENous Daily    And    sodium chloride (PF)  10 mL IntraVENous Daily    folic acid IVPB  1 mg IntraVENous Daily    sennosides-docusate sodium  2 tablet Oral Daily    Vitamin D  1,000 Units Oral Daily    chlorhexidine  15 mL Mouth/Throat BID     Continuous Infusions:   dextrose      dexmedetomidine      sodium chloride      sodium chloride 50 mL/hr at 01/11/22 0940       INPUT/OUTPUT:  In: 8399 [I.V.:4464; NG/GT:30]  Out: 0718 [Urine:2550; Drains:20]  Date 01/11/22 0000 - 01/11/22 2359   Shift 5327-7417 8692-6510 5404-0603 24 Hour Total   INTAKE   I.V. 1331   1331   NG/GT 30   30   Shift Total 1361   1361   OUTPUT   Urine 395 210  605   Drains 4   4   Shift Total 399 210  609   Weight (kg)            LABS:  ABGs:   Recent Labs     01/10/22  0511 01/11/22  0252   POCPH 7.555* 7.425   POCPCO2 25.0* 32.7*   POCPO2 160.9* 124.7*   POCHCO3 22.1 21.5   OTTR0AER 100* 99*     CBC:   Recent Labs     01/09/22  1835 01/11/22  0528   WBC 27.7* 19.2*   HGB 13.7 10.0*   HCT 43.1 31.6*   MCV 75.3* 77.3*   * 341   LYMPHOPCT 2*  --    RBC 5.72 4.09*   MCH 24.0* 24.4*   MCHC 31.8 31.6   RDW 16.4* 16.7*     CRP:   No results for input(s): CRP in the last 72 hours. LDH:   No results for input(s): LDH in the last 72 hours.   BMP:   Recent Labs     01/09/22  1835 01/10/22  0008 01/10/22  0104 01/11/22  0528   * 133*  --  142   K 3.7 3.5*  --  4.2   CL 97* 103  --  114*   CO2 22 18*  --  18*   BUN 56* 47*  --  34*   CREATININE 1.35* 1.22* 1.41* 1.04   GLUCOSE 210* 172*  --  212*   PHOS  --  2.6  --  2.8     Liver Function Test:   Recent Labs     01/09/22  1835   PROT 8.1   LABALBU 3.6   ALT 32   AST 48*   ALKPHOS 85 BILITOT 0.93     Coagulation Profile:   Recent Labs     01/10/22  0612   INR 1.1   PROTIME 11.2   APTT 22.4     D-Dimer:  Recent Labs     01/09/22  1835   DDIMER 3.48*     Lactic Acid:  No results for input(s): LACTA in the last 72 hours. Cardiac Enzymes:  Recent Labs     01/09/22  1835 01/10/22  0008 01/10/22  1050 01/10/22  1514 01/11/22  0528   CKTOTAL 2,165*   < > 672* 525* 274   CKMB 28.4*  --   --   --   --     < > = values in this interval not displayed. BNP/ProBNP:   No results for input(s): BNP, PROBNP in the last 72 hours. Triglycerides:  Recent Labs     01/11/22  0528   TRIG 123        Microbiology:  Urine Culture:  No components found for: CURINE  Blood Culture:  No components found for: CBLOOD, CFUNGUSBL  Sputum Culture:  No components found for: [facility]  Recent Labs     01/10/22  0807 01/10/22  0807 01/10/22  0808   SPECDESC .CSF  . CSF   < > .SPINAL FLUID   SPECIAL NOT REPORTED   < > NOT REPORTED   CULTURE DUPLICATE ORDER  --   --     < > = values in this interval not displayed. Recent Labs     01/10/22  0549 01/10/22  0549 01/10/22  0806 01/10/22  0807 01/10/22  0808   SPECDESC . BLOOD   < > .CSF SHUNT  .CSF . CSF  . CSF . SPINAL FLUID   SPECIAL RT AC 6ML   < > NOT REPORTED NOT REPORTED NOT REPORTED   CULTURE NO GROWTH 1 DAY  --  NO GROWTH 22 HOURS DUPLICATE ORDER  --     < > = values in this interval not displayed. Pathology:    Radiology Reports:  XR CHEST PORTABLE   Final Result   Stable nodular opacity in the right lower lung zone better characterized on   recent CT.         MRI BRAIN W WO CONTRAST   Final Result   Prominent ventricular system with debris throughout the CSF. Associated   FLAIR signal abnormality involving the ependymal and subependymal surface   with enhancement of the ependymal surface of the ventricular system   consistent with an infectious process.   There is associated leptomeningeal   enhancement extending into the craniocervical region and along the visualized   cervical spinal cord. RECOMMENDATIONS:   Unavailable         MRI CERVICAL SPINE W WO CONTRAST   Final Result   Debris throughout the CSF fluid with leptomeningeal enhancement and dural   enhancement as described above consistent with an infectious process. Multilevel degenerative change with foraminal narrowing bilaterally as   described above. RECOMMENDATIONS:   Unavailable         CTA HEAD NECK W CONTRAST   Final Result   Diminutive anterior cerebral arteries and posterior cerebral arteries. Mildly beaded appearance to the M1 portions of the middle cerebral arteries   bilaterally with intermittent foci of mild stenosis. Subsequent diminutive   middle cerebral artery branches distally and this is more pronounced on the   left compared to the right. These findings are of uncertain etiology   although vasculitis is a consideration. RECOMMENDATIONS:   Unavailable         CT LUMBAR SPINE TRAUMA RECONSTRUCTION   Final Result   Unremarkable non-contrast CT of the lumbar spine. CT ABDOMEN PELVIS WO CONTRAST Additional Contrast? None   Final Result   Examination is partially degraded by motion related artifact. Focal area of soft tissue thickening which appears to involve a loop of small   bowel in the central right mid abdomen. This may be partially exaggerated by   the patient motion. Localized infectious or inflammatory versus neoplastic   process however is not excluded and short-term follow-up is recommended. Nondisplaced left 6th and 7th rib fractures. Masslike process exophytic from the lateral margin of the right-side of the   prostate gland. Correlate with PSA values. Cholelithiasis. RECOMMENDATIONS:   Unavailable         CT HEAD WO CONTRAST   Final Result   Persistent layering debris within the lateral ventricles. There is mild   dilatation of the ventricular system. Differential includes isodense   subacute hemorrhage versus infection. Follow-up MRI brain with and without   contrast is recommended for further evaluation. No acute traumatic injury of the facial bones. RECOMMENDATIONS:   Unavailable         CT FACIAL BONES WO CONTRAST   Final Result   Persistent layering debris within the lateral ventricles. There is mild   dilatation of the ventricular system. Differential includes isodense   subacute hemorrhage versus infection. Follow-up MRI brain with and without   contrast is recommended for further evaluation. No acute traumatic injury of the facial bones. RECOMMENDATIONS:   Unavailable         XR CHEST PORTABLE   Final Result   1. Support devices as above. 2. No significant change in the focal opacity within the right lower lung. XR ELBOW RIGHT (MIN 3 VIEWS)   Final Result   No acute osseous abnormality or dislocation involving the right elbow or   right forearm. XR RADIUS ULNA RIGHT (2 VIEWS)   Final Result   No acute osseous abnormality or dislocation involving the right elbow or   right forearm. XR KNEE RIGHT (3 VIEWS)   Final Result   1. No convincing acute osseous abnormality. 2. Degenerative changes of the right knee. 3. Trace joint effusion. 4. There appears to be soft tissue swelling anterior to the patella. XR CHEST PORTABLE   Final Result      Right lung base opacity again identified, nonspecific. Please correlate exam   findings and exam findings and history. CT THORACIC SPINE TRAUMA RECONSTRUCTION    (Results Pending)   MRI THORACIC SPINE W [address]    (Results Pending)   MRI LUMBAR SPINE W [address]    (Results Pending)   XR CHEST PORTABLE    (Results Pending)   VL DUP LOWER EXTREMITY VENOUS BILATERAL    (Results Pending)        Echocardiogram:   No results found for this or any previous visit.        ASSESSMENT AND PLAN     Assessment:    // Acute hypoxic respiratory failure  // Pneumonia - RML cavitary lesion, may be result of aspiration  // Altered

## 2022-01-11 NOTE — PROGRESS NOTES
[facility] - Progress Note    Today's Date and Time: 1/11/2022, 11:06 AM    Impression :   · Acute metabolic encephalopathy  · Unlikely bacterial Meningitis. More likely subacute SAH  · Right lung consolidation with cavitary lesion  · Bilateral mastoid effusion  · Rhabdomyolysis  · JOEZF  · Rt lung consolidation with possible cavity    Recommendations:   · Sputum culture  · Ceftriaxone 2 gm IV q 24 hr for pulmonary lesion  · D/C Acyclovir  · On Decadron 10 mg IV every 6 hours  · TB quantiferon test    Medical Decision Making/Summary/Discussion:1/11/2022     ·   Infection Control Recommendations   · [place] Precautions    Antimicrobial Stewardship Recommendations     · Discontinuation of therapy  Coordination of Outpatient Care:   · Estimated Length of IV antimicrobials:1-10-22  · Patient will need Midline Catheter Insertion: no  · Patient will need PICC line Insertion:no  · Patient will need: Home IV , [place],  SNF,  LTAC: TBD  · Patient will need outpatient wound care:yes    Chief complaint/reason for consultation:   · Meningitis      History of Present Illness:   Ministerio Cassidy is a 70y.o.-year-old  male who was initially admitted on 1/9/2022. Patient seen at the request of     INITIAL HISTORY:     Ministerio Cassidy is a 70y.o.-year-old male who was found down at home. Last well-known 48 hours prior to admission. Patient's cousin was talking to him over the phone frequently and stated that he has been confused for the past couple of weeks and was complaining of headaches.       Patient apparently had a dental procedure done 3 weeks back. His baseline mental status is ANO x4. Patient admitted inpatient for management of suspected meningitis.     Imaging revealed debris's within the bilateral ventricles of his brain likely secondary to subacute hemorrhage versus infection. CT chest showed right middle lobe cavitary lesion.   He has clear secretions from ET tube.     Past medical history:  Essential hypertension  Chronic anemia  History of GI bleed    CURRENT EVALUATION : 1/11/2022    Patient evaluated and examined in the ICU. No problems overnight  No overall chnage    Cultures: No growth     Afebrile  VS stable, HTN    Intubated  RR 17  02 sat 98    Labs, X rays reviewed: 1/11/2022    BUN: 34  Cr: 1.04    WBC: 27.7-->19.2  Hb:  10  Plat: 341    Cultures:  Urine:  · 1-11-22: No growth   Blood:  · 1-10-22: 1/2 Coagulase negative Staphylococci   Sputum :  ·   CSF:  · 1-10-22: No growth     Discussed with patient, RN, Neuro. I have personally reviewed the past medical history, past surgical history, medications, social history, and family history, and I have updated the database accordingly. Past Medical History:     Past Medical History:   Diagnosis Date    Anemia 2016    ON IRON    BPH with elevated PSA     post biopsy    Colon polyps 2016    BENEIGN REMOVED WITH COLONOSCOPY    Elevated Gina-Barr virus antibody titer 1989    NEVER TREATED FOR    Elevated fasting glucose     History of blood transfusion 2016    R/T INTESTINAL BLEEDING    History of chronic fatigue syndrome 1989    RESOLVED     History of GI bleed 2016    BROUGHT ON BY MEDS---NIACIN, FISH OIL AND VIT C    Hyperlipidemia 2014    (triglycerides-ELEVATED, TRYING TO CONTROL WITH DIET    Hypertension 1999    ON RX    Wears glasses        Past Surgical  History:     Past Surgical History:   Procedure Laterality Date    COLONOSCOPY  2000    internal hemorrhoids    COLONOSCOPY  02/02/2015    polypx1, repeat 5yrs due to family hx & hx polyps- brannon    COLONOSCOPY  12/09/2015    polyp X2  int. Hemorrhoids  partial prolapse of ileocecal valve    COLONOSCOPY  11/2016    COLONOSCOPY N/A 11/12/2020    COLONOSCOPY POLYPECTOMY SNARE/HOT BIOPSY performed by Anamika Salvador DO at [address] Bilateral 1958    to correct flat arches (age 9)    PRE-MALIGNANT / BENIGN SKIN LESION EXCISION Right 10/15/2021    v-EXCISION Lesion Right cheek, Right nose, scalp, nasal tip performed by Luis Barnard MD at [place] [address] Bilateral 12/04/2012    benign    PROSTATE BIOPSY Bilateral 07/2014    benign    PROSTATECTOMY  12/10/2019    LAPAROSCOPIC ROBOTIC SIMPLE PROSTATECTOMY     PROSTATECTOMY N/A 12/10/2019    XI LAPAROSCOPIC ROBOTIC SIMPLE PROSTATECTOMY performed by Samm Holloway MD at [address] [place] [address]  12/8/15    Normal upper GI tract, no evidence of blood in upper GI tract, mild distal esophagitis    UPPER GASTROINTESTINAL ENDOSCOPY  11/2016       Medications:      insulin lispro  0-6 Units SubCUTAneous 4 times per day    ampicillin IV  2,000 mg IntraVENous 6 times per day    cefTRIAXone (ROCEPHIN) IV  2,000 mg IntraVENous Q12H    vancomycin (VANCOCIN) intermittent dosing (placeholder)   Other RX Placeholder    vancomycin  1,000 mg IntraVENous Q12H    enoxaparin  40 mg SubCUTAneous Daily    erythromycin   Left Eye BID    atorvastatin  10 mg Per NG tube Nightly    sodium chloride flush  5-40 mL IntraVENous 2 times per day    pantoprazole  40 mg IntraVENous Daily    And    sodium chloride (PF)  10 mL IntraVENous Daily    folic acid IVPB  1 mg IntraVENous Daily    sennosides-docusate sodium  2 tablet Oral Daily    Vitamin D  1,000 Units Oral Daily    chlorhexidine  15 mL Mouth/Throat BID       Social History:     Social History     Socioeconomic History    Marital status: Single     Spouse name: Not on file    Number of children: Not on file    Years of education: Not on file    Highest education level: Not on file   Occupational History    Not on file   Tobacco Use    Smoking status: Light Tobacco Smoker     Packs/day: 0.01     Years: 10.00     Pack years: 0.10     Types: Pipe     Start date: 1/1/1970    Smokeless tobacco: Never Used    Tobacco comment: Rare pipe smoker. 4 BOWLS A WEEK No inhalation.  Has never smoked cigarettes. Vaping Use    Vaping Use: Never used   Substance and Sexual Activity    Alcohol use: Yes     Alcohol/week: 0.0 standard drinks     Comment: WHISKEY OR WINE- 1 OR 2 A MONTH    Drug use: No    Sexual activity: Not on file   Other Topics Concern    Not on file   Social History Narrative    Not on file     Social Determinants of Health     Financial Resource Strain: Low Risk     Difficulty of Paying Living Expenses: Not hard at all   Food Insecurity: No Food Insecurity    Worried About Running Out of Food in the Last Year: Never true    [address] in the Last Year: Never true   Transportation Needs:     Lack of Transportation (Medical): Not on file    Lack of Transportation (Non-Medical): Not on file   Physical Activity:     Days of Exercise per Week: Not on file    Minutes of Exercise per Session: Not on file   Stress:     Feeling of Stress : Not on file   Social Connections:     Frequency of Communication with Friends and Family: Not on file    Frequency of Social Gatherings with Friends and Family: Not on file    Attends Restorationist Services: Not on file    Active Member of [address] [place] [address] or Organizations: Not on file    Attends Club or Organization Meetings: Not on file    Marital Status: Not on file   Intimate Partner Violence:     Fear of Current or Ex-Partner: Not on file    Emotionally Abused: Not on file    Physically Abused: Not on file    Sexually Abused: Not on file   Housing Stability:     Unable to Pay for Housing in the Last Year: Not on file    Number of [place] in the Last Year: Not on file    Unstable Housing in the Last Year: Not on file       Family History:     Family History   Problem Relation Age of Onset    Cancer Mother         LUNG    High Blood Pressure Mother     Diabetes Father         IDDM-LATE IN LIFE    Heart Disease Father         CHF    High Blood Pressure Father         Allergies:   Patient has no known allergies.      Review of Systems:     Unable to provide. Sedated on ventilator. 1/11/2022      Constitutional: No fevers or chills. No systemic complaints  Head: No headaches  Eyes: No double vision or blurry vision. No conjunctival inflammation. ENT: No sore throat or runny nose. . No hearing loss, tinnitus or vertigo. Cardiovascular: No chest pain or palpitations. No shortness of breath. No FONTANEZ  Lung: No shortness of breath or cough. No sputum production  Abdomen: No nausea, vomiting, diarrhea, or abdominal pain. Tiffanie Brittle No cramps. Genitourinary: No increased urinary frequency, or dysuria. No hematuria. No suprapubic or CVA pain  Musculoskeletal: No muscle aches or pains. No joint effusions, swelling or deformities  Hematologic: No bleeding or bruising. Neurologic: No headache, weakness, numbness, or tingling. Integument: No rash, no ulcers. Psychiatric: No depression. Endocrine: No polyuria, no polydipsia, no polyphagia. Physical Examination :     Patient Vitals for the past 8 hrs:   BP Temp Pulse Resp SpO2   01/11/22 1000 (!) 159/94  91 16 99 %   01/11/22 0900 (!) 165/103  96 21 98 %   01/11/22 0800 (!) 156/94  89 19 100 %   01/11/22 0700 (!) 154/91  81 16 100 %   01/11/22 0600 (!) 155/91  86 20 100 %   01/11/22 0500 (!) 155/87  84 18 100 %   01/11/22 0400 (!) 143/81 98 °F (36.7 °C) 85 19 98 %     General Appearance: Sedated, somnolent  Head:  Normocephalic, no trauma  Eyes: Pupils equal, round, reactive to light and accommodation; extraocular movements intact; sclera anicteric; conjunctivae pink. No embolic phenomena. ENT: Oropharynx clear, without erythema, exudate, or thrush. No tenderness of sinuses. Mouth/throat: mucosa pink and moist. No lesions. Dentition in good repair. Orally intubated  Neck:Supple, without lymphadenopathy. Thyroid normal, No bruits. Pulmonary/Chest: Clear to auscultation, without wheezes, rales, or rhonchi. No dullness to percussion.    Cardiovascular: Regular rate and rhythm without murmurs, rubs, or gallops. Abdomen: Soft, non tender. Bowel sounds normal. No organomegaly  All four Extremities: No cyanosis, clubbing, edema, or effusions. Neurologic: Minimal movement to painful stimuli. Absent Lt corneal  Skin: Warm and dry with good turgor. No signs of peripheral arterial or venous insufficiency. No ulcerations. No open wounds. Medical Decision Making -Laboratory:   I have independently reviewed/ordered the following labs:    CBC with Differential:   Recent Labs     01/09/22  1835 01/11/22  0528   WBC 27.7* 19.2*   HGB 13.7 10.0*   HCT 43.1 31.6*   * 341   LYMPHOPCT 2*  --    MONOPCT 5*  --      BMP:   Recent Labs     01/10/22  0008 01/10/22  0104 01/11/22  0528   *  --  142   K 3.5*  --  4.2     --  114*   CO2 18*  --  18*   BUN 47*  --  34*   CREATININE 1.22* 1.41* 1.04   MG 2.5  --  2.9*     Hepatic Function Panel:   Recent Labs     01/09/22  1835   PROT 8.1   LABALBU 3.6   BILIDIR 0.31*   IBILI 0.62   BILITOT 0.93   ALKPHOS 85   ALT 32   AST 48*     No results for input(s): RPR in the last 72 hours. No results for input(s): HIV in the last 72 hours. No results for input(s): BC in the last 72 hours.   Lab Results   Component Value Date    MUCUS NOT REPORTED 01/10/2022    RBC 4.09 01/11/2022    TRICHOMONAS NOT REPORTED 01/10/2022    WBC 19.2 01/11/2022    YEAST NOT REPORTED 01/10/2022    TURBIDITY Clear 01/10/2022     Lab Results   Component Value Date    CREATININE 1.04 01/11/2022    GLUCOSE 212 01/11/2022       Medical Decision Making-Imaging:     EXAMINATION:   ONE XRAY VIEW OF THE CHEST       1/11/2022 9:02 am       COMPARISON:   01/10/2022 radiograph       HISTORY:   ORDERING SYSTEM PROVIDED HISTORY: intubated   TECHNOLOGIST PROVIDED HISTORY:   intubated       FINDINGS:   Supportive devices are stable.  Heart, mediastinum and pulmonary vascularity   are normal.  Stable nodular opacity in the right lower lung zone representing   a cavitary lesion seen on prior CT.  The lungs appear clear otherwise.  No   significant skeletal finding.           Impression   Stable nodular opacity in the right lower lung zone better characterized on   recent CT.         EXAMINATION:   CTA OF THE CHEST 1/9/2022 7:40 pm       TECHNIQUE:   CTA of the chest was performed after the administration of intravenous   contrast.  Multiplanar reformatted images are provided for review.  MIP   images are provided for review. Dose modulation, iterative reconstruction,   and/or weight based adjustment of the mA/kV was utilized to reduce the   radiation dose to as low as reasonably achievable.       COMPARISON:   Chest x-ray 01/09/2022       HISTORY:   ORDERING SYSTEM PROVIDED HISTORY: elevated d-dimer, mental status change   TECHNOLOGIST PROVIDED HISTORY:   elevated d-dimer, mental status change   Decision Support Exception - unselect if not a suspected or confirmed   emergency medical condition->Emergency Medical Condition (MA)   Reason for Exam: Elevated d-dimer, mental status change       FINDINGS:   Pulmonary Arteries: No central lobar pulmonary emboli.  Main pulmonary artery   is unremarkable caliber.       Mediastinum: Air-fluid identified involving esophagus.  Slight heterogeneous   appearance of the thyroid gland on left.  Heart is mildly prominent size.  No   definite bulky hilar mediastinal adenopathy.       Lungs/pleura: Right middle lobe atelectasis versus scarring identified.    Within the lateral aspect right middle lobe, there is consolidative versus   masslike opacity with air-fluid level.  There vessels corresponding through   this opacity.  The tiny locules of gas as well.  Otherwise mild   hypoventilatory changes elsewhere the lungs.       Upper Abdomen: Evidence of cholelithiasis.  Fatty infiltration liver.       Soft Tissues/Bones: Degenerate changes of the thoracic spine.           Impression   No evidence of pulmonary embolism to the segmental level.       Nonspecific opacification and when question air-fluid level involving the   right middle lobe laterally.  This demonstrates tiny locules of gas. Infection/Pneumonia and/or cavitary mass lesion may be considered. Shania Points   pulmonology consultation.       RECOMMENDATIONS:   Unavailable             Medical Decision [id number]:       Medical Decision Making-Other:     Note:  · Labs, medications, radiologic studies were reviewed with personal review of films  · Large amounts of data were reviewed  · Discussed with nursing Staff, Discharge planner  · Infection Control and Prevention measures reviewed  · All prior entries were reviewed  · Administer medications as ordered  · Prognosis: Guarded  · Discharge planning reviewed  · Follow up as outpatient. Thank you for allowing us to participate in the care of this patient. Please call with questions.     Kedar Velarde MD  Pager: [phone] - Office: [phone]

## 2022-01-11 NOTE — PROGRESS NOTES
Pharmacy Note  Vancomycin Consult    Marcelo Wellington is a 70 y.o. male started on Vancomycin for CNS infection; consult received from Dr. Ila Chandler to manage therapy. Also receiving the following antibiotics: ampicillin, ceftriaxone. Patient Active Problem List   Diagnosis    Hypertension    Hyperlipidemia    BPH with elevated PSA    History of chronic fatigue syndrome    GI bleed    Symptomatic anemia    Iron deficiency anemia    Gastroesophageal reflux disease without esophagitis    Gastritis without bleeding    Vitamin B12 deficiency    BPH with obstruction/lower urinary tract symptoms    Neoplasm of uncertain behavior of skin    Basal cell carcinoma (BCC) of left cheek    Basal cell carcinoma of right nasal sidewall    Basal cell carcinoma of right cheek    Basal cell carcinoma of nasal tip    Basal cell carcinoma of scalp    JOZEF (acute kidney injury) ([address]    Acute respiratory failure (HCC)    Rhabdomyolysis    Altered mental status    Septicemia (HCC)    Closed fracture of multiple ribs of left side    Abrasion of left cornea    Pneumonia of right middle lobe due to infectious organism    Altered mental state    Ventilator dependence ([facility]    Cerebritis     Allergies:  Patient has no known allergies. Recent Labs     01/09/22  1835 01/09/22  1835 01/10/22  0008 01/10/22  0104 01/11/22  0528   BUN 56*   < > 47*  --  34*   CREATININE 1.35*   < > 1.22* 1.41* 1.04   WBC 27.7*  --   --   --  19.2*    < > = values in this interval not displayed. Intake/Output Summary (Last 24 hours) at 1/11/2022 0844  Last data filed at 1/11/2022 0700  Gross per 24 hour   Intake 4494 ml   Output 2360 ml   Net 2134 ml       Ht Readings from Last 1 Encounters:   01/10/22 5' 10\" (1.778 m)        Wt Readings from Last 1 Encounters:   01/09/22 200 lb (90.7 kg)       Body mass index is 28.7 kg/m². Estimated Creatinine Clearance: 74 mL/min (based on SCr of 1.04 mg/dL).     Goal Trough Level: 15-20 mcg/mL  Goal AUC: 400-600    Assessment/Plan:  Will initiate Vancomycin with 1000 mg IV every 12 hours, noted 1500 mg dose administered yesterday. Regimen predicts trough of 15-17 mcg/mL and AUC of 450-500. Timing of trough level will be determined based on culture results, renal function, and clinical response. Thank you for the consult. Will continue to follow. Bernice Joel, Pharm. D., SHERRIE, [facility]  1/11/2022 8:46 AM

## 2022-01-11 NOTE — PLAN OF CARE
Problem: OXYGENATION/RESPIRATORY FUNCTION  Goal: Patient will maintain patent airway  1/10/2022 2142 by Ester Johnson RCP  Outcome: Ongoing     Problem: OXYGENATION/RESPIRATORY FUNCTION  Goal: Patient will achieve/maintain normal respiratory rate/effort  Description: Respiratory rate and effort will be within normal limits for the patient  1/10/2022 2142 by Ester Johnson RCP  Outcome: Ongoing     Problem: MECHANICAL VENTILATION  Goal: Patient will maintain patent airway  1/10/2022 2142 by Ester Johnson RCP  Outcome: Ongoing     Problem: MECHANICAL VENTILATION  Goal: Oral health is maintained or improved  1/10/2022 2142 by Ester Johnson RCP  Outcome: Ongoing     Problem: MECHANICAL VENTILATION  Goal: ET tube will be managed safely  1/10/2022 2142 by Ester Johnson RCP  Outcome: Ongoing     Problem: MECHANICAL VENTILATION  Goal: Ability to express needs and understand communication  1/10/2022 2142 by Ester Johnson RCP  Outcome: Ongoing     Problem: MECHANICAL VENTILATION  Goal: Mobility/activity is maintained at optimum level for patient  1/10/2022 2142 by Ester Johnson RCP  Outcome: Ongoing     Problem: SKIN INTEGRITY  Goal: Skin integrity is maintained or improved  1/10/2022 2142 by Ester Johnson RCP  Outcome: Ongoing

## 2022-01-11 NOTE — CONSULTS
Endovascular Neurosurgery Consult      Reason for evaluation: AMS, Metabolic encephalopathy, cerebral vasculitis     SUBJECTIVE:   History of Chief Complaint:    Mr. Violet Christopher is a 69 y/o M with pmh signifcant for Htn, dyslipidemia presented to [facility] after he was found down by EMS at home. Suspected prolonged down time as long as 48 hrs. Initially patient was following some commands. Patient was intubated for the protection of airways. CT head revealed biventricular hemorrhage vs debris. CTA head showed diminutive bl anterior as well as posterior cerebral arteries as well as beaded appearance. Therefore endovascular team was consulted for further evaluation. Patient remained intubated on fentanyl PCA. Allergies  has No Known Allergies. Medications  Prior to Admission medications    Medication Sig Start Date End Date Taking?  Authorizing Provider   omeprazole (PRILOSEC) 20 MG delayed release capsule Take 1 capsule by mouth daily 3/25/21   Eun Jefferson MD   atorvastatin (LIPITOR) 10 MG tablet take 1 tablet by mouth once daily 3/25/21   Eun Jefferson MD   fenofibrate (TRIGLIDE) 160 MG tablet Take 1 tablet by mouth daily 3/22/21   Eun Jefferson MD   hydroCHLOROthiazide (MICROZIDE) 12.5 MG capsule Take 1 capsule by mouth daily 3/9/21   Eun Jefferson MD   amLODIPine (NORVASC) 5 MG tablet Take 1 tablet by mouth daily 3/9/21   Eun Jefferson MD   benazepril (LOTENSIN) 40 MG tablet take 1 tablet by mouth once daily 2/18/21   Eun Jefferson MD   ferrous sulfate 325 (65 FE) MG tablet Take 325 mg by mouth 3 times daily (with meals)     Historical Provider, MD    Scheduled Meds:   insulin lispro  0-6 Units SubCUTAneous 4 times per day    ampicillin IV  2,000 mg IntraVENous 6 times per day    cefTRIAXone (ROCEPHIN) IV  2,000 mg IntraVENous Q12H    vancomycin (VANCOCIN) intermittent dosing (placeholder)   Other RX Placeholder    vancomycin  1,000 mg IntraVENous Q12H    enoxaparin 40 mg SubCUTAneous Daily    erythromycin   Left Eye BID    atorvastatin  10 mg Per NG tube Nightly    sodium chloride flush  5-40 mL IntraVENous 2 times per day    pantoprazole  40 mg IntraVENous Daily    And    sodium chloride (PF)  10 mL IntraVENous Daily    folic acid IVPB  1 mg IntraVENous Daily    sennosides-docusate sodium  2 tablet Oral Daily    Vitamin D  1,000 Units Oral Daily    chlorhexidine  15 mL Mouth/Throat BID     Continuous Infusions:   dextrose      dexmedetomidine      sodium chloride      sodium chloride 50 mL/hr at 01/11/22 0940     PRN Meds:.glucose, dextrose, glucagon (rDNA), dextrose, labetalol, sodium chloride flush, sodium chloride, ondansetron **OR** ondansetron, polyethylene glycol, acetaminophen **OR** acetaminophen, sodium chloride flush  Past Medical History   has a past medical history of Anemia, BPH with elevated PSA, Colon polyps, Elevated Gina-Barr virus antibody titer, Elevated fasting glucose, History of blood transfusion, History of chronic fatigue syndrome, History of GI bleed, Hyperlipidemia, Hypertension, and Wears glasses. Past Surgical History   has a past surgical history that includes Foot surgery (Bilateral, 1958); Prostate biopsy (Bilateral, 12/04/2012); Prostate biopsy (Bilateral, 07/2014); Colonoscopy (2000); Colonoscopy (02/02/2015); Colonoscopy (12/09/2015); Colonoscopy (11/2016); Upper gastrointestinal endoscopy (12/8/15); Upper gastrointestinal endoscopy (11/2016); Prostatectomy (12/10/2019); Prostatectomy (N/A, 12/10/2019); Colonoscopy (N/A, 11/12/2020); and pre-malignant / benign skin lesion excision (Right, 10/15/2021). Social History   reports that he has been smoking pipe. He started smoking about 52 years ago. He has a 0.10 pack-year smoking history. He has never used smokeless tobacco.   reports current alcohol use. reports no history of drug use.   Family History  family history includes Cancer in his mother; Diabetes in his father; Heart Disease in his father; High Blood Pressure in his father and mother. Review of Systems:  CONSTITUTIONAL:  negative for fevers, chills, fatigue and malaise    EYES:  negative for double vision, blurred vision and photophobia     HEENT:  negative for tinnitus, epistaxis and sore throat    RESPIRATORY:  negative for cough, shortness of breath, wheezing    CARDIOVASCULAR:  negative for chest pain, palpitations, syncope, edema    GASTROINTESTINAL:  negative for nausea, vomiting    GENITOURINARY:  negative for incontinence    MUSCULOSKELETAL:  negative for neck or back pain    NEUROLOGICAL:  Negative for weakness and tingling  negative for headaches and dizziness    PSYCHIATRIC:  negative for anxiety      Review of systems otherwise negative. OBJECTIVE:     Vitals:    22 0700   BP: (!) 154/91   Pulse: 81   Resp: 16   Temp:    SpO2: 100%        General:  Gen: normal habitus, NAD  HEENT: NCAT, mucosa moist  Cvs: RRR, S1 S2 normal  Resp: symmetric unlabored breathing  Abd: s/nd/nt  Ext: no edema  Skin: no lesions seen, warm and dry    Neuro: intubated and fentanyl PCA   Gen: Not responsive to verbal as well as painful stimuli  Lang/speech: Non-verbal as intubated   CN: PERRL, Corneal reflexes present, breathing over the vent   Motor: No response to painful stimuli b/l   Coord: unable to assess  DTR: deferred  Gait: unable to assess     NIH Stroke Scale:   1a  Level of consciousness: 3 - responds only with reflex motor or automatic effects or totally unresponsive, flaccid, areflexic   1b. LOC questions:  2 - answers neither question correctly   1c. LOC commands: 2 - performs neither task correctly   2. Best Gaze: 0 - normal   3. Visual: 0 - no visual loss   4. Facial Palsy: 0 - normal symmetric movement   5a. Motor left arm: 4 - no movement   5b. Motor right arm: 4 - no movement   6a. Motor left le - no movement   6b  Motor right le - no movement   7. Limb Ataxia: 0 - absent   8.   Sensory: 2 - severe to total sensory loss; patient is not aware of being touched in face, arm, leg   9. Best Language:  3 - mute, global aphasia; no usable speech or auditory comprehension   10. Dysarthria: UN - intubated or other physical barrier   11. Extinction and Inattention: 0 - no abnormality         Total:   28     MRS: 05      LABS:   Reviewed. Lab Results   Component Value Date    HGB 10.0 (L) 01/11/2022    WBC 19.2 (H) 01/11/2022     01/11/2022     01/11/2022    BUN 34 (H) 01/11/2022    CREATININE 1.04 01/11/2022    AST 48 (H) 01/09/2022    ALT 32 01/09/2022    MG 2.9 (H) 01/11/2022    APTT 22.4 01/10/2022    INR 1.1 01/10/2022      Lab Results   Component Value Date    COVID19 Not Detected 01/10/2022    COVID19 Not Detected 01/03/2022    COVID19 Not Detected 11/06/2020       RADIOLOGY:   Images were personally reviewed including:  MRI Brain:   Prominent ventricular system with debris throughout the CSF.  Associated   FLAIR signal abnormality involving the ependymal and subependymal surface   with enhancement of the ependymal surface of the ventricular system   consistent with an infectious process.  There is associated leptomeningeal   enhancement extending into the craniocervical region and along the visualized   cervical spinal cord. CTA head and neck:   Diminutive anterior cerebral arteries and posterior cerebral arteries. Mildly beaded appearance to the M1 portions of the middle cerebral arteries   bilaterally with intermittent foci of mild stenosis.  Subsequent diminutive   middle cerebral artery branches distally and this is more pronounced on the   left compared to the right.  These findings are of uncertain etiology   although vasculitis is a consideration. ASSESSMENT:   71 y/o M with pmh signicant for Htn, dyslipidemia presented after was found down. CT head and MRI brain showed prominent intraventricular debris.  CTA head showed diminutive diffuse anterior and posterior cerebral arteries with beaded appearance. DDx at this point includes secondary cerebral vasculitis vs reactive diffuse cerebral vasospasm vs diffuse intracranial atherosclerotic disease. PLAN:   --Will consider DSA if patient's neuro exam remains poor  --Consider CTA head in 7-10 days to evaluate intracranial vasculature  --Abx per ICU team   --LTM EEG to r/out subclinical status   --Endovascular team will continue to follow along. Case discussed with Dr. Rocío Madrid attending.     Hector Jain MD    Stroke, [facility]  [id number]  Electronically signed 1/11/2022 at 9:47 AM

## 2022-01-11 NOTE — PROGRESS NOTES
Daily Progress Note  Neuro Critical Care    Patient Name: Dante Vyas  Patient : 1950  Room/Bed: 0211/4906-48  Code Status: FULL  Allergies: No Known Allergies    CHIEF COMPLAINT:      AMS     INTERVAL HISTORY    Initial Presentation (Admitted 1/10/22): The patient is a 70 y.o. male with a history of hypertension who presented as a transfer from [facility] for intraventricular debris. Initially presented to the [facility] ED after being found down at home with altered mental status. He reportedly saw his PCP one week ago for headaches and CTH at that time was negative for acute abnormality. His POA tried calling him and felt that he sounded confused. Patient is normal A&O x4 at baseline. EMS was called to the home and patient was found down for an unknown amount of time but suspected prolonged down time as long as 48 hours. On arrival to [facility] ED, GCS 12. Patient awake and able to follow simple commands but not answering questions appropriately. CT Head showed debris within bilateral ventricles secondary to subacute hemorrhage vs infectious process. CT C-spine negative. CT Chest showed possible cavitary lung lesion in the right middle lobe. Labs were remarkable for elevated CK consistent with rhabdomyolysis, mild JOZEF, leukocytosis to 27.7, and mild elevation of lactate. Patient was transferred to [facility] for neurosurgical evaluation. While in [facility] ED, patient GCS declined and he became tachypneic. Patient was subsequently intubated. Febrile with leukocytosis. Trauma was consulted due to possible traumatic etiology of ventricular debris and imaging demonstrated two left sided rib fractures but otherwise negative for traumatic injury. Neurosurgery consulted. Started empirically on Decadron and antibiotic/antiviral coverage for meningitis. Hospital Course:  1/10: Admitted to the Neuro ICU. EVD placed at bedside and set at [address] [facility].  CSF studies; Glucose <2, Protein 966, RBC 350, WBC 60542. Meningitis panel negative. Acyclovir discontinued. Continued on Rocephin, Vancomycin and Ampicillin. Received 4 doses of IV Decadron. MRI Brain with/without contrast showed known debris in the ventricular system with enhancement of the ependymal surface of the ventricular system and leptomeningeal enhancement. MRI Cervical spine with no new findings. Last 24h:   EVD set at [address] [facility], ICP 3-11, 20cc out/24h. ID discontinued antibiotics overnight. Discussed case this AM; started on Rocephin 2g Q12h. Neurosurgery following and concerned about subarachnoid purulence in cisterns and cervical space. Dr. Ana Willard considering intraoperative subarachnoid culture. Awaiting MRI Thoracic and Lumbar spine. Will consider CT facial bone with contrast.  Repeat blood culture and respiratory culture ordered. Neuro Endovascular reviewed CTA findings (concern for vasculitis) and recommended TCD which showed elevated mean velocities in the left MCA (189 max, LR 6.74). Recommended Verapamil, started on 80mg Q8h. Clinical exam remains poor. Fentanyl infusion changed to PRN dosing, will use Precedex if needs more sedation (becomes hypertensive, tachypneic intermittently).       CURRENT MEDICATIONS:  SCHEDULED MEDICATIONS:   insulin lispro  0-6 Units SubCUTAneous 4 times per day    enoxaparin  40 mg SubCUTAneous Daily    cefTRIAXone (ROCEPHIN) IV  2,000 mg IntraVENous Q12H    erythromycin   Left Eye BID    atorvastatin  10 mg Per NG tube Nightly    sodium chloride flush  5-40 mL IntraVENous 2 times per day    pantoprazole  40 mg IntraVENous Daily    And    sodium chloride (PF)  10 mL IntraVENous Daily    folic acid IVPB  1 mg IntraVENous Daily    sennosides-docusate sodium  2 tablet Oral Daily    Vitamin D  1,000 Units Oral Daily    chlorhexidine  15 mL Mouth/Throat BID     CONTINUOUS INFUSIONS:   dextrose      dexmedetomidine      sodium chloride      sodium chloride 50 mL/hr at 01/11/22 0940     PRN MEDICATIONS:   glucose, dextrose, glucagon (rDNA), dextrose, labetalol, fentanNYL, sodium chloride flush, sodium chloride, ondansetron **OR** ondansetron, polyethylene glycol, acetaminophen **OR** acetaminophen, sodium chloride flush    VITALS:  Temperature Range: Temp: 97.1 °F (36.2 °C) Temp  Av.8 °F (36.6 °C)  Min: 97.1 °F (36.2 °C)  Max: 98.3 °F (36.8 °C)  BP Range: Systolic (57CKZ), WBD:120 , Min:102 , RICHARD:720     Diastolic (00DZE), TVQ:90, Min:75, Max:103    Pulse Range: Pulse  Av.4  Min: 81  Max: 112  Respiration Range: Resp  Av.5  Min: 16  Max: 33  Current Pulse Ox: SpO2: 98 %  24HR Pulse Ox Range: SpO2  Av.3 %  Min: 94 %  Max: 100 %  Patient Vitals for the past 12 hrs:   BP Temp Temp src Pulse Resp SpO2   22 1300 (!) 163/90   94 16 98 %   22 1200 (!) 165/87 97.1 °F (36.2 °C) Oral 89 20 100 %   22 1147    88 19 99 %   22 1100 (!) 162/93   92 17 98 %   22 1000 (!) 159/94   91 16 99 %   22 0900 (!) 165/103   96 21 98 %   22 0800 (!) 156/94   89 19 100 %   22 0700 (!) 154/91   81 16 100 %   22 0600 (!) 155/91   86 20 100 %   22 0500 (!) 155/87   84 18 100 %   22 0400 (!) 143/81 98 °F (36.7 °C)  85 19 98 %   22 0300 139/86   88 18 99 %   22 0255    93 16 99 %     Estimated body mass index is 28.7 kg/m² as calculated from the following:    Height as of this encounter: 5' 10\" (1.778 m).     Weight as of an earlier encounter on 22: 200 lb (90.7 kg).  []<16 Severe malnutrition  []1616.99 Moderate malnutrition  []1718.49 Mild malnutrition  []18.524.9 Normal  [x]2529.9 Overweight (not obese)  []3034.9 Obese class 1 (Low Risk)  []3539.9 Obese class 2 (Moderate Risk)  []?40 Obese class 3 (High Risk)    RECENT LABS:   Lab Results   Component Value Date    WBC 19.2 (H) 2022    HGB 10.0 (L) 2022    HCT 31.6 (L) 2022     2022    CHOL 127 03/15/2021 TRIG 123 01/11/2022    HDL 27 (L) 03/15/2021    ALT 32 01/09/2022    AST 48 (H) 01/09/2022     01/11/2022    K 4.2 01/11/2022     (H) 01/11/2022    CREATININE 1.04 01/11/2022    BUN 34 (H) 01/11/2022    CO2 18 (L) 01/11/2022    TSH 0.50 01/10/2022    PSA 0.95 04/28/2021    INR 1.1 01/10/2022    LABA1C 5.6 09/17/2019     24 HOUR INTAKE/OUTPUT:    Intake/Output Summary (Last 24 hours) at 1/11/2022 1452  Last data filed at 1/11/2022 1203  Gross per 24 hour   Intake 5857.92 ml   Output 1430 ml   Net 4427.92 ml       IMAGING:   ECHO Complete 2D W Doppler W Color  Result Date: 1/11/2022  Transthoracic Echocardiography Report (TTE)  Patient Name Radha Carlos   Date of Study             01/11/2022               Pervis Evon   Date of      1950  Gender                    Male  Birth   Age          70 year(s)  Race                         Room Number  7674        Height:                   70 inch, 177.8 cm   Corporate ID [id number]    Weight:                   207 pounds, 93.9 kg  #   Patient Acct [de-identified]   BSA:         2.12 m^2     BMI:       29.7 kg/m^2  #   MR #         [id number]     Sonographer               Castro Sullivan   Accession #  [id number]  Interpreting Physician    [address]   Fellow                   Referring Nurse                           Practitioner   Interpreting             Referring Physician       Gareth Ha  Fellow                                             APRN-CNP  Additional Comments Technically difficult study. Type of Study   TTE procedure:2D Echocardiogram, M-Mode, Doppler, Color Doppler. Procedure Date Date: 01/11/2022 Start: 08:11 AM Study Location: [facility] Technical Quality: Adequate visualization Indications:LV Function and Rule out vegetation. History / Tech. Comments: Echo done at patient bedside. Procedure explained to patient.  HTN Patient Status: Inpatient Height: 70 inches Weight: 207 pounds BSA: 2.12 m^2 BMI: 29.7 kg/m^2 CONCLUSIONS Summary Difficult study. Left ventricle is normal in size. Global left ventricular systolic function is normal. Calculated ejection fraction 54% by Amanda's method. Left atrium is normal in size. Normal right ventricular size and function. No significant valvular abnormalities. CT ABDOMEN PELVIS WO CONTRAST Additional Contrast? None  Result Date: 1/10/2022  Examination is partially degraded by motion related artifact. Focal area of soft tissue thickening which appears to involve a loop of small bowel in the central right mid abdomen. This may be partially exaggerated by the patient motion. Localized infectious or inflammatory versus neoplastic process however is not excluded and short-term follow-up is recommended. Nondisplaced left 6th and 7th rib fractures. Masslike process exophytic from the lateral margin of the right-side of the prostate gland. Correlate with PSA values. Cholelithiasis. RECOMMENDATIONS: Unavailable     XR ELBOW RIGHT (MIN 3 VIEWS)  Result Date: 1/10/2022  No acute osseous abnormality or dislocation involving the right elbow or right forearm. XR RADIUS ULNA RIGHT (2 VIEWS)  Result Date: 1/10/2022  No acute osseous abnormality or dislocation involving the right elbow or right forearm. XR KNEE RIGHT (3 VIEWS)  Result Date: 1/10/2022  1. No convincing acute osseous abnormality. 2. Degenerative changes of the right knee. 3. Trace joint effusion. 4. There appears to be soft tissue swelling anterior to the patella. CT HEAD WO CONTRAST  Result Date: 1/10/2022  Persistent layering debris within the lateral ventricles. There is mild dilatation of the ventricular system. Differential includes isodense subacute hemorrhage versus infection. Follow-up MRI brain with and without contrast is recommended for further evaluation. No acute traumatic injury of the facial bones.  RECOMMENDATIONS: Unavailable     CT HEAD WO CONTRAST  Result Date: 1/9/2022  Debris within the bilateral lateral ventricles more pronounced on the right of uncertain clinical significance or etiology. MRI of the brain with contrast is recommended for further evaluation. Differential diagnosis would include subacute isodense hemorrhage versus possible infectious process. Neoplastic process is not considered due to the normal head CT 1 week ago. Bilateral mastoid effusion Limited evaluation of the cervical spine due to motion. Multilevel degenerative change in the cervical spine. Findings were discussed with Dr. Lindsey Velarde on 01/09/2022 at 7 20 p.m. Thresa Abed RECOMMENDATIONS: Unavailable     CT HEAD WO CONTRAST  Result Date: 1/3/2022  No acute intracranial abnormality. RECOMMENDATIONS: Unavailable     CT FACIAL BONES WO CONTRAST  Result Date: 1/10/2022  Persistent layering debris within the lateral ventricles. There is mild dilatation of the ventricular system. Differential includes isodense subacute hemorrhage versus infection. Follow-up MRI brain with and without contrast is recommended for further evaluation. No acute traumatic injury of the facial bones. RECOMMENDATIONS: Unavailable     CT CERVICAL SPINE WO CONTRAST  Result Date: 1/9/2022  Debris within the bilateral lateral ventricles more pronounced on the right of uncertain clinical significance or etiology. MRI of the brain with contrast is recommended for further evaluation. Differential diagnosis would include subacute isodense hemorrhage versus possible infectious process. Neoplastic process is not considered due to the normal head CT 1 week ago. Bilateral mastoid effusion Limited evaluation of the cervical spine due to motion. Multilevel degenerative change in the cervical spine. Findings were discussed with Dr. Lindsey Velarde on 01/09/2022 at 7 20 p.m. Thresa Abed  RECOMMENDATIONS: Unavailable     MRI CERVICAL SPINE W WO CONTRAST  Result Date: 1/10/2022  Debris throughout the CSF fluid with leptomeningeal enhancement and dural enhancement as described above consistent with an infectious process. Multilevel degenerative change with foraminal narrowing bilaterally as described above. RECOMMENDATIONS: Unavailable     XR CHEST PORTABLE  Result Date: 1/11/2022  Stable nodular opacity in the right lower lung zone better characterized on recent CT. XR CHEST PORTABLE  Result Date: 1/10/2022  1. Support devices as above. 2. No significant change in the focal opacity within the right lower lung. XR CHEST PORTABLE  Result Date: 1/10/2022  Right lung base opacity again identified, nonspecific. Please correlate exam findings and exam findings and history. XR CHEST PORTABLE  Result Date: 1/9/2022  Right lung base opacity. Please correlate exam findings. This could represent a focus of pneumonia versus underlying mass. CT CHEST PULMONARY EMBOLISM W CONTRAST  Result Date: 1/9/2022  No evidence of pulmonary embolism to the segmental level. Nonspecific opacification and when question air-fluid level involving the right middle lobe laterally. This demonstrates tiny locules of gas. Infection/Pneumonia and/or cavitary mass lesion may be considered. .  Consider pulmonology consultation.  RECOMMENDATIONS: Unavailable     VL DUP LOWER EXTREMITY VENOUS BILATERAL  Result Date: 1/11/2022    [facility]  Vascular Lower Extremities DVT Study Procedure   Patient Name   Marcelo Honeycutt    Date of Study           01/11/2022                 Evy Pereyra   Date of Birth  1950   Gender                  Male   Age            70 year(s)   Race                       Room Number    4172 port    Height:                 70 inch, 177.8 cm   Corporate ID # [id number]     Weight:                 207 pounds, 93.9 kg   Patient Acct # [de-identified]    BSA:        2.12 m^2    BMI:       29.7 kg/m^2   MR #           [id number]      Greg Arrieta RVT   Accession #    [id number]   Interpreting Physician  Delma Gtz   Referring                   Referring Physician     Haze Harada  Nurse  Practitioner  Procedure Type of Study:   Veins: Lower Extremities DVT Study, Venous Scan Lower Bilateral.  Indications for Study:Elevated D-Dimer. Patient Status: In Patient. Technical Quality:Adequate visualization.   - Critical Result:Findings were reported to Dr. Johanny Prado on 1/11/2022 while     study was being done by Josias Ventura RVT. Conclusions   Summary   Simultaneous real time imaging utilizing B-Mode, color doppler and  spectral waveform analysis was performed on the bilateral lower  extremities for venous examination of the deep and superficial systems. Findings are:   Right:  Chronic deep venous thrombosis identified in the below knee peroneal and  gastrocnemius veins. Left:  No evidence of deep or superficial venous thrombosis. CTA HEAD NECK W CONTRAST  Result Date: 1/10/2022  Diminutive anterior cerebral arteries and posterior cerebral arteries. Mildly beaded appearance to the M1 portions of the middle cerebral arteries bilaterally with intermittent foci of mild stenosis. Subsequent diminutive middle cerebral artery branches distally and this is more pronounced on the left compared to the right. These findings are of uncertain etiology although vasculitis is a consideration. RECOMMENDATIONS: Unavailable     MRI BRAIN W WO CONTRAST  Result Date: 1/10/2022  Prominent ventricular system with debris throughout the CSF. Associated FLAIR signal abnormality involving the ependymal and subependymal surface with enhancement of the ependymal surface of the ventricular system consistent with an infectious process. There is associated leptomeningeal enhancement extending into the craniocervical region and along the visualized cervical spinal cord. RECOMMENDATIONS: Unavailable     CT LUMBAR SPINE TRAUMA RECONSTRUCTION  Result Date: 1/10/2022  Unremarkable non-contrast CT of the lumbar spine. Labs and Images reviewed with:  [x] Dr. Arlet Still.  [facility]    []  [facility] [place] [id number] Erika Alvarado  [] Dr. Elly Mejia  [] There are no new interval images to review. PHYSICAL EXAM       CONSTITUTIONAL:  Intubated, Fentanyl infusion held. Does not open eyes or follow commands. HEAD:  normocephalic, right facial pressure wounds   EYES:  PERRL, upward gaze   ENT:  moist mucous membranes   NECK:  supple, symmetric   LUNGS:  Equal air entry bilaterally, clear   CARDIOVASCULAR:  normal s1 / s2, RRR, distal pulses intact   ABDOMEN:  Soft, no rigidity   NEUROLOGIC:  Mental Status:  Intubated, sedation held. Does not open eyes or follow commands. Cranial Nerves:    II: Visual fields:  abnormal - absent blink to threat  III: Pupils:  equal, round, reactive to light  III,IV,VI: Extra Ocular Movements: abnormal - not tracking  V: Corneal reflex:  completed. abnormal - weak on right, absent left  VII: Facial strength: intact  Weak cough/gag    Motor Exam:    No movement to central or local noxious stimuli in all extremities. DRAINS:  [x] There are no drains for Neuro Critical Care to monitor at this time. ASSESSMENT AND PLAN:       The patient is 69 yo male with a history of hypertension who presented to [facility] ED as a transfer from [facility] for ventricular debris. Initially presented to [facility] ED with altered mentation after being found down. CT Head showed debris within bilateral ventricles secondary to subacute hemorrhage vs infectious process. Noted to have right middle lobe opacification concerning for cavitary mass lesion. Labs revealed elevated CK, JOZEF, leukocytosis. Transferred for Neurosurgical evaluation. Neurosurgery consulted. EVD placed urgently 1/10. CSF studies concerning for bacterial meningitis. MRI Brain re-demonstrated debris in the ventricular system and showed enhancement of the ependymal surface of the ventricular system and leptomeningeal enhancement. ID consulted; recommending CNS dosed Rocephin.   Neuro Endovasuclar consulted due to concerns for vasculitis on CTA Head/Neck and recommended TCD which showed elevated L MCA velocities. Patient was started on Verapamil.       NEUROLOGIC:  - Acute debris within the ventricular system with enhancement of the ependymal surface of the ventricular system and leptomeningeal enhancement  - Concern for bacterial meningitis/ventriculitis   - EVD set at 51icD46, ICP 3-11, 20cc out/24h  - CSF studies; SF studies; Glucose <2, Protein 966, , WBC 16783, meningitis panel negative, CSF culture pending  - Neurosurgery following; considering intraop subarachnoid culture  - ID recommending Rocephin 2g Q12h  - Concern for vasculitis  - CTA Head/Neck showed beading of the bilateral M1 MCAs and diminutive anterior and posterior cerebral arteries  - Neuro Endovascular consulted; appreciate recs  - TCD showed elevated mean velocities of the L MCA (189, LR 6.74)  - Started on Verapamil 80mg Q8h per Endovascular recs  - Poor clinical exam, upward gaze; F/U LTME  - Goal -180, avoid hypotension  - PRN Fentanyl for agitation/sedation, OK to use Precedex if needed  - Neuro checks per protocol    CARDIOVASCULAR:  - Goal -180  - PRN Labetalol  - Troponin 17, EKG sinus tachycardia  - Echo EF 54%  - Continue home Lipitor for hyperlipidemia  - Continue telemetry    PULMONARY:  - Intubated in ED for acute hypoxic respiratory failure  - Vent Settings: PRVC 30/16/580/5  - Daily AB.42/32.7/124.7/21.5  - Right lung opacification possible cavitary lesion   - CXR  showed stable nodular opacity in the right lower lung  - Pulmonology following; appreciate recs  - ID ordered TB quantiferon     RENAL/FLUID/ELECTROLYTE:  - JOZEF improving  - BUN 34/ Creatinine 1.04  - Monitor I&O; 4764/2310  - IVF: Total fluids 80cc/hr  - CK and lactic normalized  - Give 2g Calcium gluconate   - Replace electrolytes PRN  - Daily BMP    GI/NUTRITION:  NUTRITION:  Diet NPO  ADULT TUBE FEEDING; Orogastric; Peptide Based; Continuous; 20; Yes; 20; Q 4 hours; 75; 30; Q 4 hours   - Start tube feeds via OG  - Bowel regimen: Senokot-S daily  - GI prophylaxis: Protonix    ID:  - No further fevers,Tmax 36.7C  - Leukocytosis continued, WBC 19.2  - COVID-19 negative on 1/10  - UA 1/10 showed moderate bacteria, negative nitrite/leukocyte esterase   - Blood cultures 1/10; 1/2 positive staph coag negative  - Repeat blood cultures pending  - F/U Sputum culture  - Ventriculitis/meningitis  - CSF studies; Glucose <2, Protein 966, , WBC 06779, eningitis panel negative  - Follow CSF culture  - ID following; recommending Rocephin CNS dosing  - Continue to monitor for fevers  - Daily CBC    HEME:   - H&H 10.0/31.6; down-trend noted, continue to monitor  - Platelets 337  - Daily CBC    ENDOCRINE:  - Continue to monitor blood glucose, goal <180  - Hyperglycemia; increase to medium dose insulin sliding scale  - Check hemoglobin A1C     OTHER:  - Vitamin D and Folic acid supplementation  - PT/OT/ST as appropriate  - Code Status: FULL    PROPHYLAXIS:  Stress ulcer: PPI    DVT PROPHYLAXIS:  - SCD sleeves - Thigh High   - Start Lovenox 40mg QD    DISPOSITION:  [x] To remain ICU for EVD and ventilator management. We will continue to follow along. For any changes in exam or patient status please contact Neuro Critical Care.       ELROY [facility]  Neuro Critical Care  Pager [phone]  1/11/2022     2:52 PM

## 2022-01-11 NOTE — PLAN OF CARE
Problem: OXYGENATION/RESPIRATORY FUNCTION  Goal: Patient will maintain patent airway  1/11/2022 0855 by Becca Sainz RCP  Outcome: Ongoing     Problem: OXYGENATION/RESPIRATORY FUNCTION  Goal: Patient will achieve/maintain normal respiratory rate/effort  Description: Respiratory rate and effort will be within normal limits for the patient  1/11/2022 0855 by Becca Sainz RCP  Outcome: Ongoing     Problem: MECHANICAL VENTILATION  Goal: Patient will maintain patent airway  1/11/2022 0855 by Becca Sainz RCP  Outcome: Ongoing     Problem: MECHANICAL VENTILATION  Goal: Oral health is maintained or improved  1/11/2022 0855 by Becca Sainz RCP  Outcome: Ongoing     Problem: MECHANICAL VENTILATION  Goal: ET tube will be managed safely  1/11/2022 0855 by Becca Sainz RCP  Outcome: Ongoing     Problem: MECHANICAL VENTILATION  Goal: Ability to express needs and understand communication  1/11/2022 0855 by Becca Sainz RCP  Outcome: Ongoing     Problem: MECHANICAL VENTILATION  Goal: Mobility/activity is maintained at optimum level for patient  1/11/2022 0855 by Becca Sainz RCP  Outcome: Ongoing     Problem: SKIN INTEGRITY  Goal: Skin integrity is maintained or improved  1/11/2022 0855 by Becca Sainz RCP  Outcome: Ongoing

## 2022-01-11 NOTE — PROGRESS NOTES
Neurosurgery MARYANNE/Resident    Daily Progress Note   CC:No chief complaint on file. 1/11/2022  8:59 AM    Chart reviewed. No acute events overnight. No new complaints. Patient remains intubated, Echo at bedside this morning. 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    Vitals:    01/11/22 0400 01/11/22 0500 01/11/22 0600 01/11/22 0700   BP: (!) 143/81 (!) 155/87 (!) 155/91 (!) 154/91   Pulse: 85 84 86 81   Resp: 19 18 20 16   Temp: 98 °F (36.7 °C)      TempSrc:       SpO2: 98% 100% 100% 100%   Height:           PE:   E2 V1T M1 (no movement to central stimulation)   Intubated  Slight eye opening to central stimulation   PERRL  +right corneal  Absent left corneal  +cough and gag  Absent movement to central painful stimulation  Very minimal movement to peripheral stimulation    EVD in place with 13ml/12 hours  ICP 3-11    Lab Results   Component Value Date    WBC 19.2 (H) 01/11/2022    HGB 10.0 (L) 01/11/2022    HCT 31.6 (L) 01/11/2022     01/11/2022    CHOL 127 03/15/2021    TRIG 123 01/11/2022    HDL 27 (L) 03/15/2021    ALT 32 01/09/2022    AST 48 (H) 01/09/2022     01/11/2022    K 4.2 01/11/2022     (H) 01/11/2022    CREATININE 1.04 01/11/2022    BUN 34 (H) 01/11/2022    CO2 18 (L) 01/11/2022    TSH 0.50 01/10/2022    PSA 0.95 04/28/2021    INR 1.1 01/10/2022    LABA1C 5.6 09/17/2019       Radiology   CT ABDOMEN PELVIS WO CONTRAST Additional Contrast? None    Result Date: 1/10/2022  EXAMINATION: CT OF THE ABDOMEN AND PELVIS WITHOUT CONTRAST 1/10/2022 1:23 am TECHNIQUE: CT of the abdomen and pelvis was performed without the administration of intravenous contrast. Multiplanar reformatted images are provided for review.  Dose modulation, iterative reconstruction, and/or weight based adjustment of the mA/kV was utilized to reduce the radiation dose to as low as reasonably achievable. COMPARISON: None. HISTORY: ORDERING SYSTEM PROVIDED HISTORY: found down TECHNOLOGIST PROVIDED HISTORY: found down Decision Support Exception - unselect if not a suspected or confirmed emergency medical condition->Emergency Medical Condition (MA) Reason for Exam: Patient found down, AMS, POA in [facility] called yetruday pt GIACOMO gonsalez found patient today lying on floor, head facing right ulcer to right cheek. unknown amount of time approximately 48hr estimate. Patient transfer from [place]. DX: Rhabdo, glucose 221 FINDINGS: Some images are mildly degraded by patient motion. Cholelithiasis without evidence of cholecystitis. Exam is limited without intravenous contrast. Liver is homogeneous. Spleen is normal in size. Pancreas is unremarkable. Thickening of the adrenal glands, likely due to hyperplasia. No hydronephrosis. Dominant cyst in the right central kidney measuring 5.6 cm and 6 Hounsfield units. Tiny presumed cyst along the lateral margin of the lower left kidney. Scattered vascular calcifications. Abdominal aorta is normal in caliber. Assessment of bowel is limited without oral contrast.  The nasogastric tube terminates in the proximal to mid stomach. No bowel obstruction. Appendix is normal.  Mild diverticulosis without evidence of acute diverticulitis. In the central aspect of the mid abdomen just to the right of midline, on images 71-84, is a focal area of soft tissue thickening and minimal fat stranding which appears to be associated with a loop of small bowel. Some small bowel loops within the central pelvis are mildly fluid distended. The urinary bladder is largely decompressed with Rangel catheter. Heterogeneous enlargement of the prostate gland asymmetric laterally towards the right where there is an exophytic masslike process. Degenerative changes are present in the spine and pelvis.  Please see separate report for CT of the chest performed approximately 6 hours prior on 01/09/2022. Nondisplaced fracture of the lateral left 7th and 6th ribs. Examination is partially degraded by motion related artifact. Focal area of soft tissue thickening which appears to involve a loop of small bowel in the central right mid abdomen. This may be partially exaggerated by the patient motion. Localized infectious or inflammatory versus neoplastic process however is not excluded and short-term follow-up is recommended. Nondisplaced left 6th and 7th rib fractures. Masslike process exophytic from the lateral margin of the right-side of the prostate gland. Correlate with PSA values. Cholelithiasis. RECOMMENDATIONS: Unavailable     XR ELBOW RIGHT (MIN 3 VIEWS)    Result Date: 1/10/2022  EXAMINATION: THREE XRAY VIEWS OF THE RIGHT ELBOW; TWO XRAY VIEWS OF THE RIGHT FOREARM 1/10/2022 12:56 am COMPARISON: None. HISTORY: ORDERING SYSTEM PROVIDED HISTORY: fall, AMS, prolonged down time TECHNOLOGIST PROVIDED HISTORY: fall, AMS, prolonged down time Reason for Exam: found down Initial evaluation. FINDINGS: No acute osseous abnormality seen of the right elbow or right forearm. There is no evidence of dislocation. Minimal degenerative changes of the right elbow noted. 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 No acute osseous abnormality or dislocation involving the right elbow or right forearm. XR RADIUS ULNA RIGHT (2 VIEWS)    Result Date: 1/10/2022  EXAMINATION: THREE XRAY VIEWS OF THE RIGHT ELBOW; TWO XRAY VIEWS OF THE RIGHT FOREARM 1/10/2022 12:56 am COMPARISON: None. HISTORY: ORDERING SYSTEM PROVIDED HISTORY: fall, AMS, prolonged down time TECHNOLOGIST PROVIDED HISTORY: fall, AMS, prolonged down time Reason for Exam: found down Initial evaluation. FINDINGS: No acute osseous abnormality seen of the right elbow or right forearm. There is no evidence of dislocation. Minimal degenerative changes of the right elbow noted. 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      No acute osseous abnormality or dislocation involving the right elbow or right forearm. XR KNEE RIGHT (3 VIEWS)    Result Date: 1/10/2022  EXAMINATION: THREE XRAY VIEWS OF THE RIGHT KNEE 1/10/2022 12:56 am COMPARISON: None. HISTORY: ORDERING SYSTEM PROVIDED HISTORY: Possible fall - abraisons TECHNOLOGIST PROVIDED HISTORY: Possible fall - abraisons Reason for Exam: found down Initial evaluation. FINDINGS: No acute osseous abnormality is seen. There is mild-to-moderate tricompartmental joint arthrosis. There is spurring of the patella at the attachment of the quadriceps tendon. There appears to be a trace joint effusion. Soft tissue swelling is seen anterior to the patella. 1. No convincing acute osseous abnormality. 2. Degenerative changes of the right knee. 3. Trace joint effusion. 4. There appears to be soft tissue swelling anterior to the patella. CT HEAD WO CONTRAST    Result Date: 1/10/2022  EXAMINATION: CT OF THE HEAD WITHOUT CONTRAST; CT OF THE FACE WITHOUT CONTRAST 1/10/2022 1:20 am TECHNIQUE: CT of the head was performed without the administration of intravenous contrast. Dose modulation, iterative reconstruction, and/or weight based adjustment of the mA/kV was utilized to reduce the radiation dose to as low as reasonably achievable.; CT of the face was performed without the administration of intravenous contrast. Multiplanar reformatted images are provided for review. Dose modulation, iterative reconstruction, and/or weight based adjustment of the mA/kV was utilized to reduce the radiation dose to as low as reasonably achievable. COMPARISON: 01/09/2022 HISTORY: Trauma FINDINGS: CT HEAD: BRAIN/VENTRICLES: Persistent layering debris within the lateral ventricles. There is mild dilatation of the ventricular system. There are changes chronic small vessel ischemic disease and mild generalized atrophy. There is no acute intracranial hemorrhage, mass effect or midline shift.   The gray-white differentiation is maintained without evidence of an acute infarct. CT FACIAL BONES: FACIAL BONES: The maxilla, pterygoid plates and zygomatic arches are intact. The mandible is intact. The mandibular condyles are normally situated. The nasal bones and maxillary nasal processes are intact. ORBITS: The globes appear intact. The extraocular muscles, optic nerve sheath complexes and lacrimal glands appear unremarkable. No retrobulbar hematoma or mass is seen. The orbital walls and rims are intact. SINUSES/MASTOIDS: The paranasal sinuses are well aerated. No acute fracture is seen. Partial opacification of mastoid air cells. SOFT TISSUES: No acute abnormality of the visualized skull or soft tissues. Persistent layering debris within the lateral ventricles. There is mild dilatation of the ventricular system. Differential includes isodense subacute hemorrhage versus infection. Follow-up MRI brain with and without contrast is recommended for further evaluation. No acute traumatic injury of the facial bones. RECOMMENDATIONS: Unavailable     CT HEAD WO CONTRAST    Result Date: 1/9/2022  EXAMINATION: CT OF THE HEAD WITHOUT CONTRAST; CT OF THE CERVICAL SPINE WITHOUT CONTRAST 1/9/2022 7:02 pm TECHNIQUE: CT of the head was performed without the administration of intravenous contrast. Dose modulation, iterative reconstruction, and/or weight based adjustment of the mA/kV was utilized to reduce the radiation dose to as low as reasonably achievable.; CT of the cervical spine was performed without the administration of intravenous contrast. Multiplanar reformatted images are provided for review. Dose modulation, iterative reconstruction, and/or weight based adjustment of the mA/kV was utilized to reduce the radiation dose to as low as reasonably achievable.  COMPARISON: 01/03/2022 HISTORY: ORDERING SYSTEM PROVIDED HISTORY: altered mental status TECHNOLOGIST PROVIDED HISTORY: altered mental status Decision Support Exception - unselect if not a suspected or confirmed emergency medical condition->Emergency Medical Condition (MA) Reason for Exam: Altered mental status, found on the floor of his home today, unknown time of fall FINDINGS: BRAIN/VENTRICLES: There is no evidence for acute intracranial hemorrhage. There is debris within the bilateral lateral ventricles (right greater than left). There is no evidence for acute intracranial hemorrhage or mass lesion. No obvious acute infarct is identified. ORBITS: The visualized portion of the orbits demonstrate no acute abnormality. SINUSES: There is right mastoid effusion. SOFT TISSUES/SKULL:  No acute abnormality of the visualized skull or soft tissues. CT cervical spine: Bilateral mastoid effusion exam is limited by motion artifact. There is multilevel degenerative change in the cervical spine with decreased disc space height and osteophytosis at multiple levels. The exam is limited by motion artifact. There is loss of the normal cervical lordosis with kyphosis. No obvious displaced fracture fragments are identified. Debris within the bilateral lateral ventricles more pronounced on the right of uncertain clinical significance or etiology. MRI of the brain with contrast is recommended for further evaluation. Differential diagnosis would include subacute isodense hemorrhage versus possible infectious process. Neoplastic process is not considered due to the normal head CT 1 week ago. Bilateral mastoid effusion Limited evaluation of the cervical spine due to motion. Multilevel degenerative change in the cervical spine. Findings were discussed with Dr. Simone Conroy on 01/09/2022 at 7 20 p.m. Carmine Stephenson  RECOMMENDATIONS: Unavailable     CT HEAD WO CONTRAST    Result Date: 1/3/2022  EXAMINATION: CT OF THE HEAD WITHOUT CONTRAST  1/3/2022 12:30 pm TECHNIQUE: CT of the head was performed without the administration of intravenous contrast. Dose modulation, iterative reconstruction, and/or weight based adjustment of the mA/kV was utilized to reduce the radiation dose to as low as reasonably achievable. COMPARISON: None. HISTORY: ORDERING SYSTEM PROVIDED HISTORY: Acute nonintractable headache, unspecified headache type TECHNOLOGIST PROVIDED HISTORY: acute headache. Atypical for patient. Lightheadedness and dizziness Reason for Exam: Throbbing headache, lightheaded and dizzy FINDINGS: BRAIN/VENTRICLES: There is no acute intracranial hemorrhage, mass effect or midline shift. No abnormal extra-axial fluid collection. The gray-white differentiation is maintained without evidence of an acute infarct. There is no evidence of hydrocephalus. There is extensive calcification of the falx and tentorium. Findings are nonspecific but could be related to remote infection or other insult. ORBITS: The visualized portion of the orbits demonstrate no acute abnormality. SINUSES: The visualized paranasal sinuses and mastoid air cells demonstrate no acute abnormality. SOFT TISSUES/SKULL:  No acute abnormality of the visualized skull or soft tissues. No acute intracranial abnormality. RECOMMENDATIONS: Unavailable     CT FACIAL BONES WO CONTRAST    Result Date: 1/10/2022  EXAMINATION: CT OF THE HEAD WITHOUT CONTRAST; CT OF THE FACE WITHOUT CONTRAST 1/10/2022 1:20 am TECHNIQUE: CT of the head was performed without the administration of intravenous contrast. Dose modulation, iterative reconstruction, and/or weight based adjustment of the mA/kV was utilized to reduce the radiation dose to as low as reasonably achievable.; CT of the face was performed without the administration of intravenous contrast. Multiplanar reformatted images are provided for review. Dose modulation, iterative reconstruction, and/or weight based adjustment of the mA/kV was utilized to reduce the radiation dose to as low as reasonably achievable.  COMPARISON: 01/09/2022 HISTORY: Trauma FINDINGS: CT HEAD: BRAIN/VENTRICLES: Persistent layering debris within the lateral ventricles. There is mild dilatation of the ventricular system. There are changes chronic small vessel ischemic disease and mild generalized atrophy. There is no acute intracranial hemorrhage, mass effect or midline shift. The gray-white differentiation is maintained without evidence of an acute infarct. CT FACIAL BONES: FACIAL BONES: The maxilla, pterygoid plates and zygomatic arches are intact. The mandible is intact. The mandibular condyles are normally situated. The nasal bones and maxillary nasal processes are intact. ORBITS: The globes appear intact. The extraocular muscles, optic nerve sheath complexes and lacrimal glands appear unremarkable. No retrobulbar hematoma or mass is seen. The orbital walls and rims are intact. SINUSES/MASTOIDS: The paranasal sinuses are well aerated. No acute fracture is seen. Partial opacification of mastoid air cells. SOFT TISSUES: No acute abnormality of the visualized skull or soft tissues. Persistent layering debris within the lateral ventricles. There is mild dilatation of the ventricular system. Differential includes isodense subacute hemorrhage versus infection. Follow-up MRI brain with and without contrast is recommended for further evaluation. No acute traumatic injury of the facial bones. RECOMMENDATIONS: Unavailable     CT CERVICAL SPINE WO CONTRAST    Result Date: 1/9/2022  EXAMINATION: CT OF THE HEAD WITHOUT CONTRAST; CT OF THE CERVICAL SPINE WITHOUT CONTRAST 1/9/2022 7:02 pm TECHNIQUE: CT of the head was performed without the administration of intravenous contrast. Dose modulation, iterative reconstruction, and/or weight based adjustment of the mA/kV was utilized to reduce the radiation dose to as low as reasonably achievable.; CT of the cervical spine was performed without the administration of intravenous contrast. Multiplanar reformatted images are provided for review.  Dose modulation, iterative reconstruction, and/or weight based adjustment of MRI CERVICAL SPINE W WO CONTRAST    Result Date: 1/10/2022  EXAMINATION: MRI OF THE CERVICAL SPINE WITHOUT AND WITH CONTRAST  1/10/2022 1:18 pm: TECHNIQUE: Multiplanar multisequence MRI of the cervical spine was performed without and with the administration of intravenous contrast. COMPARISON: None. HISTORY: ORDERING SYSTEM PROVIDED HISTORY: found down TECHNOLOGIST PROVIDED HISTORY: found down Reason for Exam: found down FINDINGS: BONES/ALIGNMENT: The vertebral body heights are maintained. There is age-appropriate bone marrow signal.  There is multilevel degenerative disc disease with loss of disc signal.  There is no disc space narrowing. There is no spondylolisthesis. SPINAL CORD: The spinal cord is normal in caliber and signal.  There is debris visualized throughout the CSF fluid of the visualized brain and in the spinal canal.  There is leptomeningeal enhancement along the spinal cord. There is also a degree of dural enhancement within the visualized lower intracranial region and throughout the spinal column. SOFT TISSUES: The posterior paraspinal soft tissues are unremarkable. The prevertebral soft tissues are unremarkable. C2-C3: There is a disc osteophyte complex with uncovertebral and facet hypertrophy that is worse on the right compared to left. There is no canal stenosis or left foraminal narrowing. There is moderate right foraminal narrowing. C3-C4: There is a disc osteophyte complex with uncovertebral and facet hypertrophy that is worse on the right compared to left. There is no canal stenosis or left foraminal narrowing. There is mild right foraminal narrowing. C4-C5: There is a disc osteophyte complex with uncovertebral and facet hypertrophy. There is no canal stenosis. There is mild left and moderate to severe right foraminal narrowing. C5-C6: There is a disc osteophyte complex with uncovertebral and facet hypertrophy. There is no canal stenosis.   There is moderate left and severe right foraminal narrowing. C6-C7: There is a disc osteophyte complex with uncovertebral and facet hypertrophy. There is no canal stenosis. There is mild left and moderate right foraminal narrowing. C7-T1: There is no significant disc protrusion, spinal canal stenosis or neural foraminal narrowing. Debris throughout the CSF fluid with leptomeningeal enhancement and dural enhancement as described above consistent with an infectious process. Multilevel degenerative change with foraminal narrowing bilaterally as described above. RECOMMENDATIONS: Unavailable     XR CHEST PORTABLE    Result Date: 1/10/2022  EXAMINATION: ONE XRAY VIEW OF THE CHEST 1/10/2022 1:53 am COMPARISON: 01/10/2022. HISTORY: ORDERING SYSTEM PROVIDED HISTORY: ett TECHNOLOGIST PROVIDED HISTORY: ett Reason for Exam: et tube and ng placement   supine port Initial evaluation. FINDINGS: Endotracheal tube with the tip projecting approximately 6 cm above the emile. Enteric tube coursing below the diaphragm. The side port projects in the region the gastric cardia. The cardiac silhouette is within normal limits for size. There is no significant change in the focal opacity within the right lower lung. No focal consolidation within the left lung. No pleural effusion or pneumothorax. 1. Support devices as above. 2. No significant change in the focal opacity within the right lower lung. XR CHEST PORTABLE    Result Date: 1/10/2022  EXAMINATION: ONE XRAY VIEW OF THE CHEST 1/9/2022 11:49 pm COMPARISON: 01/09/2022 HISTORY: ORDERING SYSTEM PROVIDED HISTORY: transfer TECHNOLOGIST PROVIDED HISTORY: transfer Reason for Exam: found down   supine port FINDINGS: Right lung base masslike opacity opacity again identified. Otherwise unremarkable size. Lungs clear. No pleural effusion pneumothorax. Right lung base opacity again identified, nonspecific. Please correlate exam findings and exam findings and history.      XR CHEST PORTABLE    Result Date: 1/9/2022  EXAMINATION: ONE XRAY VIEW OF THE CHEST 1/9/2022 7:25 pm COMPARISON: 12/07/2015 HISTORY: ORDERING SYSTEM PROVIDED HISTORY: altered mental status TECHNOLOGIST PROVIDED HISTORY: altered mental status Reason for Exam: Altered mental status FINDINGS: The cardiomediastinal silhouette is normal in size and contour. Right lung base masslike opacity identified. No pleural effusion or pneumothorax is present. Right lung base opacity. Please correlate exam findings. This could represent a focus of pneumonia versus underlying mass. CT CHEST PULMONARY EMBOLISM W CONTRAST    Result Date: 1/9/2022  EXAMINATION: CTA OF THE CHEST 1/9/2022 7:40 pm TECHNIQUE: CTA of the chest was performed after the administration of intravenous contrast.  Multiplanar reformatted images are provided for review. MIP images are provided for review. Dose modulation, iterative reconstruction, and/or weight based adjustment of the mA/kV was utilized to reduce the radiation dose to as low as reasonably achievable. COMPARISON: Chest x-ray 01/09/2022 HISTORY: ORDERING SYSTEM PROVIDED HISTORY: elevated d-dimer, mental status change TECHNOLOGIST PROVIDED HISTORY: elevated d-dimer, mental status change Decision Support Exception - unselect if not a suspected or confirmed emergency medical condition->Emergency Medical Condition (MA) Reason for Exam: Elevated d-dimer, mental status change FINDINGS: Pulmonary Arteries: No central lobar pulmonary emboli. Main pulmonary artery is unremarkable caliber. Mediastinum: Air-fluid identified involving esophagus. Slight heterogeneous appearance of the thyroid gland on left. Heart is mildly prominent size. No definite bulky hilar mediastinal adenopathy. Lungs/pleura: Right middle lobe atelectasis versus scarring identified. Within the lateral aspect right middle lobe, there is consolidative versus masslike opacity with air-fluid level. There vessels corresponding through this opacity.   The tiny locules of gas as well. Otherwise mild hypoventilatory changes elsewhere the lungs. Upper Abdomen: Evidence of cholelithiasis. Fatty infiltration liver. Soft Tissues/Bones: Degenerate changes of the thoracic spine. No evidence of pulmonary embolism to the segmental level. Nonspecific opacification and when question air-fluid level involving the right middle lobe laterally. This demonstrates tiny locules of gas. Infection/Pneumonia and/or cavitary mass lesion may be considered. .  Consider pulmonology consultation. RECOMMENDATIONS: Unavailable     CTA HEAD NECK W CONTRAST    Result Date: 1/10/2022  EXAMINATION: CTA OF THE HEAD AND NECK WITH CONTRAST 1/10/2022 8:09 am: TECHNIQUE: CTA of the head and neck was performed with the administration of intravenous contrast. Multiplanar reformatted images are provided for review. MIP images are provided for review. Stenosis of the internal carotid arteries measured using NASCET criteria. Dose modulation, iterative reconstruction, and/or weight based adjustment of the mA/kV was utilized to reduce the radiation dose to as low as reasonably achievable. COMPARISON: CT brain performed 01/10/2022. HISTORY: ORDERING SYSTEM PROVIDED HISTORY: assess for vasculitis TECHNOLOGIST PROVIDED HISTORY: assess for vasculitis Decision Support Exception - unselect if not a suspected or confirmed emergency medical condition->Emergency Medical Condition (MA) Reason for Exam: assess for vasculitis FINDINGS: CTA NECK: AORTIC ARCH/ARCH VESSELS: No dissection or arterial injury. No significant stenosis of the brachiocephalic or subclavian arteries. CAROTID ARTERIES: No dissection, arterial injury, or hemodynamically significant stenosis by NASCET criteria. VERTEBRAL ARTERIES: No dissection, arterial injury, or significant stenosis. There is a dominant right vertebral artery. SOFT TISSUES: The lung apices are clear. No cervical or superior mediastinal lymphadenopathy.   The larynx and pharynx are unremarkable. No acute abnormality of the salivary and thyroid glands. BONES: No acute osseous abnormality. CTA HEAD: ANTERIOR CIRCULATION: The internal carotid arteries are patent. The anterior cerebral arteries are patent and mildly diminutive. The middle cerebral arteries demonstrate a mildly beaded pattern in the M1 portions with intermittent foci of mild stenosis. The distal middle cerebral artery branches are diminutive and this is more pronounced on the left compared to the right. POSTERIOR CIRCULATION: There is a dominant right vertebral artery. The vertebral arteries are patent. There is a focus of apparent fenestration within the basilar artery. There is a focus of mild-to-moderate stenosis in the distal aspect of the basilar artery. The posterior cerebral arteries are patent and mildly diminutive. OTHER: No dural venous sinus thrombosis on this non-dedicated study. BRAIN: No mass effect or midline shift. No extra-axial fluid collection. The gray-white differentiation is maintained. Diminutive anterior cerebral arteries and posterior cerebral arteries. Mildly beaded appearance to the M1 portions of the middle cerebral arteries bilaterally with intermittent foci of mild stenosis. Subsequent diminutive middle cerebral artery branches distally and this is more pronounced on the left compared to the right. These findings are of uncertain etiology although vasculitis is a consideration. RECOMMENDATIONS: Unavailable     MRI BRAIN W WO CONTRAST    Result Date: 1/10/2022  EXAMINATION: MRI OF THE BRAIN WITHOUT AND WITH CONTRAST  1/10/2022 1:18 pm TECHNIQUE: Multiplanar multisequence MRI of the head/brain was performed without and with the administration of intravenous contrast. COMPARISON: CT brain performed 01/10/2022.  HISTORY: ORDERING SYSTEM PROVIDED HISTORY: infection TECHNOLOGIST PROVIDED HISTORY: infection Reason for Exam: infection FINDINGS: INTRACRANIAL STRUCTURES/VENTRICLES:  The sellar and suprasellar structures, optic chiasm, corpus callosum, pineal gland, tectum, and midline brainstem structures are unremarkable. The craniocervical junction is unremarkable. There is no acute hemorrhage, mass effect, midline shift. There is FLAIR signal abnormality in the periventricular white matter. The ventricles are symmetric and enlarged containing a large amount of debris and this is worse on the right compared to the left. The debris is seen in the 3rd ventricle and 4th ventricle. There is a shunt tube from a right frontal approach with the tip in the right frontal horn. There is enhancement involving the ependymal surface of the ventricular system and along the periphery of the choroid plexus. Left a meningeal enhancement extends into the cranial cervical junction and visualized cervical spinal cord. ORBITS: The visualized portion of the orbits demonstrate no acute abnormality. SINUSES: There is fluid throughout the mastoid air cells. The paranasal sinuses are normally aerated. BONES/SOFT TISSUES: The bone marrow signal intensity appears normal. The soft tissues demonstrate no acute abnormality. Prominent ventricular system with debris throughout the CSF. Associated FLAIR signal abnormality involving the ependymal and subependymal surface with enhancement of the ependymal surface of the ventricular system consistent with an infectious process. There is associated leptomeningeal enhancement extending into the craniocervical region and along the visualized cervical spinal cord. RECOMMENDATIONS: Unavailable     CT LUMBAR SPINE TRAUMA RECONSTRUCTION    Result Date: 1/10/2022  EXAMINATION: CT OF THE LUMBAR SPINE WITHOUT CONTRAST  1/10/2022 TECHNIQUE: CT of the lumbar spine was performed without the administration of intravenous contrast. Multiplanar reformatted images are provided for review. Adjustment of mA and/or kV according to patient size was utilized.   Dose modulation, iterative reconstruction, and/or weight based adjustment of the mA/kV was utilized to reduce the radiation dose to as low as reasonably achievable. COMPARISON: None HISTORY: ORDERING SYSTEM PROVIDED HISTORY: found down altered TECHNOLOGIST PROVIDED HISTORY: found down altered Reason for Exam: Patient found down, POA in [facility] called yetruday pt ams, ELA found patient today lying on floor, head facing right ulcer to right cheek. unknown amount of time approximately 48hr estimate. Patient transfer from [facility] [place]. DX: Rhabdo, glucose 221 FINDINGS: BONES/ALIGNMENT: There is normal alignment of the spine. The vertebral body heights are maintained. No osseous destructive lesion is seen. DEGENERATIVE CHANGES: No significant degenerative changes of the lumbar spine. SOFT TISSUES/RETROPERITONEUM: No paraspinal mass is seen. Unremarkable non-contrast CT of the lumbar spine. A/P  70 y.o. male who presents with altered mental status, ventriculitis, primary CSN infection, cerebritis        - Obtain MRI thoracic and lumbar spine w/wo  - Hold all antiplatelets and anticoagulants  - Lovenox for DVT prophylaxis   - Neuro checks per floor protocol  - Maintain EVD at 15cm H20, monitor ICP record hourly   - CSF culture pending       Please contact neurosurgery with any changes in patients neurologic status.        Junior Isbell CNP  1/11/22  8:59 AM

## 2022-01-11 NOTE — CARE COORDINATION
Case Management Initial Discharge Plan  Fani Pierce,             Met with:cousin, Andrés Patrick to discuss discharge plans. Information verified: address, contacts, phone number, , insurance Yes  Insurance Provider: Medicare A/B, [facility] [facility]    Emergency Contact/Next of Kin name & number: Nikhil Hennessy, pt's cousin (speaks daily to him). Who are involved in patient's support system? Siva    PCP: Maria Luz Shepherd MD  Date of last visit: 3-4 weeks ago      Discharge Planning    Living Arrangements:  Alone     Home has 2 stories  3 stairs to climb to get into front door,   Location of bedroom/bathroom in home main    Patient able to perform ADL's:Independent    Current Services (outpatient & in home) none  DME equipment: n/a  DME provider: n/a    Is patient receiving oral anticoagulation therapy?  unknown    If indicated:   Physician managing anticoagulation treatment: n/a  Where does patient obtain lab work for ATC treatment? n/a      Potential Assistance Needed:  Extended Care [address]    Patient agreeable to home care: No  [place] of choice provided:  no    Prior SNF/Rehab Placement and Facility: none  Agreeable to SNF/Rehab: Yes  [place] of choice provided: no     Evaluation: no    Expected Discharge date:       Patient expects to be discharged to: If home: is the family and/or caregiver wiling & able to provide support at home? no  Who will be providing this support? His cousin  Follow Up Appointment: Best Day/ Time:      Transportation provider: [facility]  Transportation arrangements needed for discharge: Yes    Readmission Risk              Risk of Unplanned Readmission:  17             Does patient have a readmission risk score greater than 14?: Yes  If yes, follow-up appointment must be made within 7 days of discharge.      Goals of Care:       Educated pt's cousin Andrés Patrick on transitional options, provided freedom of choice and are agreeable with plan      Discharge Plan: Spoke with Toby Jay over the phone, states he spoke with pt daily. Toby Jay was in [facility] for his daughter's wedding. He spoke to pt Saturday evening, states pt always called everyday at 10 pm \"on the dot. \"   Toby Jay believes pt was down for no more then 26 hours. Toby Jay went over pt's house when he returned from [facility] right off of the plane. Toby Jay would prefer staff call his cell number at [phone]. He lives 10 minutes from Pt. Toby Jay said all of pt's arrangements are done and all written down. He believes pt would choose [facility] for SNF, but will let staff know for sure. Toby Jay will be here about 1 pm today. Pt intubated FiO2 30%, PEEP 5, RR 16, .     0900 Nursing round report: Rangel discontinued. Presents with bacterial meningitis symptoms, but negative. Intubated pt has cough and gag reflex. No subarachnoid hemorrhage. Brain ventricles may be full of infection per scans. When pt's cousin comes to visit, they will discuss at full body MRI with him. Per unit nurse, pt was having headaches for weeks and sleeping with 2 carhart jackets and \"freezing still. \"           Electronically signed by Tony Ordaz RN on 1/11/22 at 7:47 AM EST

## 2022-01-12 NOTE — PROCEDURES
Referring physician: Dr. Josselin Guido  Date: 1/12/2022  Start Time: 1/11/2022 @ [phone]  End Time:1/12/2022 @ 0900    Indication  Patient with encephalopathy, EEG done to rule out subclinical seizures. Introduction  This continuous video-EEG was acquired using a [employer] workstation at 256 samples/s. Electrodes were placed according to the International 10-20 system. Automated spike and seizure detection algorithms were applied. Video was recorded during this study. Description  The background consistent of diffuse none reactive polymorphic delta and theta slowing with brief periods of attenuation. Right hemisphere has higher amplitude tracing suggestive of breach rythm. Normal sleep structures were observed. There were frequent lateralized periodic discharges seen mainly on right temporal area. Events  1/11-12/2022  No events reported or recorded. Impression  Abnormal continuous vEEG recording, the slowing mentioned above suggests moderate-severe non specific encephalopathy. The presence of R temporal LPDs increases patient risk for focal seizures, can also be seen in CNS-itis. EKG was not reliable for read. No electrographic seizures. Cathleen Jones MD  Epilepsy Board Certified. Neurology Board Certified.     Electronically Signed

## 2022-01-12 NOTE — PROCEDURES
Referring physician: Dr. Erika Alvarado  Date: 1/12/2022  Start Time: 1/11/2022 @ [phone]  End Time:1/12/2022 @ [phone]    Indication  Patient with encephalopathy, EEG done to rule out subclinical seizures. Introduction  This continuous video-EEG was acquired using a [employer] workstation at 256 samples/s. Electrodes were placed according to the International 10-20 system. Automated spike and seizure detection algorithms were applied. Video was recorded during this study. Description  The background consistent of diffuse none reactive polymorphic delta and theta slowing with brief periods of attenuation. Right hemisphere has higher amplitude tracing suggestive of breach rythm. Normal sleep structures were observed. There were frequent lateralized periodic discharges seen mainly on right temporal area. Events  1/11-12/2022  No events reported or recorded. Impression  Abnormal continuous vEEG recording, the slowing mentioned above suggests moderate-severe non specific encephalopathy. The presence of R temporal LPDs increases patient risk for focal seizures, can also be seen in CNS-itis. EKG was not reliable for read. No electrographic seizures. Shi Tijerina MD  Epilepsy Board Certified. Neurology Board Certified.     Electronically Signed

## 2022-01-12 NOTE — PROGRESS NOTES
Comprehensive Nutrition Assessment    Type and Reason for Visit:  Reassess    Nutrition Recommendations/Plan:    - When able to start TF, suggest peptide based formula, goal of 75 mL/hr.   - Obtain current weight    Nutrition Assessment:  RD consulted for TF ordering and management yesterda. TF never started d/t possible angio today per RN. Updated weight requested. Estimated Daily Nutrient Needs:  Energy (kcal):  3812-1313 kcals/day; Weight Used for Energy Requirements:  Current     Protein (g):  100-115 gm/day; Weight Used for Protein Requirements:  Ideal (1.3-1.5)        Fluid (ml/day):  per MD    Nutrition Related Findings:  meds/labs reviewed      Wounds:  None       Current Nutrition Therapies:    NPO    Anthropometric Measures:  · Height: 5' 10\" (177.8 cm)  · Current Body Weight: 202 lb 6.1 oz (91.8 kg) (1/3/22)   · Ideal Body Weight: 166 lbs; % Ideal Body Weight 121.9 %   · BMI: 29  · BMI Categories: Overweight (BMI 25.0-29. 9)       Nutrition Diagnosis:   · Inadequate oral intake related to impaired respiratory function as evidenced by NPO or clear liquid status due to medical condition      Nutrition Interventions:   Food and/or Nutrient Delivery:  Continue NPO  Nutrition Education/Counseling:  No recommendation at this time   Coordination of Nutrition Care:  Continue to monitor while inpatient    Goals:  Meet greater than 50% of estimated nutrient needs  - no progress    Nutrition Monitoring and Evaluation:   Behavioral-Environmental Outcomes:  None Identified   Food/Nutrient Intake Outcomes:  Diet Advancement/Tolerance  Physical Signs/Symptoms Outcomes:  Weight,Biochemical Data,Nutrition Focused Physical Findings     Discharge Planning:     Too soon to determine     Electronically signed by Aaron Campos, MS, RD, LD on 1/12/22 at 10:43 AM EST    Contact: [phone]

## 2022-01-12 NOTE — PROGRESS NOTES
Physician Progress Note      PATIENT:               Ar Vaz  CSN #:                  [id number]  :                       1950  ADMIT DATE:       2022 11:26 PM  [address] DATE:  RESPONDING  PROVIDER #:        Lisandra Lord MD          QUERY TEXT:    Pt admitted with meningitis. Pt noted to have elevated WBC, temp, HR, RR. If   possible, please document if you are evaluating and /or treating any of the   following: The medical record reflects the following:  Risk Factors: traumatic rhabdomyolysis, meningitis  Clinical Indicators: WBC- 27.7,  HR- 126, RR- 42-16, TEMP- 95.3-101. 6. lac   acid- 1.5  pt confused with decline in GCS to 10 and requiring intubation for   airway protection. found down at home. noted to be toxic appearing on exam and   tachycardia, and hypertensive - 164/106. CXR pneumonia  Treatment: intubation with mechanical ventilation,  IVF- 1L BOLUS, 125 ML/HR,    Zovirax, Ampicillin, rocpehin, zithromax, Decadron, vancomycin, keppra,   propofol. emergency EVD placement in the ED. Thank Joe Gomez RN, CDS-  email - [email or web address]  office- [phone]  Options provided:  -- sepsis due to meningitis/pneumonia, present on admission  -- Sepsis was ruled out  -- Other - I will add my own diagnosis  -- Disagree - Not applicable / Not valid  -- Disagree - Clinically unable to determine / Unknown  -- Refer to Clinical Documentation Reviewer    PROVIDER RESPONSE TEXT:    After further study, sepsis was ruled out for this patient.     Query created by: Keena Feldman on 1/10/2022 2:09 PM      Electronically signed by:  Lisandra Lord MD 2022 8:04 AM

## 2022-01-12 NOTE — PROGRESS NOTES
Progress Note - Neurosurgery    Chief complaint altered mental status    Subjective:  Ole Muhammad is a 70 y.o. male. No new issues overnight. Patient still vented  Review of Systems    Objective:  Blood pressure (!) 168/86, pulse 74, temperature 98.3 °F (36.8 °C), temperature source Oral, resp. rate 29, height 5' 10\" (1.778 m), SpO2 100 %. Physical Exam   Afebrile. Regular rate. Equal chest rise and fall and vented. Soft nontender abdomen. No CSF rhinorrhea or otorrhea. Neurologic Exam   Off sedation did weakly open eyes to stimulus. Pupils are 3 mm but reactive. Very weak withdrawal of all extremities with no definitive localization and not following commands. EVD site clean dry intact ICPs within normal limits. Xanthochromic clear CSF by EVD    Labs:  Admission on 01/09/2022, Discharged on 01/09/2022   Component Date Value Ref Range Status    Glucose 01/09/2022 210* 70 - 99 mg/dL Final    BUN 01/09/2022 56* 8 - 23 mg/dL Final    CREATININE 01/09/2022 1.35* 0.70 - 1.20 mg/dL Final    Bun/Cre Ratio 01/09/2022 41* 9 - 20 Final    Calcium 01/09/2022 10.0  8.6 - 10.4 mg/dL Final    Sodium 01/09/2022 134* 135 - 144 mmol/L Final    Potassium 01/09/2022 3.7  3.7 - 5.3 mmol/L Final    Chloride 01/09/2022 97* 98 - 107 mmol/L Final    CO2 01/09/2022 22  20 - 31 mmol/L Final    Anion Gap 01/09/2022 15  9 - 17 mmol/L Final    GFR Non- 01/09/2022 52* >60 mL/min Final    GFR  01/09/2022 >60  >60 mL/min Final    GFR Comment 01/09/2022        Final    Comment: Average GFR for 79or more years old:   76 mL/min/1.73sq m  Chronic Kidney Disease:   <60 mL/min/1.73sq m  Kidney failure:   <15 mL/min/1.73sq m              eGFR calculated using average adult body mass.  Additional eGFR calculator available at:        [email or web address]            GFR Staging 01/09/2022 NOT REPORTED   Final    WBC 01/09/2022 27.7* 3.5 - 11.3 k/uL Final    RBC Sepsis 01/09/2022 2.2* 0.5 - 1.9 mmol/L Final    Lactic Acid, Sepsis, Whole Blood 01/09/2022 NOT REPORTED  0.5 - 1.9 mmol/L Final    Lactic Acid, Sepsis 01/09/2022 1.9  0.5 - 1.9 mmol/L Final    Lactic Acid, Sepsis, Whole Blood 01/09/2022 NOT REPORTED  0.5 - 1.9 mmol/L Final    Myoglobin 01/09/2022 2,634* 28 - 72 ng/mL Final    Total CK 01/09/2022 2,165* 39 - 308 U/L Final    CK-MB 01/09/2022 28.4* <10.5 ng/mL Final    Troponin, High Sensitivity 01/09/2022 17  0 - 22 ng/L Final    Comment:       High Sensitivity Troponin values cannot be compared with other Troponin methodologies. Patients with high levels of Biotin oral intake (i.e >5mg/day) may have falsely decreased   Troponin levels. Samples collected within 8 hours of biotin intake may require additional   information for diagnosis.       Troponin T 01/09/2022 NOT REPORTED  <0.03 ng/mL Final    Troponin Interp 01/09/2022 NOT REPORTED   Final    Color, UA 01/09/2022 NOT REPORTED  Yellow Final    Turbidity UA 01/09/2022 NOT REPORTED  Clear Final    Glucose, Ur 01/09/2022 2+* NEGATIVE Final    Bilirubin Urine 01/09/2022 NEGATIVE  NEGATIVE Final    Ketones, Urine 01/09/2022 NEGATIVE  NEGATIVE Final    Specific Gravity, UA 01/09/2022 1.030* 1.010 - 1.025 Final    Urine Hgb 01/09/2022 3+* NEGATIVE Final    pH, UA 01/09/2022 5.5  5.0 - 6.0 Final    Protein, UA 01/09/2022 1+* NEGATIVE Final    Urobilinogen, Urine 01/09/2022 Normal  Normal Final    Nitrite, Urine 01/09/2022 NEGATIVE  NEGATIVE Final    Leukocyte Esterase, Urine 01/09/2022 NEGATIVE  NEGATIVE Final    Urinalysis Comments 01/09/2022 NOT REPORTED   Final    Ventricular Rate 01/09/2022 104  BPM Final    Atrial Rate 01/09/2022 104  BPM Final    P-R Interval 01/09/2022 152  ms Final    QRS Duration 01/09/2022 70  ms Final    Q-T Interval 01/09/2022 356  ms Final    QTc Calculation (Bazett) 01/09/2022 468  ms Final    P Axis 01/09/2022 62  degrees Final    R Axis 01/09/2022 54 [email or web address]  Fact sheet for Patients: [email or web address]          Methodology: Isothermal Nucleic Acid Amplification      D-Dimer, Quant 01/09/2022 3.48* 0.00 - 0.59 mg/L FEU Final    Comment:        When combined with a low clinical probability, a D dimer value of <0.50 mg/L FEU is   considered negative for DVT and PE (negative predictive value of 98%, sensitivity of 97%). If this test is not being used to help rule out DVT and PE, then the following reference   range should be utilized: 0.00 - 0.59 mg/L FEU. The D-Dimer assay is intended for use as an aid in the diagnosis of venous thromboembolism   (DVT and PE) and the results should be interpreted in conjunction with the patient's medical   history, clinical presentation, and other findings. Elevated levels of D-dimer activity can be seen in any state of coagulation activation and   is not recommended in patients with therapeutic dose anticoagulant therapy for >24 hours,   fibrinolytic therapy within the previous 7 days, trauma or surgery within the previous 4   weeks,   disseminated malignancies, aortic aneurysm, sepsis, severe infections, pneumonia, severe   skin infections, liver cirrhosis, advanced age, luanne                           nary disease, diabetes, and pregnancy. A very low percentage of patients with DVT may yield D-dimer results below the cutoff of   0.5 mg/L FEU. This is known to be more prevalent in patients with distal DVT.             - 01/09/2022        Final    WBC, UA 01/09/2022 0 TO 4  0 - 4 /HPF Final    RBC, UA 01/09/2022 10 TO 25  0 - 4 /HPF Final    Casts UA 01/09/2022 5 TO 10  0 - 2 /LPF Final    Casts UA 01/09/2022 FINE GRANULAR  0 - 2 /LPF Final    Crystals, UA 01/09/2022 NOT REPORTED  None /HPF Final    Epithelial Cells UA 01/09/2022 5 TO 10  0 - 5 /HPF Final    Renal Epithelial, UA 01/09/2022 NOT REPORTED  0 /HPF Final    Bacteria, UA 01/09/2022 1+* None Final    Mucus, UA 01/09/2022 1+* None Final    Trichomonas, UA 01/09/2022 NOT REPORTED  None Final    Amorphous, UA 01/09/2022 2+* None Final    Other Observations UA 01/09/2022 NOT REPORTED  NOT REQ. Final    Yeast, UA 01/09/2022 NOT REPORTED  None Final     Results for orders placed during the hospital encounter of 01/09/22    MRI BRAIN W WO CONTRAST    Narrative  EXAMINATION:  MRI OF THE BRAIN WITHOUT AND WITH CONTRAST  1/10/2022 1:18 pm    TECHNIQUE:  Multiplanar multisequence MRI of the head/brain was performed without and  with the administration of intravenous contrast.    COMPARISON:  CT brain performed 01/10/2022. HISTORY:  ORDERING SYSTEM PROVIDED HISTORY: infection  TECHNOLOGIST PROVIDED HISTORY:  infection  Reason for Exam: infection    FINDINGS:  INTRACRANIAL STRUCTURES/VENTRICLES:  The sellar and suprasellar structures,  optic chiasm, corpus callosum, pineal gland, tectum, and midline brainstem  structures are unremarkable. The craniocervical junction is unremarkable. There is no acute hemorrhage, mass effect, midline shift. There is FLAIR  signal abnormality in the periventricular white matter. The ventricles are  symmetric and enlarged containing a large amount of debris and this is worse  on the right compared to the left. The debris is seen in the 3rd ventricle  and 4th ventricle. There is a shunt tube from a right frontal approach with  the tip in the right frontal horn. There is enhancement involving the  ependymal surface of the ventricular system and along the periphery of the  choroid plexus. Left a meningeal enhancement extends into the cranial  cervical junction and visualized cervical spinal cord. ORBITS: The visualized portion of the orbits demonstrate no acute abnormality. SINUSES: There is fluid throughout the mastoid air cells. The paranasal  sinuses are normally aerated.     BONES/SOFT TISSUES: The bone marrow signal intensity appears normal. The soft  tissues demonstrate no acute abnormality. Impression  Prominent ventricular system with debris throughout the CSF. Associated  FLAIR signal abnormality involving the ependymal and subependymal surface  with enhancement of the ependymal surface of the ventricular system  consistent with an infectious process. There is associated leptomeningeal  enhancement extending into the craniocervical region and along the visualized  cervical spinal cord. RECOMMENDATIONS:  Unavailable    Results for orders placed during the hospital encounter of 01/09/22    MRI CERVICAL SPINE W WO CONTRAST    Narrative  EXAMINATION:  MRI OF THE CERVICAL SPINE WITHOUT AND WITH CONTRAST  1/10/2022 1:18 pm:    TECHNIQUE:  Multiplanar multisequence MRI of the cervical spine was performed without and  with the administration of intravenous contrast.    COMPARISON:  None. HISTORY:  ORDERING SYSTEM PROVIDED HISTORY: found down  TECHNOLOGIST PROVIDED HISTORY:  found down  Reason for Exam: found down    FINDINGS:  BONES/ALIGNMENT: The vertebral body heights are maintained. There is  age-appropriate bone marrow signal.  There is multilevel degenerative disc  disease with loss of disc signal.  There is no disc space narrowing. There  is no spondylolisthesis. SPINAL CORD: The spinal cord is normal in caliber and signal.  There is  debris visualized throughout the CSF fluid of the visualized brain and in the  spinal canal.  There is leptomeningeal enhancement along the spinal cord. There is also a degree of dural enhancement within the visualized lower  intracranial region and throughout the spinal column. SOFT TISSUES: The posterior paraspinal soft tissues are unremarkable. The  prevertebral soft tissues are unremarkable. C2-C3: There is a disc osteophyte complex with uncovertebral and facet  hypertrophy that is worse on the right compared to left. There is no canal  stenosis or left foraminal narrowing.   There is moderate right foraminal  narrowing. C3-C4: There is a disc osteophyte complex with uncovertebral and facet  hypertrophy that is worse on the right compared to left. There is no canal  stenosis or left foraminal narrowing. There is mild right foraminal  narrowing. C4-C5: There is a disc osteophyte complex with uncovertebral and facet  hypertrophy. There is no canal stenosis. There is mild left and moderate to  severe right foraminal narrowing. C5-C6: There is a disc osteophyte complex with uncovertebral and facet  hypertrophy. There is no canal stenosis. There is moderate left and severe  right foraminal narrowing. C6-C7: There is a disc osteophyte complex with uncovertebral and facet  hypertrophy. There is no canal stenosis. There is mild left and moderate  right foraminal narrowing. C7-T1: There is no significant disc protrusion, spinal canal stenosis or  neural foraminal narrowing. Impression  Debris throughout the CSF fluid with leptomeningeal enhancement and dural  enhancement as described above consistent with an infectious process. Multilevel degenerative change with foraminal narrowing bilaterally as  described above. RECOMMENDATIONS:  Unavailable    Results for orders placed during the hospital encounter of 01/09/22    MRI LUMBAR SPINE W WO CONTRAST    Narrative  EXAMINATION:  MRI OF THE LUMBAR SPINE WITHOUT AND WITH CONTRAST  1/11/2022 4:35 pm    TECHNIQUE:  Multiplanar multisequence MRI of the lumbar spine was performed without and  with the administration of intravenous contrast.    COMPARISON:  None. HISTORY:  ORDERING SYSTEM PROVIDED HISTORY: asses CSF infection  TECHNOLOGIST PROVIDED HISTORY:  asses CSF infection  Reason for Exam: CSF infection; found down    FINDINGS:  BONES/ALIGNMENT: There is normal alignment of the spine. The vertebral body  heights are maintained. The bone marrow signal appears unremarkable. SPINAL CORD:  The conus terminates normally at the T12-L1 level. There is  diffuse heterogeneous appearance of the CSF on T2 weighted sequences and  diffuse leptomeningeal enhancement. There is no evidence of abscess. SOFT TISSUES: No paraspinal fluid collection or mass. Right renal cyst is  partially imaged. L1-L2: Mild disc height loss and desiccation. Mild bilateral neural  foraminal and mild spinal canal stenosis secondary to disc bulge. L2-L3: Disc height loss and desiccation. Mild bilateral neural foraminal  narrowing and moderate spinal canal stenosis secondary to disc bulge. L3-L4: Mild disc height loss and desiccation. Mild left and moderate right  neural foraminal and mild spinal canal stenosis secondary to disc bulge. L4-L5: Mild disc height loss and desiccation. Mild bilateral neural  foraminal narrowing and mild spinal canal stenosis secondary to disc bulge  and facet hypertrophy. L5-S1: Moderate disc height loss and desiccation. Mild bilateral neural  foraminal narrowing secondary to disc bulge. No spinal canal stenosis. Impression  1. 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 No  evidence of abscess. 2. Mild-to-moderate multilevel degenerative changes as detailed above. Results for orders placed during the hospital encounter of 01/09/22    CT HEAD WO CONTRAST    Narrative  EXAMINATION:  CT OF THE HEAD WITHOUT CONTRAST; CT OF THE FACE WITHOUT CONTRAST 1/10/2022  1:20 am    TECHNIQUE:  CT of the head was performed without the administration of intravenous  contrast. Dose modulation, iterative reconstruction, and/or weight based  adjustment of the mA/kV was utilized to reduce the radiation dose to as low  as reasonably achievable.; CT of the face was performed without the  administration of intravenous contrast. Multiplanar reformatted images are  provided for review.  Dose modulation, iterative reconstruction, and/or weight  based adjustment of the mA/kV was utilized to reduce the radiation dose to as  low as reasonably achievable. COMPARISON:  01/09/2022    HISTORY:  Trauma    FINDINGS:  CT HEAD:    BRAIN/VENTRICLES: Persistent layering debris within the lateral ventricles. There is mild dilatation of the ventricular system. There are changes  chronic small vessel ischemic disease and mild generalized atrophy. There is  no acute intracranial hemorrhage, mass effect or midline shift. The  gray-white differentiation is maintained without evidence of an acute infarct. CT FACIAL BONES:    FACIAL BONES: The maxilla, pterygoid plates and zygomatic arches are intact. The mandible is intact. The mandibular condyles are normally situated. The  nasal bones and maxillary nasal processes are intact. ORBITS: The globes appear intact. The extraocular muscles, optic nerve  sheath complexes and lacrimal glands appear unremarkable. No retrobulbar  hematoma or mass is seen. The orbital walls and rims are intact. SINUSES/MASTOIDS: The paranasal sinuses are well aerated. No acute fracture  is seen. Partial opacification of mastoid air cells. SOFT TISSUES: No acute abnormality of the visualized skull or soft tissues. Impression  Persistent layering debris within the lateral ventricles. There is mild  dilatation of the ventricular system. Differential includes isodense  subacute hemorrhage versus infection. Follow-up MRI brain with and without  contrast is recommended for further evaluation. No acute traumatic injury of the facial bones. 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 Neuro axis imaging does not show any focal compressive lesion of the brain or spine at this time. Considering the lesions are extra-axial but subarachnoid in nature as well as fairly diffuse there is no strong role for evacuation or surgical intervention at this time.   Medical therapy per infectious disease and ICU with supportive care via EVD.     Celena Thurman DO  Neurosurgery  O: [phone]  C: [facility],   1/12/2022

## 2022-01-12 NOTE — CARE COORDINATION
Pt remains intubated, fiO2 30%, PEEP 5. Placed call to pt's cousin, Sky Rodriguez, to discuss transition plans and obtain LTACH choice, and per Sky Rodriguez, he is on his way to the hospital, will be here soon. MANJU will follow-up with Theodora Cerrato -  with Sky Rodriguez, provided [facility] list, [facility] chosen. Referral sent.

## 2022-01-12 NOTE — PROGRESS NOTES
Endovascular Neurosurgery Progress Note    SUBJECTIVE:   No acute events overnight. Review of Systems:  CONSTITUTIONAL:  negative for fevers, chills, fatigue and malaise    EYES:  negative for double vision, blurred vision and photophobia     HEENT:  negative for tinnitus, epistaxis and sore throat    RESPIRATORY:  negative for cough, shortness of breath, wheezing    CARDIOVASCULAR:  negative for chest pain, palpitations, syncope, edema    GASTROINTESTINAL:  negative for nausea, vomiting    GENITOURINARY:  negative for incontinence    MUSCULOSKELETAL:  negative for neck or back pain    NEUROLOGICAL:  Negative for weakness and tingling  negative for headaches and dizziness    PSYCHIATRIC:  negative for anxiety      Review of systems otherwise negative. OBJECTIVE:     Vitals:    22 1600   BP: (!) 144/73   Pulse: 73   Resp: 20   Temp: 99 °F (37.2 °C)   SpO2: 100%        General:  Gen: normal habitus, NAD  HEENT: NCAT, mucosa moist  Cvs: RRR, S1 S2 normal  Resp: symmetric unlabored breathing  Abd: s/nd/nt  Ext: no edema  Skin: no lesions seen, warm and dry    Neuro:  Gen: awake and alert, oriented x3. Lang/speech: no aphasia or dysarthria. Follows commands. CN: PERRL, EOMI, VFF, V1-3 intact, face symmetric, hearing intact, shoulder shrug symmetric, tongue midline  Motor: grossly 5/5 UE and LE b/l  Sense: LT intact in all 4 ext. Coord: FTN and HTS intact b/l  DTR: deferred  Gait: narrow base gait    NIH Stroke Scale:   1a  Level of consciousness: 3 - responds only with reflex motor or automatic effects or totally unresponsive, flaccid, areflexic   1b. LOC questions:  2 - answers neither question correctly   1c. LOC commands: 2 - performs neither task correctly   2. Best Gaze: 0 - normal   3. Visual: 0 - no visual loss   4. Facial Palsy: 0 - normal symmetric movement   5a. Motor left arm: 4 - no movement   5b. Motor right arm: 4 - no movement   6a.  Motor left le - no movement   6b  Motor right le - no movement   7. Limb Ataxia: 0 - absent   8. Sensory: 2 - severe to total sensory loss; patient is not aware of being touched in face, arm, leg   9. Best Language:  3 - mute, global aphasia; no usable speech or auditory comprehension   10. Dysarthria: UN - intubated or other physical barrier   11. Extinction and Inattention: 0 - no abnormality         Total:   28     MRS: 05      LABS:   Reviewed. Lab Results   Component Value Date    HGB 10.0 (L) 2022    WBC 16.5 (H) 2022     2022     (H) 2022    BUN 29 (H) 2022    CREATININE 0.84 2022    AST 48 (H) 2022    ALT 32 2022    MG 2.8 (H) 2022    APTT 22.4 01/10/2022    INR 1.1 01/10/2022      Lab Results   Component Value Date    COVID19 Not Detected 01/10/2022    COVID19 Not Detected 2022    COVID19 Not Detected 2020       RADIOLOGY:   Images were personally reviewed including:   MRI Brain:   Prominent ventricular system with debris throughout the CSF.  Associated   FLAIR signal abnormality involving the ependymal and subependymal surface   with enhancement of the ependymal surface of the ventricular system   consistent with an infectious process.  There is associated leptomeningeal   enhancement extending into the craniocervical region and along the visualized   cervical spinal cord.      CTA head and neck:   Diminutive anterior cerebral arteries and posterior cerebral arteries. Mildly beaded appearance to the M1 portions of the middle cerebral arteries   bilaterally with intermittent foci of mild stenosis.  Subsequent diminutive   middle cerebral artery branches distally and this is more pronounced on the   left compared to the right.  These findings are of uncertain etiology   although vasculitis is a consideration.          ASSESSMENT:   69 y/o M with pmh signicant for Htn, dyslipidemia presented after was found down.  CT head and MRI brain showed prominent intraventricular debris. CTA head showed diminutive diffuse anterior and posterior cerebral arteries with beaded appearance. DDx at this point includes secondary cerebral vasculitis vs reactive diffuse cerebral vasospasm vs diffuse intracranial atherosclerotic disease. PLAN:   --Will consider DSA if patient's neuro exam remains poor  --Consider CTA head in 7-10 days to evaluate intracranial vasculature  --Abx per ICU team   --LTM EEG to r/out subclinical status   --Endovascular team will continue to follow along. Case discussed with Dr. Lee Stevie attending.     Annette Cordova MD  Stroke, [facility]  [id number] [address] [place]  Electronically signed 1/12/2022 at 5:50 PM

## 2022-01-12 NOTE — PROGRESS NOTES
Daily Progress Note  Neuro Critical Care    Patient Name: Yenni Cruz  Patient : 1950  Room/Bed: 4693/0730-40  Code Status: FULL  Allergies: No Known Allergies    CHIEF COMPLAINT:      AMS     INTERVAL HISTORY    Initial Presentation (Admitted 1/10/22): The patient is a 70 y.o. male with a history of hypertension who presented as a transfer from [facility] for intraventricular debris. Initially presented to the [facility] ED after being found down at home with altered mental status. He reportedly saw his PCP one week ago for headaches and CTH at that time was negative for acute abnormality. His POA tried calling him and felt that he sounded confused. Patient is normal A&O x4 at baseline. EMS was called to the home and patient was found down for an unknown amount of time but suspected prolonged down time as long as 48 hours. On arrival to [facility] ED, GCS 12. Patient awake and able to follow simple commands but not answering questions appropriately. CT Head showed debris within bilateral ventricles secondary to subacute hemorrhage vs infectious process. CT C-spine negative. CT Chest showed possible cavitary lung lesion in the right middle lobe. Labs were remarkable for elevated CK consistent with rhabdomyolysis, mild JOZEF, leukocytosis to 27.7, and mild elevation of lactate. Patient was transferred to [address] [place] [address] for neurosurgical evaluation. While in [address] [place] [address] ED, patient GCS declined and he became tachypneic. Patient was subsequently intubated. Febrile with leukocytosis. Trauma was consulted due to possible traumatic etiology of ventricular debris and imaging demonstrated two left sided rib fractures but otherwise negative for traumatic injury. Neurosurgery consulted. Started empirically on Decadron and antibiotic/antiviral coverage for meningitis. Hospital Course:  1/10: Admitted to the Neuro ICU. EVD placed at bedside and set at [address].  CSF studies; Glucose <2, Protein 966, RBC 350, WBC 00813. Meningitis panel negative. Acyclovir discontinued. Continued on Rocephin, Vancomycin and Ampicillin. Received 4 doses of IV Decadron. MRI Brain with/without contrast showed known debris in the ventricular system with enhancement of the ependymal surface of the ventricular system and leptomeningeal enhancement. MRI Cervical spine with no new findings. 1/12:   EVD set at [facility], ICP 3-11, 20cc out/24h. ID discontinued antibiotics overnight. Discussed case this AM; started on Rocephin 2g Q12h. Neurosurgery following and concerned about subarachnoid purulence in cisterns and cervical space. Dr. Fredi Arreola considering intraoperative subarachnoid culture. Awaiting MRI Thoracic and Lumbar spine. Will consider CT facial bone with contrast.  Repeat blood culture and respiratory culture ordered. Neuro Endovascular reviewed CTA findings (concern for vasculitis) and recommended TCD which showed elevated mean velocities in the left MCA (189 max, LR 6.74). Recommended Verapamil, started on 80mg Q8h. Clinical exam remains poor. Fentanyl infusion changed to PRN dosing, will use Precedex if needs more sedation (becomes hypertensive, tachypneic intermittently). Last 24H: SBP >180 ON, PRN hydralazine orderedResp cx Gram + cocci, 1/2 + blood cultures likely staph. Straight cath x2 -> amaya. In the am, pt is obtunded, not following commands. Pupils 2mm slugglishly reactive. Spontaneous movement in the left upper extremity, flicker movement in the b/l lower extremities. CT maxillofacial w contrast ordered to asses if abcesss present. NS switched to LR 80ml/hr. CSF cell count WBC decreasing 79344 -> 70876. Plan to continue with recophin for now. Family updated about management plan, mri finding and ltme.      CURRENT MEDICATIONS:  SCHEDULED MEDICATIONS:   dexamethasone  10 mg IntraVENous Q6H    enoxaparin  40 mg SubCUTAneous Daily    cefTRIAXone (ROCEPHIN) IV  2,000 mg IntraVENous Q12H    verapamil  80 mg Per NG tube 3 times per day    insulin lispro  0-12 Units SubCUTAneous Q6H    erythromycin   Left Eye BID    atorvastatin  10 mg Per NG tube Nightly    sodium chloride flush  5-40 mL IntraVENous 2 times per day    pantoprazole  40 mg IntraVENous Daily    And    sodium chloride (PF)  10 mL IntraVENous Daily    folic acid IVPB  1 mg IntraVENous Daily    sennosides-docusate sodium  2 tablet Oral Daily    Vitamin D  1,000 Units Oral Daily    chlorhexidine  15 mL Mouth/Throat BID     CONTINUOUS INFUSIONS:   dextrose      dexmedetomidine 0.2 mcg/kg/hr (22 0240)    sodium chloride      sodium chloride 50 mL/hr at 22 0940     PRN MEDICATIONS:   glucose, dextrose, glucagon (rDNA), dextrose, fentanNYL, labetalol, sodium chloride flush, hydrALAZINE, sodium chloride flush, sodium chloride, ondansetron **OR** ondansetron, polyethylene glycol, acetaminophen **OR** acetaminophen, sodium chloride flush    VITALS:  Temperature Range: Temp: 99.1 °F (37.3 °C) Temp  Av.1 °F (36.7 °C)  Min: 97.1 °F (36.2 °C)  Max: 99.1 °F (37.3 °C)  BP Range: Systolic (41LIS), UUK:898 , Min:135 , ARIK:711     Diastolic (73OWJ), LZM:51, Min:65, Max:103    Pulse Range: Pulse  Av.5  Min: 65  Max: 96  Respiration Range: Resp  Av.1  Min: 16  Max: 31  Current Pulse Ox: SpO2: 100 %  24HR Pulse Ox Range: SpO2  Av.2 %  Min: 93 %  Max: 100 %  Patient Vitals for the past 12 hrs:   BP Temp Temp src Pulse Resp SpO2   22 0840    80 26 100 %   22 0815    77 20 99 %   22 0800 135/68 99.1 °F (37.3 °C) Oral 78 20 100 %   22 0730    79 24 100 %   22 0715    77 24 100 %   22 0600 (!) 158/74   74 18 99 %   22 0500 (!) 174/78   80 21 100 %   22 0400 (!) 167/79 98 °F (36.7 °C)  77 28 100 %   22 0345    82 17 100 %   22 0300 (!) 164/71   83 (!) 31 100 %   22 0200 (!) 162/71   85 21 100 %   22 0100 (!) 142/67   85 26 100 %   01/12/22 0000 (!) 163/75 98.1 °F (36.7 °C)  87 21 100 %   01/11/22 2300 (!) 148/65   86 27 100 %   01/11/22 2200 (!) 148/82   75 21 100 %   01/11/22 2100 (!) 156/85   65 16 100 %     Estimated body mass index is 28.7 kg/m² as calculated from the following:    Height as of this encounter: 5' 10\" (1.778 m).     Weight as of an earlier encounter on 1/9/22: 200 lb (90.7 kg).  []<16 Severe malnutrition  []1616.99 Moderate malnutrition  []1718.49 Mild malnutrition  []18.524.9 Normal  [x]2529.9 Overweight (not obese)  []3034.9 Obese class 1 (Low Risk)  []3539.9 Obese class 2 (Moderate Risk)  []?40 Obese class 3 (High Risk)    RECENT LABS:   Lab Results   Component Value Date    WBC 16.5 (H) 01/12/2022    HGB 10.0 (L) 01/12/2022    HCT 32.6 (L) 01/12/2022     01/12/2022    CHOL 127 03/15/2021    TRIG 123 01/11/2022    HDL 27 (L) 03/15/2021    ALT 32 01/09/2022    AST 48 (H) 01/09/2022     (H) 01/12/2022    K 4.3 01/12/2022     (H) 01/12/2022    CREATININE 0.84 01/12/2022    BUN 29 (H) 01/12/2022    CO2 18 (L) 01/12/2022    TSH 0.50 01/10/2022    PSA 0.95 04/28/2021    INR 1.1 01/10/2022    LABA1C 5.6 09/17/2019     24 HOUR INTAKE/OUTPUT:    Intake/Output Summary (Last 24 hours) at 1/12/2022 0858  Last data filed at 1/12/2022 4231  Gross per 24 hour   Intake 3355.61 ml   Output 929 ml   Net 2426.61 ml       IMAGING:   ECHO Complete 2D W Doppler W Color  Result Date: 1/11/2022  Transthoracic Echocardiography Report (TTE)  Patient Name Gregory Cruz   Date of Study             01/11/2022               Luana Chicas   Date of      1950  Gender                    Male  Birth   Age          70 year(s)  Race                         Room Number  0514        Height:                   70 inch, 177.8 cm   Corporate ID [id number]    Weight:                   207 pounds, 93.9 kg  #   Patient Acct [de-identified]   BSA:         2.12 m^2     BMI:       29.7 kg/m^2  #   MR #         [id number]     Sonographer Nolvia Calderaerster   Accession #  [id number]  Interpreting Physician    [address]   Fellow                   Referring Nurse                           Practitioner   Interpreting             Referring Physician       Aixa Bradley  Fellow                                             APRN-CNP  Additional Comments Technically difficult study. Type of Study   TTE procedure:2D Echocardiogram, M-Mode, Doppler, Color Doppler. Procedure Date Date: 01/11/2022 Start: 08:11 AM Study Location: [facility] Technical Quality: Adequate visualization Indications:LV Function and Rule out vegetation. History / Tech. Comments: Echo done at patient bedside. Procedure explained to patient. HTN Patient Status: Inpatient Height: 70 inches Weight: 207 pounds BSA: 2.12 m^2 BMI: 29.7 kg/m^2 CONCLUSIONS Summary Difficult study. Left ventricle is normal in size. Global left ventricular systolic function is normal. Calculated ejection fraction 54% by Amanda's method. Left atrium is normal in size. Normal right ventricular size and function. No significant valvular abnormalities. CT ABDOMEN PELVIS WO CONTRAST Additional Contrast? None  Result Date: 1/10/2022  Examination is partially degraded by motion related artifact. Focal area of soft tissue thickening which appears to involve a loop of small bowel in the central right mid abdomen. This may be partially exaggerated by the patient motion. Localized infectious or inflammatory versus neoplastic process however is not excluded and short-term follow-up is recommended. Nondisplaced left 6th and 7th rib fractures. Masslike process exophytic from the lateral margin of the right-side of the prostate gland. Correlate with PSA values. Cholelithiasis. RECOMMENDATIONS: Unavailable     XR ELBOW RIGHT (MIN 3 VIEWS)  Result Date: 1/10/2022  No acute osseous abnormality or dislocation involving the right elbow or right forearm.      XR RADIUS ULNA RIGHT (2 VIEWS)  Result Date: 1/10/2022  No acute osseous abnormality or dislocation involving the right elbow or right forearm. XR KNEE RIGHT (3 VIEWS)  Result Date: 1/10/2022  1. No convincing acute osseous abnormality. 2. Degenerative changes of the right knee. 3. Trace joint effusion. 4. There appears to be soft tissue swelling anterior to the patella. CT HEAD WO CONTRAST  Result Date: 1/10/2022  Persistent layering debris within the lateral ventricles. There is mild dilatation of the ventricular system. Differential includes isodense subacute hemorrhage versus infection. Follow-up MRI brain with and without contrast is recommended for further evaluation. No acute traumatic injury of the facial bones. RECOMMENDATIONS: Unavailable     CT HEAD WO CONTRAST  Result Date: 1/9/2022  Debris within the bilateral lateral ventricles more pronounced on the right of uncertain clinical significance or etiology. MRI of the brain with contrast is recommended for further evaluation. Differential diagnosis would include subacute isodense hemorrhage versus possible infectious process. Neoplastic process is not considered due to the normal head CT 1 week ago. Bilateral mastoid effusion Limited evaluation of the cervical spine due to motion. Multilevel degenerative change in the cervical spine. Findings were discussed with Dr. Andrae Astudillo on 01/09/2022 at 7 20 p.m. Kervin Kc RECOMMENDATIONS: Unavailable     CT HEAD WO CONTRAST  Result Date: 1/3/2022  No acute intracranial abnormality. RECOMMENDATIONS: Unavailable     CT FACIAL BONES WO CONTRAST  Result Date: 1/10/2022  Persistent layering debris within the lateral ventricles. There is mild dilatation of the ventricular system. Differential includes isodense subacute hemorrhage versus infection. Follow-up MRI brain with and without contrast is recommended for further evaluation. No acute traumatic injury of the facial bones.  RECOMMENDATIONS: Unavailable     CT CERVICAL SPINE WO CONTRAST  Result Date: 1/9/2022  Debris within the bilateral lateral ventricles more pronounced on the right of uncertain clinical significance or etiology. MRI of the brain with contrast is recommended for further evaluation. Differential diagnosis would include subacute isodense hemorrhage versus possible infectious process. Neoplastic process is not considered due to the normal head CT 1 week ago. Bilateral mastoid effusion Limited evaluation of the cervical spine due to motion. Multilevel degenerative change in the cervical spine. Findings were discussed with Dr. Domenico Best on 01/09/2022 at 7 20 p.m. Rometta Favre RECOMMENDATIONS: Unavailable     MRI CERVICAL SPINE W WO CONTRAST  Result Date: 1/10/2022  Debris throughout the CSF fluid with leptomeningeal enhancement and dural enhancement as described above consistent with an infectious process. Multilevel degenerative change with foraminal narrowing bilaterally as described above. RECOMMENDATIONS: Unavailable     XR CHEST PORTABLE  Result Date: 1/11/2022  Stable nodular opacity in the right lower lung zone better characterized on recent CT. XR CHEST PORTABLE  Result Date: 1/10/2022  1. Support devices as above. 2. No significant change in the focal opacity within the right lower lung. XR CHEST PORTABLE  Result Date: 1/10/2022  Right lung base opacity again identified, nonspecific. Please correlate exam findings and exam findings and history. XR CHEST PORTABLE  Result Date: 1/9/2022  Right lung base opacity. Please correlate exam findings. This could represent a focus of pneumonia versus underlying mass. CT CHEST PULMONARY EMBOLISM W CONTRAST  Result Date: 1/9/2022  No evidence of pulmonary embolism to the segmental level. Nonspecific opacification and when question air-fluid level involving the right middle lobe laterally. This demonstrates tiny locules of gas. Infection/Pneumonia and/or cavitary mass lesion may be considered. .  Consider pulmonology consultation. RECOMMENDATIONS: Unavailable     VL DUP LOWER EXTREMITY VENOUS BILATERAL  Result Date: 1/11/2022    [facility]  Vascular Lower Extremities DVT Study Procedure   Patient Name   Mee Savage    Date of Study           01/11/2022                 Alejandra Herndon   Date of Birth  1950   Gender                  Male   Age            70 year(s)   Race                       Room Number    3466 port    Height:                 70 inch, 177.8 cm   Corporate ID # [id number]     Weight:                 207 pounds, 93.9 kg   Patient Acct # [de-identified]    BSA:        2.12 m^2    BMI:       29.7 kg/m^2   MR #           [id number]      Herschel Kanner, RVT   Accession #    [id number]   Interpreting Physician  Perez Kowalski   Referring                   Referring Physician     Kathy Maldonado  Nurse  Practitioner  Procedure Type of Study:   Veins: Lower Extremities DVT Study, Venous Scan Lower Bilateral.  Indications for Study:Elevated D-Dimer. Patient Status: In Patient. Technical Quality:Adequate visualization.   - Critical Result:Findings were reported to Dr. Oumou Atkinson on 1/11/2022 while     study was being done by Bradley Paige RVT. Conclusions   Summary   Simultaneous real time imaging utilizing B-Mode, color doppler and  spectral waveform analysis was performed on the bilateral lower  extremities for venous examination of the deep and superficial systems. Findings are:   Right:  Chronic deep venous thrombosis identified in the below knee peroneal and  gastrocnemius veins. Left:  No evidence of deep or superficial venous thrombosis. CTA HEAD NECK W CONTRAST  Result Date: 1/10/2022  Diminutive anterior cerebral arteries and posterior cerebral arteries. Mildly beaded appearance to the M1 portions of the middle cerebral arteries bilaterally with intermittent foci of mild stenosis.   Subsequent diminutive middle cerebral artery branches distally and this is more pronounced on the left compared to the right. These findings are of uncertain etiology although vasculitis is a consideration. RECOMMENDATIONS: Unavailable     MRI BRAIN W WO CONTRAST  Result Date: 1/10/2022  Prominent ventricular system with debris throughout the CSF. Associated FLAIR signal abnormality involving the ependymal and subependymal surface with enhancement of the ependymal surface of the ventricular system consistent with an infectious process. There is associated leptomeningeal enhancement extending into the craniocervical region and along the visualized cervical spinal cord. RECOMMENDATIONS: Unavailable     CT LUMBAR SPINE TRAUMA RECONSTRUCTION  Result Date: 1/10/2022  Unremarkable non-contrast CT of the lumbar spine. Labs and Images reviewed with:  [x] Dr. Autumn Joe    [] Dr. Cristine Goodpasture  [] Dr. Venita Camargo  [] There are no new interval images to review. PHYSICAL EXAM       CONSTITUTIONAL:  Intubated, Fentanyl infusion held. Does not open eyes or follow commands. HEAD:  normocephalic, right facial pressure wounds   EYES:  PERRL, upward gaze   ENT:  moist mucous membranes   NECK:  supple, symmetric   LUNGS:  Equal air entry bilaterally, clear   CARDIOVASCULAR:  normal s1 / s2, RRR, distal pulses intact   ABDOMEN:  Soft, no rigidity   NEUROLOGIC:  Mental Status:  Intubated, sedation held. Does not open eyes or follow commands. Cranial Nerves:    II: Visual fields:  abnormal - blink to threat  III: Pupils:  equal, round, reactive to light  III,IV,VI: Extra Ocular Movements: abnormal - not tracking  V: Corneal reflex:  completed. abnormal - weak on right, absent left  VII: Facial strength: intact  Weak cough/gag    Motor Exam:    Flicker movement to central or local noxious stimuli in all extremities. DRAINS:  [x] There are no drains for Neuro Critical Care to monitor at this time.      ASSESSMENT AND PLAN:       The patient is 71 yo male with a history of hypertension who presented to [facility] as a transfer from [facility] ED for ventricular debris. Initially presented to [facility] ED with altered mentation after being found down. CT Head showed debris within bilateral ventricles secondary to subacute hemorrhage vs infectious process. Noted to have right middle lobe opacification concerning for cavitary mass lesion. Labs revealed elevated CK, JOZEF, leukocytosis. Transferred for Neurosurgical evaluation. Neurosurgery consulted. EVD placed urgently 1/10. CSF studies concerning for bacterial meningitis. MRI Brain re-demonstrated debris in the ventricular system and showed enhancement of the ependymal surface of the ventricular system and leptomeningeal enhancement. ID consulted; recommending CNS dosed Rocephin. Neuro Endovasuclar consulted due to concerns for vasculitis on CTA Head/Neck and recommended TCD which showed elevated L MCA velocities. Patient was started on Verapamil. CT maxillofacial ordered. Follow Csf culture. Csf culture 1/10 alpha hemolytic strep. NEUROLOGIC:  - Acute debris within the ventricular system with enhancement of the ependymal surface of the ventricular system and leptomeningeal enhancement  - Concern for bacterial meningitis/ventriculitis. Csf culture 1/10 positive for alpha hemolytic strep  - EVD set at 48qkT74, ICP 3-6, 19cc out/24h  - CSF studies; CSF studies; Glucose <2, Protein 966, >1600, WBC 14943>48384, meningitis panel negative, CSF culture pending.  - Neurosurgery following; considering intraop subarachnoid culture. - ID recommending Rocephin 2g Q12h. -NSG: no focal compressive lesion on neuroimaging. Extra-axial but subarachanoid in nature. No plan for surgical intervention or evacuation. Medical or infectiours therapy for now. - Concern for vasculitis.    - CTA Head/Neck showed beading of the bilateral M1 MCAs and diminutive anterior and posterior cerebral arteries  - Neuro Endovascular consulted; significant vasculopathy. TCS showing L MCA high velocities. Medical management. - TCD  showed elevated mean velocities of the L MCA (189, LR 6.74)  - Started on Verapamil 80mg Q8h per Endovascular recs  -F/U LTME: Moderate-severe non specific encephalopathy. The presence of R temporal LPDs increases patient risk for focal seizures, can also be seen in CNS-itis. - Goal -180, avoid hypotension  - PRN Fentanyl for agitation/sedation, OK to use Precedex if needed  - Neuro checks per protocol    CARDIOVASCULAR:  - Goal -180  - PRN Labetalol  - Troponin 17, EKG sinus tachycardia  - Echo EF 54%  - Continue home Lipitor for hyperlipidemia  - Continue telemetry    PULMONARY:  - Intubated in ED for acute hypoxic respiratory failure  - Vent Settings: PRVC 30/16/580/5  - Daily AB.42/32.7/124.7/21.5  - Right lung opacification possible cavitary lesion.   - CXR  showed stable nodular opacity in the right lower lung.  - Pulmonology following; appreciate recs. - ID ordered TB quantiferon     RENAL/FLUID/ELECTROLYTE:  - JOZEF resolved  - BUN 34>29/ Creatinine 0.84  - Monitor I&O; 3056/1079  - IVF: Total fluids 80cc/hr  - CK and lactic normalized  - Give 2g Calcium gluconate   - Replace electrolytes PRN  - Daily BMP    GI/NUTRITION:  NUTRITION:  Diet NPO Exceptions are: Sips of Water with Meds   - Start tube feeds via OG  - Bowel regimen: Senokot-S daily  - GI prophylaxis: Protonix    ID:  - No further fevers,Tmax 98.3  - Leukocytosis continued, WBC 19.2>16.5  - COVID-19 negative on 1/10  - UA 1/10 showed moderate bacteria, negative nitrite/leukocyte esterase   - Blood cultures 1/10;  positive staph coag negative  - Repeat blood cultures pending.  - F/U Sputum culture  - Ventriculitis/meningitis  - CSF studies; Glucose <2, Protein 966, >1600, WBC 10703>33170, eningitis panel negative  - Follow CSF culture 1/10 showing alpha strep cocci.    - ID following; recommending Rocephin CNS dosing  - Continue to monitor for fevers  - Daily CBC    HEME:   - H&H 10.0/32.6; down-trend noted, continue to monitor  - Platelets 957>399  - Daily CBC    ENDOCRINE:  - Continue to monitor blood glucose, goal <180  - Hyperglycemia; increase to medium dose insulin sliding scale  - Check hemoglobin A1C     OTHER:  - Vitamin D and Folic acid supplementation  - PT/OT/ST as appropriate  - Code Status: FULL    PROPHYLAXIS:  Stress ulcer: PPI    DVT PROPHYLAXIS:  - SCD sleeves - Thigh High   - Start Lovenox 40mg QD    DISPOSITION:  [x] To remain ICU for EVD and ventilator management. We will continue to follow along. For any changes in exam or patient status please contact Neuro Critical Care.       Milvia Holloway MD  Neuro Critical Care  Pager [phone]  1/12/2022     8:58 AM

## 2022-01-12 NOTE — PROGRESS NOTES
PULMONARY & CRITICAL CARE MEDICINE PROGRESS NOTE     Patient:  Zulema Wells  MRN: [id number]  6 [facility] date: 1/9/2022  Primary Care Physician: Kelly Duff MD  Consulting Physician: Geri Mendez MD  CODE Status: Full Code  LOS: 2     SUBJECTIVE     CHIEF COMPLAINT/REASON FOR INITIAL CONSULT: RML cavitary lesion and acute hypoxic respiratory failure in the setting of CNS infection    BRIEF HOSPITAL COURSE:   History obtained from chart review and unobtainable from patient due to mental status and and being on the ventilator. Zulema Wells is a 70 y.o. white gentleman was admitted with altered mental status, traumatic rhabdomyolysis. Patient was apparently found down at home which was felt to be as long as 48 hours. In the emergency room radiologic studies showed debris within the bilateral lateral ventricles of the brain which was felt to be related to hemorrhage or infection. Radiologic studies also revealed right middle lobe cavitary lesion. Patient had leukocytosis; microbiologic studies so far are unrevealing for any infectious process. Patient was started on empiric antibiotics for CNS infection.       INTERVAL HISTORY:  01/12/22   Infectious workup continues - ID following  Awaiting sputum, and repeat CSF cultures, BCx NGTD  CSF WBC significantly elevated above 68,338, neutrophilic predominance  Vancomycin and ampicillin discontinued, only on ceftriaxone - ID following  Patient remains on mechanical ventilation, FiO2 30%, PEEP 5, sat 100%  No purulent secretions or blood from ETT  Fever resolved  WBC 27->19->16  On decadron 10 mg IV every 6 hours  On 0.2 mcg/kg/h precedex, eyes closed, not following commands  EEG monitoring reveals non-specific mod-severe encephalopathy, no seizures   HDS, not on pressors  JOZEF improving      REVIEW OF SYSTEMS:  Unobtainable from patient due to sedation/mechanical ventilation    OBJECTIVE     VENTILATOR SETTINGS:  Vent Information  $Ventilation: $Subsequent Day  Skin Assessment: Clean, dry, & intact  Equipment Changed: HME  Vent Type: Servo i  Vent Mode: PRVC  Vt Ordered: 580 mL  Rate Set: 16 bmp  Pressure Support: (S) 7 cmH20  FiO2 : 30 %  SpO2: 100 %  SpO2/FiO2 ratio: 333.33  Sensitivity: 5  PEEP/CPAP: 5  I Time/ I Time %: 0.9 s  Humidification Source: HME     PaO2/FiO2 RATIO:  Recent Labs     22  0350   POCPO2 124.4*      FiO2 : 30 %     VITAL SIGNS:   LAST:  BP (!) 155/75   Pulse 74   Temp 99.1 °F (37.3 °C) (Oral)   Resp 20   Ht 5' 10\" (1.778 m)   SpO2 100%   BMI 28.70 kg/m²   8-24 HR RANGE:  TEMP Temp  Av.1 °F (36.7 °C)  Min: 97.1 °F (36.2 °C)  Max: 99.1 °F (94.5 °C)   BP Systolic (48MPN), YYL:960 , Min:135 , DYM:076      Diastolic (66BIN), EAI:86, Min:65, Max:93     PULSE Pulse  Av.3  Min: 65  Max: 94   RR Resp  Av.5  Min: 17  Max: 28   O2 SAT SpO2  Av.8 %  Min: 99 %  Max: 100 %   OXYGEN DELIVERY No data recorded        SYSTEMIC EXAMINATION:   General appearance - Mechanically ventilated, ill-appearing  Mental status - somnolent, unresponsive  Eyes - pupils equal and reactive, sclera anicteric  Mouth - mucous membranes moist, pharynx normal without lesions  Neck - supple, no significant adenopathy, carotids upstroke normal bilaterally, no bruits  Chest - Breath sounds bilaterally were dimnished to auscultation at bases. There were no wheezes, rhonchi or rales.   There is no intercostal recession or use of accessory muscles  Heart - normal rate, regular rhythm, normal S1, S2, no murmurs, rubs, clicks or gallops  Abdomen - soft, nontender, nondistended, no masses or organomegaly  Neurological - DTR's normal and symmetric, motor and sensory grossly normal bilaterally  Extremities - peripheral pulses normal, no pedal edema, no clubbing or cyanosis  Skin - normal coloration and turgor, no rashes, no suspicious skin lesions noted     DATA REVIEW     Medications:  Scheduled Meds:   dexamethasone  10 mg IntraVENous Q6H    enoxaparin  40 mg SubCUTAneous Daily    cefTRIAXone (ROCEPHIN) IV  2,000 mg IntraVENous Q12H    verapamil  80 mg Per NG tube 3 times per day    insulin lispro  0-12 Units SubCUTAneous Q6H    erythromycin   Left Eye BID    atorvastatin  10 mg Per NG tube Nightly    sodium chloride flush  5-40 mL IntraVENous 2 times per day    pantoprazole  40 mg IntraVENous Daily    And    sodium chloride (PF)  10 mL IntraVENous Daily    folic acid IVPB  1 mg IntraVENous Daily    sennosides-docusate sodium  2 tablet Oral Daily    Vitamin D  1,000 Units Oral Daily    chlorhexidine  15 mL Mouth/Throat BID     Continuous Infusions:   lactated ringers 80 mL/hr at 01/12/22 1109    dextrose      dexmedetomidine 0.2 mcg/kg/hr (01/12/22 0240)    sodium chloride         INPUT/OUTPUT:  In: 3415.6 [I.V.:2805.6; NG/GT:210]  Out: 1507 [Urine:1495; Drains:19]  Date 01/12/22 0000 - 01/12/22 2359   Shift 0615-1822 7317-3095 9740-1316 24 Hour Total   INTAKE   I.V. 981 328.7  1309.7   NG/GT 60 30  90   Shift Total 1041 358.7  1399.7   OUTPUT   Urine 850 435  1285   Emesis/NG output  0  0   Drains 3 0  3   Shift Total 853 435  1288   Weight (kg)            LABS:  ABGs:   Recent Labs     01/10/22  0511 01/11/22  0252 01/12/22  0350   POCPH 7.555* 7.425 7.483*   POCPCO2 25.0* 32.7* 29.8*   POCPO2 160.9* 124.7* 124.4*   POCHCO3 22.1 21.5 22.4   DLVE0RKO 100* 99* 99*     CBC:   Recent Labs     01/09/22  1835 01/11/22  0528 01/12/22  0459   WBC 27.7* 19.2* 16.5*   HGB 13.7 10.0* 10.0*   HCT 43.1 31.6* 32.6*   MCV 75.3* 77.3* 78.9*   * 341 347   LYMPHOPCT 2*  --   --    RBC 5.72 4.09* 4.13*   MCH 24.0* 24.4* 24.2*   MCHC 31.8 31.6 30.7   RDW 16.4* 16.7* 17.1*     CRP:   No results for input(s): CRP in the last 72 hours. LDH:   No results for input(s): LDH in the last 72 hours.   BMP:   Recent Labs     01/09/22  1835 01/10/22  0008 01/10/22  0104 01/11/22  0528 01/12/22  0459   * 133*  --  142 147*   K 3.7 3.5*  --  4.2 4.3   CL 97* 103  --  114* 118*   CO2 22 18*  --  18* 18*   BUN 56* 47*  --  34* 29*   CREATININE 1.35* 1.22* 1.41* 1.04 0.84   GLUCOSE 210* 172*  --  212* 138*   PHOS  --  2.6  --  2.8 2.2*     Liver Function Test:   Recent Labs     01/09/22  1835   PROT 8.1   LABALBU 3.6   ALT 32   AST 48*   ALKPHOS 85   BILITOT 0.93     Coagulation Profile:   Recent Labs     01/10/22  0612   INR 1.1   PROTIME 11.2   APTT 22.4     D-Dimer:  Recent Labs     01/09/22  1835   DDIMER 3.48*     Lactic Acid:  No results for input(s): LACTA in the last 72 hours. Cardiac Enzymes:  Recent Labs     01/09/22  1835 01/10/22  0008 01/10/22  1050 01/10/22  1514 01/11/22  0528   CKTOTAL 2,165*   < > 672* 525* 274   CKMB 28.4*  --   --   --   --     < > = values in this interval not displayed. BNP/ProBNP:   No results for input(s): BNP, PROBNP in the last 72 hours. Triglycerides:  Recent Labs     01/11/22  0528   TRIG 123        Microbiology:  Urine Culture:  No components found for: CURINE  Blood Culture:  No components found for: CBLOOD, CFUNGUSBL  Sputum Culture:  No components found for: CSPUTUM  Recent Labs     01/12/22  2100 [facility] . CSF SHUNT   SPECIAL NOT REPORTED   CULTURE PENDING     Recent Labs     01/11/22  1817 01/11/22 2037 01/12/22  0927   SPECDESC . SUCTIONED SPUTUM . BLOOD  . BLOOD . CSF SHUNT   SPECIAL NOT REPORTED UNKNOWN AMOUNT, UNKNOWN SITE  UNKNOWN AMOUNT, UNKNOWN SITE NOT REPORTED   CULTURE PENDING NO GROWTH 12 HOURS  NO GROWTH 12 HOURS PENDING        Pathology:    Radiology Reports:  XR CHEST PORTABLE   Final Result   1. Endotracheal tube overlying the proximal trachea 8.6 cm above the level of   the emile. Advancement is recommended for more optimal positioning. 2. Enteric tube as above. 3. Stable nodular opacity at the right lower lung zone consistent with a   cavitary lesion seen on prior CT. 4. Mild bibasilar atelectasis.    The findings were sent to the Radiology Results [address] at 6:58   am on 1/12/2022to be communicated to a licensed caregiver. 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 No evidence of abscess, discitis, or osteomyelitis. MRI LUMBAR SPINE W WO CONTRAST   Final Result   1. 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 No   evidence of abscess. 2. Mild-to-moderate multilevel degenerative changes as detailed above. VL DUP LOWER EXTREMITY VENOUS BILATERAL   Final Result      XR CHEST PORTABLE   Final Result   Stable nodular opacity in the right lower lung zone better characterized on   recent CT.         MRI BRAIN W WO CONTRAST   Final Result   Prominent ventricular system with debris throughout the CSF. Associated   FLAIR signal abnormality involving the ependymal and subependymal surface   with enhancement of the ependymal surface of the ventricular system   consistent with an infectious process. There is associated leptomeningeal   enhancement extending into the craniocervical region and along the visualized   cervical spinal cord. RECOMMENDATIONS:   Unavailable         MRI CERVICAL SPINE W WO CONTRAST   Final Result   Debris throughout the CSF fluid with leptomeningeal enhancement and dural   enhancement as described above consistent with an infectious process. Multilevel degenerative change with foraminal narrowing bilaterally as   described above. RECOMMENDATIONS:   Unavailable         CTA HEAD NECK W CONTRAST   Final Result   Diminutive anterior cerebral arteries and posterior cerebral arteries. Mildly beaded appearance to the M1 portions of the middle cerebral arteries   bilaterally with intermittent foci of mild stenosis. Subsequent diminutive   middle cerebral artery branches distally and this is more pronounced on the   left compared to the right. These findings are of uncertain etiology   although vasculitis is a consideration. RECOMMENDATIONS:   Unavailable         CT LUMBAR SPINE TRAUMA RECONSTRUCTION   Final Result   Unremarkable non-contrast CT of the lumbar spine. CT ABDOMEN PELVIS WO CONTRAST Additional Contrast? None   Final Result   Examination is partially degraded by motion related artifact. Focal area of soft tissue thickening which appears to involve a loop of small   bowel in the central right mid abdomen. This may be partially exaggerated by   the patient motion. Localized infectious or inflammatory versus neoplastic   process however is not excluded and short-term follow-up is recommended. Nondisplaced left 6th and 7th rib fractures. Masslike process exophytic from the lateral margin of the right-side of the   prostate gland. Correlate with PSA values. Cholelithiasis. RECOMMENDATIONS:   Unavailable         CT HEAD WO CONTRAST   Final Result   Persistent layering debris within the lateral ventricles. There is mild   dilatation of the ventricular system. Differential includes isodense   subacute hemorrhage versus infection. Follow-up MRI brain with and without   contrast is recommended for further evaluation. No acute traumatic injury of the facial bones. RECOMMENDATIONS:   Unavailable         CT FACIAL BONES WO CONTRAST   Final Result   Persistent layering debris within the lateral ventricles. There is mild   dilatation of the ventricular system. Differential includes isodense   subacute hemorrhage versus infection. Follow-up MRI brain with and without   contrast is recommended for further evaluation. No acute traumatic injury of the facial bones. RECOMMENDATIONS:   Unavailable         XR CHEST PORTABLE   Final Result   1. Support devices as above. 2. No significant change in the focal opacity within the right lower lung.          CT THORACIC SPINE TRAUMA RECONSTRUCTION   Final Result   Mild degenerative changes of the thoracic spine without evidence of acute osseous abnormality. Please see concurrently performed CT chest and   subsequently performed MRI thoracic spine for additional findings. XR ELBOW RIGHT (MIN 3 VIEWS)   Final Result   No acute osseous abnormality or dislocation involving the right elbow or   right forearm. XR RADIUS ULNA RIGHT (2 VIEWS)   Final Result   No acute osseous abnormality or dislocation involving the right elbow or   right forearm. XR KNEE RIGHT (3 VIEWS)   Final Result   1. No convincing acute osseous abnormality. 2. Degenerative changes of the right knee. 3. Trace joint effusion. 4. There appears to be soft tissue swelling anterior to the patella. XR CHEST PORTABLE   Final Result      Right lung base opacity again identified, nonspecific. Please correlate exam   findings and exam findings and history. VL TRANSCRANIAL DOPPLER COMPLETE    (Results Pending)   XR CHEST PORTABLE    (Results Pending)   CT MAXILLOFACIAL W CONTRAST    (Results Pending)        Echocardiogram:   No results found for this or any previous visit. ASSESSMENT AND PLAN     Assessment:    // Acute hypoxic respiratory failure, mechanical ventilation  // Pneumonia - RML cavitary lesion, may be result of aspiration  // Altered mental status - may be 2/2 CNS infection or hemorrhage  // Sepsis  // Traumatic Rhabdomyolysis - improving  // JOZEF - improving  // Rib fractures, Left side  // Left corneal abrasion  // leukocytosis  // fever      Plan:    I personally interviewed/examined the patient; reviewed interval history, interpreted all available radiographic and laboratory data at the time of service.      Wean mechanical ventilation as tolerated   Monitor endotracheal secretions    Monitor RML cavitary lesion    Continue pulmonary toilet, aspiration precautions    Continue antibiotics per ID and neurocrit   Follow microbiologic data   Continue to monitor I/O with a goal of even/negative fluid balance   Stress ulcer prophylaxis per neurocrit   DVT prophylaxis as per neurocrit    The patient is/remains critically ill with illness/injury that acutely impairs one or more vital organ systems, such that there is a high probability of imminent or life threatening deterioration in the patient's condition. Critical care time of 35 minutes was spent (excluding procedures), in coordination of care during bedside rounds and discussion of patient care in detail, and recommendations of the team were adopted in the plan. Necessity of all invasive devices was also confirmed. Ulysses Acosta  Medical Student  1/11/22    Please note that this chart was generated using voice recognition Dragon dictation software. Although every effort was made to ensure the accuracy of this automated transcription, some errors in transcription may have occurred.

## 2022-01-12 NOTE — PROGRESS NOTES
cavitary lesion. He has clear secretions from ET tube.     Past medical history:  Essential hypertension  Chronic anemia  History of GI bleed    CURRENT EVALUATION : 1/12/2022    Patient evaluated and examined in the neuro ICU. Afebrile  Hypertensive    Intubated and sedated  PRVC/16/580/5/30    On precedex for sedation    Labs, X rays reviewed: 1/12/2022    BUN: 34 > 29  Cr: 1.04 > 0.84    WBC: 27.7-->19.2 > 16.5  Hb:  10 > 10  Plat: 341 > 347    Cultures:  Urine:  · 1-11-22: No growth   Blood:  · 1-10-22: 1/2 Coagulase negative Staphylococci   Sputum :  · 01/11/22: few gram positive cocci in pairs and chairs  CSF:  · 1-10-22: streptococci alpha hemolytic    CXR:  · 1-12-22: Stable nodular opacity in RLL with cavitary lesion and air fluid level. Discussed with patient, RN, Neuro. I have personally reviewed the past medical history, past surgical history, medications, social history, and family history, and I have updated the database accordingly. Past Medical History:     Past Medical History:   Diagnosis Date    Anemia 2016    ON IRON    BPH with elevated PSA     post biopsy    Colon polyps 2016    BENEIGN REMOVED WITH COLONOSCOPY    Elevated Gina-Barr virus antibody titer 1989    NEVER TREATED FOR    Elevated fasting glucose     History of blood transfusion 2016    R/T INTESTINAL BLEEDING    History of chronic fatigue syndrome 1989    RESOLVED     History of GI bleed 2016    BROUGHT ON BY MEDS---NIACIN, FISH OIL AND VIT C    Hyperlipidemia 2014    (triglycerides-ELEVATED, TRYING TO CONTROL WITH DIET    Hypertension 1999    ON RX    Wears glasses        Past Surgical  History:     Past Surgical History:   Procedure Laterality Date    COLONOSCOPY  2000    internal hemorrhoids    COLONOSCOPY  02/02/2015    polypx1, repeat 5yrs due to family hx & hx polyps- brannon    COLONOSCOPY  12/09/2015    polyp X2  int. Hemorrhoids  partial prolapse of ileocecal valve    COLONOSCOPY  11/2016    COLONOSCOPY N/A 11/12/2020    COLONOSCOPY POLYPECTOMY SNARE/HOT BIOPSY performed by Vu Catherine DO at [facility]    to correct flat arches (age 9)    PRE-MALIGNANT / BENIGN SKIN LESION EXCISION Right 10/15/2021    v-EXCISION Lesion Right cheek, Right nose, scalp, nasal tip performed by Bishop Telma MD at [place] [address] Bilateral 12/04/2012    benign    PROSTATE BIOPSY Bilateral 07/2014    benign    PROSTATECTOMY  12/10/2019    LAPAROSCOPIC ROBOTIC SIMPLE PROSTATECTOMY     PROSTATECTOMY N/A 12/10/2019    XI LAPAROSCOPIC ROBOTIC SIMPLE PROSTATECTOMY performed by Amina Good MD at [place] [address]  12/8/15    Normal upper GI tract, no evidence of blood in upper GI tract, mild distal esophagitis    UPPER GASTROINTESTINAL ENDOSCOPY  11/2016       Medications:      dexamethasone  10 mg IntraVENous Q6H    enoxaparin  40 mg SubCUTAneous Daily    cefTRIAXone (ROCEPHIN) IV  2,000 mg IntraVENous Q12H    verapamil  80 mg Per NG tube 3 times per day    insulin lispro  0-12 Units SubCUTAneous Q6H    erythromycin   Left Eye BID    atorvastatin  10 mg Per NG tube Nightly    sodium chloride flush  5-40 mL IntraVENous 2 times per day    pantoprazole  40 mg IntraVENous Daily    And    sodium chloride (PF)  10 mL IntraVENous Daily    folic acid IVPB  1 mg IntraVENous Daily    sennosides-docusate sodium  2 tablet Oral Daily    Vitamin D  1,000 Units Oral Daily    chlorhexidine  15 mL Mouth/Throat BID       Social History:     Social History     Socioeconomic History    Marital status: Single     Spouse name: Not on file    Number of children: Not on file    Years of education: Not on file    Highest education level: Not on file   Occupational History    Not on file   Tobacco Use    Smoking status: Light Tobacco Smoker     Packs/day: 0.01     Years: 10.00     Pack years: 0.10     Types: Pipe     Start date: 1/1/1970   [facility] Smokeless tobacco: Never Used    Tobacco comment: Rare pipe smoker. 4 BOWLS A WEEK No inhalation. Has never smoked cigarettes. Vaping Use    Vaping Use: Never used   Substance and Sexual Activity    Alcohol use: Yes     Alcohol/week: 0.0 standard drinks     Comment: WHISKEY OR WINE- 1 OR 2 A MONTH    Drug use: No    Sexual activity: Not on file   Other Topics Concern    Not on file   Social History Narrative    Not on file     Social Determinants of Health     Financial Resource Strain: Low Risk     Difficulty of Paying Living Expenses: Not hard at all   Food Insecurity: No Food Insecurity    Worried About Running Out of Food in the Last Year: Never true    [address] in the Last Year: Never true   Transportation Needs:     Lack of Transportation (Medical): Not on file    Lack of Transportation (Non-Medical):  Not on file   Physical Activity:     Days of Exercise per Week: Not on file    Minutes of Exercise per Session: Not on file   Stress:     Feeling of Stress : Not on file   Social Connections:     Frequency of Communication with Friends and Family: Not on file    Frequency of Social Gatherings with Friends and Family: Not on file    Attends [employer] Services: Not on file    Active Member of [address] [place] [address] [employer] or Organizations: Not on file    Attends Club or Organization Meetings: Not on file    Marital Status: Not on file   Intimate Partner Violence:     Fear of Current or Ex-Partner: Not on file    Emotionally Abused: Not on file    Physically Abused: Not on file    Sexually Abused: Not on file   Housing Stability:     Unable to Pay for Housing in the Last Year: Not on file    Number of [place] in the Last Year: Not on file    Unstable Housing in the Last Year: Not on file       Family History:     Family History   Problem Relation Age of Onset    Cancer Mother         LUNG    High Blood Pressure Mother     Diabetes Father         IDDM-LATE IN LIFE    Heart Disease Father CHF    High Blood Pressure Father         Allergies:   Patient has no known allergies. Review of Systems:     Unable to provide. Sedated on ventilator. 1/12/2022      Physical Examination :     Patient Vitals for the past 8 hrs:   BP Temp Temp src Pulse Resp SpO2   01/12/22 0900 (!) 147/79   79 26 100 %   01/12/22 0840    80 26 100 %   01/12/22 0815    77 20 99 %   01/12/22 0800 135/68 99.1 °F (37.3 °C) Oral 78 20 100 %   01/12/22 0730    79 24 100 %   01/12/22 0715    77 24 100 %   01/12/22 0600 (!) 158/74   74 18 99 %   01/12/22 0500 (!) 174/78   80 21 100 %   01/12/22 0400 (!) 167/79 98 °F (36.7 °C)  77 28 100 %   01/12/22 0345    82 17 100 %   01/12/22 0300 (!) 164/71   83 (!) 31 100 %   01/12/22 0200 (!) 162/71   85 21 100 %     General Appearance: Sedated, somnolent  Head:  Normocephalic, no trauma  Eyes: Pupils equal, round, reactive to light and accommodation; extraocular movements intact; sclera anicteric; conjunctivae pink. No embolic phenomena. ENT: Oropharynx clear, without erythema, exudate, or thrush. No tenderness of sinuses. Mouth/throat: mucosa pink and moist. No lesions. Dentition in good repair. Orally intubated  Neck:Supple, without lymphadenopathy. Thyroid normal, No bruits. Pulmonary/Chest: Clear to auscultation, without wheezes, rales, or rhonchi. No dullness to percussion. Cardiovascular: Regular rate and rhythm without murmurs, rubs, or gallops. Abdomen: Soft, non tender. Bowel sounds normal. No organomegaly  All four Extremities: No cyanosis, clubbing, edema, or effusions. Neurologic: Minimal movement to painful stimuli. Absent Lt corneal  Skin: Warm and dry with good turgor. No signs of peripheral arterial or venous insufficiency. No ulcerations. No open wounds.     Medical Decision Making -Laboratory:   I have independently reviewed/ordered the following labs:    CBC with Differential:   Recent Labs     01/09/22  1835 01/09/22  1835 01/11/22  0528 01/12/22  0459   WBC 27.7*   < > 19.2* 16.5*   HGB 13.7   < > 10.0* 10.0*   HCT 43.1   < > 31.6* 32.6*   *   < > 341 347   LYMPHOPCT 2*  --   --   --    MONOPCT 5*  --   --   --     < > = values in this interval not displayed. BMP:   Recent Labs     01/11/22  0528 01/12/22 0459    147*   K 4.2 4.3   * 118*   CO2 18* 18*   BUN 34* 29*   CREATININE 1.04 0.84   MG 2.9* 2.8*     Hepatic Function Panel:   Recent Labs     01/09/22  1835   PROT 8.1   LABALBU 3.6   BILIDIR 0.31*   IBILI 0.62   BILITOT 0.93   ALKPHOS 85   ALT 32   AST 48*     No results for input(s): RPR in the last 72 hours. No results for input(s): HIV in the last 72 hours. No results for input(s): BC in the last 72 hours. Lab Results   Component Value Date    MUCUS NOT REPORTED 01/10/2022    RBC 4.13 01/12/2022    TRICHOMONAS NOT REPORTED 01/10/2022    WBC 16.5 01/12/2022    YEAST NOT REPORTED 01/10/2022    TURBIDITY Clear 01/10/2022     Lab Results   Component Value Date    CREATININE 0.84 01/12/2022    GLUCOSE 138 01/12/2022       Medical Decision Making-Imaging:     EXAMINATION:   ONE XRAY VIEW OF THE CHEST       1/11/2022 9:02 am       COMPARISON:   01/10/2022 radiograph       HISTORY:   ORDERING SYSTEM PROVIDED HISTORY: intubated   TECHNOLOGIST PROVIDED HISTORY:   intubated       FINDINGS:   Supportive devices are stable.  Heart, mediastinum and pulmonary vascularity   are normal.  Stable nodular opacity in the right lower lung zone representing   a cavitary lesion seen on prior CT.  The lungs appear clear otherwise.  No   significant skeletal finding.           Impression   Stable nodular opacity in the right lower lung zone better characterized on   recent CT.         EXAMINATION:   CTA OF THE CHEST 1/9/2022 7:40 pm       TECHNIQUE:   CTA of the chest was performed after the administration of intravenous   contrast.  Multiplanar reformatted images are provided for review.  MIP   images are provided for review.  Dose modulation, iterative reconstruction,   and/or weight based adjustment of the mA/kV was utilized to reduce the   radiation dose to as low as reasonably achievable.       COMPARISON:   Chest x-ray 01/09/2022       HISTORY:   ORDERING SYSTEM PROVIDED HISTORY: elevated d-dimer, mental status change   TECHNOLOGIST PROVIDED HISTORY:   elevated d-dimer, mental status change   Decision Support Exception - unselect if not a suspected or confirmed   emergency medical condition->Emergency Medical Condition (MA)   Reason for Exam: Elevated d-dimer, mental status change       FINDINGS:   Pulmonary Arteries: No central lobar pulmonary emboli.  Main pulmonary artery   is unremarkable caliber.       Mediastinum: Air-fluid identified involving esophagus.  Slight heterogeneous   appearance of the thyroid gland on left.  Heart is mildly prominent size.  No   definite bulky hilar mediastinal adenopathy.       Lungs/pleura: Right middle lobe atelectasis versus scarring identified. Within the lateral aspect right middle lobe, there is consolidative versus   masslike opacity with air-fluid level.  There vessels corresponding through   this opacity.  The tiny locules of gas as well.  Otherwise mild   hypoventilatory changes elsewhere the lungs.       Upper Abdomen: Evidence of cholelithiasis.  Fatty infiltration liver.       Soft Tissues/Bones: Degenerate changes of the thoracic spine.           Impression   No evidence of pulmonary embolism to the segmental level.       Nonspecific opacification and when question air-fluid level involving the   right middle lobe laterally.  This demonstrates tiny locules of gas. Infection/Pneumonia and/or cavitary mass lesion may be considered. Adelia Berry   pulmonology consultation.       RECOMMENDATIONS:   Unavailable              Impression   1. Endotracheal tube overlying the proximal trachea 8.6 cm above the level of   the emile.  Advancement is recommended for more optimal positioning.    2. Enteric tube as above. 3. Stable nodular opacity at the right lower lung zone consistent with a   cavitary lesion seen on prior CT. 4. Mild bibasilar atelectasis. Medical Decision Bzamkh-Yehhmzcf-Zfceb:       Medical Decision Making-Other:     Note:  · Labs, medications, radiologic studies were reviewed with personal review of films  · Large amounts of data were reviewed  · Discussed with nursing Staff, Discharge planner  · Infection Control and Prevention measures reviewed  · All prior entries were reviewed  · Administer medications as ordered  · Prognosis: Guarded  · Discharge planning reviewed  · Follow up as outpatient. Thank you for allowing us to participate in the care of this patient. Please call with questions. Todd Vera  PGY-1  Infectious disease  [id number] [place] [address] Pob [phone]:    I have discussed the case, including pertinent history and exam findings with the residents and students. I have seen and examined the patient and the key elements of the encounter have been performed by me. I was present when the student obtained his information or examined the patient. I have reviewed the laboratory data, other diagnostic studies and discussed them with the residents. I have updated the medical record where necessary. I agree with the assessment, plan and orders as documented by the resident/ student.     Jany Lino MD.     9:58 AM 1/12/2022

## 2022-01-13 NOTE — PROGRESS NOTES
[facility] - Progress Note    Today's Date and Time: 1/13/2022, 9:25 AM    Impression :   · Acute metabolic encephalopathy  · Inflammatory intraventricular process with Alpha Streptococci on culture of CSF  · Right lung consolidation with cavitary lesion  · Bilateral mastoid effusion  · Rhabdomyolysis  · JOZEF  · Rt lung consolidation with possible cavity    Recommendations:     · Ceftriaxone 2 gm IV q 12 hr for pulmonary lesion and ventricular inflammation  · D/C Acyclovir  · On Decadron 10 mg IV every 6 hours  · TB quantiferon test    Medical Decision Making/Summary/Discussion:1/13/2022     ·   Infection Control Recommendations   · [place] Precautions    Antimicrobial Stewardship Recommendations     · Discontinuation of therapy  Coordination of Outpatient Care:   · Estimated Length of IV antimicrobials:1-10-22  · Patient will need Midline Catheter Insertion: no  · Patient will need PICC line Insertion:no  · Patient will need: Home IV , [place],  SNF,  LTAC: TBD  · Patient will need outpatient wound care:yes    Chief complaint/reason for consultation:   · Meningitis      History of Present Illness:   Royce Carrasco is a 70y.o.-year-old  male who was initially admitted on 1/9/2022. Patient seen at the request of     INITIAL HISTORY:     Royce Carrasco is a 70y.o.-year-old male who was found down at home. Last well-known 48 hours prior to admission. Patient's cousin was talking to him over the phone frequently and stated that he has been confused for the past couple of weeks and was complaining of headaches.       Patient apparently had a dental procedure done 3 weeks back. His baseline mental status is ANO x4. Patient admitted inpatient for management of suspected meningitis.     Imaging revealed debris's within the bilateral ventricles of his brain likely secondary to subacute hemorrhage versus infection.  CT chest showed right middle lobe cavitary lesion. He has clear secretions from ET tube.     Past medical history:  Essential hypertension  Chronic anemia  History of GI bleed    CURRENT EVALUATION : 1/13/2022    Patient evaluated and examined in the neuro ICU. Afebrile  Hypertensive    Intubated and sedated  PRVC/16/580/5/30  On precedex for sedation    CXR on 01/13/22 - right basilar opacity, slight left basilar atelectasis    Labs, X rays reviewed: 1/13/2022    BUN: 34 > 29 > 35  Cr: 1.04 > 0.84 > 0.87    WBC: 27.7-->19.2 > 16.5 > 11  Hb:  10 > 10 > 9.7  Plat: 341 > 347 > 300    Cultures:  Urine:  · 1-11-22: No growth   Blood:  · 1-10-22: 1/2 Coagulase negative Staphylococci   Sputum :  · 01/11/22: few gram positive cocci in pairs and chairs  CSF:  · 1-10-22: viridans streptococci  · 01/12/22: gram positive cocci in chains . No growth    CXR:  · 1-12-22: Stable nodular opacity in RLL with cavitary lesion and air fluid level. Discussed with patient, RN, Neuro. I have personally reviewed the past medical history, past surgical history, medications, social history, and family history, and I have updated the database accordingly.     Past Medical History:     Past Medical History:   Diagnosis Date    Anemia 2016    ON IRON    BPH with elevated PSA     post biopsy    Colon polyps 2016    BENEIGN REMOVED WITH COLONOSCOPY    Elevated Gina-Barr virus antibody titer 1989    NEVER TREATED FOR    Elevated fasting glucose     History of blood transfusion 2016    R/T INTESTINAL BLEEDING    History of chronic fatigue syndrome 1989    RESOLVED     History of GI bleed 2016    BROUGHT ON BY MEDS---NIACIN, FISH OIL AND VIT C    Hyperlipidemia 2014    (triglycerides-ELEVATED, TRYING TO CONTROL WITH DIET    Hypertension 1999    ON RX    Wears glasses        Past Surgical  History:     Past Surgical History:   Procedure Laterality Date    COLONOSCOPY  2000    internal hemorrhoids    COLONOSCOPY  02/02/2015    polypx1, repeat 5yrs due to family hx & hx polyps- brannon    COLONOSCOPY  12/09/2015    polyp X2  int. Hemorrhoids  partial prolapse of ileocecal valve    COLONOSCOPY  11/2016    COLONOSCOPY N/A 11/12/2020    COLONOSCOPY POLYPECTOMY SNARE/HOT BIOPSY performed by Yuri Green DO at [facility]    to correct flat arches (age 9)    PRE-MALIGNANT / BENIGN SKIN LESION EXCISION Right 10/15/2021    v-EXCISION Lesion Right cheek, Right nose, scalp, nasal tip performed by Melita Rodriguez MD at [place] [address] Bilateral 12/04/2012    benign    PROSTATE BIOPSY Bilateral 07/2014    benign    PROSTATECTOMY  12/10/2019    LAPAROSCOPIC ROBOTIC SIMPLE PROSTATECTOMY     PROSTATECTOMY N/A 12/10/2019    XI LAPAROSCOPIC ROBOTIC SIMPLE PROSTATECTOMY performed by Josephine Grossman MD at [address]  12/8/15    Normal upper GI tract, no evidence of blood in upper GI tract, mild distal esophagitis    UPPER GASTROINTESTINAL ENDOSCOPY  11/2016       Medications:      dexamethasone  10 mg IntraVENous Q6H    enoxaparin  40 mg SubCUTAneous Daily    cefTRIAXone (ROCEPHIN) IV  2,000 mg IntraVENous Q12H    verapamil  80 mg Per NG tube 3 times per day    insulin lispro  0-12 Units SubCUTAneous Q6H    erythromycin   Left Eye BID    atorvastatin  10 mg Per NG tube Nightly    sodium chloride flush  5-40 mL IntraVENous 2 times per day    pantoprazole  40 mg IntraVENous Daily    And    sodium chloride (PF)  10 mL IntraVENous Daily    folic acid IVPB  1 mg IntraVENous Daily    sennosides-docusate sodium  2 tablet Oral Daily    Vitamin D  1,000 Units Oral Daily    chlorhexidine  15 mL Mouth/Throat BID       Social History:     Social History     Socioeconomic History    Marital status: Single     Spouse name: Not on file    Number of children: Not on file    Years of education: Not on file    Highest education level: Not on file   Occupational History    Not on file   Tobacco Use  Smoking status: Light Tobacco Smoker     Packs/day: 0.01     Years: 10.00     Pack years: 0.10     Types: Pipe     Start date: 1/1/1970    Smokeless tobacco: Never Used    Tobacco comment: Rare pipe smoker. 4 BOWLS A WEEK No inhalation. Has never smoked cigarettes. Vaping Use    Vaping Use: Never used   Substance and Sexual Activity    Alcohol use: Yes     Alcohol/week: 0.0 standard drinks     Comment: WHISKEY OR WINE- 1 OR 2 A MONTH    Drug use: No    Sexual activity: Not on file   Other Topics Concern    Not on file   Social History Narrative    Not on file     Social Determinants of Health     Financial Resource Strain: Low Risk     Difficulty of Paying Living Expenses: Not hard at all   Food Insecurity: No Food Insecurity    Worried About Running Out of Food in the Last Year: Never true    [address] in the Last Year: Never true   Transportation Needs:     Lack of Transportation (Medical): Not on file    Lack of Transportation (Non-Medical):  Not on file   Physical Activity:     Days of Exercise per Week: Not on file    Minutes of Exercise per Session: Not on file   Stress:     Feeling of Stress : Not on file   Social Connections:     Frequency of Communication with Friends and Family: Not on file    Frequency of Social Gatherings with Friends and Family: Not on file    Attends Hindu Services: Not on file    Active Member of [address] [place] [address] or Organizations: Not on file    Attends Club or Organization Meetings: Not on file    Marital Status: Not on file   Intimate Partner Violence:     Fear of Current or Ex-Partner: Not on file    Emotionally Abused: Not on file    Physically Abused: Not on file    Sexually Abused: Not on file   Housing Stability:     Unable to Pay for Housing in the Last Year: Not on file    Number of [place] in the Last Year: Not on file    Unstable Housing in the Last Year: Not on file       Family History:     Family History   Problem Relation Age of Onset    Cancer Mother         LUNG    High Blood Pressure Mother     Diabetes Father         IDDM-LATE IN LIFE    Heart Disease Father         CHF    High Blood Pressure Father         Allergies:   Patient has no known allergies. Review of Systems:     Unable to provide. Sedated on ventilator. 1/13/2022      Physical Examination :     Patient Vitals for the past 8 hrs:   BP Temp Temp src Pulse Resp SpO2   01/13/22 0804    61 16 99 %   01/13/22 0600 (!) 143/75 97 °F (36.1 °C) CORE 66 18 100 %   01/13/22 0500 (!) 164/80   58 17 99 %   01/13/22 0409    62 16 98 %   01/13/22 0400 (!) 153/82 98.1 °F (36.7 °C) Oral 61 17 98 %   01/13/22 0300 (!) 145/79   63 18 98 %   01/13/22 0200 (!) 150/78 97.9 °F (36.6 °C)  61 18 98 %     General Appearance: Sedated, somnolent  Head:  Normocephalic, no trauma  Eyes: Pupils equal, round, reactive to light and accommodation; extraocular movements intact; sclera anicteric; conjunctivae pink. No embolic phenomena. ENT: Oropharynx clear, without erythema, exudate, or thrush. No tenderness of sinuses. Mouth/throat: mucosa pink and moist. No lesions. Dentition in good repair. Orally intubated  Neck:Supple, without lymphadenopathy. Thyroid normal, No bruits. Pulmonary/Chest: Clear to auscultation, without wheezes, rales, or rhonchi. No dullness to percussion. Cardiovascular: Regular rate and rhythm without murmurs, rubs, or gallops. Abdomen: Soft, non tender. Bowel sounds normal. No organomegaly  All four Extremities: No cyanosis, clubbing, edema, or effusions. Neurologic: Minimal movement to painful stimuli. Absent Lt corneal  Skin: Warm and dry with good turgor. No signs of peripheral arterial or venous insufficiency. No ulcerations. No open wounds.     Medical Decision Making -Laboratory:   I have independently reviewed/ordered the following labs:    CBC with Differential:   Recent Labs     01/12/22  0459 01/13/22  0513   WBC 16.5* 11.0   HGB 10.0* 9.7*   HCT 32.6* 32.0*    300     BMP:   Recent Labs     01/12/22  0459 01/13/22  0513   * 146*   K 4.3 4.4   * 115*   CO2 18* 21   BUN 29* 35*   CREATININE 0.84 0.87   MG 2.8* 2.9*     Hepatic Function Panel:   No results for input(s): PROT, LABALBU, BILIDIR, IBILI, BILITOT, ALKPHOS, ALT, AST in the last 72 hours. No results for input(s): RPR in the last 72 hours. No results for input(s): HIV in the last 72 hours. No results for input(s): BC in the last 72 hours. Lab Results   Component Value Date    MUCUS NOT REPORTED 01/10/2022    RBC 4.10 01/13/2022    TRICHOMONAS NOT REPORTED 01/10/2022    WBC 11.0 01/13/2022    YEAST NOT REPORTED 01/10/2022    TURBIDITY Clear 01/10/2022     Lab Results   Component Value Date    CREATININE 0.87 01/13/2022    GLUCOSE 184 01/13/2022       Medical Decision Making-Imaging:     EXAMINATION:   ONE XRAY VIEW OF THE CHEST       1/11/2022 9:02 am       COMPARISON:   01/10/2022 radiograph       HISTORY:   ORDERING SYSTEM PROVIDED HISTORY: intubated   TECHNOLOGIST PROVIDED HISTORY:   intubated       FINDINGS:   Supportive devices are stable.  Heart, mediastinum and pulmonary vascularity   are normal.  Stable nodular opacity in the right lower lung zone representing   a cavitary lesion seen on prior CT.  The lungs appear clear otherwise.  No   significant skeletal finding.           Impression   Stable nodular opacity in the right lower lung zone better characterized on   recent CT.         EXAMINATION:   CTA OF THE CHEST 1/9/2022 7:40 pm       TECHNIQUE:   CTA of the chest was performed after the administration of intravenous   contrast.  Multiplanar reformatted images are provided for review.  MIP   images are provided for review.  Dose modulation, iterative reconstruction,   and/or weight based adjustment of the mA/kV was utilized to reduce the   radiation dose to as low as reasonably achievable.       COMPARISON:   Chest x-ray 01/09/2022       HISTORY:   [address] [place] [address] HISTORY: elevated d-dimer, mental status change   TECHNOLOGIST PROVIDED HISTORY:   elevated d-dimer, mental status change   Decision Support Exception - unselect if not a suspected or confirmed   emergency medical condition->Emergency Medical Condition (MA)   Reason for Exam: Elevated d-dimer, mental status change       FINDINGS:   Pulmonary Arteries: No central lobar pulmonary emboli.  Main pulmonary artery   is unremarkable caliber.       Mediastinum: Air-fluid identified involving esophagus.  Slight heterogeneous   appearance of the thyroid gland on left.  Heart is mildly prominent size.  No   definite bulky hilar mediastinal adenopathy.       Lungs/pleura: Right middle lobe atelectasis versus scarring identified. Within the lateral aspect right middle lobe, there is consolidative versus   masslike opacity with air-fluid level.  There vessels corresponding through   this opacity.  The tiny locules of gas as well.  Otherwise mild   hypoventilatory changes elsewhere the lungs.       Upper Abdomen: Evidence of cholelithiasis.  Fatty infiltration liver.       Soft Tissues/Bones: Degenerate changes of the thoracic spine.           Impression   No evidence of pulmonary embolism to the segmental level.       Nonspecific opacification and when question air-fluid level involving the   right middle lobe laterally.  This demonstrates tiny locules of gas. Infection/Pneumonia and/or cavitary mass lesion may be considered. Jeanne Wang   pulmonology consultation.       RECOMMENDATIONS:   Unavailable              Impression   1. Endotracheal tube overlying the proximal trachea 8.6 cm above the level of   the emile.  Advancement is recommended for more optimal positioning. 2. Enteric tube as above. 3. Stable nodular opacity at the right lower lung zone consistent with a   cavitary lesion seen on prior CT. 4. Mild bibasilar atelectasis.        Medical Decision [id number]:       Medical Decision Making-Other: Note:  · Labs, medications, radiologic studies were reviewed with personal review of films  · Large amounts of data were reviewed  · Discussed with nursing Staff, Discharge planner  · Infection Control and Prevention measures reviewed  · All prior entries were reviewed  · Administer medications as ordered  · Prognosis: Guarded  · Discharge planning reviewed  · Follow up as outpatient. Thank you for allowing us to participate in the care of this patient. Please call with questions. Todd Vera  PGY-1  Infectious disease  [address] [place] [address] Pob 525:    I have discussed the case, including pertinent history and exam findings with the residents and students. I have seen and examined the patient and the key elements of the encounter have been performed by me. I was present when the student obtained his information or examined the patient. I have reviewed the laboratory data, other diagnostic studies and discussed them with the residents. I have updated the medical record where necessary. I agree with the assessment, plan and orders as documented by the resident/ student.     Hosea Greenwood MD.

## 2022-01-13 NOTE — CARE COORDINATION
Pt remains intubated fiO2 30%, PEEP 5, EVD drain clamped today. Continue to follow for discharge planning needs.

## 2022-01-13 NOTE — PROCEDURES
Referring physician: Dr. Sophia Jay  Date: 1/3/2022  Start Time: 1/12/2022 @ [phone]  End Time:1/13/2022 @ 1100    Indication  Patient with encephalopathy, EEG done to rule out subclinical seizures. Introduction  This continuous video-EEG was acquired using a [employer] workstation at 256 samples/s. Electrodes were placed according to the International 10-20 system. Automated spike and seizure detection algorithms were applied. Video was recorded during this study. Description  The background consistent of diffuse none reactive polymorphic delta and theta slowing with brief periods of attenuation. Right hemisphere has higher amplitude tracing suggestive of breach rythm. Normal sleep structures were observed. There were frequent lateralized periodic discharges seen mainly on right temporal area. Events  1/11-12/2022  No events reported or recorded. 1/12-13/2022  No events reported or recorded. Impression  Abnormal continuous vEEG recording, the slowing mentioned above suggests moderate-severe non specific encephalopathy. The presence of R temporal LPDs increases patient risk for focal seizures, can also be seen in CNS-itis. No major changes from previous EEG. EKG was not reliable for read. No electrographic seizures. Josias Ruiz MD  Epilepsy Board Certified. Neurology Board Certified.     Electronically Signed

## 2022-01-13 NOTE — PLAN OF CARE
Problem: Falls - Risk of:  Goal: Will remain free from falls  Description: Will remain free from falls  Outcome: Ongoing     Problem: Falls - Risk of:  Goal: Absence of physical injury  Description: Absence of physical injury  Outcome: Ongoing     Problem: Nutrition  Goal: Optimal nutrition therapy  Outcome: Ongoing     Problem: OXYGENATION/RESPIRATORY FUNCTION  Goal: Patient will maintain patent airway  Outcome: Ongoing     Problem: OXYGENATION/RESPIRATORY FUNCTION  Goal: Patient will achieve/maintain normal respiratory rate/effort  Description: Respiratory rate and effort will be within normal limits for the patient  Outcome: Ongoing     Problem: MECHANICAL VENTILATION  Goal: Patient will maintain patent airway  Outcome: Ongoing     Problem: MECHANICAL VENTILATION  Goal: Oral health is maintained or improved  Outcome: Ongoing     Problem: MECHANICAL VENTILATION  Goal: ET tube will be managed safely  Outcome: Ongoing     Problem: MECHANICAL VENTILATION  Goal: Ability to express needs and understand communication  Outcome: Ongoing     Problem: MECHANICAL VENTILATION  Goal: Mobility/activity is maintained at optimum level for patient  Outcome: Ongoing

## 2022-01-13 NOTE — PROGRESS NOTES
PULMONARY & CRITICAL CARE MEDICINE PROGRESS NOTE     Patient:  Marcelo Wellington  MRN: [id number]  6 [facility] date: 1/9/2022  Primary Care Physician: Eileen Hammer MD  Consulting Physician: Trever Torres MD  CODE Status: Full Code  LOS: 3     SUBJECTIVE     CHIEF COMPLAINT/REASON FOR INITIAL CONSULT: RML cavitary lesion and acute hypoxic respiratory failure in the setting of CNS infection    BRIEF HOSPITAL COURSE:   History obtained from chart review and unobtainable from patient due to mental status and and being on the ventilator. Marcelo Wellington is a 70 y.o. white gentleman was admitted with altered mental status, traumatic rhabdomyolysis. Patient was apparently found down at home which was felt to be as long as 48 hours. Per chart, patient had dental work 3 weeks prior to admission. In the emergency room radiologic studies showed debris within the bilateral lateral ventricles of the brain which was felt to be related to hemorrhage or infection. Radiologic studies also revealed right middle lobe cavitary lesion. Patient had leukocytosis; microbiologic studies so far are unrevealing for any infectious process. Patient was started on empiric antibiotics for CNS infection. INTERVAL HISTORY:  01/13/22   Eyes open, not following commands  On precedex drip  CSF growing Strep. Viridans  CSF profile consistent with bacterial infection  Blood growing Coag neg Staph.  - likely contaminant  Afebrile and HDS overnight  Still intubated, SpO2 99% on 30% FiO2  Leukocytosis improving 27->19->16->11  On ceftriaxone, vancomycin an ampicillin discontinued  Still on decadron  No purulent or bloody sputum  Sputum collected 1/11, culture in progress  Quantiferon in process      REVIEW OF SYSTEMS:  Unobtainable from patient due to sedation/mechanical ventilation    OBJECTIVE     VENTILATOR SETTINGS:  Vent Information  $Ventilation: $Subsequent Day  Skin Assessment: Clean, dry, & intact  Equipment Changed: HME  Vent Type: Servo i  Vent Mode: PRVC  Vt Ordered: 580 mL  Rate Set: 16 bmp  Pressure Support: (S) 7 cmH20  FiO2 : 30 %  SpO2: 99 %  SpO2/FiO2 ratio: 330  Sensitivity: 5  PEEP/CPAP: 5  I Time/ I Time %: 0.9 s  Humidification Source: HME     PaO2/FiO2 RATIO:  Recent Labs     22  0417   POCPO2 118.5*      FiO2 : 30 %     VITAL SIGNS:   LAST:  BP (!) 143/75   Pulse 61   Temp 97 °F (36.1 °C) (Core)   Resp 16   Ht 5' 10\" (1.778 m)   SpO2 99%   BMI 28.70 kg/m²   8-24 HR RANGE:  TEMP Temp  Av.1 °F (36.7 °C)  Min: 97 °F (36.1 °C)  Max: 99 °F (91.0 °C)   BP Systolic (59DBE), PLD:759 , Min:132 , LJH:491      Diastolic (76UOO), IYU:58, Min:69, Max:87     PULSE Pulse  Av.9  Min: 58  Max: 85   RR Resp  Av.1  Min: 16  Max: 18   O2 SAT SpO2  Av.6 %  Min: 98 %  Max: 100 %   OXYGEN DELIVERY No data recorded        SYSTEMIC EXAMINATION:   General appearance - Mechanically ventilated, ill-appearing  Mental status - eyes open, somnolent, unresponsive  Eyes - pupils equal and reactive, sclera anicteric  Mouth - mucous membranes moist, pharynx normal without lesions  Neck - supple, no significant adenopathy, carotids upstroke normal bilaterally, no bruits  Chest - Breath sounds bilaterally were dimnished to auscultation at bases. There were no wheezes, rhonchi or rales.   There is no intercostal recession or use of accessory muscles  Heart - normal rate, regular rhythm, normal S1, S2, no murmurs, rubs, clicks or gallops  Abdomen - soft, nontender, nondistended, no masses or organomegaly  Neurological - DTR's normal and symmetric, motor and sensory grossly normal bilaterally  Extremities - peripheral pulses normal, no pedal edema, no clubbing or cyanosis  Skin - normal coloration and turgor, no rashes, no suspicious skin lesions noted     DATA REVIEW     Medications:  Scheduled Meds:   dexamethasone  10 mg IntraVENous Q6H    enoxaparin  40 mg SubCUTAneous Daily    cefTRIAXone (ROCEPHIN) IV  2,000 mg IntraVENous Q12H    verapamil  80 mg Per NG tube 3 times per day    insulin lispro  0-12 Units SubCUTAneous Q6H    erythromycin   Left Eye BID    atorvastatin  10 mg Per NG tube Nightly    sodium chloride flush  5-40 mL IntraVENous 2 times per day    pantoprazole  40 mg IntraVENous Daily    And    sodium chloride (PF)  10 mL IntraVENous Daily    folic acid IVPB  1 mg IntraVENous Daily    sennosides-docusate sodium  2 tablet Oral Daily    Vitamin D  1,000 Units Oral Daily    chlorhexidine  15 mL Mouth/Throat BID     Continuous Infusions:   lactated ringers 80 mL/hr at 01/12/22 2352    dextrose      dexmedetomidine 0.2 mcg/kg/hr (01/12/22 1809)    sodium chloride         INPUT/OUTPUT:  In: 2237.8 [I.V.:1807.8; NG/GT:180]  Out: 2112 [Urine:2080; Drains:32]  Date 01/13/22 0000 - 01/13/22 2359   Shift 1089-7109 3073-1974 1600-2359 24 Hour Total   INTAKE   I.V. 692.8   692.8   NG/GT 60   60   Shift Total 752.8   752.8   OUTPUT   Urine 560   560   Drains 10   10   Shift Total 570   570   Weight (kg)            LABS:  ABGs:   Recent Labs     01/11/22  0252 01/12/22  0350 01/13/22  0417   POCPH 7.425 7.483* 7.494*   POCPCO2 32.7* 29.8* 32.1*   POCPO2 124.7* 124.4* 118.5*   POCHCO3 21.5 22.4 24.7   WAZE9TCT 99* 99* 99*     CBC:   Recent Labs     01/11/22  0528 01/12/22  0459 01/13/22  0513   WBC 19.2* 16.5* 11.0   HGB 10.0* 10.0* 9.7*   HCT 31.6* 32.6* 32.0*   MCV 77.3* 78.9* 78.0*    347 300   RBC 4.09* 4.13* 4.10*   MCH 24.4* 24.2* 23.7*   MCHC 31.6 30.7 30.3   RDW 16.7* 17.1* 17.1*     CRP:   No results for input(s): CRP in the last 72 hours. LDH:   No results for input(s): LDH in the last 72 hours.   BMP:   Recent Labs     01/11/22  0528 01/12/22  0459 01/13/22  0513    147* 146*   K 4.2 4.3 4.4   * 118* 115*   CO2 18* 18* 21   BUN 34* 29* 35*   CREATININE 1.04 0.84 0.87   GLUCOSE 212* 138* 184*   PHOS 2.8 2.2* 3.2     Liver Function Test:   No results for input(s): PROT, LABALBU, ALT, AST, GGT, ALKPHOS, BILITOT in the last 72 hours. Coagulation Profile:   No results for input(s): INR, PROTIME, APTT in the last 72 hours. D-Dimer:  No results for input(s): DDIMER in the last 72 hours. Lactic Acid:  No results for input(s): LACTA in the last 72 hours. Cardiac Enzymes:  Recent Labs     01/10/22  1050 01/10/22  1514 01/11/22  0528   CKTOTAL 672* 525* 274     BNP/ProBNP:   No results for input(s): BNP, PROBNP in the last 72 hours. Triglycerides:  Recent Labs     01/11/22  0528   TRIG 123        Microbiology:  Urine Culture:  No components found for: CURINE  Blood Culture:  No components found for: CBLOOD, CFUNGUSBL  Sputum Culture:  No components found for: CSPUTUM  Recent Labs     01/12/22 2157   [facility] . BLOOD  . BLOOD   SPECIAL RTFA 3 ML   R HAND 11 ML    CULTURE NO GROWTH <24 HRS  NO GROWTH <24 HRS     Recent Labs     01/11/22 2037 01/12/22 0927 01/12/22 2157   SPECDESC . BLOOD  . BLOOD . CSF SHUNT .BLOOD  . BLOOD   SPECIAL UNKNOWN AMOUNT, UNKNOWN SITE  UNKNOWN AMOUNT, UNKNOWN SITE NOT REPORTED RTFA 3 ML   R HAND 11 ML    CULTURE POSITIVE Blood Culture*  DIRECT GRAM STAIN FROM BOTTLE: GRAM POSITIVE COCCI IN CLUSTERS  Detected: Coagulase negative Staphylococcus species (not  S.epidermidis, or S. lugdunensis) Culture Results to Follow Methodology- Polymerase Chain Reaction (PCR)*  STAPHYLOCOCCUS SPECIES, COAGULASE NEGATIVE A single positive blood culture of coagulase negative Staphylocci, diphtheroids,micrococci, Cutibacterium, viridans Streptocci, Bacillus, or Lactobacillus species should be interpreted with caution and viewed as a likely skin contaminant. *  (NOTE) Direct Gram Stain from bottle and Polymerase Chain Reaction (PCR) results called to and read back by:BRAD DE JESUS 1/12/22 1505  NO GROWTH 1 DAY NO GROWTH 21 HOURS NO GROWTH <24 HRS  NO GROWTH <24 HRS        Pathology:    Radiology Reports:  XR CHEST PORTABLE   Final Result   Improved ETT position, now satisfactory; enteric tube position fluid with leptomeningeal enhancement and dural   enhancement as described above consistent with an infectious process. Multilevel degenerative change with foraminal narrowing bilaterally as   described above. RECOMMENDATIONS:   Unavailable         CTA HEAD NECK W CONTRAST   Final Result   Diminutive anterior cerebral arteries and posterior cerebral arteries. Mildly beaded appearance to the M1 portions of the middle cerebral arteries   bilaterally with intermittent foci of mild stenosis. Subsequent diminutive   middle cerebral artery branches distally and this is more pronounced on the   left compared to the right. These findings are of uncertain etiology   although vasculitis is a consideration. RECOMMENDATIONS:   Unavailable         CT LUMBAR SPINE TRAUMA RECONSTRUCTION   Final Result   Unremarkable non-contrast CT of the lumbar spine. CT ABDOMEN PELVIS WO CONTRAST Additional Contrast? None   Final Result   Examination is partially degraded by motion related artifact. Focal area of soft tissue thickening which appears to involve a loop of small   bowel in the central right mid abdomen. This may be partially exaggerated by   the patient motion. Localized infectious or inflammatory versus neoplastic   process however is not excluded and short-term follow-up is recommended. Nondisplaced left 6th and 7th rib fractures. Masslike process exophytic from the lateral margin of the right-side of the   prostate gland. Correlate with PSA values. Cholelithiasis. RECOMMENDATIONS:   Unavailable         CT HEAD WO CONTRAST   Final Result   Persistent layering debris within the lateral ventricles. There is mild   dilatation of the ventricular system. Differential includes isodense   subacute hemorrhage versus infection. Follow-up MRI brain with and without   contrast is recommended for further evaluation. No acute traumatic injury of the facial bones. RECOMMENDATIONS:   Unavailable         CT FACIAL BONES WO CONTRAST   Final Result   Persistent layering debris within the lateral ventricles. There is mild   dilatation of the ventricular system. Differential includes isodense   subacute hemorrhage versus infection. Follow-up MRI brain with and without   contrast is recommended for further evaluation. No acute traumatic injury of the facial bones. RECOMMENDATIONS:   Unavailable         XR CHEST PORTABLE   Final Result   1. Support devices as above. 2. No significant change in the focal opacity within the right lower lung. CT THORACIC SPINE TRAUMA RECONSTRUCTION   Final Result   Mild degenerative changes of the thoracic spine without evidence of acute   osseous abnormality. Please see concurrently performed CT chest and   subsequently performed MRI thoracic spine for additional findings. XR ELBOW RIGHT (MIN 3 VIEWS)   Final Result   No acute osseous abnormality or dislocation involving the right elbow or   right forearm. XR RADIUS ULNA RIGHT (2 VIEWS)   Final Result   No acute osseous abnormality or dislocation involving the right elbow or   right forearm. XR KNEE RIGHT (3 VIEWS)   Final Result   1. No convincing acute osseous abnormality. 2. Degenerative changes of the right knee. 3. Trace joint effusion. 4. There appears to be soft tissue swelling anterior to the patella. XR CHEST PORTABLE   Final Result      Right lung base opacity again identified, nonspecific. Please correlate exam   findings and exam findings and history. XR CHEST PORTABLE    (Results Pending)   VL TRANSCRANIAL DOPPLER COMPLETE    (Results Pending)        Echocardiogram:   No results found for this or any previous visit.        ASSESSMENT AND PLAN     Assessment:    // Acute hypoxic respiratory failure, mechanical ventilation  // Pneumonia - RML cavitary lesion, may be result of aspiration  // Altered mental status - may be 2/2 CNS infection or hemorrhage  // Sepsis  // Traumatic Rhabdomyolysis - improving  // JOZEF - improving  // Rib fractures, Left side  // Left corneal abrasion  // leukocytosis  // fever      Plan:    I personally interviewed/examined the patient; reviewed interval history, interpreted all available radiographic and laboratory data at the time of service.  Wean mechanical ventilation as tolerated   Monitor endotracheal secretions    Monitor RML cavitary lesion    Continue pulmonary toilet, aspiration precautions    Continue antibiotics per ID and neurocrit   Follow microbiologic data   Continue to monitor I/O with a goal of even/negative fluid balance   Stress ulcer prophylaxis per neurocrit   DVT prophylaxis as per neurocrit    The patient is/remains critically ill with illness/injury that acutely impairs one or more vital organ systems, such that there is a high probability of imminent or life threatening deterioration in the patient's condition. Critical care time of 35 minutes was spent (excluding procedures), in coordination of care during bedside rounds and discussion of patient care in detail, and recommendations of the team were adopted in the plan. Necessity of all invasive devices was also confirmed. Kaleigh Guevara  Medical Student  1/11/22    Please note that this chart was generated using voice recognition Dragon dictation software. Although every effort was made to ensure the accuracy of this automated transcription, some errors in transcription may have occurred.

## 2022-01-13 NOTE — PLAN OF CARE
Problem: OXYGENATION/RESPIRATORY FUNCTION  Goal: Patient will maintain patent airway  1/13/2022 1858 by Lena Henry RCP  Outcome: Ongoing  1/13/2022 1718 by Link Newman RN  Outcome: Ongoing  Goal: Patient will achieve/maintain normal respiratory rate/effort  Description: Respiratory rate and effort will be within normal limits for the patient  1/13/2022 1858 by Lena Henry RCP  Outcome: Ongoing  1/13/2022 1718 by Link Newman RN  Outcome: Ongoing     Problem: MECHANICAL VENTILATION  Goal: Patient will maintain patent airway  1/13/2022 1858 by MARS DickensP  Outcome: Ongoing  1/13/2022 1718 by Link Newman RN  Outcome: Ongoing  Goal: Oral health is maintained or improved  1/13/2022 1858 by Lena Henry RCP  Outcome: Ongoing  1/13/2022 1718 by Link Newman RN  Outcome: Ongoing  Goal: ET tube will be managed safely  1/13/2022 1858 by MARS DickensP  Outcome: Ongoing  1/13/2022 1718 by Link Newman RN  Outcome: Ongoing  Goal: Ability to express needs and understand communication  1/13/2022 1858 by MARS DickensP  Outcome: Ongoing  1/13/2022 1718 by Link Newman RN  Outcome: Ongoing  Goal: Mobility/activity is maintained at optimum level for patient  1/13/2022 1858 by Lena Henry RCP  Outcome: Ongoing  1/13/2022 1718 by Link Newman, RN  Outcome: Ongoing     Problem: SKIN INTEGRITY  Goal: Skin integrity is maintained or improved  1/13/2022 1858 by MARS DickensP  Outcome: Ongoing  1/13/2022 1718 by Link Newman RN  Outcome: Ongoing

## 2022-01-13 NOTE — PROGRESS NOTES
Neurosurgery MARYANNE/Resident    Daily Progress Note   No chief complaint on file. 1/13/2022  11:11 AM    Chart reviewed. No acute events overnight. Remains intubated. ICP 4-6 overnight. EVD open at [address] overnight with output of 32ml/24hrs. Vitals:    01/13/22 0500 01/13/22 0600 01/13/22 0804 01/13/22 0848   BP: (!) 164/80 (!) 143/75     Pulse: 58 66 61 58   Resp: 17 18 16 13   Temp:  97 °F (36.1 °C)     TempSrc:  Core     SpO2: 99% 100% 99% 100%   Height:             PE:   Intubated, sedated  E2 V1T M4  PERRL  Opens eyes to pain  Minimal withdraw from pain throughout all extremities     Drain output 32ml/24hr  EVD site c/d/i    Lab Results   Component Value Date    WBC 11.0 01/13/2022    HGB 9.7 (L) 01/13/2022    HCT 32.0 (L) 01/13/2022     01/13/2022    CHOL 127 03/15/2021    TRIG 123 01/11/2022    HDL 27 (L) 03/15/2021    ALT 32 01/09/2022    AST 48 (H) 01/09/2022     (H) 01/13/2022    K 4.4 01/13/2022     (H) 01/13/2022    CREATININE 0.87 01/13/2022    BUN 35 (H) 01/13/2022    CO2 21 01/13/2022    TSH 0.50 01/10/2022    PSA 0.95 04/28/2021    INR 1.1 01/10/2022    LABA1C 6.7 (H) 01/11/2022       A/P  70 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        Julius Mohs, PA-C  1/13/22  11:11 AM

## 2022-01-13 NOTE — PROGRESS NOTES
Daily Progress Note  Neuro Critical Care    Patient Name: Alban Bryant  Patient : 1950  Room/Bed: 9079/5006-04  Code Status: FULL CODE  Allergies: No Known Allergies    CHIEF COMPLAINT:       AMS     INTERVAL HISTORY      Initial Presentation (Admitted 1/10/22): The patient is a 74 y. o. male with a history of hypertension who presented as a transfer from [facility] ED for intraventricular debris. Initially presented to the [facility] ED after being found down at home with altered mental status. He reportedly saw his PCP one week ago for headaches and CTH at that time was negative for acute abnormality. His POA tried calling him and felt that he sounded confused. Patient is normal A&O x4 at baseline. EMS was called to the home and patient was found down for an unknown amount of time but suspected prolonged down time as long as 48 hours. On arrival to [facility] ED, GCS 12. Patient awake and able to follow simple commands but not answering questions appropriately. CT Head showed debris within bilateral ventricles secondary to subacute hemorrhage vs infectious process. CT C-spine negative. CT Chest showed possible cavitary lung lesion in the right middle lobe. Labs were remarkable for elevated CK consistent with rhabdomyolysis, mild JOZEF, leukocytosis to 27.7, and mild elevation of lactate. Patient was transferred to [facility] for neurosurgical evaluation. While in [facility] ED, patient GCS declined and he became tachypneic. Patient was subsequently intubated. Febrile with leukocytosis. Trauma was consulted due to possible traumatic etiology of ventricular debris and imaging demonstrated two left sided rib fractures but otherwise negative for traumatic injury. Neurosurgery consulted. Started empirically on Decadron and antibiotic/antiviral coverage for meningitis.     Hospital Course:  1/10: Admitted to the Neuro ICU. EVD placed at bedside and set at [address] [facility].  CSF studies; Glucose <2, Protein 966, , WBC 55952. Meningitis panel negative. Acyclovir discontinued. Continued on Rocephin, Vancomycin and Ampicillin. Received 4 doses of IV Decadron. MRI Brain with/without contrast showed known debris in the ventricular system with enhancement of the ependymal surface of the ventricular system and leptomeningeal enhancement. MRI Cervical spine with no new findings.        1/12:   EVD set at [address] [facility], ICP 3-11, 20cc out/24h. ID discontinued antibiotics overnight. Discussed case this AM; started on Rocephin 2g Q12h. Neurosurgery following and concerned about subarachnoid purulence in cisterns and cervical space. Dr. Alexander Garcia considering intraoperative subarachnoid culture. Awaiting MRI Thoracic and Lumbar spine. Will consider CT facial bone with contrast.  Repeat blood culture and respiratory culture ordered. Neuro Endovascular reviewed CTA findings (concern for vasculitis) and recommended TCD which showed elevated mean velocities in the left MCA (189 max, LR 6.74). Recommended Verapamil, started on 80mg Q8h. Clinical exam remains poor. Fentanyl infusion changed to PRN dosing, will use Precedex if needs more sedation (becomes hypertensive, tachypneic intermittently).    1/12: SBP >180 ON, PRN hydralazine orderedResp cx Gram + cocci, 1/2 + blood cultures likely staph. Straight cath x2 -> amaya. In the am, pt is obtunded, not following commands. Pupils 2mm slugglishly reactive. Spontaneous movement in the left upper extremity, flicker movement in the b/l lower extremities. CT maxillofacial w contrast ordered to asses if abcesss present. NS switched to LR 80ml/hr. CSF cell count WBC decreasing 66439 -> 38257. Plan to continue with recophin for now. Family updated about management plan, mri finding and ltme.      1/13: Overnight pt was stable. More spontaneous eye opening.s ICPs in 2-8. Output 19 in the last 12 hours. TB test in process. Hyponatremic. Free water bolus started.  Tube feeds ordered. [facility] DC. Plan to send csf sample for infectious workup. A line has been removed. Follow TCD doppler tomorrow. VL lower extremity duplex scan ordered. 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 CSF is positive for viridans streptococcus. Plan to continue with ceftriaxone.  CT maxillofacial unremarkable for abscess or sinusitis.        CURRENT MEDICATIONS:  SCHEDULED MEDICATIONS:   dexamethasone  10 mg IntraVENous Q6H    enoxaparin  40 mg SubCUTAneous Daily    cefTRIAXone (ROCEPHIN) IV  2,000 mg IntraVENous Q12H    verapamil  80 mg Per NG tube 3 times per day    insulin lispro  0-12 Units SubCUTAneous Q6H    erythromycin   Left Eye BID    atorvastatin  10 mg Per NG tube Nightly    sodium chloride flush  5-40 mL IntraVENous 2 times per day    pantoprazole  40 mg IntraVENous Daily    And    sodium chloride (PF)  10 mL IntraVENous Daily    folic acid IVPB  1 mg IntraVENous Daily    sennosides-docusate sodium  2 tablet Oral Daily    Vitamin D  1,000 Units Oral Daily    chlorhexidine  15 mL Mouth/Throat BID     CONTINUOUS INFUSIONS:   lactated ringers 80 mL/hr at 22 2352    dextrose      dexmedetomidine 0.2 mcg/kg/hr (22 1809)    sodium chloride       PRN MEDICATIONS:   glucose, dextrose, glucagon (rDNA), dextrose, fentanNYL, labetalol, sodium chloride flush, sodium chloride flush, sodium chloride, ondansetron **OR** ondansetron, polyethylene glycol, acetaminophen **OR** acetaminophen, sodium chloride flush    VITALS:  Temperature Range: Temp: 97 °F (36.1 °C) Temp  Av.1 °F (36.7 °C)  Min: 97 °F (36.1 °C)  Max: 99 °F (37.2 °C)  BP Range: Systolic (51JJQ), AHQ:480 , Min:132 , PND:784     Diastolic (00CYY), LMK:01, Min:73, Max:87    Pulse Range: Pulse  Av.4  Min: 58  Max: 77  Respiration Range: Resp  Av.8  Min: 13  Max: 32  Current Pulse Ox: SpO2: 100 %  24HR Pulse Ox Range: SpO2  Av.4 %  Min: 98 %  Max: 100 %  Patient Vitals for the past 12 hrs:   BP Temp Temp src Pulse Resp SpO2   01/13/22 0848    58 13 100 %   01/13/22 0804    61 16 99 %   01/13/22 0600 (!) 143/75 97 °F (36.1 °C) CORE 66 18 100 %   01/13/22 0500 (!) 164/80   58 17 99 %   01/13/22 0409    62 16 98 %   01/13/22 0400 (!) 153/82 98.1 °F (36.7 °C) Oral 61 17 98 %   01/13/22 0300 (!) 145/79   63 18 98 %   01/13/22 0200 (!) 150/78 97.9 °F (36.6 °C)  61 18 98 %   01/13/22 0100 (!) 148/84   66 17 99 %   01/13/22 0000 134/78 97.9 °F (36.6 °C) Oral 70 20 98 %   01/12/22 2326    77 19 100 %   01/12/22 2300 (!) 143/85   75 (!) 32 99 %     Estimated body mass index is 28.7 kg/m² as calculated from the following:    Height as of this encounter: 5' 10\" (1.778 m). Weight as of an earlier encounter on 1/9/22: 200 lb (90.7 kg).  []<16 Severe malnutrition  []1616.99 Moderate malnutrition  []1718.49 Mild malnutrition  []18.524.9 Normal  [x]2529.9 Overweight (not obese)  []3034.9 Obese class 1 (Low Risk)  []3539.9 Obese class 2 (Moderate Risk)  []?40 Obese class 3 (High Risk)    RECENT LABS:   Lab Results   Component Value Date    WBC 11.0 01/13/2022    HGB 9.7 (L) 01/13/2022    HCT 32.0 (L) 01/13/2022     01/13/2022    CHOL 127 03/15/2021    TRIG 123 01/11/2022    HDL 27 (L) 03/15/2021    LDLCHOLESTEROL 66 03/15/2021    ALT 32 01/09/2022    AST 48 (H) 01/09/2022     (H) 01/13/2022    K 4.4 01/13/2022     (H) 01/13/2022    CREATININE 0.87 01/13/2022    BUN 35 (H) 01/13/2022    CO2 21 01/13/2022    TSH 0.50 01/10/2022    PSA 0.95 04/28/2021    INR 1.1 01/10/2022    LABA1C 6.7 (H) 01/11/2022     24 HOUR INTAKE/OUTPUT:    Intake/Output Summary (Last 24 hours) at 1/13/2022 1018  Last data filed at 1/13/2022 0700  Gross per 24 hour   Intake 1879.09 ml   Output 1777 ml   Net 102.09 ml       IMAGING:     Place summary of recent imaging here. Labs and Images reviewed with:  [] Dr. José Manuel Joe    [x] Dr. Roland Roach  [] Dr. Edita Partida  [] There are no new interval images to review. PHYSICAL EXAM       CONSTITUTIONAL:  Intubated. Sponatenous eye opening with occasional weak eye blinks. Doesnot follow commands. HEAD:  normocephalic, right facial pressure wounds   EYES:  PERRL, upward gaze   ENT:  moist mucous membranes   NECK:  supple, symmetric   LUNGS:  Equal air entry bilaterally, clear   CARDIOVASCULAR:  normal s1 / s2, RRR, distal pulses intact   ABDOMEN:  Soft, no rigidity   NEUROLOGIC:  Mental Status:  Intubated. Sponatenous eye opening with occasional weak eye blinks. Doesnot follow commands. Cranial Nerves:    II: Visual fields:  abnormal - blink to threat  III: Pupils:  equal, round, reactive to light  III,IV,VI: Extra Ocular Movements: abnormal - not tracking  V: Corneal reflex:  completed. abnormal - weak on right, absent left  VII: Facial strength: intact  Weak cough/gag     Motor Exam:    Patient has withdrawal movement in all four extremities. No antigravity movement. DRAINS:  [] There are no drains for Neuro Critical Care to monitor at this time. ASSESSMENT AND PLAN:       The patient is 71 yo male with a history of hypertension who presented to [facility] ED as a transfer from [facility] for ventricular debris. Initially presented to [facility] ED with altered mentation after being found down. CT Head showed debris within bilateral ventricles secondary to subacute hemorrhage vs infectious process. Noted to have right middle lobe opacification concerning for cavitary mass lesion. Labs revealed elevated CK, JOZEF, leukocytosis. Transferred for Neurosurgical evaluation. Neurosurgery consulted. EVD placed urgently 1/10. CSF studies concerning for bacterial meningitis. MRI Brain re-demonstrated debris in the ventricular system and showed enhancement of the ependymal surface of the ventricular system and leptomeningeal enhancement. ID consulted; recommending CNS dosed Rocephin.   Neuro Endovasuclar consulted due to concerns for vasculitis on CTA Head/Neck and recommended TCD which showed elevated L MCA velocities. Patient was started on Verapamil. CT maxillofacial ordered. Follow Csf culture. Csf culture 1/10 alpha hemolytic strep. On ceftraixone     NEUROLOGIC:  - Acute debris within the ventricular system with enhancement of the ependymal surface of the ventricular system and leptomeningeal enhancement  - Concern for bacterial meningitis/ventriculitis. Csf culture 1/10 positive for alpha hemolytic strep. CSF culture 1/10 reported today growing viridians streptoccocus.   - EVD set at 15 cmH20, ICP 4-6, 32cc out/24h  - CSF studies; CSF studies; Glucose <2, Protein 966, >1600, WBC 26294>69731, meningitis panel negative, CSF culture viridians streptoccocus  - Neurosurgery following; considering intraop subarachnoid culture. No plan for intervention  - ID recommending Rocephin 2g Q12h. -NSG: no focal compressive lesion on neuroimaging. Extra-axial but subarachanoid in nature. No plan for surgical intervention or evacuation. Medical or infectiours therapy for now. - Concern for vasculitis. - CTA Head/Neck showed beading of the bilateral M1 MCAs and diminutive anterior and posterior cerebral arteries  - Neuro Endovascular consulted; significant vasculopathy. TCS showing L MCA high velocities. Medical management. Plan for repeat TCD tomorrow. - TCD  showed elevated mean velocities of the L MCA (189, LR 6.74)  - Started on Verapamil 80mg Q8h per Endovascular recs  -F/U LTME: Moderate-severe non specific encephalopathy. The presence of R temporal LPDs increases patient risk for focal seizures, can also be seen in CNS-itis.  LTME dc1/  - Goal -180, avoid hypotension  - PRN Fentanyl for agitation/sedation, OK to use Precedex if needed  - Neuro checks per protocol     CARDIOVASCULAR:      BP Range: Systolic (39QNU), CBC:948 , Min:132 , EHS:658     Diastolic (15LIF), FTR:27, Min:73, Max:87    Pulse Range: Pulse  Av.4  Min: 58  Max: 77  - Goal -180  - PRN Labetalol  - Troponin 17, EKG sinus tachycardia  - Echo EF 54%  - Continue home Lipitor for hyperlipidemia  - Continue telemetry    PULMONARY:    Respiration Range: Resp  Av.8  Min: 13  Max: 32  Current Pulse Ox: SpO2: 100 %    - Intubated in ED for acute hypoxic respiratory failure  - Vent Settings: PRVC 30/16/580/5  - Daily ABG : 7.42/32.7/118.5/24.7  - Right lung opacification possible cavitary lesion .   - CXR  showed stable nodular opacity in the right lower lung.  - Pulmonology following; : continue medical management. - ID ordered TB quantiferon in process. RENAL/FLUID/ELECTROLYTE:    Lab Results   Component Value Date     2022    K 4.4 2022     2022    CO2 21 2022    BUN 35 2022    CREATININE 0.87 2022    GLUCOSE 184 2022    CALCIUM 8.4 2022          I/O last 3 completed shifts: In: 3900.8 [I.V.:3380.8; NG/GT:270; IV Piggyback:250]  Out: 2970 [Urine:2930; Drains:40]  No intake/output data recorded. - JOZEF resolved  - BUN 34>29/ Creatinine 0.84  - Monitor I&O; 3056/1079  - Hyponatremia.  Water flushed Q4.  - IVF: Total fluids 80cc/hr  - CK and lactic normalized  - Give 2g Calcium gluconate   - Replace electrolytes PRN  - Daily BMP    GI/NUTRITION:  NUTRITION:  Diet NPO Exceptions are: Sips of Water with Meds  ADULT TUBE FEEDING; Nasogastric; Peptide Based; Continuous; 25; Yes; 20; Q 4 hours; 75; 30; Q 4 hours  - Start tube feeds via OG  - Bowel regimen: Senokot-S daily  - GI prophylaxis: Protonix  -tube feeds started      Recent Labs     22  0513 22  0459 22  0528   WBC 11.0 16.5* 19.2*   HGB 9.7* 10.0* 10.0*   HCT 32.0* 32.6* 31.6*   MCV 78.0* 78.9* 77.3*    347 341         ID:  Temperature Range: Temp: 97 °F (36.1 °C) Temp  Av.1 °F (36.7 °C)  Min: 97 °F (36.1 °C)  Max: 99 °F (37.2 °C)    - Leukocytosis resolved, WBC 19.2>16.5>11.0  - COVID-19 negative on 1/10  - UA 1/10 showed moderate bacteria, negative nitrite/leukocyte esterase   - Blood cultures 1/10; 1/2 positive staph coag negative  - Repeat blood cultures pending.  - F/U Sputum culture  - Ventriculitis/meningitis  - CSF studies; Glucose <2, Protein 966, >1600, WBC 91224>36899, eningitis panel negative  - Follow CSF culture 1/10 showing alpha strep cocci. Viridians streptoccocci.  - Repeat CSF culture pending.  - ID following; recommending Rocephin CNS dosing  - Continue to monitor for fevers  - Daily CBC    HEME:     - Daily CBC    ENDOCRINE:  - Continue to monitor blood glucose, goal <180  - - Hyperglycemia; increase to medium dose insulin sliding scale    OTHER:  - Vitamin D and Folic acid supplementation  - PT/OT/ST as appropriate  - Code Status: FULL       PROPHYLAXIS:  Stress ulcer: PPT daily    DVT PROPHYLAXIS:  - SCD sleeves - Thigh High   -  Lovenox 40mg QD    DISPOSITION:  [x] To remain ICU:   [] OK for out of ICU from Neuro Critical Care standpoint    We will continue to follow along. For any changes in exam or patient status please contact Neuro Critical Care.       Bertrand Stovall MD  Neuro Critical Care  1/13/2022     10:18 AM

## 2022-01-13 NOTE — PROGRESS NOTES
Physician Progress Note      PATIENT:               Shahana Joseph  CSN #:                  [id number]  :                       1950  ADMIT DATE:       2022 11:26 PM  [address] [place] DATE:  RESPONDING  PROVIDER #:        Rebecca Solano MD          QUERY TEXT:    Patient admitted with subarachnoid hemorrhage. Noted documentation of being   found down at home in progres notes. If possible, please document if you are   treating/evaluating any of the following: The medical record reflects the following:  Risk Factors: headaches ongoing for past several weeks with adjustment of BP   medications per PCP, HTN, HLD, Anemia,  Meningitis, resp. failure, JOZEF,   traumatic rhabdomyolysis, metabolic encephalopathy , multiple rib fractures,   dental work s/p 3 weeks  Clinical Indicators: GCS 11->8 ->10->6->3->6.  baseline A&O x4, found down at   home for at least 48 hours as evidence by traumatic rhabdomyolysis,  CXF-   protein - 966.9, WBC- 13,438,  RBC- 350, glucose<2. WBC- 27.7. CT head-   ventricular debris  Treatment: IV- Vancomycin, CEftriaxone, Ampicillin, Acyclovir-discontinued,   Neuro checks, imaging. Thank Cody Tate RN, CDS-  email - [email or web address]  office- [phone]  Options provided:  -- Traumatic SAH without LOC  -- Traumatic SAH with LOC, Please specify duration, if known  -- Nontraumatic SAH  -- Other - I will add my own diagnosis  -- Disagree - Not applicable / Not valid  -- Disagree - Clinically unable to determine / Unknown  -- Refer to Clinical Documentation Reviewer    PROVIDER RESPONSE TEXT:    No SAH    Query created by: Valeria River on 2022 3:09 PM      Electronically signed by:  Rebecca Solano MD 2022 5:39 PM

## 2022-01-13 NOTE — PROCEDURES
Referring physician: Dr. Camryn Milligan  Date: 1/3/2022  Start Time: 1/12/2022 @ [phone]  End Time:1/13/2022 @ 0800    Indication  Patient with encephalopathy, EEG done to rule out subclinical seizures. Introduction  This continuous video-EEG was acquired using a [employer] workstation at 256 samples/s. Electrodes were placed according to the International 10-20 system. Automated spike and seizure detection algorithms were applied. Video was recorded during this study. Description  The background consistent of diffuse none reactive polymorphic delta and theta slowing with brief periods of attenuation. Right hemisphere has higher amplitude tracing suggestive of breach rythm. Normal sleep structures were observed. There were frequent lateralized periodic discharges seen mainly on right temporal area. Events  1/11-12/2022  No events reported or recorded. 1/12-13/2022  No events reported or recorded. Impression  Abnormal continuous vEEG recording, the slowing mentioned above suggests moderate-severe non specific encephalopathy. The presence of R temporal LPDs increases patient risk for focal seizures, can also be seen in CNS-itis. No major changes from previous EEG. EKG was not reliable for read. No electrographic seizures. Jackelyn Baez MD  Epilepsy Board Certified. Neurology Board Certified.     Electronically Signed

## 2022-01-14 NOTE — PROGRESS NOTES
01/14/22 0836   ETT (adult)   Placement Date/Time: 01/10/22 0136   Timeout: Patient;Procedure;Site/Side;Appropriate Equipment; Consent Confirmed  Preoxygenation: Yes  Mask Ventilation: Ventilated by mask (1)  Technique: Video laryngoscopy  Type: Cuffed  Tube Size: 8 mm  Laryngoscop. .. Secured at 27 cm     ETT found at 27 @ lip. Chart, notes and previous orders reviewed (RT communication order from 1/12/22) ETT remains at 27 @ lip.

## 2022-01-14 NOTE — PROCEDURES
Referring physician: Dr. Tyrone Wilson  Date: 1/3/2022  Start Time: 1/13/2022 @ 1100  End Time:1/13/2022 @ 1400    Total time 5 hrs    Indication  Patient with encephalopathy, EEG done to rule out subclinical seizures. Introduction  This continuous video-EEG was acquired using a [employer] workstation at 256 samples/s. Electrodes were placed according to the International 10-20 system. Automated spike and seizure detection algorithms were applied. Video was recorded during this study. Description  The background consistent of diffuse none reactive polymorphic delta and theta slowing with brief periods of attenuation. Right hemisphere has higher amplitude tracing suggestive of breach rythm. Normal sleep structures were observed. There were frequent lateralized periodic discharges seen mainly on right temporal area. Events  1/11-12/2022  No events reported or recorded. 1/12-13/2022  No events reported or recorded. Impression  Abnormal continuous vEEG recording, the slowing mentioned above suggests moderate-severe non specific encephalopathy. The presence of R temporal LPDs increases patient risk for focal seizures, can also be seen in CNS-itis. No major changes from previous EEG. EKG was not reliable for read. No electrographic seizures. Nabil Coburn MD  Epilepsy Board Certified. Neurology Board Certified.     Electronically Signed

## 2022-01-14 NOTE — PROGRESS NOTES
Neurosurgery MARYANNE/Resident    Daily Progress Note   No chief complaint on file. 1/14/2022  9:27 AM    Chart reviewed. No acute events overnight. Remains intubated. EVD remained clamped overnight, ICP 2-6. Vitals:    01/14/22 0400 01/14/22 0500 01/14/22 0600 01/14/22 0836   BP: (!) 163/92 (!) 168/89 (!) 161/87    Pulse: 61 69 72 66   Resp: 13 16 27 25   Temp: 97.9 °F (36.6 °C)      TempSrc:       SpO2: 100% 100%  99%   Height:             PE:   Intubated, sedated  E1 VT M4  Trace withdraw all extremities     EVD clamped, ICP 2-6 overnight, EVD is patent and site is c/d/i without signs of drainage       Lab Results   Component Value Date    WBC 13.1 (H) 01/14/2022    HGB 10.4 (L) 01/14/2022    HCT 34.6 (L) 01/14/2022     01/14/2022    CHOL 127 03/15/2021    TRIG 123 01/11/2022    HDL 27 (L) 03/15/2021    ALT 32 01/09/2022    AST 48 (H) 01/09/2022     (H) 01/14/2022    K 4.6 01/14/2022     (H) 01/14/2022    CREATININE 0.84 01/14/2022    BUN 42 (H) 01/14/2022    CO2 22 01/14/2022    TSH 0.50 01/10/2022    PSA 0.95 04/28/2021    INR 1.1 01/10/2022    LABA1C 6.7 (H) 01/11/2022       A/P    70 y.o. 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        José Burton PA-C  1/14/22  9:27 AM

## 2022-01-14 NOTE — PROGRESS NOTES
PULMONARY & CRITICAL CARE MEDICINE PROGRESS NOTE     Patient:  Usha Harrington  MRN: [id number]  [facility] date: 1/9/2022  Primary Care Physician: Eun Jefferson MD  Consulting Physician: Jung Yadav MD  CODE Status: Full Code  LOS: 4     SUBJECTIVE     CHIEF COMPLAINT/REASON FOR INITIAL CONSULT: RML cavitary lesion and acute hypoxic respiratory failure in the setting of CNS infection    BRIEF HOSPITAL COURSE:   History obtained from chart review and unobtainable from patient due to mental status and and being on the ventilator. Usha Harrington is a 70 y.o. white gentleman was admitted with altered mental status, traumatic rhabdomyolysis. Patient was apparently found down at home which was felt to be as long as 48 hours. Per chart, patient had dental work 3 weeks prior to admission. In the emergency room radiologic studies showed debris within the bilateral lateral ventricles of the brain which was felt to be related to hemorrhage or infection. Radiologic studies also revealed right middle lobe cavitary lesion. Patient had leukocytosis; microbiologic studies so far are unrevealing for any infectious process. Patient was started on empiric antibiotics for CNS infection. INTERVAL HISTORY:  01/14/22   CSF growing Strep.  Viridans  CSF profile consistent with bacterial infection  Still intubated, SpO2 99% on 30% FiO2  On ceftriaxone  culture in progress  Quantiferon in process      REVIEW OF SYSTEMS:  Unobtainable from patient due to sedation/mechanical ventilation    OBJECTIVE     VENTILATOR SETTINGS:  Vent Information  $Ventilation: $Subsequent Day  Skin Assessment: Clean, dry, & intact  Equipment Changed: Expiratory Filter (HME)  Vent Type: Servo i  Vent Mode: CPAP  Vt Ordered: 580 mL  Rate Set: 12 bmp  Pressure Support: 10 cmH20  FiO2 : 30 %  SpO2: 100 %  SpO2/FiO2 ratio: 333.33  Sensitivity: 5  PEEP/CPAP: 5  I Time/ I Time %: 0.9 s  Humidification Source: HME     PaO2/FiO2 RATIO:  Recent Labs 22  0343   POCPO2 97.9      FiO2 : 30 %     VITAL SIGNS:   LAST:  BP (!) 183/103   Pulse 78   Temp 98.1 °F (36.7 °C)   Resp 21   Ht 5' 10\" (1.778 m)   SpO2 100%   BMI 28.70 kg/m²   8-24 HR RANGE:  TEMP Temp  Av.1 °F (36.7 °C)  Min: 97.9 °F (36.6 °C)  Max: 98.2 °F (22.2 °C)   BP Systolic (08EJQ), XUJ:144 , Min:140 , MHF:982      Diastolic (51JWI), ALQ:49, Min:74, Max:103     PULSE Pulse  Av.4  Min: 56  Max: 79   RR Resp  Av.2  Min: 14  Max: 35   O2 SAT SpO2  Av.5 %  Min: 99 %  Max: 100 %   OXYGEN DELIVERY No data recorded        SYSTEMIC EXAMINATION:   General appearance - Mechanically ventilated, ill-appearing  Mental status - eyes open, somnolent, unresponsive  Eyes - pupils equal and reactive, sclera anicteric  Mouth - mucous membranes moist, pharynx normal without lesions  Neck - supple, no significant adenopathy, carotids upstroke normal bilaterally, no bruits  Chest - Breath sounds bilaterally were dimnished to auscultation at bases. There were no wheezes, rhonchi or rales.   There is no intercostal recession or use of accessory muscles  Heart - normal rate, regular rhythm, normal S1, S2, no murmurs, rubs, clicks or gallops  Abdomen - soft, nontender, nondistended, no masses or organomegaly  Neurological - DTR's normal and symmetric, motor and sensory grossly normal bilaterally  Extremities - peripheral pulses normal, no pedal edema, no clubbing or cyanosis  Skin - normal coloration and turgor, no rashes, no suspicious skin lesions noted     DATA REVIEW     Medications:  Scheduled Meds:   senna  5 mL Oral Nightly    docusate  100 mg Oral Daily    polyethylene glycol  17 g Oral Daily    enoxaparin  40 mg SubCUTAneous Daily    cefTRIAXone (ROCEPHIN) IV  2,000 mg IntraVENous Q12H    verapamil  80 mg Per NG tube 3 times per day    insulin lispro  0-12 Units SubCUTAneous Q6H    erythromycin   Left Eye BID    atorvastatin  10 mg Per NG tube Nightly    sodium chloride flush 5-40 mL IntraVENous 2 times per day    pantoprazole  40 mg IntraVENous Daily    And    sodium chloride (PF)  10 mL IntraVENous Daily    folic acid IVPB  1 mg IntraVENous Daily    Vitamin D  1,000 Units Oral Daily    chlorhexidine  15 mL Mouth/Throat BID     Continuous Infusions:   lactated ringers 80 mL/hr at 01/12/22 2352    dextrose      sodium chloride         INPUT/OUTPUT:  In: 49005.5 [I.V.:9215.5; NG/GT:997]  Out: 7371 [Urine:2495; Drains:3]  Date 01/14/22 0000 - 01/14/22 2359   Shift 1029-6675 4874-1040 0769-5458 24 Hour Total   INTAKE   I.V. 2200 6255  8455   NG/ 176  652   Shift Total 2676 6431  9107   OUTPUT   Urine 475 1050  1525   Drains  0  0   Shift Total 475 1050  1525   Weight (kg)            LABS:  ABGs:   Recent Labs     01/12/22  0350 01/13/22  0417 01/14/22  0343   POCPH 7.483* 7.494* 7.519*   POCPCO2 29.8* 32.1* 32.8*   POCPO2 124.4* 118.5* 97.9   POCHCO3 22.4 24.7 26.7   JRKX7YHN 99* 99* 98     CBC:   Recent Labs     01/12/22 0459 01/13/22  0513 01/14/22 0422   WBC 16.5* 11.0 13.1*   HGB 10.0* 9.7* 10.4*   HCT 32.6* 32.0* 34.6*   MCV 78.9* 78.0* 79.5*    300 309   RBC 4.13* 4.10* 4.35   MCH 24.2* 23.7* 23.9*   MCHC 30.7 30.3 30.1   RDW 17.1* 17.1* 17.2*     CRP:   No results for input(s): CRP in the last 72 hours. LDH:   No results for input(s): LDH in the last 72 hours. BMP:   Recent Labs     01/12/22 0459 01/13/22  0513 01/14/22 0422   * 146* 146*   K 4.3 4.4 4.6   * 115* 114*   CO2 18* 21 22   BUN 29* 35* 42*   CREATININE 0.84 0.87 0.84   GLUCOSE 138* 184* 188*   PHOS 2.2* 3.2 3.0     Liver Function Test:   No results for input(s): PROT, LABALBU, ALT, AST, GGT, ALKPHOS, BILITOT in the last 72 hours. Coagulation Profile:   No results for input(s): INR, PROTIME, APTT in the last 72 hours. D-Dimer:  No results for input(s): DDIMER in the last 72 hours. Lactic Acid:  No results for input(s): LACTA in the last 72 hours.   Cardiac Enzymes:  No results for input(s): CKTOTAL, CKMB, CKMBINDEX, TROPONINI in the last 72 hours. Invalid input(s): TROPONIN, HSTROP  BNP/ProBNP:   No results for input(s): BNP, PROBNP in the last 72 hours. Triglycerides:  No results for input(s): TRIG in the last 72 hours. Microbiology:  Urine Culture:  No components found for: CURINE  Blood Culture:  No components found for: CBLOOD, CFUNGUSBL  Sputum Culture:  No components found for: CSPUTUM  Recent Labs     01/13/22  1452   [facility] . CSF   SPECIAL NOT REPORTED   CULTURE NO GROWTH 15 HOURS     Recent Labs     01/12/22  0979 01/12/22  2157 01/13/22  1452   [facility] . CSF SHUNT .BLOOD  . BLOOD . CSF   SPECIAL NOT REPORTED RTFA 3 ML   R HAND 11 ML  NOT REPORTED   CULTURE CULTURE IN PROGRESS NO GROWTH 2 DAYS  NO GROWTH 2 DAYS NO GROWTH 15 HOURS        Pathology:    Radiology Reports:  XR CHEST PORTABLE   Final Result   There is a right basilar infiltrate consistent with pneumonia. Support tubes as described above. XR ABDOMEN (KUB) (SINGLE AP VIEW)   Final Result   Enteric tube in satisfactory position. Bowel gas pattern suggesting mild   ileus. Rangel catheter. XR CHEST PORTABLE   Final Result   Improved ETT position, now satisfactory; enteric tube position unchanged. Right basilar opacity, and probable slight left basilar atelectasis, as   discussed above. CT MAXILLOFACIAL W CONTRAST   Final Result   No evidence of facial infection or sinusitis. XR CHEST PORTABLE   Final Result   1. Endotracheal tube overlying the proximal trachea 8.6 cm above the level of   the emile. Advancement is recommended for more optimal positioning. 2. Enteric tube as above. 3. Stable nodular opacity at the right lower lung zone consistent with a   cavitary lesion seen on prior CT. 4. Mild bibasilar atelectasis. The findings were sent to the Radiology Results [address] at 6:58   am on 1/12/2022to be communicated to a licensed caregiver.          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 No evidence of abscess, discitis, or osteomyelitis. MRI LUMBAR SPINE W WO CONTRAST   Final Result   1. 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 No   evidence of abscess. 2. Mild-to-moderate multilevel degenerative changes as detailed above. VL DUP LOWER EXTREMITY VENOUS BILATERAL   Final Result      XR CHEST PORTABLE   Final Result   Stable nodular opacity in the right lower lung zone better characterized on   recent CT. VL TRANSCRANIAL DOPPLER COMPLETE   Final Result      MRI BRAIN W WO CONTRAST   Final Result   Prominent ventricular system with debris throughout the CSF. Associated   FLAIR signal abnormality involving the ependymal and subependymal surface   with enhancement of the ependymal surface of the ventricular system   consistent with an infectious process. There is associated leptomeningeal   enhancement extending into the craniocervical region and along the visualized   cervical spinal cord. RECOMMENDATIONS:   Unavailable         MRI CERVICAL SPINE W WO CONTRAST   Final Result   Debris throughout the CSF fluid with leptomeningeal enhancement and dural   enhancement as described above consistent with an infectious process. Multilevel degenerative change with foraminal narrowing bilaterally as   described above. RECOMMENDATIONS:   Unavailable         CTA HEAD NECK W CONTRAST   Final Result   Diminutive anterior cerebral arteries and posterior cerebral arteries. Mildly beaded appearance to the M1 portions of the middle cerebral arteries   bilaterally with intermittent foci of mild stenosis. Subsequent diminutive   middle cerebral artery branches distally and this is more pronounced on the   left compared to the right. These findings are of uncertain etiology   although vasculitis is a consideration. RECOMMENDATIONS:   Unavailable         CT LUMBAR SPINE TRAUMA RECONSTRUCTION   Final Result   Unremarkable non-contrast CT of the lumbar spine. CT ABDOMEN PELVIS WO CONTRAST Additional Contrast? None   Final Result   Examination is partially degraded by motion related artifact. Focal area of soft tissue thickening which appears to involve a loop of small   bowel in the central right mid abdomen. This may be partially exaggerated by   the patient motion. Localized infectious or inflammatory versus neoplastic   process however is not excluded and short-term follow-up is recommended. Nondisplaced left 6th and 7th rib fractures. Masslike process exophytic from the lateral margin of the right-side of the   prostate gland. Correlate with PSA values. Cholelithiasis. RECOMMENDATIONS:   Unavailable         CT HEAD WO CONTRAST   Final Result   Persistent layering debris within the lateral ventricles. There is mild   dilatation of the ventricular system. Differential includes isodense   subacute hemorrhage versus infection. Follow-up MRI brain with and without   contrast is recommended for further evaluation. No acute traumatic injury of the facial bones. RECOMMENDATIONS:   Unavailable         CT FACIAL BONES WO CONTRAST   Final Result   Persistent layering debris within the lateral ventricles. There is mild   dilatation of the ventricular system. Differential includes isodense   subacute hemorrhage versus infection. Follow-up MRI brain with and without   contrast is recommended for further evaluation. No acute traumatic injury of the facial bones. RECOMMENDATIONS:   Unavailable         XR CHEST PORTABLE   Final Result   1. Support devices as above. 2. No significant change in the focal opacity within the right lower lung.          CT THORACIC SPINE TRAUMA RECONSTRUCTION   Final Result   Mild degenerative changes of the thoracic spine without evidence of acute osseous abnormality. Please see concurrently performed CT chest and   subsequently performed MRI thoracic spine for additional findings. XR ELBOW RIGHT (MIN 3 VIEWS)   Final Result   No acute osseous abnormality or dislocation involving the right elbow or   right forearm. XR RADIUS ULNA RIGHT (2 VIEWS)   Final Result   No acute osseous abnormality or dislocation involving the right elbow or   right forearm. XR KNEE RIGHT (3 VIEWS)   Final Result   1. No convincing acute osseous abnormality. 2. Degenerative changes of the right knee. 3. Trace joint effusion. 4. There appears to be soft tissue swelling anterior to the patella. XR CHEST PORTABLE   Final Result      Right lung base opacity again identified, nonspecific. Please correlate exam   findings and exam findings and history. VL TRANSCRANIAL DOPPLER COMPLETE    (Results Pending)   XR CHEST PORTABLE    (Results Pending)        Echocardiogram:   No results found for this or any previous visit. ASSESSMENT AND PLAN     Assessment:    // Acute hypoxic respiratory failure, mechanical ventilation  // Pneumonia - RML cavitary lesion, may be result of aspiration  // Altered mental status - may be 2/2 CNS infection or hemorrhage  // Sepsis  // Traumatic Rhabdomyolysis - improving  // JOZEF - improving  // Rib fractures, Left side  // Left corneal abrasion  // leukocytosis  // fever      Plan:    I personally interviewed/examined the patient; reviewed interval history, interpreted all available radiographic and laboratory data at the time of service.      Wean mechanical ventilation as tolerated   Monitor endotracheal secretions    Monitor RML cavitary lesion    Continue pulmonary toilet, aspiration precautions    Continue antibiotics per ID and neurocrit   Follow microbiologic data   Continue to monitor I/O with a goal of even/negative fluid balance   Stress ulcer prophylaxis per neurocrit   DVT prophylaxis as per neurocrit    The patient is/remains critically ill with illness/injury that acutely impairs one or more vital organ systems, such that there is a high probability of imminent or life threatening deterioration in the patient's condition. Critical care time of 35 minutes was spent (excluding procedures), in coordination of care during bedside rounds and discussion of patient care in detail, and recommendations of the team were adopted in the plan. Necessity of all invasive devices was also confirmed.      Laurel Booker MD  PGY-3, Internal medicine resident  [facility], [place], [place]

## 2022-01-14 NOTE — PROGRESS NOTES
cavitary lesion. He has clear secretions from ET tube.     Past medical history:  Essential hypertension  Chronic anemia  History of GI bleed    CURRENT EVALUATION : 1/14/2022    Patient evaluated and examined in the neuro ICU. Afebrile  Hypertensive    Intubated and sedated  PRVC/12/580/5/30  Weaning trial on CPAP mode  Off of sedation    External ventricular drain in place  CXR on 01/13/22 - right basilar opacity, slight left basilar atelectasis    On rocephin 2 g IV q 12 h  Will continue same Rx    Labs, X rays reviewed: 1/14/2022    BUN: 34 > 29 > 35 > 42  Cr: 1.04 > 0.84 > 0.87 > 0.84    WBC: 27.7-->19.2 > 16.5 > 11 > 13.1  Hb:  10 > 10 > 9.7 > 10.4  Plat: 341 > 347 > 300 > 309    Cultures:  Urine:  · 1-11-22: No growth   Blood:  · 1-10-22: 1/2 Coagulase negative Staphylococci   · 1-11-22: no growth  · 1-12-22: no growth  Sputum :  · 01/11/22: few gram positive cocci in pairs and chairs  CSF:  · 1-10-22: viridans streptococci  · 01/12/22: gram positive cocci in chains on stain. No growth in culture    CXR:  · 1-12-22: Stable nodular opacity in RLL with cavitary lesion and air fluid level. Discussed with patient, RN, Neuro. I have personally reviewed the past medical history, past surgical history, medications, social history, and family history, and I have updated the database accordingly.     Past Medical History:     Past Medical History:   Diagnosis Date    Anemia 2016    ON IRON    BPH with elevated PSA     post biopsy    Colon polyps 2016    BENEIGN REMOVED WITH COLONOSCOPY    Elevated Gina-Barr virus antibody titer 1989    NEVER TREATED FOR    Elevated fasting glucose     History of blood transfusion 2016    R/T INTESTINAL BLEEDING    History of chronic fatigue syndrome 1989    RESOLVED     History of GI bleed 2016    BROUGHT ON BY MEDS---NIACIN, FISH OIL AND VIT C    Hyperlipidemia 2014    (triglycerides-ELEVATED, TRYING TO CONTROL WITH DIET    Hypertension 1999    ON RX    Wears glasses        Past Surgical  History:     Past Surgical History:   Procedure Laterality Date    COLONOSCOPY  2000    internal hemorrhoids    COLONOSCOPY  02/02/2015    polypx1, repeat 5yrs due to family hx & hx polyps- brannon    COLONOSCOPY  12/09/2015    polyp X2  int. Hemorrhoids  partial prolapse of ileocecal valve    COLONOSCOPY  11/2016    COLONOSCOPY N/A 11/12/2020    COLONOSCOPY POLYPECTOMY SNARE/HOT BIOPSY performed by Aidan Johnson DO at [facility] [place] 69 Bilateral 1958    to correct flat arches (age 9)    PRE-MALIGNANT / BENIGN SKIN LESION EXCISION Right 10/15/2021    v-EXCISION Lesion Right cheek, Right nose, scalp, nasal tip performed by Geri Krause MD at [place] [id number] Bilateral 12/04/2012    benign    PROSTATE BIOPSY Bilateral 07/2014    benign    PROSTATECTOMY  12/10/2019    LAPAROSCOPIC ROBOTIC SIMPLE PROSTATECTOMY     PROSTATECTOMY N/A 12/10/2019    XI LAPAROSCOPIC ROBOTIC SIMPLE PROSTATECTOMY performed by Sue Mojica MD at [address] [place] [address]  12/8/15    Normal upper GI tract, no evidence of blood in upper GI tract, mild distal esophagitis    UPPER GASTROINTESTINAL ENDOSCOPY  11/2016       Medications:      senna  5 mL Oral Nightly    docusate  100 mg Oral Daily    polyethylene glycol  17 g Oral Daily    enoxaparin  40 mg SubCUTAneous Daily    cefTRIAXone (ROCEPHIN) IV  2,000 mg IntraVENous Q12H    verapamil  80 mg Per NG tube 3 times per day    insulin lispro  0-12 Units SubCUTAneous Q6H    erythromycin   Left Eye BID    atorvastatin  10 mg Per NG tube Nightly    sodium chloride flush  5-40 mL IntraVENous 2 times per day    pantoprazole  40 mg IntraVENous Daily    And    sodium chloride (PF)  10 mL IntraVENous Daily    folic acid IVPB  1 mg IntraVENous Daily    Vitamin D  1,000 Units Oral Daily    chlorhexidine  15 mL Mouth/Throat BID       Social History:     Social History     Socioeconomic History    Marital status: Single     Spouse name: Not on file    Number of children: Not on file    Years of education: Not on file    Highest education level: Not on file   Occupational History    Not on file   Tobacco Use    Smoking status: Light Tobacco Smoker     Packs/day: 0.01     Years: 10.00     Pack years: 0.10     Types: Pipe     Start date: 1/1/1970    Smokeless tobacco: Never Used    Tobacco comment: Rare pipe smoker. 4 BOWLS A WEEK No inhalation. Has never smoked cigarettes. Vaping Use    Vaping Use: Never used   Substance and Sexual Activity    Alcohol use: Yes     Alcohol/week: 0.0 standard drinks     Comment: WHISKEY OR WINE- 1 OR 2 A MONTH    Drug use: No    Sexual activity: Not on file   Other Topics Concern    Not on file   Social History Narrative    Not on file     Social Determinants of Health     Financial Resource Strain: Low Risk     Difficulty of Paying Living Expenses: Not hard at all   Food Insecurity: No Food Insecurity    Worried About Running Out of Food in the Last Year: Never true    [address] in the Last Year: Never true   Transportation Needs:     Lack of Transportation (Medical): Not on file    Lack of Transportation (Non-Medical):  Not on file   Physical Activity:     Days of Exercise per Week: Not on file    Minutes of Exercise per Session: Not on file   Stress:     Feeling of Stress : Not on file   Social Connections:     Frequency of Communication with Friends and Family: Not on file    Frequency of Social Gatherings with Friends and Family: Not on file    Attends Gnosticism Services: Not on file    Active Member of Clubs or Organizations: Not on file    Attends Club or Organization Meetings: Not on file    Marital Status: Not on file   Intimate Partner Violence:     Fear of Current or Ex-Partner: Not on file    Emotionally Abused: Not on file    Physically Abused: Not on file    Sexually Abused: Not on file   Housing Stability:     Unable to Pay for Housing in the Last Year: Not on file    Number of Places Lived in the Last Year: Not on file    Unstable Housing in the Last Year: Not on file       Family History:     Family History   Problem Relation Age of Onset    Cancer Mother         LUNG    High Blood Pressure Mother     Diabetes Father         IDDM-LATE IN LIFE    Heart Disease Father         CHF    High Blood Pressure Father         Allergies:   Patient has no known allergies. Review of Systems:     Unable to provide. Sedated on ventilator. 1/14/2022      Physical Examination :     Patient Vitals for the past 8 hrs:   BP Temp Temp src Pulse Resp SpO2   01/14/22 0900 (!) 161/83   69 24    01/14/22 0836    66 25 99 %   01/14/22 0800 (!) 165/86 98.2 °F (36.8 °C) CORE 74 20    01/14/22 0600 (!) 161/87   72 27    01/14/22 0500 (!) 168/89   69 16 100 %   01/14/22 0400 (!) 163/92 97.9 °F (36.6 °C)  61 13 100 %     General Appearance: Sedated, somnolent  Head:  Normocephalic, no trauma  Eyes: Pupils equal, round, reactive to light and accommodation; extraocular movements intact; sclera anicteric; conjunctivae pink. No embolic phenomena. ENT: Oropharynx clear, without erythema, exudate, or thrush. No tenderness of sinuses. Mouth/throat: mucosa pink and moist. No lesions. Dentition in good repair. Orally intubated  Neck:Supple, without lymphadenopathy. Thyroid normal, No bruits. Pulmonary/Chest: Clear to auscultation, without wheezes, rales, or rhonchi. No dullness to percussion. Cardiovascular: Regular rate and rhythm without murmurs, rubs, or gallops. Abdomen: Soft, non tender. Bowel sounds normal. No organomegaly  All four Extremities: No cyanosis, clubbing, edema, or effusions. Neurologic: Minimal movement to painful stimuli. Absent Lt corneal  Skin: Warm and dry with good turgor. No signs of peripheral arterial or venous insufficiency. No ulcerations. No open wounds.     Medical Decision Making -Laboratory:   I have independently reviewed/ordered the following labs:    CBC with Differential:   Recent Labs     01/13/22  0513 01/14/22 0422   WBC 11.0 13.1*   HGB 9.7* 10.4*   HCT 32.0* 34.6*    309     BMP:   Recent Labs     01/13/22  0513 01/14/22 0422   * 146*   K 4.4 4.6   * 114*   CO2 21 22   BUN 35* 42*   CREATININE 0.87 0.84   MG 2.9* 2.9*     Hepatic Function Panel:   No results for input(s): PROT, LABALBU, BILIDIR, IBILI, BILITOT, ALKPHOS, ALT, AST in the last 72 hours. No results for input(s): RPR in the last 72 hours. No results for input(s): HIV in the last 72 hours. No results for input(s): BC in the last 72 hours. Lab Results   Component Value Date    MUCUS NOT REPORTED 01/10/2022    RBC 4.35 01/14/2022    TRICHOMONAS NOT REPORTED 01/10/2022    WBC 13.1 01/14/2022    YEAST NOT REPORTED 01/10/2022    TURBIDITY Clear 01/10/2022     Lab Results   Component Value Date    CREATININE 0.84 01/14/2022    GLUCOSE 188 01/14/2022       Medical Decision Making-Imaging:     EXAMINATION:   ONE XRAY VIEW OF THE CHEST       1/11/2022 9:02 am       COMPARISON:   01/10/2022 radiograph       HISTORY:   ORDERING SYSTEM PROVIDED HISTORY: intubated   TECHNOLOGIST PROVIDED HISTORY:   intubated       FINDINGS:   Supportive devices are stable.  Heart, mediastinum and pulmonary vascularity   are normal.  Stable nodular opacity in the right lower lung zone representing   a cavitary lesion seen on prior CT.  The lungs appear clear otherwise.  No   significant skeletal finding.           Impression   Stable nodular opacity in the right lower lung zone better characterized on   recent CT.         EXAMINATION:   CTA OF THE CHEST 1/9/2022 7:40 pm       TECHNIQUE:   CTA of the chest was performed after the administration of intravenous   contrast.  Multiplanar reformatted images are provided for review.  MIP   images are provided for review.  Dose modulation, iterative reconstruction,   and/or weight based adjustment of the mA/kV was utilized to reduce the   radiation dose to as low as reasonably achievable.       COMPARISON:   Chest x-ray 01/09/2022       HISTORY:   ORDERING SYSTEM PROVIDED HISTORY: elevated d-dimer, mental status change   TECHNOLOGIST PROVIDED HISTORY:   elevated d-dimer, mental status change   Decision Support Exception - unselect if not a suspected or confirmed   emergency medical condition->Emergency Medical Condition (MA)   Reason for Exam: Elevated d-dimer, mental status change       FINDINGS:   Pulmonary Arteries: No central lobar pulmonary emboli.  Main pulmonary artery   is unremarkable caliber.       Mediastinum: Air-fluid identified involving esophagus.  Slight heterogeneous   appearance of the thyroid gland on left.  Heart is mildly prominent size.  No   definite bulky hilar mediastinal adenopathy.       Lungs/pleura: Right middle lobe atelectasis versus scarring identified. Within the lateral aspect right middle lobe, there is consolidative versus   masslike opacity with air-fluid level.  There vessels corresponding through   this opacity.  The tiny locules of gas as well.  Otherwise mild   hypoventilatory changes elsewhere the lungs.       Upper Abdomen: Evidence of cholelithiasis.  Fatty infiltration liver.       Soft Tissues/Bones: Degenerate changes of the thoracic spine.           Impression   No evidence of pulmonary embolism to the segmental level.       Nonspecific opacification and when question air-fluid level involving the   right middle lobe laterally.  This demonstrates tiny locules of gas. Infection/Pneumonia and/or cavitary mass lesion may be considered. [facility]   pulmonology consultation.       RECOMMENDATIONS:   Unavailable              Impression   1. Endotracheal tube overlying the proximal trachea 8.6 cm above the level of   the emile.  Advancement is recommended for more optimal positioning. 2. Enteric tube as above.    3. Stable nodular opacity at the right lower lung zone consistent with a   cavitary lesion seen on prior CT. 4. Mild bibasilar atelectasis. Impression   There is a right basilar infiltrate consistent with pneumonia.       Support tubes as described above. Medical Decision [id number]-[id number]-[id number]:       Medical Decision Making-Other:     Note:  · Labs, medications, radiologic studies were reviewed with personal review of films  · Large amounts of data were reviewed  · Discussed with nursing Staff, Discharge planner  · Infection Control and Prevention measures reviewed  · All prior entries were reviewed  · Administer medications as ordered  · Prognosis: Guarded  · Discharge planning reviewed  · Follow up as outpatient. Thank you for allowing us to participate in the care of this patient. Please call with questions. Todd Vera  PGY-1  Infectious disease  [id number] [place] [address] Pob 759:    I have discussed the case, including pertinent history and exam findings with the residents and students. I have seen and examined the patient and the key elements of the encounter have been performed by me. I was present when the student obtained his information or examined the patient. I have reviewed the laboratory data, other diagnostic studies and discussed them with the residents. I have updated the medical record where necessary. I agree with the assessment, plan and orders as documented by the resident/ student.     Matthew Cutler MD.

## 2022-01-14 NOTE — PLAN OF CARE
MIC Conley  Outcome: Ongoing   Skin assessed for breakdown and redness, patient turned regularly, and heels elevated off of bed on pillows.       Problem: NUTRITION  Goal: Nutritional status is improving  Outcome: Ongoing

## 2022-01-14 NOTE — PROGRESS NOTES
01/14/22 1629   Patient Transport   Time spent transporting 16-30   Transport ventillation type Transport vent   Transport from ICU   Transport destination CT scan   Transport destination ICU   Emergency equipment included Yes     Pt tolerated transport well.

## 2022-01-14 NOTE — PROGRESS NOTES
4 - no movement   6b  Motor right le - no movement   7. Limb Ataxia: 0 - absent   8. Sensory: 2 - severe to total sensory loss; patient is not aware of being touched in face, arm, leg   9. Best Language:  3 - mute, global aphasia; no usable speech or auditory comprehension   10. Dysarthria: UN - intubated or other physical barrier   11. Extinction and Inattention: 0 - no abnormality         Total:   28     MRS: 05      LABS:   Reviewed. Lab Results   Component Value Date    HGB 10.4 (L) 2022    WBC 13.1 (H) 2022     2022     (H) 2022    BUN 42 (H) 2022    CREATININE 0.84 2022    AST 48 (H) 2022    ALT 32 2022    MG 2.9 (H) 2022    APTT 22.4 01/10/2022    INR 1.1 01/10/2022      Lab Results   Component Value Date    COVID19 Not Detected 01/10/2022    COVID19 Not Detected 2022    COVID19 Not Detected 2020       RADIOLOGY:   Images were personally reviewed including:     MRI Brain 2022:   1. There are foci of diffusion restriction in the right frontal, parietal,   and occipital lobes, concerning for acute infarction. 2. Layering debris in the ventricles with diffusion restriction is consistent   with ventriculitis.  This appears slightly increased from prior examination. There is also increased ventriculomegaly despite right frontal approach   ventricular shunt catheter. 3. Mastoid air cell effusions.           MRI Brain:   Prominent ventricular system with debris throughout the CSF.  Associated   FLAIR signal abnormality involving the ependymal and subependymal surface   with enhancement of the ependymal surface of the ventricular system   consistent with an infectious process.  There is associated leptomeningeal   enhancement extending into the craniocervical region and along the visualized   cervical spinal cord.      CTA head and neck:   Diminutive anterior cerebral arteries and posterior cerebral arteries.    Mildly beaded appearance to the M1 portions of the middle cerebral arteries   bilaterally with intermittent foci of mild stenosis.  Subsequent diminutive   middle cerebral artery branches distally and this is more pronounced on the   left compared to the right.  These findings are of uncertain etiology   although vasculitis is a consideration.          ASSESSMENT:   69 y/o M with pmh signicant for Htn, dyslipidemia presented after was found down. CT head and MRI brain showed prominent intraventricular debris. CTA head showed diminutive diffuse anterior and posterior cerebral arteries with beaded appearance. DDx at this point includes secondary cerebral vasculitis vs reactive diffuse cerebral vasospasm vs diffuse intracranial atherosclerotic disease. MRI brain on 01/14 showed increased stroke burden   TCDs showed increased velocities   PLAN:   --DSA with possible IA verpamil, patient was consented over the phone from [address] [place] [address].   --C/w Daily TCDs  --Abx per ICU team   --Endovascular team will continue to follow along. Case discussed with Dr. Maribell Nazario attending.     Cheryl Hi MD  Stroke, [facility]  [id number] [place]  Electronically signed 1/14/2022 at 5:26 PM

## 2022-01-14 NOTE — PROGRESS NOTES
Endovascular Neurosurgery Progress Note    SUBJECTIVE:   No acute events overnight. Review of Systems:  CONSTITUTIONAL:  negative for fevers, chills, fatigue and malaise    EYES:  negative for double vision, blurred vision and photophobia     HEENT:  negative for tinnitus, epistaxis and sore throat    RESPIRATORY:  negative for cough, shortness of breath, wheezing    CARDIOVASCULAR:  negative for chest pain, palpitations, syncope, edema    GASTROINTESTINAL:  negative for nausea, vomiting    GENITOURINARY:  negative for incontinence    MUSCULOSKELETAL:  negative for neck or back pain    NEUROLOGICAL:  Negative for weakness and tingling  negative for headaches and dizziness    PSYCHIATRIC:  negative for anxiety      Review of systems otherwise negative. OBJECTIVE:     Vitals:    22 1600   BP: (!) 152/77   Pulse: 66   Resp: 17   Temp:    SpO2: 100%        General:  Gen: normal habitus, NAD  HEENT: NCAT, mucosa moist  Cvs: RRR, S1 S2 normal  Resp: symmetric unlabored breathing  Abd: s/nd/nt  Ext: no edema  Skin: no lesions seen, warm and dry    Neuro:  Gen: awake and alert, oriented x3. Lang/speech: no aphasia or dysarthria. Follows commands. CN: PERRL, EOMI, VFF, V1-3 intact, face symmetric, hearing intact, shoulder shrug symmetric, tongue midline  Motor: grossly 5/5 UE and LE b/l  Sense: LT intact in all 4 ext. Coord: FTN and HTS intact b/l  DTR: deferred  Gait: narrow base gait    NIH Stroke Scale:   1a  Level of consciousness: 3 - responds only with reflex motor or automatic effects or totally unresponsive, flaccid, areflexic   1b. LOC questions:  2 - answers neither question correctly   1c. LOC commands: 2 - performs neither task correctly   2. Best Gaze: 0 - normal   3. Visual: 0 - no visual loss   4. Facial Palsy: 0 - normal symmetric movement   5a. Motor left arm: 4 - no movement   5b. Motor right arm: 4 - no movement   6a.  Motor left le - no movement   6b  Motor right le - no movement   7. Limb Ataxia: 0 - absent   8. Sensory: 2 - severe to total sensory loss; patient is not aware of being touched in face, arm, leg   9. Best Language:  3 - mute, global aphasia; no usable speech or auditory comprehension   10. Dysarthria: UN - intubated or other physical barrier   11. Extinction and Inattention: 0 - no abnormality         Total:   28     MRS: 05      LABS:   Reviewed. Lab Results   Component Value Date    HGB 9.7 (L) 01/13/2022    WBC 11.0 01/13/2022     01/13/2022     (H) 01/13/2022    BUN 35 (H) 01/13/2022    CREATININE 0.87 01/13/2022    AST 48 (H) 01/09/2022    ALT 32 01/09/2022    MG 2.9 (H) 01/13/2022    APTT 22.4 01/10/2022    INR 1.1 01/10/2022      Lab Results   Component Value Date    COVID19 Not Detected 01/10/2022    COVID19 Not Detected 01/03/2022    COVID19 Not Detected 11/06/2020       RADIOLOGY:   Images were personally reviewed including:   MRI Brain:   Prominent ventricular system with debris throughout the CSF.  Associated   FLAIR signal abnormality involving the ependymal and subependymal surface   with enhancement of the ependymal surface of the ventricular system   consistent with an infectious process.  There is associated leptomeningeal   enhancement extending into the craniocervical region and along the visualized   cervical spinal cord.      CTA head and neck:   Diminutive anterior cerebral arteries and posterior cerebral arteries. Mildly beaded appearance to the M1 portions of the middle cerebral arteries   bilaterally with intermittent foci of mild stenosis.  Subsequent diminutive   middle cerebral artery branches distally and this is more pronounced on the   left compared to the right.  These findings are of uncertain etiology   although vasculitis is a consideration.          ASSESSMENT:   71 y/o M with pmh signicant for Htn, dyslipidemia presented after was found down. CT head and MRI brain showed prominent intraventricular debris.  CTA head showed diminutive diffuse anterior and posterior cerebral arteries with beaded appearance. DDx at this point includes secondary cerebral vasculitis vs reactive diffuse cerebral vasospasm vs diffuse intracranial atherosclerotic disease. PLAN:   --Will consider DSA if patient's neuro exam remains poor  --Consider CTA head in 7-10 days to evaluate intracranial vasculature  --Abx per ICU team   --Endovascular team will continue to follow along. Case discussed with Dr. Shahida Nieves attending.     Rosales Sharpe MD  Stroke, [facility]  [id number] [place]  Electronically signed 1/13/2022 at 7:18 PM

## 2022-01-14 NOTE — PLAN OF CARE
Problem: OXYGENATION/RESPIRATORY FUNCTION  Goal: Patient will maintain patent airway  1/13/2022 2206 by Boris Glover RCP  Outcome: Ongoing     Problem: OXYGENATION/RESPIRATORY FUNCTION  Goal: Patient will achieve/maintain normal respiratory rate/effort  Description: Respiratory rate and effort will be within normal limits for the patient  1/13/2022 2206 by Boris Glover RCP  Outcome: Ongoing     Problem: MECHANICAL VENTILATION  Goal: Patient will maintain patent airway  1/13/2022 2206 by Boris Glover RCP  Outcome: Ongoing     Problem: MECHANICAL VENTILATION  Goal: Oral health is maintained or improved  1/13/2022 2206 by Boris Glover RCP  Outcome: Ongoing     Problem: MECHANICAL VENTILATION  Goal: ET tube will be managed safely  1/13/2022 2206 by Boris Glover RCP  Outcome: Ongoing     Problem: MECHANICAL VENTILATION  Goal: Ability to express needs and understand communication  1/13/2022 2206 by Boris Glover RCP  Outcome: Ongoing     Problem: MECHANICAL VENTILATION  Goal: Mobility/activity is maintained at optimum level for patient  1/13/2022 2206 by Boris Glover RCP  Outcome: Ongoing     Problem: SKIN INTEGRITY  Goal: Skin integrity is maintained or improved  1/13/2022 2206 by Boris Glover RCP  Outcome: Ongoing

## 2022-01-14 NOTE — PLAN OF CARE
Problem: OXYGENATION/RESPIRATORY FUNCTION  Goal: Patient will maintain patent airway  1/14/2022 1001 by Jimmy Mendes RCP  Outcome: Ongoing     Problem: OXYGENATION/RESPIRATORY FUNCTION  Goal: Patient will achieve/maintain normal respiratory rate/effort  Description: Respiratory rate and effort will be within normal limits for the patient  1/14/2022 1001 by Jimmy Mendes RCP  Outcome: Ongoing     Problem: MECHANICAL VENTILATION  Goal: Patient will maintain patent airway  1/14/2022 1001 by Jimmy Mendes RCP  Outcome: Ongoing     Problem: MECHANICAL VENTILATION  Goal: Oral health is maintained or improved  1/14/2022 1001 by Jimmy Mendes RCP  Outcome: Ongoing     Problem: MECHANICAL VENTILATION  Goal: ET tube will be managed safely  1/14/2022 1001 by Jimmy Mendes RCP  Outcome: Ongoing     Problem: MECHANICAL VENTILATION  Goal: Ability to express needs and understand communication  1/14/2022 1001 by Jimmy Mendes RCP  Outcome: Ongoing     Problem: MECHANICAL VENTILATION  Goal: Mobility/activity is maintained at optimum level for patient  1/14/2022 1001 by Jimmy Mendes RCP  Outcome: Ongoing     Problem: SKIN INTEGRITY  Goal: Skin integrity is maintained or improved  1/14/2022 1001 by Jimmy Mendes RCP  Outcome: Ongoing

## 2022-01-14 NOTE — PROGRESS NOTES
Comprehensive Nutrition Assessment    Type and Reason for Visit:  Reassess    Nutrition Recommendations/Plan:    - Continue TF as tolerated. Can consider running TF at decreased rate until able to tolerate advancement. Nutrition Assessment:  TF started yesterday. Noted increased gastric residuals overnight while TF running at 40 mL/hr. TF held temporarily and restarted this morning with improvement in residuals (75 mL per RN). RN reports KUB obtained this morning and reports concern for start of ileus. TF currently on hold, but per RN, plan to run TF as tolerated. No BM since admission noted. Malnutrition Assessment:  Malnutrition Status:  Insufficient data      Estimated Daily Nutrient Needs:  Energy (kcal):  9604-4299 kcals/day; Weight Used for Energy Requirements:  Current     Protein (g):  100-115 gm/day; Weight Used for Protein Requirements:  Ideal (1.3-1.5)        Fluid (ml/day):  per MD    Nutrition Related Findings:  meds/labs reviewed      Wounds:  None       Current Nutrition Therapies:    Diet NPO Exceptions are: Sips of Water with Meds  ADULT TUBE FEEDING; Nasogastric; Peptide Based; Continuous; 25; Yes; 20; Q 4 hours; 75; 30; Q 4 hours  Current Tube Feeding (TF) Orders:  · Feeding Route: Orogastric  · Formula: Peptide Based  · Schedule: Continuous - TF currently on hold  · Water Flushes: 250 mL q 4 hours  · Current TF & Flush Orders Provides: TF off  · Goal TF & Flush Orders Provides: Vital AF 1.2 at 75 mL/hr = 2160 kcals, 135 gm protein      Anthropometric Measures:  · Height: 5' 10\" (177.8 cm)  · Current Body Weight: 202 lb 6.1 oz (91.8 kg) (1/3/22)   · Ideal Body Weight: 166 lbs; % Ideal Body Weight 121.9 %   · BMI: 29  · BMI Categories: Overweight (BMI 25.0-29. 9)       Nutrition Diagnosis:   · Inadequate oral intake related to impaired respiratory function as evidenced by NPO or clear liquid status due to medical condition,nutrition support - enteral nutrition      Nutrition Interventions:   Food and/or Nutrient Delivery:  Continue Current Tube Feeding  Nutrition Education/Counseling:  No recommendation at this time   Coordination of Nutrition Care:  Continue to monitor while inpatient    Goals:  Meet greater than 50% of estimated nutrient needs       Nutrition Monitoring and Evaluation:   Behavioral-Environmental Outcomes:  None Identified   Food/Nutrient Intake Outcomes:  Enteral Nutrition Intake/Tolerance  Physical Signs/Symptoms Outcomes:  Weight,Biochemical Data,Nutrition Focused Physical Findings,GI Status,Constipation     Discharge Planning:     Too soon to determine     Electronically signed by Deandre Chaudhry MS, RD, LD on 1/14/22 at 11:05 AM EST    Contact: [phone]

## 2022-01-14 NOTE — PROGRESS NOTES
01/14/22 0906   Vent Information   Vent Type Servo i   Vent Mode CPAP   Pressure Support 10 cmH20   PEEP/CPAP 5     Pt placed on CPAP trial at this time by Dr. Faheem Christine

## 2022-01-15 NOTE — CARE COORDINATION
Social work is following to complete an SBIRT and PHQ9 depression screening when pt is able to participate.

## 2022-01-15 NOTE — PLAN OF CARE
Problem: Falls - Risk of:  Goal: Will remain free from falls  Description: Will remain free from falls  1/15/2022 1523 by Rose Russo RN  Outcome: Ongoing  1/15/2022 0526 by Tigist Zhao RN  Outcome: Ongoing  Goal: Absence of physical injury  Description: Absence of physical injury  1/15/2022 1523 by Rose Russo RN  Outcome: Ongoing  1/15/2022 0526 by Tigist Zhao RN  Outcome: Ongoing     Problem: Nutrition  Goal: Optimal nutrition therapy  1/15/2022 1523 by Rose Russo RN  Outcome: Ongoing  1/15/2022 0526 by Tigist Zhao RN  Outcome: Ongoing     Problem: OXYGENATION/RESPIRATORY FUNCTION  Goal: Patient will maintain patent airway  1/15/2022 1523 by Rose Russo RN  Outcome: Ongoing  1/15/2022 0949 by Fransisca Lipscomb RCP  Outcome: Ongoing  Goal: Patient will achieve/maintain normal respiratory rate/effort  Description: Respiratory rate and effort will be within normal limits for the patient  1/15/2022 1523 by Rose Russo RN  Outcome: Ongoing  1/15/2022 0949 by Fransisca Lipscomb RCP  Outcome: Ongoing     Problem: MECHANICAL VENTILATION  Goal: Patient will maintain patent airway  1/15/2022 1523 by Rose Russo RN  Outcome: Ongoing  1/15/2022 0949 by Fransisca Lipscomb RCP  Outcome: Ongoing  Goal: Oral health is maintained or improved  1/15/2022 1523 by Rose Russo RN  Outcome: Ongoing  1/15/2022 0949 by Fransisca Lipscomb RCP  Outcome: Ongoing  Goal: ET tube will be managed safely  1/15/2022 1523 by Rose Russo RN  Outcome: Ongoing  1/15/2022 0949 by Fransisca Lipscomb RCP  Outcome: Ongoing  Goal: Ability to express needs and understand communication  1/15/2022 1523 by Rose Russo RN  Outcome: Ongoing  1/15/2022 0949 by Fransisca Lipscomb RCP  Outcome: Ongoing  1/15/2022 0526 by Tigist Zhao RN  Outcome: Ongoing  Goal: Mobility/activity is maintained at optimum level for patient  1/15/2022 1523 by Rose Russo RN  Outcome: Ongoing  1/15/2022 0949 by Fransisca Lipscomb RCP  Outcome: Ongoing  1/15/2022 0526 by Brad Morales RN  Outcome: Ongoing     Problem: SKIN INTEGRITY  Goal: Skin integrity is maintained or improved  1/15/2022 1523 by Reba Ardon RN  Outcome: Ongoing  1/15/2022 0949 by Travis Escoto RCP  Outcome: Ongoing   Skin assessed for breakdown and redness, patient turned regularly, and heels elevated off of bed on pillows. Problem: NUTRITION  Goal: Nutritional status is improving  Outcome: Ongoing     Problem: Skin Integrity:  Goal: Will show no infection signs and symptoms  Description: Will show no infection signs and symptoms  1/15/2022 1523 by Reba Ardon RN  Outcome: Ongoing  1/15/2022 0526 by Brad Morales RN  Outcome: Ongoing  Goal: Absence of new skin breakdown  Description: Absence of new skin breakdown  1/15/2022 1523 by Reba Ardon RN  Outcome: Ongoing  1/15/2022 0526 by Brad Morales RN  Outcome: Ongoing     Problem: HEMODYNAMIC STATUS  Goal: Patient has stable vital signs and fluid balance  1/15/2022 1523 by Reba Ardon RN  Outcome: Ongoing  1/15/2022 0526 by Brad Morales RN  Outcome: Ongoing   Neuro assessment completed, fall precautions in place, aspirations precautions in place, assess for barriers in communication and mobility, interventions to assist in communication and mobility in place, encouraged to call for assistance, adaptive devices used as needed, assess emotional state and support offered, encouraged patient to communicate by available means, and support systems included in patient care.     Problem: ACTIVITY INTOLERANCE/IMPAIRED MOBILITY  Goal: Mobility/activity is maintained at optimum level for patient  1/15/2022 1523 by Reba Ardon RN  Outcome: Ongoing  1/15/2022 0949 by Travis Escoto RCP  Outcome: Ongoing  1/15/2022 0526 by Brad Morales RN  Outcome: Ongoing     Problem: COMMUNICATION IMPAIRMENT  Goal: Ability to express needs and understand communication  1/15/2022 1523 by Clarence Cabral RN  Outcome: Ongoing  1/15/2022 0949 by Ayo Flynn RCP  Outcome: Ongoing  1/15/2022 0526 by Willie Barrow RN  Outcome: Ongoing

## 2022-01-15 NOTE — PROGRESS NOTES
PULMONARY & CRITICAL CARE MEDICINE PROGRESS NOTE     Patient:  Fani Pierce  MRN: [id number]  [address] [facility] date: 1/9/2022  Primary Care Physician: Maria Luz Shepherd MD  Consulting Physician: Fariha Umanzor MD  CODE Status: Full Code  LOS: 5     SUBJECTIVE     CHIEF COMPLAINT/REASON FOR INITIAL CONSULT: RML cavitary lesion and acute hypoxic respiratory failure in the setting of CNS infection    BRIEF HOSPITAL COURSE:   History obtained from chart review and unobtainable from patient due to mental status and and being on the ventilator. Fani Pierce is a 70 y.o. white gentleman was admitted with altered mental status, traumatic rhabdomyolysis. Patient was apparently found down at home which was felt to be as long as 48 hours. Per chart, patient had dental work 3 weeks prior to admission. In the emergency room radiologic studies showed debris within the bilateral lateral ventricles of the brain which was felt to be related to hemorrhage or infection. Radiologic studies also revealed right middle lobe cavitary lesion. Patient had leukocytosis; microbiologic studies so far are unrevealing for any infectious process. Patient was started on empiric antibiotics for CNS infection. INTERVAL HISTORY:  01/15/22   Overnight events noted, chart reviewed, labs and medications reviewed culture data seen CT scan of the chest results seen. Patient remain somnolent and encephalopathic did not follow command when I saw him he was not on any sedation. He is afebrile T-max of 98.1 tachycardic with heart rate of around 90s to 100 this morning hemodynamically stable with systolic blood pressure periodically elevated. He remains on ventilator remains on 30% FiO2 saturating 100% on ventilator respiratory rate is usually low to mid 20s. Ventilator setting PRVC/12/510/5/30 percent currently he is on CPAP/pressure support 10/5. ABG 7.5 0/35/88/27  CSF culture strep viridans initial blood culture was strep evidence.     CT scan of the chest reviewed from 2022 which showed lateral segment right middle lobe peripheral area of soft/fluid density likely intraparenchymal with area of cavitation and fluid level likely abscess/necrotizing pneumonia does not look like solid mass less likely to be malignancy. Most likely infectious with strep bacteremia and strep positive in CSF. REVIEW OF SYSTEMS:  Unobtainable from patient due to sedation/mechanical ventilation    OBJECTIVE     VENTILATOR SETTINGS:  Vent Information  $Ventilation: $Subsequent Day  Skin Assessment: Clean, dry, & intact  Equipment Changed: HME  Vent Type: Servo i  Vent Mode: CPAP  Vt Ordered: 510 mL  Rate Set: 12 bmp  Pressure Support: 10 cmH20  FiO2 : 30 %  SpO2: 100 %  SpO2/FiO2 ratio: 333.33  Sensitivity: 5  PEEP/CPAP: 5  I Time/ I Time %: 0.9 s  Humidification Source: HME     PaO2/FiO2 RATIO:  Recent Labs     01/15/22  0449   POCPO2 87.8      FiO2 : 30 %     VITAL SIGNS:   LAST:  BP (!) 176/91   Pulse 102   Temp 97.4 °F (36.3 °C) (Oral)   Resp 26   Ht 5' 10\" (1.778 m)   SpO2 100%   BMI 28.70 kg/m²   8-24 HR RANGE:  TEMP Temp  Av.7 °F (36.5 °C)  Min: 97.3 °F (36.3 °C)  Max: 98.4 °F (60.5 °C)   BP Systolic (09QUN), FP , Min:143 , GARY:325      Diastolic (40MXR), USK:69, Min:87, Max:113     PULSE Pulse  Av.3  Min: 66  Max: 102   RR Resp  Av  Min: 0  Max: 26   O2 SAT SpO2  Av %  Min: 100 %  Max: 100 %   OXYGEN DELIVERY No data recorded        SYSTEMIC EXAMINATION:   General appearance - Mechanically ventilated, ill-appearing  Mental status -some spontaneous eyes open, somnolent, unresponsive  Eyes - pupils equal and reactive, sclera anicteric  Mouth -oral endotracheal tube present  Neck - supple, no significant adenopathy, carotids upstroke normal bilaterally, no bruits  Chest - Breath sounds bilaterally were dimnished to auscultation at bases. There were no wheezes, rhonchi or rales.   There is no intercostal recession or use of accessory muscles  Heart - normal rate, regular rhythm, normal S1, S2, no murmurs, rubs, clicks or gallops  Abdomen - soft, nontender, nondistended, no masses or organomegaly  Neurological -full CNS exam cannot be done as patient is not responsive to commands. Extremities - peripheral pulses normal, mild pedal edema, no clubbing or cyanosis  Skin - normal coloration and turgor, no rashes, no suspicious skin lesions noted     DATA REVIEW     Medications:  Scheduled Meds:   verapamil  120 mg Per NG tube 3 times per day    senna  5 mL Oral Nightly    docusate  100 mg Oral Daily    polyethylene glycol  17 g Oral Daily    enoxaparin  40 mg SubCUTAneous Daily    cefTRIAXone (ROCEPHIN) IV  2,000 mg IntraVENous Q12H    insulin lispro  0-12 Units SubCUTAneous Q6H    erythromycin   Left Eye BID    atorvastatin  10 mg Per NG tube Nightly    sodium chloride flush  5-40 mL IntraVENous 2 times per day    pantoprazole  40 mg IntraVENous Daily    And    sodium chloride (PF)  10 mL IntraVENous Daily    folic acid IVPB  1 mg IntraVENous Daily    Vitamin D  1,000 Units Oral Daily    chlorhexidine  15 mL Mouth/Throat BID     Continuous Infusions:   lactated ringers 80 mL/hr at 01/12/22 2352    dextrose      sodium chloride         INPUT/OUTPUT:  In: 7430 [I.V.:7005;  NG/GT:1176]  Out: 7865 [SRHWJ:0504; Drains:57]  Date 01/15/22 0000 - 01/15/22 2359   Shift 3834-6731 9341-8371 5861-2279 24 Hour Total   INTAKE   I.V. 445 305  750   NG/ 250  750   Shift Total 945 555  1500   OUTPUT   Urine 1360 400  1760   Drains 1 0  1   Shift Total 1361 400  1761   Weight (kg)            LABS:  ABGs:   Recent Labs     01/13/22  0417 01/14/22  0343 01/15/22  0449   POCPH 7.494* 7.519* 7.498*   POCPCO2 32.1* 32.8* 34.6*   POCPO2 118.5* 97.9 87.8   POCHCO3 24.7 26.7 26.9   [id number] 99* 98 98     CBC:   Recent Labs     01/13/22  0513 01/14/22  0422 01/15/22  0549   WBC 11.0 13.1* 17.2*   HGB 9.7* 10.4* 10.8*   HCT 32.0* 34.6* 34.9*   MCV 78.0* 79.5* 77.4*    309 333   RBC 4.10* 4.35 4.51   MCH 23.7* 23.9* 23.9*   MCHC 30.3 30.1 30.9   RDW 17.1* 17.2* 17.5*     CRP:   No results for input(s): CRP in the last 72 hours. LDH:   No results for input(s): LDH in the last 72 hours. BMP:   Recent Labs     01/13/22  0513 01/14/22  0422 01/15/22  0549   * 146* 141   K 4.4 4.6 4.7   * 114* 111*   CO2 21 22 21   BUN 35* 42* 37*   CREATININE 0.87 0.84 0.62*   GLUCOSE 184* 188* 130*   PHOS 3.2 3.0 3.1     Liver Function Test:   No results for input(s): PROT, LABALBU, ALT, AST, GGT, ALKPHOS, BILITOT in the last 72 hours. Coagulation Profile:   No results for input(s): INR, PROTIME, APTT in the last 72 hours. D-Dimer:  No results for input(s): DDIMER in the last 72 hours. Lactic Acid:  No results for input(s): LACTA in the last 72 hours. Cardiac Enzymes:  No results for input(s): CKTOTAL, CKMB, CKMBINDEX, TROPONINI in the last 72 hours. Invalid input(s): TROPONIN, HSTROP  BNP/ProBNP:   No results for input(s): BNP, PROBNP in the last 72 hours. Triglycerides:  No results for input(s): TRIG in the last 72 hours. Microbiology:  Urine Culture:  No components found for: CURINE  Blood Culture:  No components found for: CBLOOD, CFUNGUSBL  Sputum Culture:  No components found for: CSPUTUM  Recent Labs     01/13/22  1452   [facility] . CSF Shunt   SPECIAL NOT REPORTED   CULTURE NO GROWTH 2 DAYS     Recent Labs     01/12/22  2157 01/13/22  1452   SPECDESC . BLOOD  . BLOOD . CSF Shunt   SPECIAL RTFA 3 ML   R HAND 11 ML  NOT REPORTED   CULTURE NO GROWTH 2 DAYS  NO GROWTH 2 DAYS NO GROWTH 2 DAYS        Pathology:    Radiology Reports:  XR CHEST PORTABLE   Final Result   Tubes as above. Right basilar pneumonia. MRI BRAIN WO CONTRAST   Final Result   1. There are foci of diffusion restriction in the right frontal, parietal,   and occipital lobes, concerning for acute infarction.    2. Layering debris in the ventricles with diffusion restriction is consistent   with ventriculitis. This appears slightly increased from prior examination. There is also increased ventriculomegaly despite right frontal approach   ventricular shunt catheter. 3. Mastoid air cell effusions. RECOMMENDATIONS:   Unavailable         VL TRANSCRANIAL DOPPLER COMPLETE   Final Result      XR CHEST PORTABLE   Final Result   There is a right basilar infiltrate consistent with pneumonia. Support tubes as described above. XR ABDOMEN (KUB) (SINGLE AP VIEW)   Final Result   Enteric tube in satisfactory position. Bowel gas pattern suggesting mild   ileus. Rangel catheter. XR CHEST PORTABLE   Final Result   Improved ETT position, now satisfactory; enteric tube position unchanged. Right basilar opacity, and probable slight left basilar atelectasis, as   discussed above. CT MAXILLOFACIAL W CONTRAST   Final Result   No evidence of facial infection or sinusitis. XR CHEST PORTABLE   Final Result   1. Endotracheal tube overlying the proximal trachea 8.6 cm above the level of   the emile. Advancement is recommended for more optimal positioning. 2. Enteric tube as above. 3. Stable nodular opacity at the right lower lung zone consistent with a   cavitary lesion seen on prior CT. 4. Mild bibasilar atelectasis. The findings were sent to the Radiology Results [address] at 6:58   am on 1/12/2022to be communicated to a licensed caregiver. 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 No evidence of abscess, discitis, or osteomyelitis. MRI LUMBAR SPINE W WO CONTRAST   Final Result   1. 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 No   evidence of abscess. 2. Mild-to-moderate multilevel degenerative changes as detailed above.          VL DUP LOWER EXTREMITY VENOUS BILATERAL   Final Result      XR CHEST PORTABLE Final Result   Stable nodular opacity in the right lower lung zone better characterized on   recent CT. VL TRANSCRANIAL DOPPLER COMPLETE   Final Result      MRI BRAIN W WO CONTRAST   Final Result   Prominent ventricular system with debris throughout the CSF. Associated   FLAIR signal abnormality involving the ependymal and subependymal surface   with enhancement of the ependymal surface of the ventricular system   consistent with an infectious process. There is associated leptomeningeal   enhancement extending into the craniocervical region and along the visualized   cervical spinal cord. RECOMMENDATIONS:   Unavailable         MRI CERVICAL SPINE W WO CONTRAST   Final Result   Debris throughout the CSF fluid with leptomeningeal enhancement and dural   enhancement as described above consistent with an infectious process. Multilevel degenerative change with foraminal narrowing bilaterally as   described above. RECOMMENDATIONS:   Unavailable         CTA HEAD NECK W CONTRAST   Final Result   Diminutive anterior cerebral arteries and posterior cerebral arteries. Mildly beaded appearance to the M1 portions of the middle cerebral arteries   bilaterally with intermittent foci of mild stenosis. Subsequent diminutive   middle cerebral artery branches distally and this is more pronounced on the   left compared to the right. These findings are of uncertain etiology   although vasculitis is a consideration. RECOMMENDATIONS:   Unavailable         CT LUMBAR SPINE TRAUMA RECONSTRUCTION   Final Result   Unremarkable non-contrast CT of the lumbar spine. CT ABDOMEN PELVIS WO CONTRAST Additional Contrast? None   Final Result   Examination is partially degraded by motion related artifact. Focal area of soft tissue thickening which appears to involve a loop of small   bowel in the central right mid abdomen. This may be partially exaggerated by   the patient motion.   Localized infectious or inflammatory versus neoplastic   process however is not excluded and short-term follow-up is recommended. Nondisplaced left 6th and 7th rib fractures. Masslike process exophytic from the lateral margin of the right-side of the   prostate gland. Correlate with PSA values. Cholelithiasis. RECOMMENDATIONS:   Unavailable         CT HEAD WO CONTRAST   Final Result   Persistent layering debris within the lateral ventricles. There is mild   dilatation of the ventricular system. Differential includes isodense   subacute hemorrhage versus infection. Follow-up MRI brain with and without   contrast is recommended for further evaluation. No acute traumatic injury of the facial bones. RECOMMENDATIONS:   Unavailable         CT FACIAL BONES WO CONTRAST   Final Result   Persistent layering debris within the lateral ventricles. There is mild   dilatation of the ventricular system. Differential includes isodense   subacute hemorrhage versus infection. Follow-up MRI brain with and without   contrast is recommended for further evaluation. No acute traumatic injury of the facial bones. RECOMMENDATIONS:   Unavailable         XR CHEST PORTABLE   Final Result   1. Support devices as above. 2. No significant change in the focal opacity within the right lower lung. CT THORACIC SPINE TRAUMA RECONSTRUCTION   Final Result   Mild degenerative changes of the thoracic spine without evidence of acute   osseous abnormality. Please see concurrently performed CT chest and   subsequently performed MRI thoracic spine for additional findings. XR ELBOW RIGHT (MIN 3 VIEWS)   Final Result   No acute osseous abnormality or dislocation involving the right elbow or   right forearm. XR RADIUS ULNA RIGHT (2 VIEWS)   Final Result   No acute osseous abnormality or dislocation involving the right elbow or   right forearm. XR KNEE RIGHT (3 VIEWS)   Final Result   1.  No convincing acute osseous abnormality. 2. Degenerative changes of the right knee. 3. Trace joint effusion. 4. There appears to be soft tissue swelling anterior to the patella. XR CHEST PORTABLE   Final Result      Right lung base opacity again identified, nonspecific. Please correlate exam   findings and exam findings and history. IR ANGIOGRAM CAROTID C EREBRAL BILATERAL    (Results Pending)   XR CHEST PORTABLE    (Results Pending)   CT CHEST ABDOMEN PELVIS W CONTRAST    (Results Pending)        Echocardiogram:   No results found for this or any previous visit. ASSESSMENT AND PLAN     Assessment:    // Acute hypoxic respiratory failure, mechanical ventilation  // Pneumonia - RML cavitary lesion, likely abscess likely the result of aspiration  // Altered mental status encephalopathy-   // Sepsis/strep bacteremia  // Traumatic Rhabdomyolysis -improved  // JOZEF -improved  // Rib fractures, Left side  // Left corneal abrasion  // leukocytosis persistent  // fever    CT scan of the chest reviewed from 01/09/2022 which showed lateral segment right middle lobe peripheral area of soft/fluid density likely intraparenchymal with area of cavitation and fluid level likely abscess/necrotizing pneumonia does not look like solid mass less likely to be malignancy. Most likely infectious with strep bacteremia and strep positive in CSF. Plan:    I personally interviewed/examined the patient; reviewed interval history, interpreted all available radiographic and laboratory data at the time of service.  As mentioned above right middle lobe area is most likely abscess/necrotizing pneumonia likely related to aspiration with a streptococcal viridans bacteremia and positive CSF.  Less likely malignancy   Patient is currently encephalopathic and getting treatment for strep bacteremia and would recommend to continue with antibiotic therapy as if malignancy is a concern in his current condition and all acute issues getting biopsy/aspiration would not change the management but if concern for getting a sample for analysis/cytology then will need CT-guided aspiration biopsy by interventional radiology. Would recommend follow-up CT scan in 3 to 4 weeks   Continue and wean mechanical ventilation as tolerated   Monitor endotracheal secretions    Is weaning from ventilation and liberation will depend upon his mentation and ability to protect airways and neurological status   Continue pulmonary toilet, aspiration precautions    Continue antibiotics per infectious disease   Follow microbiologic data   Continue to monitor I/O with a goal of even/negative fluid balance   Stress ulcer prophylaxis per neurocrit   DVT prophylaxis as per neuro critical care    Discussed with Dr. Marika Anderson    The patient is/remains critically ill with illness/injury that acutely impairs one or more vital organ systems, such that there is a high probability of imminent or life threatening deterioration in the patient's condition. Critical care time of 35 minutes was spent (excluding procedures), in coordination of care during bedside rounds and discussion of patient care in detail, and recommendations of the team were adopted in the plan. Necessity of all invasive devices was also confirmed.      Cary Dakins MD.  Pulmonary critical care attending  [facility], [facility]

## 2022-01-15 NOTE — PLAN OF CARE
Problem: Falls - Risk of:  Goal: Will remain free from falls  Description: Will remain free from falls  1/15/2022 0526 by Tanvir Mccauley RN  Outcome: Ongoing     Problem: Falls - Risk of:  Goal: Absence of physical injury  Description: Absence of physical injury  1/15/2022 0526 by Tanvir Mccauley RN  Outcome: Ongoing     Problem: Nutrition  Goal: Optimal nutrition therapy  1/15/2022 0526 by Tanvir Mccauley RN  Outcome: Ongoing     Problem: HEMODYNAMIC STATUS  Goal: Patient has stable vital signs and fluid balance  Outcome: Ongoing     Problem: ACTIVITY INTOLERANCE/IMPAIRED MOBILITY  Goal: Mobility/activity is maintained at optimum level for patient  1/15/2022 0526 by Tanvir Mccauley RN  Outcome: Ongoing     Problem: COMMUNICATION IMPAIRMENT  Goal: Ability to express needs and understand communication  1/15/2022 0526 by Tanvir Mccauley RN  Outcome: Ongoing    Neuro assessments completed. Fall and aspiration precautions in place. Barriers in communication and mobility assessed. Interventions to assist in communication and mobility in place. Adaptive devices used as needed.

## 2022-01-15 NOTE — PROGRESS NOTES
Daily Progress Note  Neuro Critical Care    Patient Name: Jaydon Goldman  Patient : 1950  Room/Bed: 1952/7211-68  Code Status: FULL CODE  Allergies: No Known Allergies    CHIEF COMPLAINT:       AMS     INTERVAL HISTORY      Initial Presentation (Admitted 1/10/22): The patient is a 74 y. o. male with a history of hypertension who presented as a transfer from [facility] for intraventricular debris. Initially presented to the [facility] ED after being found down at home with altered mental status. He reportedly saw his PCP one week ago for headaches and CTH at that time was negative for acute abnormality. His POA tried calling him and felt that he sounded confused. Patient is normal A&O x4 at baseline. EMS was called to the home and patient was found down for an unknown amount of time but suspected prolonged down time as long as 48 hours. On arrival to [facility] ED, GCS 12. Patient awake and able to follow simple commands but not answering questions appropriately. CT Head showed debris within bilateral ventricles secondary to subacute hemorrhage vs infectious process. CT C-spine negative. CT Chest showed possible cavitary lung lesion in the right middle lobe. Labs were remarkable for elevated CK consistent with rhabdomyolysis, mild JOZEF, leukocytosis to 27.7, and mild elevation of lactate. Patient was transferred to [facility] for neurosurgical evaluation. While in [facility] ED, patient GCS declined and he became tachypneic. Patient was subsequently intubated. Febrile with leukocytosis. Trauma was consulted due to possible traumatic etiology of ventricular debris and imaging demonstrated two left sided rib fractures but otherwise negative for traumatic injury. Neurosurgery consulted. Started empirically on Decadron and antibiotic/antiviral coverage for meningitis.     Hospital Course:  1/10: Admitted to the Neuro ICU. EVD placed at bedside and set at [facility].  CSF studies; Glucose <2, Protein 966, , WBC 32765. Meningitis panel negative. Acyclovir discontinued. Continued on Rocephin, Vancomycin and Ampicillin. Received 4 doses of IV Decadron. MRI Brain with/without contrast showed known debris in the ventricular system with enhancement of the ependymal surface of the ventricular system and leptomeningeal enhancement. MRI Cervical spine with no new findings.        1/12:   EVD set at [facility], ICP 3-11, 20cc out/24h. ID discontinued antibiotics overnight. Discussed case this AM; started on Rocephin 2g Q12h. Neurosurgery following and concerned about subarachnoid purulence in cisterns and cervical space. Dr. Sumi Buckley considering intraoperative subarachnoid culture. Awaiting MRI Thoracic and Lumbar spine. Will consider CT facial bone with contrast.  Repeat blood culture and respiratory culture ordered. Neuro Endovascular reviewed CTA findings (concern for vasculitis) and recommended TCD which showed elevated mean velocities in the left MCA (189 max, LR 6.74). Recommended Verapamil, started on 80mg Q8h. Clinical exam remains poor. Fentanyl infusion changed to PRN dosing, will use Precedex if needs more sedation (becomes hypertensive, tachypneic intermittently).    1/12: SBP >180 ON, PRN hydralazine orderedResp cx Gram + cocci, 1/2 + blood cultures likely staph. Straight cath x2 -> amaya. In the am, pt is obtunded, not following commands. Pupils 2mm slugglishly reactive. Spontaneous movement in the left upper extremity, flicker movement in the b/l lower extremities. CT maxillofacial w contrast ordered to asses if abcesss present. NS switched to LR 80ml/hr. CSF cell count WBC decreasing 47461 -> 50373. Plan to continue with recophin for now. Family updated about management plan, mri finding and ltme.      1/13: Overnight pt was stable. More spontaneous eye opening.s ICPs in 2-8. Output 19 in the last 12 hours. TB test in process. Hyponatremic. Free water bolus started.  Tube feeds ordered. LTME DC. Plan to send csf sample for infectious workup. A line has been removed. Follow TCD doppler tomorrow. VL lower extremity duplex scan ordered. 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 CSF is positive for viridans streptococcus. Plan to continue with ceftriaxone. CT maxillofacial unremarkable for abscess or sinusitis. 1/14: Pt exam has improved a little. He opens eyes more frequently. Had few eye blinks. Withdraw to pain in all four extremities. EVD clamped only for CSF sample. Residual 400s. Xray kub concerning for ileus. Bowel regimens increased to colace, glycolax, simethicone. Free water bolus increased to 250 every 4 hours. MRI brain wo contrast eval for acute stroke in setting of vascultitis with narrowing intracranial vasculature and elevated TCD.    1/15: Overnight, pt had residual of 60cc with bile. 2 mild to moderate bowel movement. Glycerin suppository added. Verapamil increased to 125. On exam pt's brain stem reflexes are intact. Frequent eye blinking's. Localizes with left upper extremity. Plan to repeat TCD in few days. Continue with rocephin. On CPAP mode. Leukocytosis increasing. Afebrile overnight.        CURRENT MEDICATIONS:  SCHEDULED MEDICATIONS:   verapamil  120 mg Per NG tube 3 times per day    senna  5 mL Oral Nightly    docusate  100 mg Oral Daily    polyethylene glycol  17 g Oral Daily    enoxaparin  40 mg SubCUTAneous Daily    cefTRIAXone (ROCEPHIN) IV  2,000 mg IntraVENous Q12H    insulin lispro  0-12 Units SubCUTAneous Q6H    erythromycin   Left Eye BID    atorvastatin  10 mg Per NG tube Nightly    sodium chloride flush  5-40 mL IntraVENous 2 times per day    pantoprazole  40 mg IntraVENous Daily    And    sodium chloride (PF)  10 mL IntraVENous Daily    folic acid IVPB  1 mg IntraVENous Daily    Vitamin D  1,000 Units Oral Daily    chlorhexidine  15 mL Mouth/Throat BID     CONTINUOUS INFUSIONS:   lactated ringers 80 mL/hr at 01/12/22 4962    dextrose      sodium chloride       PRN MEDICATIONS:   simethicone, acetaminophen, glucose, dextrose, glucagon (rDNA), dextrose, fentanNYL, labetalol, sodium chloride flush, sodium chloride flush, sodium chloride, ondansetron **OR** ondansetron, acetaminophen **OR** acetaminophen, sodium chloride flush    VITALS:  Temperature Range: Temp: 97.4 °F (36.3 °C) Temp  Av.8 °F (36.6 °C)  Min: 97.3 °F (36.3 °C)  Max: 98.4 °F (36.9 °C)  BP Range: Systolic (54GJC), JIF:843 , Min:143 , KSO:566     Diastolic (74WCD), NOL:45, Min:87, Max:113    Pulse Range: Pulse  Av.3  Min: 66  Max: 91  Respiration Range: Resp  Av.8  Min: 0  Max: 30  Current Pulse Ox: SpO2: 100 %  24HR Pulse Ox Range: SpO2  Av.8 %  Min: 99 %  Max: 100 %  Patient Vitals for the past 12 hrs:   BP Temp Temp src Pulse Resp SpO2   01/15/22 1000 (!) 176/91   91 21    01/15/22 0900 (!) 184/102   82 17    01/15/22 0800 (!) 176/89 97.4 °F (36.3 °C) Oral 80 19    01/15/22 0747    74 23 100 %   01/15/22 0700 (!) 165/97   78 (!) 0 100 %   01/15/22 0600 (!) 169/87   75 (!) 0 100 %   01/15/22 0503     (!) 0 100 %   01/15/22 0500 (!) 168/93   71 (!) 0 100 %   01/15/22 0400 (!) 162/99 97.7 °F (36.5 °C) Oral 72 (!) 0 99 %   01/15/22 0335    68 12 100 %   01/15/22 0300 (!) 170/90   74 (!) 0 100 %   01/15/22 0200 (!) 186/99   71 (!) 0 100 %   01/15/22 0100 (!) 176/99   69 (!) 0 100 %   01/15/22 0000 (!) 178/101 97.7 °F (36.5 °C) Oral 71 (!) 0 100 %     Estimated body mass index is 28.7 kg/m² as calculated from the following:    Height as of this encounter: 5' 10\" (1.778 m).     Weight as of an earlier encounter on 22: 200 lb (90.7 kg).  []<16 Severe malnutrition  []1616.99 Moderate malnutrition  []1718.49 Mild malnutrition  []18.524.9 Normal  [x]2529.9 Overweight (not obese)  []3034.9 Obese class 1 (Low Risk)  []3539.9 Obese class 2 (Moderate Risk)  []?40 Obese class 3 (High Risk)    RECENT LABS:   Lab Results   Component Value Date PLAN:       The patient is 71 yo male with a history of hypertension who presented to [facility] ED as a transfer from [facility] for ventricular debris. Initially presented to [facility] ED with altered mentation after being found down. CT Head showed debris within bilateral ventricles secondary to subacute hemorrhage vs infectious process. Noted to have right middle lobe opacification concerning for cavitary mass lesion. Labs revealed elevated CK, JOZEF, leukocytosis. Transferred for Neurosurgical evaluation. Neurosurgery consulted. EVD placed urgently 1/10. CSF studies concerning for bacterial meningitis. MRI Brain re-demonstrated debris in the ventricular system and showed enhancement of the ependymal surface of the ventricular system and leptomeningeal enhancement. ID consulted; recommending CNS dosed Rocephin. Neuro Endovasuclar consulted due to concerns for vasculitis on CTA Head/Neck and recommended TCD which showed elevated L MCA velocities. Patient was started on Verapamil. CT maxillofacial ordered. Follow Csf culture. Csf culture 1/10 alpha hemolytic strep. On ceftraixone. EVD clamped. Free water flushes. NEUROLOGIC:  - Acute debris within the ventricular system with enhancement of the ependymal surface of the ventricular system and leptomeningeal enhancement  - Concern for bacterial meningitis/ventriculitis. Csf culture 1/10 positive for alpha hemolytic strep. CSF culture 1/10 reported today growing viridians streptoccocus.   - EVD resatrted at 10mmg.   - CSF studies; CSF studies; Glucose <2, Protein 966, >1600>28, WBC 50344>86147>60874, meningitis panel negative, CSF culture viridians streptoccocus. CSF protein 312.4. Glucose <4.   - Neurosurgery following; considering intraop subarachnoid culture. No plan for intervention  - ID recommending Rocephin 2g Q12h.  - CTA Head/Neck showed beading of the bilateral M1 MCAs and diminutive anterior and posterior cerebral arteries.   MRI brain wo contrast pending: eval for acute stroke in setting of vascultitis with narrowing intracranial vasculature and elevated TCD. - Neuro Endovascular consulted; sWill consider DSA if exam remain poor. Consider CTA in 7-10 days   - Started on Verapamil 125mg Q8h per Endovascular recs  -F/U LTME: Moderate-severe non specific encephalopathy. The presence of R temporal LPDs increases patient risk for focal seizures, can also be seen in CNS-itis. LTME dc1/13  - Goal -180, avoid hypotension  - PRN Fentanyl for agitation/sedation  - Neuro checks per protocol     CARDIOVASCULAR:    BP Range: Systolic (39CBM), AAW:188 , Min:143 , WWJ:222     Diastolic (54QMQ), PKH:89, Min:87, Max:113    Pulse Range: Pulse  Av.3  Min: 66  Max: 91  - Goal -180  - PRN Labetalol  - Troponin 17, EKG sinus tachycardia  - Echo EF 54%  - Continue home Lipitor for hyperlipidemia  - Continue telemetry    PULMONARY:    Respiration Range: Resp  Av.8  Min: 0  Max: 30  Current Pulse Ox: SpO2: 100 %    - Intubated in ED for acute hypoxic respiratory failure  - Vent Settings: PRVC 30/16/580/5 (on CPAP mode 1/15)  - Daily ABG : 7.49/34./87.8/26.9  - Right lung opacification possible cavitary lesion .   - CXR  showed stable nodular opacity in the right lower lung.  - Pulmonology following; : continue medical management. - ID ordered TB quantiferon in process. RENAL/FLUID/ELECTROLYTE:    Lab Results   Component Value Date     01/15/2022    K 4.7 01/15/2022     01/15/2022    CO2 21 01/15/2022    BUN 37 01/15/2022    CREATININE 0.62 01/15/2022    GLUCOSE 130 01/15/2022    CALCIUM 8.5 01/15/2022          I/O last 3 completed shifts: In: 65652 [I.V.:8900; NG/GT:1402]  Out: 4942 [Urine:3990; Drains:57]  I/O this shift: In: 788 [I.V.:305; NG/GT:250]  Out: 400 [Urine:400]    - JOZEF resolved  - Hypernatremia.  Water flushes 250ml Q4.  - IVF: Total fluids 80cc/hr  - CK and lactic normalized  - Give 2g Calcium gluconate   - Replace electrolytes PRN  - Daily BMP    GI/NUTRITION:  NUTRITION:  Diet NPO Exceptions are: Sips of Water with Meds  ADULT TUBE FEEDING; Nasogastric; Peptide Based; Continuous; 25; Yes; 20; Q 4 hours; 75; 30; Q 4 hours  - Start tube feeds via OG  - Bowel regimen: Senokot-S daily, semithicone and colace. Glycerin suppositiry added,   - GI prophylaxis: Protonix  -tube feeds started. Xray KUB: Enteric tube in satisfactory position.  Bowel gas pattern suggesting mild   Ileus. Recent Labs     01/15/22  0549 22  0422 22  0513   WBC 17.2* 13.1* 11.0   HGB 10.8* 10.4* 9.7*   HCT 34.9* 34.6* 32.0*   MCV 77.4* 79.5* 78.0*    309 300         ID:  Temperature Range: Temp: 97.4 °F (36.3 °C) Temp  Av.8 °F (36.6 °C)  Min: 97.3 °F (36.3 °C)  Max: 98.4 °F (36.9 °C)    - Leukocytosis resolved, WBC 19.2>16.5>11.0>13>17.2  - COVID-19 negative on 1/10  - UA 1/10 showed moderate bacteria, negative nitrite/leukocyte esterase   - Blood cultures 1/10;  positive staph coag negative  - Repeat blood cultures pending.  - F/U Sputum culture viridians streptococci  - Ventriculitis/meningitis  - CSF studies; Glucose <2, Protein 966, >1600, WBC 34945>34381, eningitis panel negative  - Follow CSF culture 1/10 showing alpha strep cocci. Viridians streptoccocci  - ID following; recommending Rocephin CNS dosing  - Continue to monitor for fevers  - Daily CBC    HEME:   - Daily CBC    ENDOCRINE:  - Continue to monitor blood glucose, goal <180  - - Hyperglycemia; increase to medium dose insulin sliding scale      OTHER:  - Vitamin D and Folic acid supplementation  - PT/OT/ST as appropriate  - Code Status: FULL       PROPHYLAXIS:  Stress ulcer: PPT daily    DVT PROPHYLAXIS:  - SCD sleeves - Thigh High   -  Lovenox 40mg QD    DISPOSITION:  [x] To remain ICU:   [] OK for out of ICU from Neuro Critical Care standpoint    We will continue to follow along.      For any changes in exam or patient status please contact Neuro Critical Care.       Mitzy Wood MD  Neuro Critical Care  1/15/2022     11:20 AM

## 2022-01-15 NOTE — PROGRESS NOTES
[facility] - Progress Note    Today's Date and Time: 1/15/2022, 6:35 PM    Impression :   · Acute metabolic encephalopathy  · Inflammatory intraventricular process with Alpha Streptococci on culture of CSF  · Right lung consolidation with cavitary lesion  · Bilateral mastoid effusion  · Rhabdomyolysis  · JOZEF  · Rt lung consolidation with possible cavity    Recommendations:     · Continue ceftriaxone 2 gm IV q 12 hr for pulmonary lesion and ventricular inflammation with alphahemolytic/viridans Streptococcus infection  · D/C Acyclovir  · On Decadron 10 mg IV every 6 hours  · The patient is off sedation and remains  · CSF fluid remains cloudy  · We will follow the follow-up culture data and adjust therapy according    Medical Decision Making/Summary/Discussion:1/15/2022     ·   Infection Control Recommendations   · [place] Precautions    Antimicrobial Stewardship Recommendations     · Discontinuation of therapy  Coordination of Outpatient Care:   · Estimated Length of IV antimicrobials:1-10-22  · Patient will need Midline Catheter Insertion: no  · Patient will need PICC line Insertion:no  · Patient will need: Home IV , [place],  SNF,  LTAC: TBD  · Patient will need outpatient wound care:yes    Chief complaint/reason for consultation:   · Meningitis      History of Present Illness:   Haley Lundy is a 70y.o.-year-old  male who was initially admitted on 1/9/2022. Patient seen at the request of     INITIAL HISTORY:     Haley Lundy is a 70y.o.-year-old male who was found down at home. Last well-known 48 hours prior to admission. Patient's cousin was talking to him over the phone frequently and stated that he has been confused for the past couple of weeks and was complaining of headaches.       Patient apparently had a dental procedure done 3 weeks back. His baseline mental status is ANO x4.   Patient admitted inpatient for management of suspected meningitis.     Imaging revealed debris's within the bilateral ventricles of his brain likely secondary to subacute hemorrhage versus infection. CT chest showed right middle lobe cavitary lesion. He has clear secretions from ET tube.     Past medical history:  Essential hypertension  Chronic anemia  History of GI bleed    External ventricular drain in place  CXR on 01/13/22 - right basilar opacity, slight left basilar atelectasis    On rocephin 2 g IV q 12 h  Will continue same Rx      CURRENT EVALUATION : 1/15/2022    Patient evaluated and examined in the neuro ICU. Afebrile    The patient is on the ventilator at 30% FiO2 and 5 of PEEP. He is off sedation. The external ventricular drain remains in place and still has cloudy appearing fluid draining. The patient does have some twitching episodes noted during my evaluation    Labs, X rays reviewed: 1/15/2022    BUN: 34 > 29 > 35 > 42  Cr: 1.04 > 0.84 > 0.87 > 0.84    WBC: 27.7-->19.2 > 16.5 > 11 > 13.1  Hb:  10 > 10 > 9.7 > 10.4  Plat: 341 > 347 > 300 > 309    Cultures:  Urine:  · 1-11-22: No growth   Blood:  · 1-10-22: 1/2 Coagulase negative Staphylococci   · 1-11-22: no growth  · 1-12-22: no growth  Sputum :  · 01/11/22: few gram positive cocci in pairs and chairs  CSF:  · 1-10-22: viridans streptococci  · 01/12/22: gram positive cocci in chains on stain. No growth in culture    CXR:  · 1-12-22: Stable nodular opacity in RLL with cavitary lesion and air fluid level. Discussed with patient, RN, Neuro. I have personally reviewed the past medical history, past surgical history, medications, social history, and family history, and I have updated the database accordingly.     Past Medical History:     Past Medical History:   Diagnosis Date    Anemia 2016    ON IRON    BPH with elevated PSA     post biopsy    Colon polyps 2016    BENEIGN REMOVED WITH COLONOSCOPY    Elevated Gina-Barr virus antibody titer 1989    NEVER TREATED FOR    Elevated fasting glucose     History of blood transfusion 2016    R/T INTESTINAL BLEEDING    History of chronic fatigue syndrome 1989    RESOLVED     History of GI bleed 2016    BROUGHT ON BY MEDS---NIACIN, FISH OIL AND VIT C    Hyperlipidemia 2014    (triglycerides-ELEVATED, TRYING TO CONTROL WITH DIET    Hypertension 1999    ON RX    Wears glasses        Past Surgical  History:     Past Surgical History:   Procedure Laterality Date    COLONOSCOPY  2000    internal hemorrhoids    COLONOSCOPY  02/02/2015    polypx1, repeat 5yrs due to family hx & hx polyps- brannon    COLONOSCOPY  12/09/2015    polyp X2  int. Hemorrhoids  partial prolapse of ileocecal valve    COLONOSCOPY  11/2016    COLONOSCOPY N/A 11/12/2020    COLONOSCOPY POLYPECTOMY SNARE/HOT BIOPSY performed by Antoinette Price DO at [address] [place] [address]    to correct flat arches (age 9)    PRE-MALIGNANT / [address] Right 10/15/2021    v-EXCISION Lesion Right cheek, Right nose, scalp, nasal tip performed by Keerthi Barreto MD at [place] [address] Bilateral 12/04/2012    benign    PROSTATE BIOPSY Bilateral 07/2014    benign    PROSTATECTOMY  12/10/2019    LAPAROSCOPIC ROBOTIC SIMPLE PROSTATECTOMY     PROSTATECTOMY N/A 12/10/2019    XI LAPAROSCOPIC ROBOTIC SIMPLE PROSTATECTOMY performed by Gemma Hamm MD at [place] [address]  12/8/15    Normal upper GI tract, no evidence of blood in upper GI tract, mild distal esophagitis    UPPER GASTROINTESTINAL ENDOSCOPY  11/2016       Medications:      verapamil  120 mg Per NG tube 3 times per day    lidocaine 1 % injection  5 mL IntraDERmal Once    sodium chloride flush  5-40 mL IntraVENous 2 times per day    metoclopramide  10 mg IntraVENous Once    senna  5 mL Oral Nightly    docusate  100 mg Oral Daily    polyethylene glycol  17 g Oral Daily    enoxaparin  40 mg SubCUTAneous Daily    cefTRIAXone (ROCEPHIN) IV  2,000 mg IntraVENous Q12H    insulin lispro  0-12 Units SubCUTAneous Q6H    erythromycin   Left Eye BID    atorvastatin  10 mg Per NG tube Nightly    sodium chloride flush  5-40 mL IntraVENous 2 times per day    pantoprazole  40 mg IntraVENous Daily    And    sodium chloride (PF)  10 mL IntraVENous Daily    folic acid IVPB  1 mg IntraVENous Daily    Vitamin D  1,000 Units Oral Daily    chlorhexidine  15 mL Mouth/Throat BID       Social History:     Social History     Socioeconomic History    Marital status: Single     Spouse name: Not on file    Number of children: Not on file    Years of education: Not on file    Highest education level: Not on file   Occupational History    Not on file   Tobacco Use    Smoking status: Light Tobacco Smoker     Packs/day: 0.01     Years: 10.00     Pack years: 0.10     Types: Pipe     Start date: 1/1/1970    Smokeless tobacco: Never Used    Tobacco comment: Rare pipe smoker. 4 BOWLS A WEEK No inhalation. Has never smoked cigarettes. Vaping Use    Vaping Use: Never used   Substance and Sexual Activity    Alcohol use: Yes     Alcohol/week: 0.0 standard drinks     Comment: WHISKEY OR WINE- 1 OR 2 A MONTH    Drug use: No    Sexual activity: Not on file   Other Topics Concern    Not on file   Social History Narrative    Not on file     Social Determinants of Health     Financial Resource Strain: Low Risk     Difficulty of Paying Living Expenses: Not hard at all   Food Insecurity: No Food Insecurity    Worried About Running Out of Food in the Last Year: Never true    [address] in the Last Year: Never true   Transportation Needs:     Lack of Transportation (Medical): Not on file    Lack of Transportation (Non-Medical):  Not on file   Physical Activity:     Days of Exercise per Week: Not on file    Minutes of Exercise per Session: Not on file   Stress:     Feeling of Stress : Not on file   Social Connections:     Frequency of Communication with Friends and Family: Not on file    Frequency of Social Gatherings with Friends and Family: Not on file    Attends Yazidi Services: Not on file    Active Member of Clubs or Organizations: Not on file    Attends Club or Organization Meetings: Not on file    Marital Status: Not on file   Intimate Partner Violence:     Fear of Current or Ex-Partner: Not on file    Emotionally Abused: Not on file    Physically Abused: Not on file    Sexually Abused: Not on file   Housing Stability:     Unable to Pay for Housing in the Last Year: Not on file    Number of [place] in the Last Year: Not on file    Unstable Housing in the Last Year: Not on file       Family History:     Family History   Problem Relation Age of Onset    Cancer Mother         LUNG    High Blood Pressure Mother     Diabetes Father         IDDM-LATE IN LIFE    Heart Disease Father         CHF    High Blood Pressure Father         Allergies:   Patient has no known allergies. Review of Systems:     Unable to provide. On ventilator. 1/15/2022     Review of Systems   Unable to perform ROS: Intubated           Physical Examination :     Patient Vitals for the past 8 hrs:   BP Temp Temp src Pulse Resp SpO2   01/15/22 1800 (!) 157/62   110 (!) 31    01/15/22 1700 (!) 141/98   119 (!) 42    01/15/22 1600 (!) 148/100   112 (!) 35    01/15/22 1536    111 (!) 35 99 %   01/15/22 1500 (!) 144/96   108 25    01/15/22 1400 (!) 151/91   105 26    01/15/22 1359    105 25 100 %   01/15/22 1340 (!) 156/93   99 (!) 33    01/15/22 1330    94 19 99 %   01/15/22 1200 (!) 181/107 98.1 °F (36.7 °C) Oral 106 23    01/15/22 1145    102 26 100 %   01/15/22 1125    96 24 100 %   01/15/22 1100 (!) 182/105   93 17      Physical Exam  Constitutional:       Appearance: He is well-developed. Interventions: He is intubated. HENT:      Head: Normocephalic.       Comments: Right-sided frontoparietal external ventricular drain in place with cloudy fluid  Cardiovascular:      Rate and Rhythm: Normal rate. Heart sounds: Normal heart sounds. No friction rub. No gallop. Pulmonary:      Effort: He is intubated. Breath sounds: Rales present. No wheezing. Abdominal:      General: Bowel sounds are normal. There is distension. Palpations: Abdomen is soft. There is no mass. Tenderness: There is no abdominal tenderness. Musculoskeletal:         General: Swelling (Bilateral upper extremity) present. Skin:     General: Skin is warm and dry. Medical Decision Making -Laboratory:   I have independently reviewed/ordered the following labs:    CBC with Differential:   Recent Labs     01/14/22 0422 01/15/22  0549   WBC 13.1* 17.2*   HGB 10.4* 10.8*   HCT 34.6* 34.9*    333     BMP:   Recent Labs     01/14/22  0422 01/15/22  0549   * 141   K 4.6 4.7   * 111*   CO2 22 21   BUN 42* 37*   CREATININE 0.84 0.62*   MG 2.9* 3.0*     Hepatic Function Panel:   No results for input(s): PROT, LABALBU, BILIDIR, IBILI, BILITOT, ALKPHOS, ALT, AST in the last 72 hours. No results for input(s): RPR in the last 72 hours. No results for input(s): HIV in the last 72 hours. No results for input(s): BC in the last 72 hours.   Lab Results   Component Value Date    MUCUS NOT REPORTED 01/10/2022    RBC 4.51 01/15/2022    TRICHOMONAS NOT REPORTED 01/10/2022    WBC 17.2 01/15/2022    YEAST NOT REPORTED 01/10/2022    TURBIDITY Clear 01/10/2022     Lab Results   Component Value Date    CREATININE 0.62 01/15/2022    GLUCOSE 130 01/15/2022       Medical Decision Making-Imaging:     EXAMINATION:   ONE XRAY VIEW OF THE CHEST       1/11/2022 9:02 am       COMPARISON:   01/10/2022 radiograph       HISTORY:   ORDERING SYSTEM PROVIDED HISTORY: intubated   TECHNOLOGIST PROVIDED HISTORY:   intubated       FINDINGS:   Supportive devices are stable.  Heart, mediastinum and pulmonary vascularity   are normal.  Stable nodular opacity in the right lower lung zone representing   a cavitary lesion seen on prior CT.  The lungs appear clear otherwise.  No   significant skeletal finding.           Impression   Stable nodular opacity in the right lower lung zone better characterized on   recent CT.         EXAMINATION:   CTA OF THE CHEST 1/9/2022 7:40 pm       TECHNIQUE:   CTA of the chest was performed after the administration of intravenous   contrast.  Multiplanar reformatted images are provided for review.  MIP   images are provided for review. Dose modulation, iterative reconstruction,   and/or weight based adjustment of the mA/kV was utilized to reduce the   radiation dose to as low as reasonably achievable.       COMPARISON:   Chest x-ray 01/09/2022       HISTORY:   ORDERING SYSTEM PROVIDED HISTORY: elevated d-dimer, mental status change   TECHNOLOGIST PROVIDED HISTORY:   elevated d-dimer, mental status change   Decision Support Exception - unselect if not a suspected or confirmed   emergency medical condition->Emergency Medical Condition (MA)   Reason for Exam: Elevated d-dimer, mental status change       FINDINGS:   Pulmonary Arteries: No central lobar pulmonary emboli.  Main pulmonary artery   is unremarkable caliber.       Mediastinum: Air-fluid identified involving esophagus.  Slight heterogeneous   appearance of the thyroid gland on left.  Heart is mildly prominent size.  No   definite bulky hilar mediastinal adenopathy.       Lungs/pleura: Right middle lobe atelectasis versus scarring identified.    Within the lateral aspect right middle lobe, there is consolidative versus   masslike opacity with air-fluid level.  There vessels corresponding through   this opacity.  The tiny locules of gas as well.  Otherwise mild   hypoventilatory changes elsewhere the lungs.       Upper Abdomen: Evidence of cholelithiasis.  Fatty infiltration liver.       Soft Tissues/Bones: Degenerate changes of the thoracic spine.           Impression   No evidence of pulmonary embolism to the segmental level.       Nonspecific opacification and when question air-fluid level involving the   right middle lobe laterally.  This demonstrates tiny locules of gas. Infection/Pneumonia and/or cavitary mass lesion may be considered. Rony Cazares   pulmonology consultation.       RECOMMENDATIONS:   Unavailable              Impression   1. Endotracheal tube overlying the proximal trachea 8.6 cm above the level of   the emile.  Advancement is recommended for more optimal positioning. 2. Enteric tube as above. 3. Stable nodular opacity at the right lower lung zone consistent with a   cavitary lesion seen on prior CT. 4. Mild bibasilar atelectasis. Impression   There is a right basilar infiltrate consistent with pneumonia.       Support tubes as described above. Medical Decision [id number]-[id number]:     Culture, CSF [[id number]] (Abnormal) Collected: 01/13/22 1452   Order Status: Completed Specimen: CSF Updated: 01/15/22 1356    Specimen Description . CSF Shunt    Special Requests NOT REPORTED    Direct Exam MANY NEUTROPHILS Abnormal      NO ORGANISMS SEEN     Gram stain made from cytocentrifuged specimen.  Organisms and cells will be concentrated. Culture CULTURE IN PROGRESS   Culture, CSF [[id number]] (Abnormal) Collected: 01/12/22 0927   Order Status: Completed Specimen: CSF Updated: 01/15/22 1345    Specimen Description . CSF SHUNT    Special Requests NOT REPORTED    Direct Exam MANY NEUTROPHILS Abnormal      FEW GRAM POSITIVE COCCI IN CHAINS Abnormal      Gram stain made from cytocentrifuged specimen.  Organisms and cells will be concentrated. NOTIFIED Roberto Monk RN AT 2302 ON 01.12.22    Culture STREPTOCOCCI, ALPHA HEMOLYTIC SCANT GROWTH Identification and sensitivity to follow. Abnormal    Culture, Blood 1 [[id number]] Collected: 01/10/22 0549   Order Status: Completed Specimen: Blood Updated: 01/15/22 0710    Specimen Description . BLOOD    Special Requests RT [facility] 6ML    Culture NO GROWTH 5 DAYS   Culture, CSF [[id number]] (Abnormal)  Collected: 01/10/22 0806   Order Status: Completed Specimen: CSF Updated: 01/14/22 0809    Specimen Description . CSF SHUNT    Special Requests NOT REPORTED    Direct Exam MANY NEUTROPHILS Abnormal      NO BACTERIA SEEN     Gram stain made from cytocentrifuged specimen.  Organisms and cells will be concentrated. Culture VIRIDANS STREPTOCOCCUS GROUP Abnormal      NOTIFIED [address], AT 0930 ON 01.12.2022   Culture, Blood 1 [[id number]] (Abnormal) Collected: 01/11/22 2037   Order Status: Completed Specimen: Blood Updated: 01/14/22 0655    Specimen Description . BLOOD    Special Requests UNKNOWN AMOUNT, UNKNOWN SITE    Culture POSITIVE Blood Culture Abnormal      DIRECT GRAM STAIN FROM BOTTLE: GRAM POSITIVE COCCI IN CLUSTERS     Detected: Coagulase negative Staphylococcus species (not  S.epidermidis, or S. lugdunensis) Culture Results to Follow Methodology- Polymerase Chain Reaction (PCR) Abnormal      STAPHYLOCOCCUS SPECIES, COAGULASE NEGATIVE A single positive blood culture of coagulase negative Staphylocci, diphtheroids,micrococci, Cutibacterium, viridans Streptocci, Bacillus, or Lactobacillus species should be interpreted with caution and viewed as a likely skin contaminant. Abnormal      (NOTE) Direct Gram Stain from bottle and Polymerase Chain Reaction (PCR) results called to and read back by:BRAD DE JESUS 1/12/22 1505   Culture, Blood 1 [[id number]] Collected: 01/12/22 2157   Order Status: Completed Specimen: Blood Updated: 01/14/22 2635    Specimen Description . BLOOD    Special Requests RTFA 3 ML     Culture NO GROWTH 2 DAYS   Culture, Blood 1 [[id number]] Collected: 01/12/22 2157   Order Status: Completed Specimen: Blood Updated: 01/14/22 7108    Specimen Description . BLOOD    Special Requests R HAND 11 ML     Culture NO GROWTH 2 DAYS   Culture, Blood 1 [[id number]] Collected: 01/11/22 2037   Order Status: Completed Specimen: Blood Updated: 01/14/22 7869 Specimen Description . BLOOD    Special Requests UNKNOWN AMOUNT, UNKNOWN SITE    Culture NO GROWTH 3 DAYS   Quantiferon TB Gold [[id number]] Collected: 01/13/22 1136   Order Status: No result Updated: 01/13/22 1214   Culture, Respiratory [[id number]] (Abnormal) Collected: 01/11/22 1817   Order Status: Completed Specimen: Sputum, Suctioned Updated: 01/13/22 1150    Specimen Description . SUCTIONED SPUTUM    Special Requests NOT REPORTED    Direct Exam < 10 EPITHELIAL CELLS/LPF     >25 NEUTROPHILS/LPF     FEW GRAM POSITIVE COCCI IN PAIRS AND CHAINS Abnormal     Culture NORMAL RESPIRATORY BELLA MODERATE GROWTH   Quantiferon TB Gold [[id number]] Collected: 01/13/22 1136   Order Status: Canceled    Quantiferon TB Gold [[id number]] Collected: 01/13/22 0513   Order Status: Canceled    QUANTIFERON (R) TB GOLD, (INCUBATED) [[id number]] Collected: 01/12/22 2157   Order Status: Canceled Specimen: Blood    Culture, Blood 1 [[id number]] (Abnormal) Collected: 01/10/22 0538   Order Status: Completed Specimen: Blood Updated: 01/12/22 1552    Specimen Description . BLOOD    Special Requests LT AC 6ML    Culture POSITIVE Blood Culture Abnormal      DIRECT GRAM STAIN FROM BOTTLE: GRAM POSITIVE COCCI IN CLUSTERS     Detected: Coagulase negative Staphylococcus species (not  S.epidermidis, or S. lugdunensis) Culture Results to Follow Methodology- Polymerase Chain Reaction (PCR) Abnormal      STAPHYLOCOCCUS SPECIES, COAGULASE NEGATIVE A single positive blood culture of coagulase negative Staphylocci, diphtheroids,micrococci, Cutibacterium, viridans Streptocci, Bacillus, or Lactobacillus species should be interpreted with caution and viewed as a likely skin contaminant. Abnormal      (NOTE) Direct Gram Stain from bottle and Polymerase Chain Reaction (PCR) results called to and read back by: SHANTANU MELGAR at [facility] on 1/11/2022 at [address] [place] [address].    Culture, Blood 1 [[id number]]    Order Status: Canceled Specimen: Blood    Culture, Blood 1 [[id number]] Order Status: Canceled Specimen: Blood    Quantiferon TB Gold [[id number]] Collected: 01/12/22 1400   Order Status: Canceled    Culture, Urine [[id number]] Collected: 01/11/22 0000   Order Status: Completed Specimen: Urine Updated: 01/11/22 2220    Specimen Description . URINE    Special Requests NOT REPORTED    Culture NO GROWTH   VDRL CSF [[id number]] Collected: 01/10/22 0807   Order Status: Completed Specimen: CSF Updated: 01/11/22 1417    VDRL, CSF Screen NONREACTIVE   Culture, Respiratory [[id number]]    Order Status: Canceled Specimen: Endotracheal    QUANTIFERON (R) TB GOLD, (INCUBATED) [[id number]] Collected: 01/11/22 0600   Order Status: Canceled Specimen: Blood    Herpes simplex virus PCR [[id number]] Collected: 01/10/22 1050   Order Status: Sent Specimen: CSF Updated: 01/10/22 1056   Meningitis Encephalitis Panel CSF, Molecular [[id number]] Collected: 01/10/22 0807   Order Status: Completed Specimen: CSF Updated: 01/10/22 1033    Specimen Description . CSF    ESCHERICHIA COLI K1 CSF FILM ARRAY Not Detected    HAEMOPHILUS INFLUENZA CSF FILM ARRAY Not Detected    LISTERIA MONOCYTOGENES CSF FILM ARRAY Not Detected    NEISSERIA MENIGITIDIS CSF FILM ARRAY Not Detected    STREPTOCOCCUS AGALACTIAE CSF FILM ARRAY Not Detected    STREPTOCOCCUS PNEUMONIAE CSF FILM ARRAY Not Detected    CYTOMEGALOVIRUS (CMV) CSF FILM ARRAY Not Detected    ENTEROVIRUS CSF FILM ARRAY Not Detected    HSV-1 CSF FILM ARRAY Not Detected    HSV-2 CSF FILM ARRAY Not Detected    HHV-6 (HERPESVIRUS 6) CSF FILM ARRAY Not Detected    PARECHOVIRUS CSF FILM ARRAY Not Detected    VARICELLA-ZOSTER CSF FILM ARRAY Not Detected    CRYPTOCOCCUS NEOFORMANS/CLAUDE CSF FILM ARR. Not Detected    Comment: Performed by multiplexed nucleic acid assay.       Culture, Urine [[id number]]    Order Status: Canceled Specimen: Urine, clean catch    Culture, Respiratory [[id number]]    Order Status: Canceled Specimen: Sputum    Culture, CSF [[id number]] Collected: 01/10/22 5947   Order Status: Completed Specimen: CSF Updated: 01/10/22 0901    Specimen Description . CSF    Special Requests NOT REPORTED    Direct Exam NOT REPORTED    Culture DUPLICATE ORDER   Gram stain CSF [[id number]] Collected: 01/10/22 0808   Order Status: Completed Specimen: Spinal Fluid Updated: 01/10/22 2244    Specimen Description . SPINAL FLUID    Special Requests NOT REPORTED    Direct Exam DUPLICATE ORDER GRAM STAIN INCLUDED WITH CF CULTURE   Culture, Respiratory [[id number]]    Order Status: Canceled Specimen: Sputum    COVID-19, Rapid [[id number]] Collected: 01/10/22 0127   Order Status: Completed Specimen: Nasopharyngeal Swab Updated: 01/10/22 0156    Specimen Description . NASOPHARYNGEAL SWAB    SARS-CoV-2, Rapid Not Detected    Comment:        Rapid NAAT:  The specimen is NEGATIVE for SARS-CoV-2, the novel coronavirus associated with   COVID-19.         The ID NOW COVID-19 assay is designed to detect the virus that causes COVID-19 in patients   with signs and symptoms of infection who are suspected of COVID-19. An individual without symptoms of COVID-19 and who is not shedding SARS-CoV-2 virus would   expect to have a negative (not detected) result in this assay. Negative results should be treated as presumptive and, if inconsistent with clinical signs   and symptoms or necessary for patient management,   should be tested with an alternative molecular assay.  Negative results do not preclude   SARS-CoV-2 infection and   should not be used as the sole basis for patient management decisions.         Fact sheet for Healthcare Providers: Tyrell   Fact sheet for Patients: [email or web address]           Methodology: Isothermal Nucleic Acid Amplification             Medical Decision Making-Other:     Note:  · Labs, medications, radiologic studies were reviewed with personal review of films  · Large amounts of data were reviewed  · Discussed with nursing Staff, Discharge planner  · Infection Control and Prevention measures reviewed  · All prior entries were reviewed  · Administer medications as ordered  · Prognosis: Guarded  · Discharge planning reviewed  · Follow up as outpatient. Thank you for allowing us to participate in the care of this patient. Please call with questions.     Electronically signed by Rene Veloz MD on 1/15/2022 at 6:36 PM

## 2022-01-15 NOTE — PROGRESS NOTES
01/15/22 1359   Vent Information   Vent Type Servo i   Vent Mode PRVC   Vt Ordered 510 mL   Rate Set 12 bmp   FiO2  30 %   PEEP/CPAP 5   Alarm Settings   High Respiratory Rate 40 br/min      Pt placed back to PRVC due to increased RR in 40's

## 2022-01-15 NOTE — BRIEF OP NOTE
Brief Postoperative Note                    [facility]    NEUROENDOVASCULAR SERVICE: POST-OP NOTE: 1/14/2022    Pt Name: Prakash Francis  MRN: [id number]  [place]: 1950  Date of Procedure: 1/14/2022  Primary Care Physician: Kingston Santana MD      Pre-Procedural Diagnosis:Diffuse intracranial cerebral vasospasm/stenosis    Post-Procedural Diagnosis:Same as above improved and partially reversed with ia verapamil      Procedure Performed:DSA with IA verapamil administration     Surgeon:   Natasha Chaudhry MD    Fellow:  Viky Dodd MD      1st Assistant:  Karyn Correa    PRE-PROCEDURAL EXAM:  Prestroke baseline mRS MODIFIED MILLI SCORE: 5 - Severe disability:  bedridden, incontinent and requiring constant nursing care and attention. Neurological exam performed and unchanged from initial H&P or consult      Anesthesia: General Anesthesia  Complications: none    Intra-Operative EXAM:  Patient sedated with unchanged limited neurological exam    EBL: < less than 50       Cc            Specimens: Were not Obtained  Contrast:     Visipaque 270 low osmolar 86 Cc             Fluoro: 13.4 min    Findings:  Please see dictated Radiology note for further details  --Diffuse intracranial cerebral vasospasm in b/l anterior and posterior circulation, no evidence of large vessel occlusion. Right middle cerebral arteries worse than the left. --Transcatheter administration of IA Verapamil 12 mg in the right ICA over 12 minutes , 8 mg in left ICA over 8 minutes, 5 mg in right vertebral artery over 5 minutes.    --Post IA administration improvement in vasospasm in right MCA and ALCIDES vasculature   - functionally fetal bilateral posterior cerebral arteries - normal variant          POST-PROCEDURAL EXAM :   Stable neurological Exam  Neurological exam performed and unchanged from initial H&P or consult    Closure:  right [place] 5   F        POST-PROCEDURAL MONITORING : see orders  Disposition: Neuro ICU      Recommendations:  --Back to Neuro ICU   --Do not bend right leg for 3 hours. --Groin checks per protocol. --Peripheral pulse checks per protocol. --SBP goal 140-180 mm Hg   -- cont verapamil ngt/oral  --Follow up with Dr. Thom Romero 2 weeks after discharge and Dr. Nicole Lux 3 months after discharge.     Nikki Hussein MD fellow    Elzbieta Garcia MD   Pager [phone]  Stroke, [facility]  [id number] [place]  Electronically signed 1/14/2022 at 7:56 PM

## 2022-01-15 NOTE — PROGRESS NOTES
Endovascular Neurosurgery Progress Note    SUBJECTIVE:   MRI completed this afternoon showed strokes, no reported acute events overnight. Review of Systems:  CONSTITUTIONAL:  negative for fevers, chills, fatigue and malaise    EYES:  negative for double vision, blurred vision and photophobia     HEENT:  negative for tinnitus, epistaxis and sore throat    RESPIRATORY:  negative for cough, shortness of breath, wheezing    CARDIOVASCULAR:  negative for chest pain, palpitations, syncope, edema    GASTROINTESTINAL:  negative for nausea, vomiting    GENITOURINARY:  negative for incontinence    MUSCULOSKELETAL:  negative for neck or back pain    NEUROLOGICAL:  Negative for weakness and tingling  negative for headaches and dizziness    PSYCHIATRIC:  negative for anxiety      Review of systems otherwise negative. OBJECTIVE:     Vitals:    01/15/22 1359   BP:    Pulse: 105   Resp: 25   Temp:    SpO2: 100%        General:  Gen: normal habitus, NAD  HEENT: NCAT, mucosa moist  Cvs: RRR, S1 S2 normal  Resp: symmetric unlabored breathing  Abd: s/nd/nt  Ext: no edema  Skin: no lesions seen, warm and dry    Neuro:  Gen: awake and alert, oriented x3. Lang/speech: no aphasia or dysarthria. Follows commands. CN: PERRL, EOMI, VFF, V1-3 intact, face symmetric, hearing intact, shoulder shrug symmetric, tongue midline  Motor: grossly 5/5 UE and LE b/l  Sense: LT intact in all 4 ext. Coord: FTN and HTS intact b/l  DTR: deferred  Gait: narrow base gait    NIH Stroke Scale:   1a  Level of consciousness: 3 - responds only with reflex motor or automatic effects or totally unresponsive, flaccid, areflexic   1b. LOC questions:  2 - answers neither question correctly   1c. LOC commands: 2 - performs neither task correctly   2. Best Gaze: 0 - normal   3. Visual: 0 - no visual loss   4. Facial Palsy: 0 - normal symmetric movement   5a. Motor left arm: 4 - no movement   5b. Motor right arm: 4 - no movement   6a.  Motor left le - no movement   6b  Motor right le - no movement   7. Limb Ataxia: 0 - absent   8. Sensory: 2 - severe to total sensory loss; patient is not aware of being touched in face, arm, leg   9. Best Language:  3 - mute, global aphasia; no usable speech or auditory comprehension   10. Dysarthria: UN - intubated or other physical barrier   11. Extinction and Inattention: 0 - no abnormality         Total:   28     MRS: 05      LABS:   Reviewed. Lab Results   Component Value Date    HGB 10.8 (L) 01/15/2022    WBC 17.2 (H) 01/15/2022     01/15/2022     01/15/2022    BUN 37 (H) 01/15/2022    CREATININE 0.62 (L) 01/15/2022    AST 48 (H) 2022    ALT 32 2022    MG 3.0 (H) 01/15/2022    APTT 22.4 01/10/2022    INR 1.1 01/10/2022      Lab Results   Component Value Date    COVID19 Not Detected 01/10/2022    COVID19 Not Detected 2022    COVID19 Not Detected 2020       RADIOLOGY:   Images were personally reviewed including:     MRI Brain 2022:   1. There are foci of diffusion restriction in the right frontal, parietal,   and occipital lobes, concerning for acute infarction. 2. Layering debris in the ventricles with diffusion restriction is consistent   with ventriculitis.  This appears slightly increased from prior examination. There is also increased ventriculomegaly despite right frontal approach   ventricular shunt catheter. 3. Mastoid air cell effusions.           MRI Brain:   Prominent ventricular system with debris throughout the CSF.  Associated   FLAIR signal abnormality involving the ependymal and subependymal surface   with enhancement of the ependymal surface of the ventricular system   consistent with an infectious process.  There is associated leptomeningeal   enhancement extending into the craniocervical region and along the visualized   cervical spinal cord.      CTA head and neck:   Diminutive anterior cerebral arteries and posterior cerebral arteries.    Mildly beaded appearance to the M1 portions of the middle cerebral arteries   bilaterally with intermittent foci of mild stenosis.  Subsequent diminutive   middle cerebral artery branches distally and this is more pronounced on the   left compared to the right.  These findings are of uncertain etiology   although vasculitis is a consideration.          ASSESSMENT:   71 y/o M with pmh signicant for Htn, dyslipidemia presented after was found down. CT head and MRI brain showed prominent intraventricular debris. CTA head showed diminutive diffuse anterior and posterior cerebral arteries with beaded appearance. DDx at this point includes secondary cerebral vasculitis vs reactive diffuse cerebral vasospasm   MRI brain on 01/14 showed increased stroke burden   TCDs showed increased velocities   Patient underwent DSA with IA verapamil with slight improvement of the right MCA vasospasm on 01/15. PLAN:   --Cerebral vasospasm management with po verapamil per ICU.   --C/w Daily TCDs  --Abx per ICU team   --Endovascular team will continue to follow along. Case discussed with Dr. Vanda Rodas attending.     Ashley Rodriguez MD  Stroke, [facility]  [id number] [place]  Electronically signed 1/15/2022 at 2:15 PM

## 2022-01-15 NOTE — PROGRESS NOTES
Neurosurgery MARYANNE/Resident    Daily Progress Note   No chief complaint on file. 1/15/2022  6:37 PM    Chart reviewed. No acute events overnight. Remains intubated. EVD 10 cm H20 overnight. Drain output 6 cc yellow fluid. ICP 0-8. Angio yesterday. VSS. WBC 17.2 (was 13.1). Vitals:    01/15/22 1536 01/15/22 1600 01/15/22 1700 01/15/22 1800   BP:  (!) 148/100 (!) 141/98 (!) 157/62   Pulse: 111 112 119 110   Resp: (!) 35 (!) 35 (!) 42 (!) 31   Temp:       TempSrc:       SpO2: 99%      Height:             PE:   Intubated, sedated  E1 VT M4  Trace withdrawal extremities     EVD 10 cmH20, ICP 0-8 overnight, EVD is patent and site is c/d/i without signs of drainage       Lab Results   Component Value Date    WBC 17.2 (H) 01/15/2022    HGB 10.8 (L) 01/15/2022    HCT 34.9 (L) 01/15/2022     01/15/2022    CHOL 127 03/15/2021    TRIG 123 01/11/2022    HDL 27 (L) 03/15/2021    ALT 32 01/09/2022    AST 48 (H) 01/09/2022     01/15/2022    K 4.7 01/15/2022     (H) 01/15/2022    CREATININE 0.62 (L) 01/15/2022    BUN 37 (H) 01/15/2022    CO2 21 01/15/2022    TSH 0.50 01/10/2022    PSA 0.95 04/28/2021    INR 1.1 01/10/2022    LABA1C 6.7 (H) 01/11/2022       A/P    70 y.o. 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        Audrey Rodriguez MD, [facility]  Neurosurgery Service  1/15/2022 at 6:37 PM         This note is created with the assistance of a speech recognition program.  While intending to generate a document that actually reflects the content of the visit, the document can still have some errors including those of syntax and sound like substitutions which may escape proof reading. In such instances, actual meaning can be extrapolated by contextual diversion.

## 2022-01-15 NOTE — PROGRESS NOTES
Pt transported to neuro icu with RT and NAN. Neuro assessment unchanged. Groin site clean, dry and intact. Pedal pulses by doppler and marked. Report given to [facility] at bedside.  Next groin check at 2015    Post Procedure Transfer from IR Table  [x] Tubes and Lines intact     Post Procedure Neuro-Checks/NIHSS/Vitals  [x] Completed post procedure  [x] Completed bedside handoff  [x] Frequency ordered  [x] Verbal communication of frequency      Post Procedure Puncture Site Checks  [x] Completed post procedure  [x] Completed bedside handoff  [x] Frequency ordered  [x] Verbal communication of frequency    Post Procedure Pulse  Checks  [x] Completed post procedure  [x] Completed bedside handoff  [x] Frequency ordered  [x] Verbal communication of frequency    Order Set  [x] Post Neuro-Endo Procedure  [] Stroke  [] t-PA     B/P control  [x] Verbal Communication   [x] Order in Care Path    Medication Review  [x] Given during procedure  [x] Current drips/meds/fluids    [x] Physician Notified of All changes in Assessment    Family  [x] Location Post Procedure; dr Celsa Wilkerson to call and update POA

## 2022-01-15 NOTE — PLAN OF CARE
Problem: OXYGENATION/RESPIRATORY FUNCTION  Goal: Patient will maintain patent airway  1/14/2022 2014 by Jessica Yousfi RCP  Outcome: Ongoing  1/14/2022 1604 by Mitzy Guzman RN  Outcome: Ongoing  1/14/2022 1001 by Jorge Thompson RCP  Outcome: Ongoing  Goal: Patient will achieve/maintain normal respiratory rate/effort  Description: Respiratory rate and effort will be within normal limits for the patient  1/14/2022 2014 by MARS SolisP  Outcome: Ongoing  1/14/2022 1604 by Mitzy Guzman RN  Outcome: Ongoing  1/14/2022 1001 by Jorge Thompson RCP  Outcome: Ongoing     Problem: MECHANICAL VENTILATION  Goal: Patient will maintain patent airway  1/14/2022 2014 by MARS SolisP  Outcome: Ongoing  1/14/2022 1604 by Mitzy Guzman RN  Outcome: Ongoing  1/14/2022 1001 by Jorge Thompson RCP  Outcome: Ongoing  Goal: Oral health is maintained or improved  1/14/2022 2014 by Jessica Yousif RCP  Outcome: Ongoing  1/14/2022 1604 by Mitzy Guzman RN  Outcome: Ongoing  1/14/2022 1001 by Jorge Thompson RCP  Outcome: Ongoing  Goal: ET tube will be managed safely  1/14/2022 2014 by MARS SolisP  Outcome: Ongoing  1/14/2022 1604 by Mitzy Guzman RN  Outcome: Ongoing  1/14/2022 1001 by Jorge Thompson RCP  Outcome: Ongoing  Goal: Ability to express needs and understand communication  1/14/2022 2014 by Jessica Yousif RCP  Outcome: Ongoing  1/14/2022 1604 by Mitzy Guzman RN  Outcome: Ongoing  1/14/2022 1001 by Jorge Thompson RCP  Outcome: Ongoing  Goal: Mobility/activity is maintained at optimum level for patient  1/14/2022 2014 by Jessica Yousif RCP  Outcome: Ongoing  1/14/2022 1604 by Mitzy Guzman RN  Outcome: Ongoing  1/14/2022 1001 by Jorge Thompson RCP  Outcome: Ongoing     Problem: SKIN INTEGRITY  Goal: Skin integrity is maintained or improved  1/14/2022 2014 by Deborrah Primus, RCP  Outcome: Ongoing  1/14/2022 1604 by Ubaldo Zhong Aby Trevizo RN  Outcome: Ongoing  1/14/2022 1001 by Krysten Schwartz RCP  Outcome: Ongoing

## 2022-01-15 NOTE — PLAN OF CARE
Problem: OXYGENATION/RESPIRATORY FUNCTION  Goal: Patient will maintain patent airway  1/14/2022 2228 by Tess Alexis RCP  Outcome: Ongoing     Problem: OXYGENATION/RESPIRATORY FUNCTION  Goal: Patient will achieve/maintain normal respiratory rate/effort  Description: Respiratory rate and effort will be within normal limits for the patient  1/14/2022 2228 by Tess Alexis RCP  Outcome: Ongoing     Problem: MECHANICAL VENTILATION  Goal: Patient will maintain patent airway  1/14/2022 2228 by Tess Alexis RCP  Outcome: Ongoing     Problem: MECHANICAL VENTILATION  Goal: Oral health is maintained or improved  1/14/2022 2228 by Tess Alexis RCP  Outcome: Ongoing     Problem: MECHANICAL VENTILATION  Goal: ET tube will be managed safely  1/14/2022 2228 by Tess Alexis RCP  Outcome: Ongoing     Problem: MECHANICAL VENTILATION  Goal: Ability to express needs and understand communication  1/14/2022 2228 by Tess Alexis RCP  Outcome: Ongoing     Problem: MECHANICAL VENTILATION  Goal: Mobility/activity is maintained at optimum level for patient  1/14/2022 2228 by Tess Alexis RCP  Outcome: Ongoing     Problem: SKIN INTEGRITY  Goal: Skin integrity is maintained or improved  1/14/2022 2228 by Tess Alexis RCP  Outcome: Ongoing

## 2022-01-15 NOTE — PLAN OF CARE
Problem: OXYGENATION/RESPIRATORY FUNCTION  Goal: Patient will maintain patent airway  1/15/2022 0949 by Stevie Red RCP  Outcome: Ongoing     Problem: OXYGENATION/RESPIRATORY FUNCTION  Goal: Patient will achieve/maintain normal respiratory rate/effort  Description: Respiratory rate and effort will be within normal limits for the patient  1/15/2022 0949 by Stevie Red RCP  Outcome: Ongoing     Problem: MECHANICAL VENTILATION  Goal: Patient will maintain patent airway  1/15/2022 0949 by Stevie Red RCP  Outcome: Ongoing     Problem: MECHANICAL VENTILATION  Goal: Oral health is maintained or improved  1/15/2022 0949 by Stevie Red RCP  Outcome: Ongoing     Problem: MECHANICAL VENTILATION  Goal: ET tube will be managed safely  1/15/2022 0949 by Stevie Red RCP  Outcome: Ongoing     Problem: MECHANICAL VENTILATION  Goal: Ability to express needs and understand communication  1/15/2022 0949 by Stevie Red RCP  Outcome: Ongoing     Problem: MECHANICAL VENTILATION  Goal: Mobility/activity is maintained at optimum level for patient  1/15/2022 0949 by Stevie Red RCP  Outcome: Ongoing     Problem: SKIN INTEGRITY  Goal: Skin integrity is maintained or improved  1/15/2022 0949 by Stevie Red RCP  Outcome: Ongoing

## 2022-01-16 NOTE — PROGRESS NOTES
Daily Progress Note  Neuro Critical Care    Patient Name: Alfreda Root  Patient : 1950  Room/Bed: 2681/2082-29  Code Status: FULL CODE  Allergies: No Known Allergies    CHIEF COMPLAINT:       AMS     INTERVAL HISTORY      Initial Presentation (Admitted 1/10/22): The patient is a 74 y. o. male with a history of hypertension who presented as a transfer from [facility] ED for intraventricular debris. Initially presented to the [facility] ED after being found down at home with altered mental status. He reportedly saw his PCP one week ago for headaches and CTH at that time was negative for acute abnormality. His POA tried calling him and felt that he sounded confused. Patient is normal A&O x4 at baseline. EMS was called to the home and patient was found down for an unknown amount of time but suspected prolonged down time as long as 48 hours. On arrival to [facility] ED, GCS 12. Patient awake and able to follow simple commands but not answering questions appropriately. CT Head showed debris within bilateral ventricles secondary to subacute hemorrhage vs infectious process. CT C-spine negative. CT Chest showed possible cavitary lung lesion in the right middle lobe. Labs were remarkable for elevated CK consistent with rhabdomyolysis, mild JOZEF, leukocytosis to 27.7, and mild elevation of lactate. Patient was transferred to [facility] for neurosurgical evaluation. While in [facility] ED, patient GCS declined and he became tachypneic. Patient was subsequently intubated. Febrile with leukocytosis. Trauma was consulted due to possible traumatic etiology of ventricular debris and imaging demonstrated two left sided rib fractures but otherwise negative for traumatic injury. Neurosurgery consulted. Started empirically on Decadron and antibiotic/antiviral coverage for meningitis.     Hospital Course:  1/10: Admitted to the Neuro ICU. EVD placed at bedside and set at [address] [facility].  CSF studies; Glucose <2, Protein 966, , WBC 36485. Meningitis panel negative. Acyclovir discontinued. Continued on Rocephin, Vancomycin and Ampicillin. Received 4 doses of IV Decadron. MRI Brain with/without contrast showed known debris in the ventricular system with enhancement of the ependymal surface of the ventricular system and leptomeningeal enhancement. MRI Cervical spine with no new findings.        1/12:   EVD set at [address] [facility], ICP 3-11, 20cc out/24h. ID discontinued antibiotics overnight. Discussed case this AM; started on Rocephin 2g Q12h. Neurosurgery following and concerned about subarachnoid purulence in cisterns and cervical space. Dr. Holland Spearing considering intraoperative subarachnoid culture. Awaiting MRI Thoracic and Lumbar spine. Will consider CT facial bone with contrast.  Repeat blood culture and respiratory culture ordered. Neuro Endovascular reviewed CTA findings (concern for vasculitis) and recommended TCD which showed elevated mean velocities in the left MCA (189 max, LR 6.74). Recommended Verapamil, started on 80mg Q8h. Clinical exam remains poor. Fentanyl infusion changed to PRN dosing, will use Precedex if needs more sedation (becomes hypertensive, tachypneic intermittently).    1/12: SBP >180 ON, PRN hydralazine orderedResp cx Gram + cocci, 1/2 + blood cultures likely staph. Straight cath x2 -> amaya. In the am, pt is obtunded, not following commands. Pupils 2mm slugglishly reactive. Spontaneous movement in the left upper extremity, flicker movement in the b/l lower extremities. CT maxillofacial w contrast ordered to asses if abcesss present. NS switched to LR 80ml/hr. CSF cell count WBC decreasing 10061 -> 20074. Plan to continue with recophin for now. Family updated about management plan, mri finding and ltme.      1/13: Overnight pt was stable. More spontaneous eye opening.s ICPs in 2-8. Output 19 in the last 12 hours. TB test in process. Hyponatremic. Free water bolus started.  Tube feeds ordered. LTME DC. Plan to send csf sample for infectious workup. A line has been removed. Follow TCD doppler tomorrow. VL lower extremity duplex scan ordered. 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 CSF is positive for viridans streptococcus. Plan to continue with ceftriaxone. CT maxillofacial unremarkable for abscess or sinusitis. 1/14: Pt exam has improved a little. He opens eyes more frequently. Had few eye blinks. Withdraw to pain in all four extremities. EVD clamped only for CSF sample. Residual 400s. Xray kub concerning for ileus. Bowel regimens increased to colace, glycolax, simethicone. Free water bolus increased to 250 every 4 hours. MRI brain wo contrast eval for acute stroke in setting of vascultitis with narrowing intracranial vasculature and elevated TCD.    1/15: Overnight, pt had residual of 60cc with bile. 2 mild to moderate bowel movement. Glycerin suppository added. Verapamil increased to 125. On exam pt's brain stem reflexes are intact. Frequent eye blinking's. Localizes with left upper extremity. Plan to repeat TCD in few days. Continue with rocephin. On CPAP mode. Leukocytosis increasing. Afebrile overnight. 1/16: Pt was tachycardic overngiht. Desaturating. CTPE consistent with PE. Central line placed. Vascular surgery consulted. Plan for repeat VL duplex venous lower. Pt NPO. Possible plan for surgery tomorrow. On exam, pt is comatose. Not following commands. Brainstem reflex intact. Flicker movement in all four extremities. GI consulted for persistent ileus pattern. No BM overnight. CSF lab work sent again. CSF WBC [id number]. POA cousin updated about the management plan. No plan for neurosurgery intervention due to PE. IR guided drainage of effusions. Follow gram stain, culture, body fluid crystals, cell differentials. POA dicussed with NSG and plan to make pt DNR CC with terminal extubation. Pending paperwork.     CURRENT MEDICATIONS:  SCHEDULED MEDICATIONS:   verapamil  120 mg Per NG tube 3 times per day    lidocaine 1 % injection  5 mL IntraDERmal Once    sodium chloride flush  5-40 mL IntraVENous 2 times per day    senna  5 mL Oral Nightly    docusate  100 mg Oral Daily    polyethylene glycol  17 g Oral Daily    enoxaparin  40 mg SubCUTAneous Daily    cefTRIAXone (ROCEPHIN) IV  2,000 mg IntraVENous Q12H    insulin lispro  0-12 Units SubCUTAneous Q6H    erythromycin   Left Eye BID    atorvastatin  10 mg Per NG tube Nightly    sodium chloride flush  5-40 mL IntraVENous 2 times per day    pantoprazole  40 mg IntraVENous Daily    And    sodium chloride (PF)  10 mL IntraVENous Daily    folic acid IVPB  1 mg IntraVENous Daily    Vitamin D  1,000 Units Oral Daily    chlorhexidine  15 mL Mouth/Throat BID     CONTINUOUS INFUSIONS:   sodium chloride      lactated ringers 80 mL/hr at 22 2012    dextrose      sodium chloride       PRN MEDICATIONS:   sodium chloride flush, sodium chloride, simethicone, acetaminophen, glucose, dextrose, glucagon (rDNA), dextrose, fentanNYL, labetalol, sodium chloride flush, sodium chloride flush, sodium chloride, ondansetron **OR** ondansetron, acetaminophen **OR** acetaminophen, sodium chloride flush    VITALS:  Temperature Range: Temp: 97.9 °F (36.6 °C) Temp  Av.6 °F (36.4 °C)  Min: 97.3 °F (36.3 °C)  Max: 98.1 °F (36.7 °C)  BP Range: Systolic (88ZDW), AMB:205 , Min:111 , XJS:816     Diastolic (77TIK), ZJW:75, Min:62, Max:107    Pulse Range: Pulse  Av.7  Min: 74  Max: 122  Respiration Range: Resp  Av  Min: 0  Max: 42  Current Pulse Ox: SpO2: 96 %  24HR Pulse Ox Range: SpO2  Av %  Min: 95 %  Max: 100 %  Patient Vitals for the past 12 hrs:   BP Temp Temp src Pulse Resp SpO2   22 0500 113/82   120 23 96 %   22 0400 118/73 97.9 °F (36.6 °C) Oral 116 28 95 %   22 0314    122 24 96 %   22 0300 111/79   114 26 95 %   22 0200 135/83   115 22 96 %   01/16/22 0142 126/81   113 24 96 %   22 0100  gaze   ENT:  moist mucous membranes   NECK:  supple, symmetric   LUNGS:  Equal air entry bilaterally, clear   CARDIOVASCULAR:  normal s1 / s2, RRR, distal pulses intact   ABDOMEN:  Soft, no rigidity   NEUROLOGIC:  Mental Status:  Intubated. Sponatenous eye opening with occasional weak eye blinks. Doesnot follow commands. Cranial Nerves:    II: Visual fields:  abnormal - blink to threat  III: Pupils:  equal, round, reactive to light  III,IV,VI: Extra Ocular Movements: abnormal - not tracking  V: Corneal reflex:  completed. abnormal - weak on right, absent left  VII: Facial strength: intact  Weak cough/gag     Motor Exam:    Patient has withdrawal movement in all four extremities. No antigravity movement. No movement in the RUE. DRAINS:  [] There are no drains for Neuro Critical Care to monitor at this time. ASSESSMENT AND PLAN:       The patient is 71 yo male with a history of hypertension who presented to [facility] ED as a transfer from [facility] for ventricular debris. Initially presented to [facility] ED with altered mentation after being found down. CT Head showed debris within bilateral ventricles secondary to subacute hemorrhage vs infectious process. Noted to have right middle lobe opacification concerning for cavitary mass lesion. Labs revealed elevated CK, JOZEF, leukocytosis. Transferred for Neurosurgical evaluation. Neurosurgery consulted. EVD placed urgently 1/10. CSF studies concerning for bacterial meningitis. MRI Brain re-demonstrated debris in the ventricular system and showed enhancement of the ependymal surface of the ventricular system and leptomeningeal enhancement. ID consulted; recommending CNS dosed Rocephin. Neuro Endovasuclar consulted due to concerns for vasculitis on CTA Head/Neck and recommended TCD which showed elevated L MCA velocities. Patient was started on Verapamil. CT maxillofacial ordered. Follow Csf culture.  Csf culture 1/10 alpha hemolytic strep. On ceftraixone. EVD clamped. Free water flushes stopped. - Plan for terminal extubation. PT is DNR CC     NEUROLOGIC:  - Acute debris within the ventricular system with enhancement of the ependymal surface of the ventricular system and leptomeningeal enhancement  - Concern for bacterial meningitis/ventriculitis. Csf culture 1/10 positive for alpha hemolytic strep. CSF culture 1/10 reported today growing viridians streptoccocus.   - EVD resatrted at 10mmg.   - CSF studies; CSF studies; Glucose <2, Protein 966, >1600>28, WBC [id number], meningitis panel negative, CSF culture viridians streptoccocus. CSF protein 312.4. Glucose <4. (pending )  - Neurosurgery following; considering intraop subarachnoid culture. Pending plan for intervention in discussion about EVD replacement and a washout.  - CTA Head/Neck showed beading of the bilateral M1 MCAs and diminutive anterior and posterior cerebral arteries. MRI brain wo contrast pending: eval for acute stroke in setting of vascultitis with narrowing intracranial vasculature and elevated TCD. - Neuro Endovascular consulted; Will consider DSA if exam remain poor. Consider CTA in 7-10 days   -F/U LTME: Moderate-severe non specific encephalopathy. The presence of R temporal LPDs increases patient risk for focal seizures, can also be seen in CNS-itis. LTME dc.  - Plan for terminal extubation. PT is DNR CC       CARDIOVASCULAR:    BP Range: Systolic (09SFD), ONC:928 , Min:111 , EKH:186     Diastolic (04UFY), EGO:06, Min:62, Max:107    Pulse Range: Pulse  Av.7  Min: 74  Max: 122  - Goal -180  - Troponin 17, EKG sinus tachycardia  - Echo EF 54%    PULMONARY:    Respiration Range: Resp  Av  Min: 0  Max: 42  Current Pulse Ox: SpO2: 96 %    - Intubated in ED for acute hypoxic respiratory failure  - Plan for terminal extubation.  PT is DNR CC  - Right lung opacification possible cavitary lesion .   - CXR  showed stable nodular opacity in the right lower lung. -CTPE: consistent with PE.   - Vascular surgery: Clot burden from PE in lungs extremely small. Tachycardia 2/2 from cns infection. Repeat b/l venous duplex scans. Will evaluate for potential IVC filter if unable to anticoagulate. Hold heparin for now. RENAL/FLUID/ELECTROLYTE:    Lab Results   Component Value Date     2022    K 4.2 2022     2022    CO2 22 2022    BUN 34 2022    CREATININE 0.78 2022    GLUCOSE 170 2022    CALCIUM 8.3 2022          I/O last 3 completed shifts: In: 1450 [I.V.:7571; NG/GT:1176]  Out: 1071 [Urine:4180; Drains:135]  I/O this shift:  In: 0693 [I.V.:733; NG/GT:750]  Out: 891 [Urine:825; Drains:66]        GI/NUTRITION:  NUTRITION:  Diet NPO Exceptions are: Sips of Water with Meds  ADULT TUBE FEEDING; Nasogastric; Peptide Based; Continuous; 25; Yes; 20; Q 4 hours; 75; 30; Q 4 hours        Recent Labs     22  0445 01/15/22  0549 22  0422   WBC 22.0* 17.2* 13.1*   HGB 11.5* 10.8* 10.4*   HCT 37.6* 34.9* 34.6*   MCV 78.7* 77.4* 79.5*    333 309         ID:  Temperature Range: Temp: 97.9 °F (36.6 °C) Temp  Av.6 °F (36.4 °C)  Min: 97.3 °F (36.3 °C)  Max: 98.1 °F (36.7 °C)    - Leukocytosis resolved, WBC 19.2>17.2>22  - COVID-19 negative on 1/10  - UA 1/10 showed moderate bacteria, negative nitrite/leukocyte esterase   - Blood cultures 1/10;  positive staph coag negative  - Repeat blood cultures pending.  - F/U Sputum culture viridians streptococci  - Ventriculitis/meningitis  - CSF studies; Glucose <2, Protein 966, >1600, WBC 34697>330874, eningitis panel negative  - Follow CSF culture 1/10 showing alpha strep cocci.  Viridians streptoccocci  - ID following; recommending Rocephin CNS dosing  - Continue to monitor for fevers      HEME:     ENDOCRINE:  - - Hyperglycemia; increase to medium dose insulin sliding scale    OTHER:       PROPHYLAXIS:  Stress ulcer: DVT PROPHYLAXIS:  - SCD sleeves - Thigh High   -  Lovenox 40mg QD    DISPOSITION:  [] To remain ICU:   [] OK for out of ICU from Neuro [address] for terminal extubation.  PT is DNR Ramon Cole MD  Neuro Critical Care  1/16/2022     6:05 AM

## 2022-01-16 NOTE — PROGRESS NOTES
Neurosurgery MARYANNE/Resident    Daily Progress Note   No chief complaint on file. 1/16/2022  11:12 AM    Chart reviewed. No acute events overnight. Remains intubated. EVD 10 cm H20 overnight. Drain output 6 cc yellow fluid. ICP 0-4. Angio yesterday. VSS. WBC 17.2 (was 13.1). Vitals:    01/16/22 0750 01/16/22 0800 01/16/22 0900 01/16/22 1000   BP:  118/88 112/77 111/73   Pulse: 119 120 110 108   Resp: 23 24 22 19   Temp:       TempSrc:       SpO2: 95%      Height:             PE:   Intubated, sedated  E1 VT M4  Trace withdrawal extremities     EVD 10 cmH20, ICP 0-4 overnight, EVD is patent and site is c/d/i without signs of drainage       Lab Results   Component Value Date    WBC 22.0 (H) 01/16/2022    HGB 11.5 (L) 01/16/2022    HCT 37.6 (L) 01/16/2022     01/16/2022    CHOL 127 03/15/2021    TRIG 123 01/11/2022    HDL 27 (L) 03/15/2021    ALT 32 01/09/2022    AST 48 (H) 01/09/2022     01/16/2022    K 4.2 01/16/2022     01/16/2022    CREATININE 0.78 01/16/2022    BUN 34 (H) 01/16/2022    CO2 22 01/16/2022    TSH 0.50 01/10/2022    PSA 0.95 04/28/2021    INR 1.1 01/10/2022    LABA1C 6.7 (H) 01/11/2022       A/P    70 y.o. 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 Nayan Vazquez  - [place] [facility]      Please contact neurosurgery with any changes in patients neurologic status.        Sonia Langley MD, [facility] [place]  Neurosurgery Service  1/16/2022 at 11:12 AM         This note is created with the assistance of a speech recognition program.  While intending to generate a document that actually reflects the content of the visit, the document can still have some errors including those of syntax and sound like substitutions which may escape proof reading. In such instances, actual meaning can be extrapolated by contextual diversion.

## 2022-01-16 NOTE — ANESTHESIA PRE PROCEDURE
Department of Anesthesiology  Preprocedure Note       Name:  Brisa Median   Age:  70 y.o.  :  1950                                          MRN:  [id number]         Date:  2022      Surgeon: Skip Toro):  Kehinde Dutta DO    Procedure: craniotomy evacuation of brain abcess    Medications prior to admission:   Prior to Admission medications    Medication Sig Start Date End Date Taking?  Authorizing Provider   omeprazole (PRILOSEC) 20 MG delayed release capsule Take 1 capsule by mouth daily 3/25/21   Maylin Sen MD   atorvastatin (LIPITOR) 10 MG tablet take 1 tablet by mouth once daily 3/25/21   Maylin Sen MD   fenofibrate (TRIGLIDE) 160 MG tablet Take 1 tablet by mouth daily 3/22/21   Maylin Sen MD   hydroCHLOROthiazide (MICROZIDE) 12.5 MG capsule Take 1 capsule by mouth daily 3/9/21   Maylin Sen MD   amLODIPine (NORVASC) 5 MG tablet Take 1 tablet by mouth daily 3/9/21   Maylin Sen MD   benazepril (LOTENSIN) 40 MG tablet take 1 tablet by mouth once daily 21   Maylin Sen MD   ferrous sulfate 325 (65 FE) MG tablet Take 325 mg by mouth 3 times daily (with meals)     Historical Provider, MD       Current medications:    Current Facility-Administered Medications   Medication Dose Route Frequency Provider Last Rate Last Admin    verapamil (CALAN) tablet 120 mg  120 mg Per NG tube 3 times per day Teresita Reeves MD   120 mg at 22 0534    lidocaine 1 % injection 5 mL  5 mL IntraDERmal Once Bianca A Henna, DO        sodium chloride flush 0.9 % injection 5-40 mL  5-40 mL IntraVENous 2 times per day Bianca A Henna, DO   5 mL at 01/15/22 2028    sodium chloride flush 0.9 % injection 5-40 mL  5-40 mL IntraVENous PRN Bianca A Henna, DO        0.9 % sodium chloride infusion  25 mL IntraVENous PRN Bianca A Henna, DO        senna (SENOKOT) 8.8 MG/5ML syrup 8.8 mg  5 mL Oral Nightly ELROY Cevallos - CNP   8.8 mg at 01/15/22 2027    docusate (COLACE) 50 MG/5ML liquid 100 mg  100 mg Oral Daily Delorse Coats, APRN - CNP   100 mg at 01/15/22 0911    polyethylene glycol (GLYCOLAX) packet 17 g  17 g Oral Daily Delorse Coats, APRN - CNP   17 g at 01/15/22 0911    simethicone (MYLICON) 40 SE/1.9SH drops 40 mg  40 mg Oral Q6H PRN Delorse Coats, APRN - CNP   40 mg at 01/15/22 0911    acetaminophen (TYLENOL) tablet 650 mg  650 mg Oral Q4H PRN Yaw Alcaraz MD        lactated ringers infusion   IntraVENous Continuous Delorse Coats, APRN - CNP 80 mL/hr at 01/12/22 2352 New Bag at 01/12/22 2352    glucose (GLUTOSE) 40 % oral gel 15 g  15 g Oral PRN Delorse Coats, APRN - CNP        dextrose 50 % IV solution  12.5 g IntraVENous PRN Delorse Coats, APRN - CNP        glucagon (rDNA) injection 1 mg  1 mg IntraMUSCular PRN Delorse Coats, APRN - CNP        dextrose 5 % solution  100 mL/hr IntraVENous PRN Delorse Coats, APRN - CNP        enoxaparin (LOVENOX) injection 40 mg  40 mg SubCUTAneous Daily Delorse Coats, APRN - CNP   40 mg at 01/15/22 0910    fentaNYL (SUBLIMAZE) injection 25 mcg  25 mcg IntraVENous Q2H PRN Delorse Coats, APRN - CNP   25 mcg at 01/15/22 1808    cefTRIAXone (ROCEPHIN) 2000 mg IVPB in D5W 50ml minibag  2,000 mg IntraVENous Q12H Efrain Horan MD   Stopped at 01/15/22 2056    labetalol (NORMODYNE;TRANDATE) injection 10 mg  10 mg IntraVENous Q1H PRN Delorse Coats, APRN - CNP   10 mg at 01/15/22 1233    insulin lispro (HUMALOG) injection vial 0-12 Units  0-12 Units SubCUTAneous Q6H Delorse Coats, APRN - CNP   2 Units at 01/16/22 0245    sodium chloride flush 0.9 % injection 10 mL  10 mL IntraVENous PRN Martha Hazel APRN - NP        erythromycin [facility]) ophthalmic ointment   Left Eye BID Laurie Hansen MD   Given at 01/16/22 0534    atorvastatin (LIPITOR) tablet 10 mg  10 mg Per NG tube Nightly Bianca Aguillon, DO   10 mg at 01/15/22 2026    sodium chloride flush 0.9 % injection 5-40 mL  5-40 mL IntraVENous 2 times per day Bianca Aguillon, DO   10 mL at 01/15/22 0911    sodium chloride flush 0.9 % injection 5-40 mL  5-40 mL IntraVENous PRN Bianca A Henna, DO        0.9 % sodium chloride infusion  25 mL IntraVENous PRN Bianca A Henna, DO        ondansetron (ZOFRAN-ODT) disintegrating tablet 4 mg  4 mg Oral Q8H PRN Bianca A Henna, DO        Or    ondansetron (ZOFRAN) injection 4 mg  4 mg IntraVENous Q6H PRN Bianca A Henna, DO        acetaminophen (TYLENOL) tablet 650 mg  650 mg Oral Q6H PRN Bianca A Henna, DO        Or    acetaminophen (TYLENOL) suppository 650 mg  650 mg Rectal Q6H PRN Bianca A Henna, DO        pantoprazole (PROTONIX) injection 40 mg  40 mg IntraVENous Daily Bianca A Henna, DO   40 mg at 01/15/22 0911    And    sodium chloride (PF) 0.9 % injection 10 mL  10 mL IntraVENous Daily Bianca A Henna, DO   10 mL at 01/15/22 2028    sodium chloride flush 0.9 % injection 10 mL  10 mL IntraVENous PRN Nella Force, APRN - NP        folic acid 1 mg in dextrose 5 % 50 mL IVPB  1 mg IntraVENous Daily Raydell Sers, APRN - CNP   Stopped at 01/15/22 6324    Vitamin D (CHOLECALCIFEROL) tablet 1,000 Units  1,000 Units Oral Daily Raydell Sers, APRN - CNP   1,000 Units at 01/15/22 2372    chlorhexidine (PERIDEX) 0.12 % solution 15 mL  15 mL Mouth/Throat BID Osman Brewer MD   15 mL at 01/15/22 2026       Allergies:  No Known Allergies    Problem List:    Patient Active Problem List   Diagnosis Code    Hypertension I10    Hyperlipidemia E78.5    BPH with elevated PSA N40.0, R97.20    History of chronic fatigue syndrome Z87.898    GI bleed K92.2    Symptomatic anemia D64.9    Iron deficiency anemia D50.9    Gastroesophageal reflux disease without esophagitis K21.9    Gastritis without bleeding K29.70    Vitamin B12 deficiency E53.8    BPH with obstruction/lower urinary tract symptoms N40.1, N13.8    Neoplasm of uncertain behavior of skin D48.5    Basal cell carcinoma (BCC) of left cheek C44.319    Basal cell carcinoma of right nasal sidewall C44.311    Basal cell carcinoma of right cheek C44.319    Basal cell carcinoma of nasal tip C44. 311    Basal cell carcinoma of scalp C44.41    JOZEF (acute kidney injury) ([facility] N17.9    Acute respiratory failure (HCC) J96.00    Rhabdomyolysis M62.82    Altered mental status R41.82    Septicemia ([facility] A41.9    Closed fracture of multiple ribs of left side S22.42XA    Abrasion of left cornea S05. 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    Intracranial vascular stenosis I67.9    Cerebritis G04.90    Cerebral ischemic stroke due to global hypoperfusion with watershed infarct [facility]) I63.9       Past Medical History:        Diagnosis Date    Anemia 2016    ON IRON    BPH with elevated PSA     post biopsy    Colon polyps 2016    BENEIGN REMOVED WITH COLONOSCOPY    Elevated Gina-Barr virus antibody titer 1989    NEVER TREATED FOR    Elevated fasting glucose     History of blood transfusion 2016    R/T INTESTINAL BLEEDING    History of chronic fatigue syndrome 1989    RESOLVED     History of GI bleed 2016    BROUGHT ON BY MEDS---NIACIN, FISH OIL AND VIT C    Hyperlipidemia 2014    (triglycerides-ELEVATED, TRYING TO CONTROL WITH DIET    Hypertension 1999    ON RX    Wears glasses        Past Surgical History:        Procedure Laterality Date    COLONOSCOPY  2000    internal hemorrhoids    COLONOSCOPY  02/02/2015    polypx1, repeat 5yrs due to family hx & hx polyps- brannon    COLONOSCOPY  12/09/2015    polyp X2  int. Hemorrhoids  partial prolapse of ileocecal valve    COLONOSCOPY  11/2016    COLONOSCOPY N/A 11/12/2020    COLONOSCOPY POLYPECTOMY SNARE/HOT BIOPSY performed by [place] Meza DO at [address] [place] [address] Rd Bilateral 1958    to correct flat arches (age 9)    PRE-MALIGNANT / BENIGN SKIN LESION EXCISION Right 10/15/2021    v-EXCISION Lesion Right cheek, Right nose, scalp, nasal tip performed by Chetan Boswell MD at [facility] [address] Bilateral 12/04/2012    benign    PROSTATE BIOPSY Bilateral 07/2014    benign    PROSTATECTOMY  12/10/2019    LAPAROSCOPIC ROBOTIC SIMPLE PROSTATECTOMY     PROSTATECTOMY N/A 12/10/2019    XI LAPAROSCOPIC ROBOTIC SIMPLE PROSTATECTOMY performed by Myriam Wu MD at [address]  12/8/15    Normal upper GI tract, no evidence of blood in upper GI tract, mild distal esophagitis    UPPER GASTROINTESTINAL ENDOSCOPY  11/2016       Social History:    Social History     Tobacco Use    Smoking status: Light Tobacco Smoker     Packs/day: 0.01     Years: 10.00     Pack years: 0.10     Types: Pipe     Start date: 1/1/1970    Smokeless tobacco: Never Used    Tobacco comment: Rare pipe smoker. 4 BOWLS A WEEK No inhalation. Has never smoked cigarettes. Substance Use Topics    Alcohol use: Yes     Alcohol/week: 0.0 standard drinks     Comment: WHISKEY OR WINE- 1 OR 2 A MONTH                                Ready to quit: Not Answered  Counseling given: Not Answered  Comment: Rare pipe smoker. 4 BOWLS A WEEK No inhalation. Has never smoked cigarettes. Vital Signs (Current):   Vitals:    01/16/22 0400 01/16/22 0500 01/16/22 0600 01/16/22 0700   BP: 118/73 113/82 117/83 (!) 117/91   Pulse: 116 120 119 119   Resp: 28 23 26 22   Temp: 97.9 °F (36.6 °C)      TempSrc: Oral      SpO2: 95% 96% 96% 96%   Height:                                                  BP Readings from Last 3 Encounters:   01/16/22 (!) 117/91   01/09/22 (!) 168/103   01/03/22 132/86       NPO Status:                                                                                 BMI:   Wt Readings from Last 3 Encounters:   01/09/22 200 lb (90.7 kg)   01/03/22 202 lb 6 oz (91.8 kg)   12/14/21 208 lb (94.3 kg)     Body mass index is 28.7 kg/m².     CBC:   Lab Results   Component Value Date    WBC 22.0 01/16/2022    RBC 4.78 01/16/2022    HGB 11.5 01/16/2022    HCT 37.6 01/16/2022    MCV 78.7 01/16/2022    RDW 17.9 01/16/2022     01/16/2022       CMP:   Lab Results   Component Value Date     01/16/2022    K 4.2 01/16/2022     01/16/2022    CO2 22 01/16/2022    BUN 34 01/16/2022    CREATININE 0.78 01/16/2022    GFRAA >60 01/16/2022    LABGLOM >60 01/16/2022    GLUCOSE 170 01/16/2022    PROT 8.1 01/09/2022    CALCIUM 8.3 01/16/2022    BILITOT 0.93 01/09/2022    ALKPHOS 85 01/09/2022    AST 48 01/09/2022    ALT 32 01/09/2022       POC Tests:   Recent Labs     01/16/22  0243   POCGLU 173*       Coags:   Lab Results   Component Value Date    PROTIME 11.2 01/10/2022    INR 1.1 01/10/2022    APTT 22.4 01/10/2022       HCG (If Applicable): No results found for: PREGTESTUR, PREGSERUM, HCG, HCGQUANT     ABGs: No results found for: PHART, PO2ART, PGB3BHN, ZRN3RUX, BEART, B7PJXGCU     Type & Screen (If Applicable):  No results found for: LABABO, LABRH    Drug/Infectious Status (If Applicable):  Lab Results   Component Value Date    HEPCAB NONREACTIVE 01/10/2022       COVID-19 Screening (If Applicable):   Lab Results   Component Value Date    COVID19 Not Detected 01/10/2022    COVID19 Not Detected 01/03/2022    COVID19 Not Detected 11/06/2020           Anesthesia Evaluation  Patient summary reviewed no history of anesthetic complications:   Airway: Mallampati: Unable to assess / NA        Dental:          Pulmonary:normal exam    (+) pneumonia: unresolved,                             Cardiovascular:    (+) hypertension: no interval change,       ECG reviewed  Rhythm: regular  Rate: normal  Echocardiogram reviewed                  Neuro/Psych:   (+) CVA: no interval change, neuromuscular disease:,             GI/Hepatic/Renal:   (+) GERD: no interval change,           Endo/Other: Negative Endo/Other ROS                    Abdominal:             Vascular: negative vascular ROS.          Other Findings:             Anesthesia Plan      general     ASA 4 - emergent       Induction: intravenous. Plan discussed with CRNA.                   Soco Elam MD   1/16/2022

## 2022-01-16 NOTE — PLAN OF CARE
Problem: Falls - Risk of:  Goal: Will remain free from falls  Description: Will remain free from falls  1/16/2022 0518 by Rigoberto Goodson RN  Outcome: Ongoing  1/15/2022 1523 by Taniya Krishna RN  Outcome: Ongoing  Goal: Absence of physical injury  Description: Absence of physical injury  1/16/2022 0518 by Rigoberto Goodson RN  Outcome: Ongoing  1/15/2022 1523 by Taniya Krishna RN  Outcome: Ongoing     Problem: Nutrition  Goal: Optimal nutrition therapy  1/16/2022 0518 by Rigoberto Goodson RN  Outcome: Ongoing  1/15/2022 1523 by Taniya Krishna RN  Outcome: Ongoing     Problem: OXYGENATION/RESPIRATORY FUNCTION  Goal: Patient will maintain patent airway  1/16/2022 0518 by Rigoberto Goodson RN  Outcome: Ongoing  1/15/2022 2244 by Tess Alexis RCP  Outcome: Ongoing  1/15/2022 1523 by Taniya Krishna RN  Outcome: Ongoing  Goal: Patient will achieve/maintain normal respiratory rate/effort  Description: Respiratory rate and effort will be within normal limits for the patient  1/16/2022 0518 by Rigoberto Goodson RN  Outcome: Ongoing  1/15/2022 2244 by Tess Alexis, RCP  Outcome: Ongoing  1/15/2022 1523 by Taniya Krishna RN  Outcome: Ongoing     Problem: MECHANICAL VENTILATION  Goal: Patient will maintain patent airway  1/16/2022 0518 by Rigoberto Goodson RN  Outcome: Ongoing  1/15/2022 2244 by Tess Alexis, RCP  Outcome: Ongoing  1/15/2022 1523 by Taniya Krishna RN  Outcome: Ongoing  Goal: Oral health is maintained or improved  1/16/2022 0518 by Rigoberto Goodson RN  Outcome: Ongoing  1/15/2022 2244 by Tess Alexis, RCP  Outcome: Ongoing  1/15/2022 1523 by Taniya Krishna RN  Outcome: Ongoing  Goal: ET tube will be managed safely  1/16/2022 0518 by Rigoberto Goodson RN  Outcome: Ongoing  1/15/2022 2244 by Tess Alexis RCP  Outcome: Ongoing  1/15/2022 1523 by Taniya Krishna RN  Outcome: Ongoing  Goal: Ability to express needs and understand communication  1/16/2022 0518 by Rigoberto Goodson RN  Outcome: Ongoing  1/15/2022 2244 by Tess Alexis, RCP  Outcome: Ongoing  1/15/2022 1523 by Wen Desai RN  Outcome: Ongoing  Goal: Mobility/activity is maintained at optimum level for patient  1/16/2022 0518 by Raegan Banks RN  Outcome: Ongoing  1/15/2022 2244 by MARS CasarezP  Outcome: Ongoing  1/15/2022 1523 by Wen Desai RN  Outcome: Ongoing     Problem: SKIN INTEGRITY  Goal: Skin integrity is maintained or improved  1/16/2022 0518 by Raegan Banks RN  Outcome: Ongoing  1/15/2022 2244 by MARS CasarezP  Outcome: Ongoing  1/15/2022 1523 by Wen Desai RN  Outcome: Ongoing     Problem: NUTRITION  Goal: Nutritional status is improving  1/16/2022 0518 by Raegan Banks RN  Outcome: Ongoing  1/15/2022 1523 by Wen Desai RN  Outcome: Ongoing     Problem: Skin Integrity:  Goal: Will show no infection signs and symptoms  Description: Will show no infection signs and symptoms  1/16/2022 0518 by Raegan Banks RN  Outcome: Ongoing  1/15/2022 1523 by Wen Desai RN  Outcome: Ongoing  Goal: Absence of new skin breakdown  Description: Absence of new skin breakdown  1/16/2022 0518 by Raegan Banks RN  Outcome: Ongoing  1/15/2022 1523 by Wen Desai RN  Outcome: Ongoing     Problem: HEMODYNAMIC STATUS  Goal: Patient has stable vital signs and fluid balance  1/16/2022 0518 by Raegan Bansk RN  Outcome: Ongoing  1/15/2022 1523 by Wen Desai RN  Outcome: Ongoing     Problem: ACTIVITY INTOLERANCE/IMPAIRED MOBILITY  Goal: Mobility/activity is maintained at optimum level for patient  1/16/2022 0518 by Raegan Banks RN  Outcome: Ongoing  1/15/2022 2244 by Roxana Ledbetter RCP  Outcome: Ongoing  1/15/2022 1523 by Wen Desai RN  Outcome: Ongoing     Problem: COMMUNICATION IMPAIRMENT  Goal: Ability to express needs and understand communication  1/16/2022 0518 by Raegan Bansk RN  Outcome: Ongoing  1/15/2022 2244 by Roxana Ledbetter RCP  Outcome: Ongoing  1/15/2022 1523 by Wen Desai RN  Outcome: Ongoing

## 2022-01-16 NOTE — CONSULTS
THE [facility] [place] [facility]  Consultation Note     . REASON FOR CONSULTATION:    Ileus on x-ray  Constipation  Admitted in [facility] for CNS infection s/p EVD    HISTORY OF PRESENT ILLNESS:        This is a 70 y.o. male with PMH including HPI taken from staff and family as patient is currently intubated and unable to contribute  Patient had dental work approximately 3 weeks ago. Patient developed encephalopathy was found down at home, CT head showed an MRI showed prominent intra ventricular debris. Patient was taken for EVD placement. CSF fluid grew Streptococcus Verandas and is currently being treated with Rocephin. 1/14/2022 KUB was concerning for ileus. Patient received several suppositories yesterday along with Colace and 16 ounces of mag citrate. 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 and right upper quadrant, dilated fluid-filled bowel, mildly distended fluid-filled stomach-findings suggestive of a generalized ileus-see report for details    Patient is not receiving narcotics or other constipating medications  OG is placed and patient tolerating tube feeds  Patient will be going to the OR either today or tomorrow per staff for possible washout and/or replacement of EVD    BMP unremarkable except elevated BUN of 34, CBC shows WBCs 22, hemoglobin 11.5, MCV 78.7, platelets 115, INR 1.1    On physical exam abdomen is distended with tympanic sounds but soft      Previous GI history:   Family state patient had colonoscopy approximately 1 year ago at [facility] records available in care everywhere or [facility].      Past Medical/Social/Family History:  Past Medical History:   Diagnosis Date    Anemia 2016    ON IRON    BPH with elevated PSA     post biopsy    Colon polyps 2016    BENEIGN REMOVED WITH COLONOSCOPY    Elevated Gina-Barr virus antibody titer 1989    NEVER TREATED FOR    Elevated fasting glucose     delayed release capsule Take 1 capsule by mouth daily 3/25/21   Maria Luz Shepherd MD   atorvastatin (LIPITOR) 10 MG tablet take 1 tablet by mouth once daily 3/25/21   Maria Luz Shepherd MD   fenofibrate (TRIGLIDE) 160 MG tablet Take 1 tablet by mouth daily 3/22/21   Maria Luz Shepherd MD   hydroCHLOROthiazide (MICROZIDE) 12.5 MG capsule Take 1 capsule by mouth daily 3/9/21   Maria Luz Shepherd MD   amLODIPine (NORVASC) 5 MG tablet Take 1 tablet by mouth daily 3/9/21   Maria Luz Shepherd MD   benazepril (LOTENSIN) 40 MG tablet take 1 tablet by mouth once daily 2/18/21   Maria Luz Shepherd MD   ferrous sulfate 325 (65 FE) MG tablet Take 325 mg by mouth 3 times daily (with meals)     Historical Provider, MD     .  Current Medications:  Scheduled Meds:   verapamil  120 mg Per NG tube 3 times per day    lidocaine 1 % injection  5 mL IntraDERmal Once    sodium chloride flush  5-40 mL IntraVENous 2 times per day    senna  5 mL Oral Nightly    docusate  100 mg Oral Daily    polyethylene glycol  17 g Oral Daily    enoxaparin  40 mg SubCUTAneous Daily    cefTRIAXone (ROCEPHIN) IV  2,000 mg IntraVENous Q12H    insulin lispro  0-12 Units SubCUTAneous Q6H    erythromycin   Left Eye BID    atorvastatin  10 mg Per NG tube Nightly    sodium chloride flush  5-40 mL IntraVENous 2 times per day    pantoprazole  40 mg IntraVENous Daily    And    sodium chloride (PF)  10 mL IntraVENous Daily    folic acid IVPB  1 mg IntraVENous Daily    Vitamin D  1,000 Units Oral Daily    chlorhexidine  15 mL Mouth/Throat BID     . Continuous Infusions:   sodium chloride      lactated ringers 80 mL/hr at 01/12/22 2352    dextrose      sodium chloride       .   PRN Meds:sodium chloride flush, sodium chloride, simethicone, acetaminophen, glucose, dextrose, glucagon (rDNA), dextrose, fentanNYL, labetalol, sodium chloride flush, sodium chloride flush, sodium chloride, ondansetron **OR** ondansetron, acetaminophen **OR** acetaminophen, sodium chloride flush    REVIEW OF SYSTEMS:     CHELLY patient intubated-    PHYSICAL EXAM:    /81   Pulse 117   Temp 99.5 °F (37.5 °C) (Oral)   Resp 22   Ht 5' 10\" (1.778 m)   SpO2 95%   BMI 28.70 kg/m²   . TMAX[24]    General: Very ill-appearing  Head:  Normocephalic, Atraumatic  EENT: EOMI, Sclera not icteric, Oropharynx moist  Neck:  Supple, Trachea midline  Lungs:CTA Bilaterally  Heart: RRR, No murmur, No rub, No gallop, PMI nondisplaced. Abdomen:Soft, Non tender,  distended, BS tympanic,  No masses. No hepatomegalia   Ext:No clubbing. No cyanosis. No edema. Skin: No rashes. No jaundice. No stigmata of liver disease. Neuro: CHELLY patient intubated-    Imaging:       Hemotological labs: Anemia studies:  No results for input(s): LABIRON, TIBC, FERRITIN, [id number], FOLATE, OCCULTBLD in the last 72 hours. CBC:  Recent Labs     01/14/22  0422 01/15/22  0549 01/16/22  0445   WBC 13.1* 17.2* 22.0*   HGB 10.4* 10.8* 11.5*   MCV 79.5* 77.4* 78.7*   RDW 17.2* 17.5* 17.9*    333 368       PT/INR:  No results for input(s): PROTIME, INR in the last 72 hours. BMP:  Recent Labs     01/14/22  0422 01/15/22  0549 01/16/22  0445   * 141 138   K 4.6 4.7 4.2   * 111* 104   CO2 22 21 22   BUN 42* 37* 34*   CREATININE 0.84 0.62* 0.78   GLUCOSE 188* 130* 170*   CALCIUM 8.5* 8.5* 8.3*       Liver work up:  Hepatitis Functional Panel:  No results for input(s): ALKPHOS, ALT, AST, PROT, BILITOT, BILIDIR, LABALBU in the last 72 hours. Amylase/Lipase/Ammonia:  No results for input(s): AMYLASE, LIPASE, AMMONIA in the last 72 hours.     Acute Hepatitis Panel:  Lab Results   Component Value Date    HEPBSAG NONREACTIVE 01/10/2022    HEPCAB NONREACTIVE 01/10/2022    HEPBIGM NONREACTIVE 01/10/2022    HEPAIGM NONREACTIVE 01/10/2022            Active Problems:    JOZEF (acute kidney injury) ([facility] [address]    Acute respiratory failure (HCC)    Rhabdomyolysis    Altered mental status    Septicemia (HCC)    Closed fracture of multiple ribs of left side    Abrasion of left cornea    Pneumonia of right middle lobe due to infectious organism    Altered mental state    Ventilator dependence (HCC)    Cerebral ventriculitis    Intracranial vascular stenosis    Cerebritis    Cerebral ischemic stroke due to global hypoperfusion with watershed infarct [facility])  Resolved Problems:    Traumatic rhabdomyolysis ([address]       GI Impression and recommendations and plan:    19-year-old male with Streptococci on CSF, respiratory failure/intubation and 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 A possible jejunal mass could be contributing to ileus and constipation.  -Patient received laxation yesterday with no results  -No abdominal imaging completed today      1. Will obtain stat KUB to further assess for constipation vs ileus vs obstruction  2. Recommend avoidance of any constipating medications such as narcotics, anticholinergics, etc  3. Continue tube feeds-management per dietitian  4. Recommend continuing bowel regimen  5. Complete plan of care to follow second round with Dr. eMli العراقي later this afternoon      This plan was formulated in collaboration with Dr. Suma Barnett MD      Electronically signed by:  ELROY Lott - [address] [facility]  [phone]  1/16/2022    2:25 PM     This note was created with the assistance of a speech-recognition program.  Although the intention is to generate a document that actually reflects the content of the visit, no guarantees can be provided that every mistake has been identified and corrected by editing.

## 2022-01-16 NOTE — PLAN OF CARE
Problem: OXYGENATION/RESPIRATORY FUNCTION  Goal: Patient will maintain patent airway  1/15/2022 2244 by Lucia Johnson RCP  Outcome: Ongoing     Problem: OXYGENATION/RESPIRATORY FUNCTION  Goal: Patient will achieve/maintain normal respiratory rate/effort  Description: Respiratory rate and effort will be within normal limits for the patient  1/15/2022 2244 by Lucia Johnson RCP  Outcome: Ongoing     Problem: MECHANICAL VENTILATION  Goal: Patient will maintain patent airway  1/15/2022 2244 by Lucia Johnson RCP  Outcome: Ongoing     Problem: MECHANICAL VENTILATION  Goal: Oral health is maintained or improved  1/15/2022 2244 by Lucia Johnson RCP  Outcome: Ongoing     Problem: MECHANICAL VENTILATION  Goal: ET tube will be managed safely  1/15/2022 2244 by Lucia Johnson RCP  Outcome: Ongoing     Problem: MECHANICAL VENTILATION  Goal: Ability to express needs and understand communication  1/15/2022 2244 by Lucia Johnson RCP  Outcome: Ongoing     Problem: MECHANICAL VENTILATION  Goal: Mobility/activity is maintained at optimum level for patient  1/15/2022 2244 by Lucia Johnson RCP  Outcome: Ongoing     Problem: SKIN INTEGRITY  Goal: Skin integrity is maintained or improved  1/15/2022 2244 by Lucia Johnson RCP  Outcome: Ongoing     Problem: ACTIVITY INTOLERANCE/IMPAIRED MOBILITY  Goal: Mobility/activity is maintained at optimum level for patient  1/15/2022 2244 by Lucia Johnson RCP  Outcome: Ongoing     Problem: COMMUNICATION IMPAIRMENT  Goal: Ability to express needs and understand communication  1/15/2022 2244 by Lucia Johnson RCP  Outcome: Ongoing

## 2022-01-16 NOTE — CONSULTS
[address]      Patient's Name/ Date of Birth/ Gender: Peg Muniz / [birth year] (70 y.o.) / male     Referring Physician: Buster Cortes MD    Consulting Physician: Dr. Constanza Green    History of present Illness: Pt is a 70 y.o. male admitted 1/9/22 as a transfer from OSH after being found down at home. Patient worked up for stroke and trauma, noted to have inflammation of the meninges on imaging with concern for purulence in the cerebral ventricles. Patient underwent EVD placement with cultures of blood and CSF yielding results of strep viridans in the CSF and possible coagulase negative staph in blood vs. Contaminant. DVT study was performed 1/11/22 with chronic RLE gastroc and peroneal DVT noted without proximal involvement. Due to worsening tachycardia and occasional desaturation, CT PE ordered today and noted to have small bilateral subsegmental emboli, patient has been on lovenox since 1/11/22. Vascular surgery consulted for evaluation. On exam patient noted to be on minimal vent settings and w/ O2 saturation 98%, pt tachycardic to 105 appears sinus on monitor, patient imaging reviewed and noted to have cavitary lesion in R lung periphery, WBC increasing on abx, repeat CSF culture being drawn. Past Medical History:  has a past medical history of Anemia, BPH with elevated PSA, Colon polyps, Elevated Gina-Barr virus antibody titer, Elevated fasting glucose, History of blood transfusion, History of chronic fatigue syndrome, History of GI bleed, Hyperlipidemia, Hypertension, and Wears glasses. Past Surgical History:   Past Surgical History:   Procedure Laterality Date    COLONOSCOPY  2000    internal hemorrhoids    COLONOSCOPY  02/02/2015    polypx1, repeat 5yrs due to family hx & hx polyps- brannon    COLONOSCOPY  12/09/2015    polyp X2  int. Hemorrhoids  partial prolapse of ileocecal valve    COLONOSCOPY  11/2016    COLONOSCOPY N/A 11/12/2020 COLONOSCOPY POLYPECTOMY SNARE/HOT BIOPSY performed by Preston Cm DO at [facility] [address] Bilateral 1958    to correct flat arches (age 9)    PRE-MALIGNANT / BENIGN SKIN LESION EXCISION Right 10/15/2021    v-EXCISION Lesion Right cheek, Right nose, scalp, nasal tip performed by Kandi Martell MD at [facility] Bilateral 12/04/2012    benign    PROSTATE BIOPSY Bilateral 07/2014    benign    PROSTATECTOMY  12/10/2019    LAPAROSCOPIC ROBOTIC SIMPLE PROSTATECTOMY     PROSTATECTOMY N/A 12/10/2019    XI LAPAROSCOPIC ROBOTIC SIMPLE PROSTATECTOMY performed by Collin Molina MD at [facility]  12/8/15    Normal upper GI tract, no evidence of blood in upper GI tract, mild distal esophagitis    UPPER GASTROINTESTINAL ENDOSCOPY  11/2016       Social History:  reports that he has been smoking pipe. He started smoking about 52 years ago. He has a 0.10 pack-year smoking history. He has never used smokeless tobacco. He reports current alcohol use. He reports that he does not use drugs. Family History: family history includes Cancer in his mother; Diabetes in his father; Heart Disease in his father; High Blood Pressure in his father and mother. Review of Systems:   Unable to obtain due to patient's condition    Allergies: Patient has no known allergies.     Current Meds:  Current Facility-Administered Medications:     verapamil (CALAN) tablet 120 mg, 120 mg, Per NG tube, 3 times per day, Cristine Goodpasture, MD, 120 mg at 01/16/22 0534    lidocaine 1 % injection 5 mL, 5 mL, IntraDERmal, Once, Bianca Aguillon DO    sodium chloride flush 0.9 % injection 5-40 mL, 5-40 mL, IntraVENous, 2 times per day, Bianca Aguillon DO, 5 mL at 01/15/22 2028    sodium chloride flush 0.9 % injection 5-40 mL, 5-40 mL, IntraVENous, PRN, Bianca Aguillon DO    0.9 % sodium chloride infusion, 25 mL, IntraVENous, PRN, Bianca Aguillon DO    senna (SENOKOT) 8.8 MG/5ML syrup 8.8 mg, 5 mL, Oral, Nightly, Breana Leonardo APRN - CNP, 8.8 mg at 01/15/22 2027    docusate (COLACE) 50 MG/5ML liquid 100 mg, 100 mg, Oral, Daily, Breana Leonardo APRN - CNP, 100 mg at 01/15/22 0911    polyethylene glycol (GLYCOLAX) packet 17 g, 17 g, Oral, Daily, Breana Leonardo, APRN - CNP, 17 g at 01/15/22 0911    simethicone (MYLICON) 40 DH/9.3VO drops 40 mg, 40 mg, Oral, Q6H PRN, Breana Leonardo APRN - CNP, 40 mg at 01/15/22 0911    acetaminophen (TYLENOL) tablet 650 mg, 650 mg, Oral, Q4H PRN, Ansley Regalado MD    lactated ringers infusion, , IntraVENous, Continuous, Breana Leonardo APRN - CNP, Last Rate: 80 mL/hr at 01/12/22 2352, New Bag at 01/12/22 2352    glucose (GLUTOSE) 40 % oral gel 15 g, 15 g, Oral, PRN, Breana Leonardo, APRN - CNP    dextrose 50 % IV solution, 12.5 g, IntraVENous, PRN, Breana Leonardo, APRN - CNP    glucagon (rDNA) injection 1 mg, 1 mg, IntraMUSCular, PRN, Breana Leonardo, APRN - CNP    dextrose 5 % solution, 100 mL/hr, IntraVENous, PRN, Breana Velardeo, APRN - CNP    enoxaparin (LOVENOX) injection 40 mg, 40 mg, SubCUTAneous, Daily, Breana Leonardo APRN - CNP, 40 mg at 01/15/22 0910    fentaNYL (SUBLIMAZE) injection 25 mcg, 25 mcg, IntraVENous, Q2H PRN, Breana Leonardo APRN - CNP, 25 mcg at 01/15/22 1808    cefTRIAXone (ROCEPHIN) 2000 mg IVPB in D5W 50ml minibag, 2,000 mg, IntraVENous, Q12H, Betito Benavidez MD, Stopped at 01/15/22 2056    labetalol (NORMODYNE;TRANDATE) injection 10 mg, 10 mg, IntraVENous, Q1H PRN, Breana Leonardo APRN - CNP, 10 mg at 01/15/22 1233    insulin lispro (HUMALOG) injection vial 0-12 Units, 0-12 Units, SubCUTAneous, Q6H, Breana Leonardo APRN - CNP, 2 Units at 01/16/22 0245    sodium chloride flush 0.9 % injection 10 mL, 10 mL, IntraVENous, PRN, Severo Bring, APRN - NP    erythromycin [facility]) ophthalmic ointment, , Left Eye, BID, Ana Lu MD, Given at 01/16/22 0534    atorvastatin (LIPITOR) tablet 10 mg, 10 mg, Per NG tube, Nightly, Bianca Aguillon DO, 10 mg at 01/15/22 2026    sodium chloride flush 0.9 % injection 5-40 mL, 5-40 mL, IntraVENous, 2 times per day, Bianca Aguillon, DO, 10 mL at 01/15/22 0911    sodium chloride flush 0.9 % injection 5-40 mL, 5-40 mL, IntraVENous, PRN, Bianca Aguillon DO    0.9 % sodium chloride infusion, 25 mL, IntraVENous, PRN, Bianca Aguillon, DO    ondansetron (ZOFRAN-ODT) disintegrating tablet 4 mg, 4 mg, Oral, Q8H PRN **OR** ondansetron (ZOFRAN) injection 4 mg, 4 mg, IntraVENous, Q6H PRN, Bianca Aguillon, DO    acetaminophen (TYLENOL) tablet 650 mg, 650 mg, Oral, Q6H PRN **OR** acetaminophen (TYLENOL) suppository 650 mg, 650 mg, Rectal, Q6H PRN, Bianca Aguillon DO    pantoprazole (PROTONIX) injection 40 mg, 40 mg, IntraVENous, Daily, 40 mg at 01/15/22 0911 **AND** sodium chloride (PF) 0.9 % injection 10 mL, 10 mL, IntraVENous, Daily, Bianca Aguillon DO, 10 mL at 01/15/22 2028    sodium chloride flush 0.9 % injection 10 mL, 10 mL, IntraVENous, PRN, ELROY Momin NP    folic acid 1 mg in dextrose 5 % 50 mL IVPB, 1 mg, IntraVENous, Daily, ELROY Stahl CNP, Stopped at 01/15/22 2485    Vitamin D (CHOLECALCIFEROL) tablet 1,000 Units, 1,000 Units, Oral, Daily, ELROY Stahl CNP, 1,000 Units at 01/15/22 0909    chlorhexidine (PERIDEX) 0.12 % solution 15 mL, 15 mL, Mouth/Throat, BID, David Larios MD, 15 mL at 01/15/22 2026    Vital Signs:  Vitals:    01/16/22 0750   BP:    Pulse: 119   Resp: 23   Temp:    SpO2: 95%       Physical Exam:  Vital signs and Nurse's note reviewed  Gen:  Sedated, intubated, ill appearing  HEENT: PERRLA, mucosa dry, ETT and OGT in place, EVD in place and draining to bedside  Neck: Supple  CVS: sinus tachycardia on monitor  Resp: equal chest rise and fall, synchronous with ventilator  Abd: soft, non-tender, non-distended, low midline scar noted  Ext: edema in LUE due to IV infiltration, trace dependent edema in BLE, no trauma, erythema, or callor noted in extremities, pulses 1+ BLE and 2+ BUE  CNS: small amount of head movement with pain, not moving extremities  Skin: No erythema or ulcerations     Labs:   Lab Results   Component Value Date    WBC 22.0 01/16/2022    HGB 11.5 01/16/2022    HCT 37.6 01/16/2022    MCV 78.7 01/16/2022     01/16/2022     Lab Results   Component Value Date     01/16/2022    K 4.2 01/16/2022     01/16/2022    CO2 22 01/16/2022    BUN 34 01/16/2022    CREATININE 0.78 01/16/2022    GLUCOSE 170 01/16/2022    CALCIUM 8.3 01/16/2022     Lab Results   Component Value Date    INR 1.1 01/10/2022       Imaging:  CT HEAD WO CONTRAST    Result Date: 1/16/2022  No significant interval change since yesterday's noncontrast MRI. Endotracheal and OG tubes in place. The OG tube is looped in the pharynx. Bilateral mastoid and right middle ear cavity opacification much worse on the right. Infection not excluded. XR CHEST PORTABLE    Result Date: 1/16/2022  No pneumothorax post right IJ line placement. Other tubes and lines as above. Continued right focal opacity laterally it the base. XR CHEST PORTABLE    Result Date: 1/16/2022  Right lower lobe airspace disease similar to prior     XR CHEST PORTABLE    Result Date: 1/15/2022  Tubes as above. Right basilar pneumonia. CT CHEST PULMONARY EMBOLISM W CONTRAST    Result Date: 1/16/2022  1. Acute bilateral pulmonary embolism as described above. There is no evidence of right ventricular strain. 2. Slight increase in the size of a cavitary mass in the right middle lobe abutting the lateral pleural surface. Pulmonary abscess would be favored over empyema due to location. Fungal infection considered less likely. Underlying malignancy can not be definitively excluded and continued follow-up is advised. 3. Trace bilateral pleural effusions appear relatively stable with dependent airspace opacities in both lower lobes. Findings were discussed with Brandon Salazar RN, at 9:52 am on 1/16/2022.      CT CHEST ABDOMEN PELVIS W CONTRAST    Result Date: 1/15/2022  No acute CT finding in the chest; slightly larger cavitated lesion lateral segment RML. Slightly larger small bilateral pleural effusions with associated mild compressive atelectasis. Unchanged differential including infectious or inflammatory etiologies and neoplasm. 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 No associated adenopathy identified. Dilated fluid-filled bowel, mostly ileal and right colon to the splenic flexure with air-fluid levels but no clear cut colitis, or focal lesion splenic flexure, as noted. Mildly distended fluid-filled stomach. Small amount of pathologic free fluid, mostly in the pelvis. No free air. Findings more suggestive of generalized ileus; if there is clinical concern for developing obstruction, consider SBFT. Bilateral hip joint effusions; correlate clinically. Slightly larger but small anterior pericardial effusion. Rangel catheter in place with a small amount of air and contrast in the urinary bladder. Enlarged unchanged appearing prostate with rightward lobular extension. Vicarious gallbladder contrast excretion and cholelithiasis without cholecystitis. Additional unchanged or likely incidental findings, as detailed above. RECOMMENDATIONS: Unavailable     MRI BRAIN WO CONTRAST    Result Date: 1/14/2022  1. There are foci of diffusion restriction in the right frontal, parietal, and occipital lobes, concerning for acute infarction. 2. Layering debris in the ventricles with diffusion restriction is consistent with ventriculitis. This appears slightly increased from prior examination. There is also increased ventriculomegaly despite right frontal approach ventricular shunt catheter. 3. Mastoid air cell effusions.  RECOMMENDATIONS: Unavailable         Impression:  Patient Active Problem List   Diagnosis    Hypertension    Hyperlipidemia    BPH with elevated PSA    History of chronic fatigue syndrome    GI bleed  Symptomatic anemia    Iron deficiency anemia    Gastroesophageal reflux disease without esophagitis    Gastritis without bleeding    Vitamin B12 deficiency    BPH with obstruction/lower urinary tract symptoms    Neoplasm of uncertain behavior of skin    Basal cell carcinoma (BCC) of left cheek    Basal cell carcinoma of right nasal sidewall    Basal cell carcinoma of right cheek    Basal cell carcinoma of nasal tip    Basal cell carcinoma of scalp    JOZEF (acute kidney injury) ([address]    Acute respiratory failure (HCC)    Rhabdomyolysis    Altered mental status    Septicemia (HCC)    Closed fracture of multiple ribs of left side    Abrasion of left cornea    Pneumonia of right middle lobe due to infectious organism    Altered mental state    Ventilator dependence ([address]    Cerebral ventriculitis    Intracranial vascular stenosis    Cerebritis    Cerebral ischemic stroke due to global hypoperfusion with watershed infarct ([address]       1. Bilateral subsegmental pulmonary emboli  2. Chronic RLE distal DVT  3. LUE edema secondary to IV infiltration    Recommendation:    1. No acute vascular surgical intervention, clot burden in lungs extremely small and not likely to cause any significant cardiopulmonary changes. Ventilation requirements remain minimal, tachycardia most likely secondary to known central infection. 2. Repeat bilateral lower extremity venous duplex scan, if large clot burden proximal DVT noted, AND plans for surgery, will evaluate for potential IVC filter if unable to anticoagulate. Ideally patient should be initiated on heparin gtt with plan transition to PO anticoagulation of primary team's choosing for one year duration as patient will likely be at continued increased VTE risk due to severity of current illness. 3. Recommend bilateral lower extremity RAVI hose to reduce edema, may use EPC cuffs as well  4.  Elevate LUE above level of heart, may place compression sleeve or loose ace wrap to aid in swelling reduction    Vascular surgery will follow up venous duplex imaging, if no large clot burden then plan to sign off at that time. Thank you and if you have any questions please contact us.        Electronically signed by Ena Saldana DO on 1/16/2022 at 10:34 AM

## 2022-01-16 NOTE — PROGRESS NOTES
Endovascular Neurosurgery Progress Note    SUBJECTIVE:   No reported acute events overnight     Review of Systems:  CONSTITUTIONAL:  negative for fevers, chills, fatigue and malaise    EYES:  negative for double vision, blurred vision and photophobia     HEENT:  negative for tinnitus, epistaxis and sore throat    RESPIRATORY:  negative for cough, shortness of breath, wheezing    CARDIOVASCULAR:  negative for chest pain, palpitations, syncope, edema    GASTROINTESTINAL:  negative for nausea, vomiting    GENITOURINARY:  negative for incontinence    MUSCULOSKELETAL:  negative for neck or back pain    NEUROLOGICAL:  Negative for weakness and tingling  negative for headaches and dizziness    PSYCHIATRIC:  negative for anxiety      Review of systems otherwise negative. OBJECTIVE:     Vitals:    22 1000   BP: 111/73   Pulse: 108   Resp: 19   Temp:    SpO2:         General:  Gen: normal habitus, NAD  HEENT: NCAT, mucosa moist  Cvs: RRR, S1 S2 normal  Resp: symmetric unlabored breathing  Abd: s/nd/nt  Ext: no edema  Skin: no lesions seen, warm and dry    Neuro: Intubated off sedation   Gen: not responsive to painful stimuli   Lang/speech: Does not follow command  CN: Pupils sluggishly reactive b/l, Corneal present on left, breathing over the vent   Motor: no movement in b/l UE and LE  Sense: no movement to painful stimuli b/l   Coord: impaired   DTR: unable to assess   Gait: deferred     NIH Stroke Scale:   1a  Level of consciousness: 3 - responds only with reflex motor or automatic effects or totally unresponsive, flaccid, areflexic   1b. LOC questions:  2 - answers neither question correctly   1c. LOC commands: 2 - performs neither task correctly   2. Best Gaze: 0 - normal   3. Visual: 0 - no visual loss   4. Facial Palsy: 0 - normal symmetric movement   5a. Motor left arm: 4 - no movement   5b. Motor right arm: 4 - no movement   6a. Motor left le - no movement   6b  Motor right le - no movement   7. Limb Ataxia: 0 - absent   8. Sensory: 2 - severe to total sensory loss; patient is not aware of being touched in face, arm, leg   9. Best Language:  3 - mute, global aphasia; no usable speech or auditory comprehension   10. Dysarthria: UN - intubated or other physical barrier   11. Extinction and Inattention: 0 - no abnormality         Total:   28     MRS: 05      LABS:   Reviewed. Lab Results   Component Value Date    HGB 11.5 (L) 01/16/2022    WBC 22.0 (H) 01/16/2022     01/16/2022     01/16/2022    BUN 34 (H) 01/16/2022    CREATININE 0.78 01/16/2022    AST 48 (H) 01/09/2022    ALT 32 01/09/2022    MG 2.7 (H) 01/16/2022    APTT 22.4 01/10/2022    INR 1.1 01/10/2022      Lab Results   Component Value Date    COVID19 Not Detected 01/10/2022    COVID19 Not Detected 01/03/2022    COVID19 Not Detected 11/06/2020       RADIOLOGY:   Images were personally reviewed including:     MRI Brain 01/14/2022:   1. There are foci of diffusion restriction in the right frontal, parietal,   and occipital lobes, concerning for acute infarction. 2. Layering debris in the ventricles with diffusion restriction is consistent   with ventriculitis.  This appears slightly increased from prior examination. There is also increased ventriculomegaly despite right frontal approach   ventricular shunt catheter. 3. Mastoid air cell effusions.           MRI Brain:   Prominent ventricular system with debris throughout the CSF.  Associated   FLAIR signal abnormality involving the ependymal and subependymal surface   with enhancement of the ependymal surface of the ventricular system   consistent with an infectious process.  There is associated leptomeningeal   enhancement extending into the craniocervical region and along the visualized   cervical spinal cord.      CTA head and neck:   Diminutive anterior cerebral arteries and posterior cerebral arteries.    Mildly beaded appearance to the M1 portions of the middle cerebral

## 2022-01-16 NOTE — PROGRESS NOTES
Patient extubated per physician order. Patient extubated in usual fashion. Patient placed on  6 liters/min via nasal cannula for comfort.      Aunt[place] Blank RCP   6:28 PM

## 2022-01-16 NOTE — PROGRESS NOTES
[id number] and RT at bedside for terminal extubation. See MAR for medication administration.  notified and later at bedside for moral/spiritual support to pt's cousin at bedside. NC placed for comfort.

## 2022-01-16 NOTE — PROGRESS NOTES
DELIVERY No data recorded        SYSTEMIC EXAMINATION:   General appearance - Mechanically ventilated, ill-appearing  Mental status -no eye movement or blinking currently, somnolent, unresponsive  Eyes - pupils equal and reactive, sclera anicteric  Mouth -oral endotracheal tube present  Neck - supple, no significant adenopathy, carotids upstroke normal bilaterally, no bruits  Chest - Breath sounds bilaterally were dimnished to auscultation at bases. There were no wheezes, rhonchi or rales. There is no intercostal recession or use of accessory muscles  Heart - normal rate, regular rhythm, normal S1, S2, no murmurs, rubs, clicks or gallops  Abdomen - soft, nontender, nondistended, no masses or organomegaly  Neurological -full CNS exam cannot be done as patient is not responsive to commands.   No spontaneous movement positive pupillary reflex  Extremities - peripheral pulses normal, mild pedal edema, no clubbing or cyanosis  Skin - normal coloration and turgor, no rashes, no suspicious skin lesions noted     DATA REVIEW     Medications:  Scheduled Meds:   verapamil  120 mg Per NG tube 3 times per day    lidocaine 1 % injection  5 mL IntraDERmal Once    sodium chloride flush  5-40 mL IntraVENous 2 times per day    senna  5 mL Oral Nightly    docusate  100 mg Oral Daily    polyethylene glycol  17 g Oral Daily    enoxaparin  40 mg SubCUTAneous Daily    cefTRIAXone (ROCEPHIN) IV  2,000 mg IntraVENous Q12H    insulin lispro  0-12 Units SubCUTAneous Q6H    erythromycin   Left Eye BID    atorvastatin  10 mg Per NG tube Nightly    sodium chloride flush  5-40 mL IntraVENous 2 times per day    pantoprazole  40 mg IntraVENous Daily    And    sodium chloride (PF)  10 mL IntraVENous Daily    folic acid IVPB  1 mg IntraVENous Daily    Vitamin D  1,000 Units Oral Daily    chlorhexidine  15 mL Mouth/Throat BID     Continuous Infusions:   sodium chloride      lactated ringers 80 mL/hr at 01/12/22 7647    dextrose  sodium chloride         INPUT/OUTPUT:  In: 2634 [I.V.:1604; NG/GT:1030]  Out: 2181 [Urine:1985; Drains:196]  Date 01/16/22 0000 - 01/16/22 2359   Shift 4781-6193 9974-7255 9338-8351 24 Hour Total   INTAKE   I.V. 568   568   NG/ 30  530   Shift Total 1068 30  1098   OUTPUT   Urine 610 125  735   Drains 42 52  94   Shift Total 652 177  829   Weight (kg)            LABS:  ABGs:   Recent Labs     01/14/22  0343 01/15/22  0449 01/16/22  0412   POCPH 7.519* 7.498* 7.547*   POCPCO2 32.8* 34.6* 31.2*   POCPO2 97.9 87.8 89.8   POCHCO3 26.7 26.9 27.1   HXJZ4IVB 98 98 98     CBC:   Recent Labs     01/14/22  0422 01/15/22  0549 01/16/22  0445   WBC 13.1* 17.2* 22.0*   HGB 10.4* 10.8* 11.5*   HCT 34.6* 34.9* 37.6*   MCV 79.5* 77.4* 78.7*    333 368   RBC 4.35 4.51 4.78   MCH 23.9* 23.9* 24.1*   MCHC 30.1 30.9 30.6   RDW 17.2* 17.5* 17.9*     CRP:   Recent Labs     01/16/22  1309   .5*     LDH:   No results for input(s): LDH in the last 72 hours. BMP:   Recent Labs     01/14/22  0422 01/15/22  0549 01/16/22  0445   * 141 138   K 4.6 4.7 4.2   * 111* 104   CO2 22 21 22   BUN 42* 37* 34*   CREATININE 0.84 0.62* 0.78   GLUCOSE 188* 130* 170*   PHOS 3.0 3.1 2.5     Liver Function Test:   No results for input(s): PROT, LABALBU, ALT, AST, GGT, ALKPHOS, BILITOT in the last 72 hours. Coagulation Profile:   No results for input(s): INR, PROTIME, APTT in the last 72 hours. D-Dimer:  No results for input(s): DDIMER in the last 72 hours. Lactic Acid:  No results for input(s): LACTA in the last 72 hours. Cardiac Enzymes:  No results for input(s): CKTOTAL, CKMB, CKMBINDEX, TROPONINI in the last 72 hours. Invalid input(s): TROPONIN, HSTROP  BNP/ProBNP:   No results for input(s): BNP, PROBNP in the last 72 hours. Triglycerides:  No results for input(s): TRIG in the last 72 hours.      Microbiology:  Urine Culture:  No components found for: CURINE  Blood Culture:  No components found for: CBLOOD, CFUNGUSBL  Sputum Culture:  No components found for: CSPUTUM  Recent Labs     01/16/22  [address] . CSF SHUNT   SPECIAL NOT REPORTED   CULTURE PENDING     Recent Labs     01/13/22  1452 01/16/22  1144   SPECDESC . CSF Shunt . CSF SHUNT   SPECIAL NOT REPORTED NOT REPORTED   CULTURE VIRIDANS STREPTOCOCCUS GROUP SCANT GROWTH For susceptibility, refer to previous culture. *  CALLED TO Malu Ruiz [phone] [id number] PENDING        Pathology:    Radiology Reports:  CT CHEST PULMONARY EMBOLISM W CONTRAST   Final Result   1. Acute bilateral pulmonary embolism as described above. There is no   evidence of right ventricular strain. 2. Slight increase in the size of a cavitary mass in the right middle lobe   abutting the lateral pleural surface. Pulmonary abscess would be favored   over empyema due to location. Fungal infection considered less likely. Underlying malignancy can not be definitively excluded and continued   follow-up is advised. 3. Trace bilateral pleural effusions appear relatively stable with dependent   airspace opacities in both lower lobes. Findings were discussed with Jeannine Ling RN, at 9:52 am on 1/16/2022. XR CHEST PORTABLE   Final Result   No pneumothorax post right IJ line placement. Other tubes and lines as   above. Continued right focal opacity laterally it the base. XR CHEST PORTABLE   Final Result   Right lower lobe airspace disease similar to prior         CT HEAD WO CONTRAST   Final Result   No significant interval change since yesterday's noncontrast MRI. Endotracheal and OG tubes in place. The OG tube is looped in the pharynx. Bilateral mastoid and right middle ear cavity opacification much worse on the   right. Infection not excluded. CT CHEST ABDOMEN PELVIS W CONTRAST   Final Result   No acute CT finding in the chest; slightly larger cavitated lesion lateral   segment RML.   Slightly larger small bilateral pleural effusions with   associated mild compressive atelectasis. Unchanged differential including   infectious or inflammatory etiologies and neoplasm. 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 No   associated adenopathy identified. Dilated fluid-filled bowel, mostly ileal and right colon to the splenic   flexure with air-fluid levels but no clear cut colitis, or focal lesion   splenic flexure, as noted. Mildly distended fluid-filled stomach. Small   amount of pathologic free fluid, mostly in the pelvis. No free air. Findings more suggestive of generalized ileus; if there is clinical concern   for developing obstruction, consider SBFT. Bilateral hip joint effusions; correlate clinically. Slightly larger but small anterior pericardial effusion. Rangel catheter in place with a small amount of air and contrast in the   urinary bladder. Enlarged unchanged appearing prostate with rightward   lobular extension. Vicarious gallbladder contrast excretion and cholelithiasis without   cholecystitis. Additional unchanged or likely incidental findings, as detailed above. RECOMMENDATIONS:   Unavailable         XR CHEST PORTABLE   Final Result   Tubes as above. Right basilar pneumonia. MRI BRAIN WO CONTRAST   Final Result   1. There are foci of diffusion restriction in the right frontal, parietal,   and occipital lobes, concerning for acute infarction. 2. Layering debris in the ventricles with diffusion restriction is consistent   with ventriculitis. This appears slightly increased from prior examination. There is also increased ventriculomegaly despite right frontal approach   ventricular shunt catheter. 3. Mastoid air cell effusions. RECOMMENDATIONS:   Unavailable         VL TRANSCRANIAL DOPPLER COMPLETE   Final Result      XR CHEST PORTABLE   Final Result   There is a right basilar infiltrate consistent with pneumonia. Support tubes as described above.          XR ABDOMEN (KUB) (SINGLE AP VIEW)   Final Result   Enteric tube in satisfactory position. Bowel gas pattern suggesting mild   ileus. Rangel catheter. XR CHEST PORTABLE   Final Result   Improved ETT position, now satisfactory; enteric tube position unchanged. Right basilar opacity, and probable slight left basilar atelectasis, as   discussed above. CT MAXILLOFACIAL W CONTRAST   Final Result   No evidence of facial infection or sinusitis. XR CHEST PORTABLE   Final Result   1. Endotracheal tube overlying the proximal trachea 8.6 cm above the level of   the emile. Advancement is recommended for more optimal positioning. 2. Enteric tube as above. 3. Stable nodular opacity at the right lower lung zone consistent with a   cavitary lesion seen on prior CT. 4. Mild bibasilar atelectasis. The findings were sent to the Radiology Results [address] at 6:58   am on 1/12/2022to be communicated to a licensed caregiver. 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 No evidence of abscess, discitis, or osteomyelitis. MRI LUMBAR SPINE W WO CONTRAST   Final Result   1. 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 No   evidence of abscess. 2. Mild-to-moderate multilevel degenerative changes as detailed above. VL DUP LOWER EXTREMITY VENOUS BILATERAL   Final Result      XR CHEST PORTABLE   Final Result   Stable nodular opacity in the right lower lung zone better characterized on   recent CT. VL TRANSCRANIAL DOPPLER COMPLETE   Final Result      MRI BRAIN W WO CONTRAST   Final Result   Prominent ventricular system with debris throughout the CSF. Associated   FLAIR signal abnormality involving the ependymal and subependymal surface   with enhancement of the ependymal surface of the ventricular system   consistent with an infectious process.   There is associated leptomeningeal   enhancement extending into the craniocervical region and along the visualized   cervical spinal cord. RECOMMENDATIONS:   Unavailable         MRI CERVICAL SPINE W WO CONTRAST   Final Result   Debris throughout the CSF fluid with leptomeningeal enhancement and dural   enhancement as described above consistent with an infectious process. Multilevel degenerative change with foraminal narrowing bilaterally as   described above. RECOMMENDATIONS:   Unavailable         CTA HEAD NECK W CONTRAST   Final Result   Diminutive anterior cerebral arteries and posterior cerebral arteries. Mildly beaded appearance to the M1 portions of the middle cerebral arteries   bilaterally with intermittent foci of mild stenosis. Subsequent diminutive   middle cerebral artery branches distally and this is more pronounced on the   left compared to the right. These findings are of uncertain etiology   although vasculitis is a consideration. RECOMMENDATIONS:   Unavailable         CT LUMBAR SPINE TRAUMA RECONSTRUCTION   Final Result   Unremarkable non-contrast CT of the lumbar spine. CT ABDOMEN PELVIS WO CONTRAST Additional Contrast? None   Final Result   Examination is partially degraded by motion related artifact. Focal area of soft tissue thickening which appears to involve a loop of small   bowel in the central right mid abdomen. This may be partially exaggerated by   the patient motion. Localized infectious or inflammatory versus neoplastic   process however is not excluded and short-term follow-up is recommended. Nondisplaced left 6th and 7th rib fractures. Masslike process exophytic from the lateral margin of the right-side of the   prostate gland. Correlate with PSA values. Cholelithiasis. RECOMMENDATIONS:   Unavailable         CT HEAD WO CONTRAST   Final Result   Persistent layering debris within the lateral ventricles.   There is mild   dilatation of the ventricular system. Differential includes isodense   subacute hemorrhage versus infection. Follow-up MRI brain with and without   contrast is recommended for further evaluation. No acute traumatic injury of the facial bones. RECOMMENDATIONS:   Unavailable         CT FACIAL BONES WO CONTRAST   Final Result   Persistent layering debris within the lateral ventricles. There is mild   dilatation of the ventricular system. Differential includes isodense   subacute hemorrhage versus infection. Follow-up MRI brain with and without   contrast is recommended for further evaluation. No acute traumatic injury of the facial bones. RECOMMENDATIONS:   Unavailable         XR CHEST PORTABLE   Final Result   1. Support devices as above. 2. No significant change in the focal opacity within the right lower lung. CT THORACIC SPINE TRAUMA RECONSTRUCTION   Final Result   Mild degenerative changes of the thoracic spine without evidence of acute   osseous abnormality. Please see concurrently performed CT chest and   subsequently performed MRI thoracic spine for additional findings. XR ELBOW RIGHT (MIN 3 VIEWS)   Final Result   No acute osseous abnormality or dislocation involving the right elbow or   right forearm. XR RADIUS ULNA RIGHT (2 VIEWS)   Final Result   No acute osseous abnormality or dislocation involving the right elbow or   right forearm. XR KNEE RIGHT (3 VIEWS)   Final Result   1. No convincing acute osseous abnormality. 2. Degenerative changes of the right knee. 3. Trace joint effusion. 4. There appears to be soft tissue swelling anterior to the patella. XR CHEST PORTABLE   Final Result      Right lung base opacity again identified, nonspecific. Please correlate exam   findings and exam findings and history.          IR ANGIOGRAM CAROTID C EREBRAL BILATERAL    (Results Pending)   XR CHEST PORTABLE    (Results Pending)   VL DUP LOWER EXTREMITY VENOUS BILATERAL    (Results Pending)   IR ARTHR/ASP/INJ INT JT/BURSA W US    (Results Pending)   XR ABDOMEN (KUB) (SINGLE AP VIEW)    (Results Pending)        Echocardiogram:   No results found for this or any previous visit. ASSESSMENT AND PLAN     Assessment:    // Acute hypoxic respiratory failure, mechanical ventilation  // Pneumonia - RML cavitary lesion, likely abscess likely the result of aspiration  // Altered mental status encephalopathy-   // Sepsis/strep bacteremia  // Traumatic Rhabdomyolysis -improved  // JOZEF -improved  // Rib fractures, Left side  // Left corneal abrasion  // leukocytosis persistent  // fever    CT scan of the chest reviewed from 01/09/2022 which showed lateral segment right middle lobe peripheral area of soft/fluid density likely intraparenchymal with area of cavitation and fluid level likely abscess/necrotizing pneumonia does not look like solid mass less likely to be malignancy. Most likely infectious with strep bacteremia and strep positive in CSF. Plan:    I personally interviewed/examined the patient; reviewed interval history, interpreted all available radiographic and laboratory data at the time of service.  As mentioned above right middle lobe area is most likely abscess/necrotizing pneumonia likely related to aspiration with a streptococcal viridans bacteremia and positive CSF. Less likely malignancy   Patient is currently encephalopathic and getting treatment for strep bacteremia and would recommend to continue with antibiotic therapy as if malignancy is a concern in his current condition and all acute issues getting biopsy/aspiration would not change the management but if concern for getting a sample for analysis/cytology then will need CT-guided aspiration biopsy by interventional radiology.  Would recommend follow-up CT scan in 3 to 4 weeks   Continue and wean mechanical ventilation as tolerated   Monitor endotracheal secretions    Is weaning from ventilation and liberation will depend upon his mentation and ability to protect airways and neurological status   Continue pulmonary toilet, aspiration precautions    Continue antibiotics per infectious disease   Follow microbiologic data   Continue to monitor I/O with a goal of even/negative fluid balance   Stress ulcer prophylaxis per neurocrit   DVT prophylaxis as per neuro critical care    Discussed with Dr. Lucian Rubio    The patient is/remains critically ill with illness/injury that acutely impairs one or more vital organ systems, such that there is a high probability of imminent or life threatening deterioration in the patient's condition. Critical care time of 35 minutes was spent (excluding procedures), in coordination of care during bedside rounds and discussion of patient care in detail, and recommendations of the team were adopted in the plan. Necessity of all invasive devices was also confirmed. Tracey Condon MD.  Pulmonary critical care attending  [facility] [place] CRITICAL CARE MEDICINE PROGRESS NOTE     Patient:  Zulema Wells  MRN: [id number]  [address] [facility] date: 1/9/2022  Primary Care Physician: Kelly Duff MD  Consulting Physician: Geri Mendez MD  CODE Status: Full Code  LOS: 6     SUBJECTIVE     CHIEF COMPLAINT/REASON FOR INITIAL CONSULT: RML cavitary lesion and acute hypoxic respiratory failure in the setting of CNS infection    BRIEF HOSPITAL COURSE:   History obtained from chart review and unobtainable from patient due to mental status and and being on the ventilator. Zulema Wells is a 70 y.o. white gentleman was admitted with altered mental status, traumatic rhabdomyolysis. Patient was apparently found down at home which was felt to be as long as 48 hours. Per chart, patient had dental work 3 weeks prior to admission.  In the emergency room radiologic studies showed debris within the bilateral lateral ventricles of the brain which was felt to be related to hemorrhage or infection. Radiologic studies also revealed right middle lobe cavitary lesion. Patient had leukocytosis; microbiologic studies so far are unrevealing for any infectious process. Patient was started on empiric antibiotics for CNS infection. INTERVAL HISTORY:  01/16/22   Overnight events noted, chart reviewed, labs and medications reviewed culture data seen CT scan of the chest results seen. Patient remain somnolent and encephalopathic did not follow command when I saw him he was not on any sedation. He is afebrile T-max of 98.1 tachycardic with heart rate of around 90s to 100 this morning hemodynamically stable with systolic blood pressure periodically elevated. He remains on ventilator remains on 30% FiO2 saturating 100% on ventilator respiratory rate is usually low to mid 20s. Ventilator setting PRVC/12/510/5/30 percent currently he is on CPAP/pressure support 10/5. ABG 7.5 0/35/88/27  CSF culture strep viridans initial blood culture was strep evidence. CT scan of the chest reviewed from 01/09/2022 which showed lateral segment right middle lobe peripheral area of soft/fluid density likely intraparenchymal with area of cavitation and fluid level likely abscess/necrotizing pneumonia does not look like solid mass less likely to be malignancy. Most likely infectious with strep bacteremia and strep positive in CSF.       REVIEW OF SYSTEMS:  Unobtainable from patient due to sedation/mechanical ventilation    OBJECTIVE     VENTILATOR SETTINGS:  Vent Information  $Ventilation: $Subsequent Day  Skin Assessment: Clean, dry, & intact  Equipment Changed: HME  Vent Type: Servo i  Vent Mode: PRVC  Vt Ordered: 510 mL  Rate Set: 12 bmp  Pressure Support: 10 cmH20  FiO2 : 30 %  SpO2: 95 %  SpO2/FiO2 ratio: 316.67  Sensitivity: 5  PEEP/CPAP: 5  I Time/ I Time %: 0.9 s  Humidification Source: HME     PaO2/FiO2 RATIO:  Recent Labs     01/16/22  0412   POCPO2 89.8      FiO2 : 30 %     VITAL SIGNS:   LAST:  /81   Pulse 117   Temp 99.5 °F (37.5 °C) (Oral)   Resp 22   Ht 5' 10\" (1.778 m)   SpO2 95%   BMI 28.70 kg/m²   8-24 HR RANGE:  TEMP Temp  Av.2 °F (36.8 °C)  Min: 97.3 °F (36.3 °C)  Max: 99.5 °F (89.9 °C)   BP Systolic (43VIJ), LTC:825 , Min:111 , VDX:687      Diastolic (96IXV), NXB:71, Min:62, Max:100     PULSE Pulse  Av.8  Min: 108  Max: 122   RR Resp  Av.3  Min: 19  Max: 24   O2 SAT SpO2  Av.5 %  Min: 95 %  Max: 96 %   OXYGEN DELIVERY No data recorded        SYSTEMIC EXAMINATION:   General appearance - Mechanically ventilated, ill-appearing  Mental status -some spontaneous eyes open, somnolent, unresponsive  Eyes - pupils equal and reactive, sclera anicteric  Mouth -oral endotracheal tube present  Neck - supple, no significant adenopathy, carotids upstroke normal bilaterally, no bruits  Chest - Breath sounds bilaterally were dimnished to auscultation at bases. There were no wheezes, rhonchi or rales. There is no intercostal recession or use of accessory muscles  Heart - normal rate, regular rhythm, normal S1, S2, no murmurs, rubs, clicks or gallops  Abdomen - soft, nontender, nondistended, no masses or organomegaly  Neurological -full CNS exam cannot be done as patient is not responsive to commands.   Extremities - peripheral pulses normal, mild pedal edema, no clubbing or cyanosis  Skin - normal coloration and turgor, no rashes, no suspicious skin lesions noted     DATA REVIEW     Medications:  Scheduled Meds:   verapamil  120 mg Per NG tube 3 times per day    lidocaine 1 % injection  5 mL IntraDERmal Once    sodium chloride flush  5-40 mL IntraVENous 2 times per day    senna  5 mL Oral Nightly    docusate  100 mg Oral Daily    polyethylene glycol  17 g Oral Daily    enoxaparin  40 mg SubCUTAneous Daily    cefTRIAXone (ROCEPHIN) IV  2,000 mg IntraVENous Q12H    insulin lispro  0-12 Units SubCUTAneous Q6H    erythromycin   Left Eye BID    atorvastatin 10 mg Per NG tube Nightly    sodium chloride flush  5-40 mL IntraVENous 2 times per day    pantoprazole  40 mg IntraVENous Daily    And    sodium chloride (PF)  10 mL IntraVENous Daily    folic acid IVPB  1 mg IntraVENous Daily    Vitamin D  1,000 Units Oral Daily    chlorhexidine  15 mL Mouth/Throat BID     Continuous Infusions:   sodium chloride      lactated ringers 80 mL/hr at 01/12/22 2352    dextrose      sodium chloride         INPUT/OUTPUT:  In: 2634 [I.V.:1604; NG/GT:1030]  Out: 2181 [Urine:1985; Drains:196]  Date 01/16/22 0000 - 01/16/22 2359   Shift 1840-1738 7726-5114 2430-5748 24 Hour Total   INTAKE   I.V. 568   568   NG/ 30  530   Shift Total 1068 30  1098   OUTPUT   Urine 610 125  735   Drains 42 52  94   Shift Total 652 177  829   Weight (kg)            LABS:  ABGs:   Recent Labs     01/14/22  0343 01/15/22  0449 01/16/22  0412   POCPH 7.519* 7.498* 7.547*   POCPCO2 32.8* 34.6* 31.2*   POCPO2 97.9 87.8 89.8   POCHCO3 26.7 26.9 27.1   GIMF4TTT 98 98 98     CBC:   Recent Labs     01/14/22  0422 01/15/22  0549 01/16/22  0445   WBC 13.1* 17.2* 22.0*   HGB 10.4* 10.8* 11.5*   HCT 34.6* 34.9* 37.6*   MCV 79.5* 77.4* 78.7*    333 368   RBC 4.35 4.51 4.78   MCH 23.9* 23.9* 24.1*   MCHC 30.1 30.9 30.6   RDW 17.2* 17.5* 17.9*     CRP:   Recent Labs     01/16/22  1309   .5*     LDH:   No results for input(s): LDH in the last 72 hours. BMP:   Recent Labs     01/14/22  0422 01/15/22  0549 01/16/22  0445   * 141 138   K 4.6 4.7 4.2   * 111* 104   CO2 22 21 22   BUN 42* 37* 34*   CREATININE 0.84 0.62* 0.78   GLUCOSE 188* 130* 170*   PHOS 3.0 3.1 2.5     Liver Function Test:   No results for input(s): PROT, LABALBU, ALT, AST, GGT, ALKPHOS, BILITOT in the last 72 hours. Coagulation Profile:   No results for input(s): INR, PROTIME, APTT in the last 72 hours. D-Dimer:  No results for input(s): DDIMER in the last 72 hours.   Lactic Acid:  No results for input(s): LACTA in the last 72 hours. Cardiac Enzymes:  No results for input(s): CKTOTAL, CKMB, CKMBINDEX, TROPONINI in the last 72 hours. Invalid input(s): TROPONIN, HSTROP  BNP/ProBNP:   No results for input(s): BNP, PROBNP in the last 72 hours. Triglycerides:  No results for input(s): TRIG in the last 72 hours. Microbiology:  Urine Culture:  No components found for: CURINE  Blood Culture:  No components found for: CBLOOD, CFUNGUSBL  Sputum Culture:  No components found for: CSPUTUM  Recent Labs     01/16/22  1144   [facility] . CSF SHUNT   SPECIAL NOT REPORTED   CULTURE PENDING     Recent Labs     01/13/22  1452 01/16/22  1144   SPECDESC . CSF Shunt . CSF SHUNT   SPECIAL NOT REPORTED NOT REPORTED   CULTURE VIRIDANS STREPTOCOCCUS GROUP SCANT GROWTH For susceptibility, refer to previous culture. *  CALLED TO Augustine Najjar [phone] [id number] PENDING        Pathology:    Radiology Reports:  CT CHEST PULMONARY EMBOLISM W CONTRAST   Final Result   1. Acute bilateral pulmonary embolism as described above. There is no   evidence of right ventricular strain. 2. Slight increase in the size of a cavitary mass in the right middle lobe   abutting the lateral pleural surface. Pulmonary abscess would be favored   over empyema due to location. Fungal infection considered less likely. Underlying malignancy can not be definitively excluded and continued   follow-up is advised. 3. Trace bilateral pleural effusions appear relatively stable with dependent   airspace opacities in both lower lobes. Findings were discussed with Jennifer Quiroga RN, at 9:52 am on 1/16/2022. XR CHEST PORTABLE   Final Result   No pneumothorax post right IJ line placement. Other tubes and lines as   above. Continued right focal opacity laterally it the base. XR CHEST PORTABLE   Final Result   Right lower lobe airspace disease similar to prior         CT HEAD WO CONTRAST   Final Result   No significant interval change since yesterday's noncontrast MRI. Endotracheal and OG tubes in place. The OG tube is looped in the pharynx. Bilateral mastoid and right middle ear cavity opacification much worse on the   right. Infection not excluded. CT CHEST ABDOMEN PELVIS W CONTRAST   Final Result   No acute CT finding in the chest; slightly larger cavitated lesion lateral   segment RML. Slightly larger small bilateral pleural effusions with   associated mild compressive atelectasis. Unchanged differential including   infectious or inflammatory etiologies and neoplasm. 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 No   associated adenopathy identified. Dilated fluid-filled bowel, mostly ileal and right colon to the splenic   flexure with air-fluid levels but no clear cut colitis, or focal lesion   splenic flexure, as noted. Mildly distended fluid-filled stomach. Small   amount of pathologic free fluid, mostly in the pelvis. No free air. Findings more suggestive of generalized ileus; if there is clinical concern   for developing obstruction, consider SBFT. Bilateral hip joint effusions; correlate clinically. Slightly larger but small anterior pericardial effusion. Rangel catheter in place with a small amount of air and contrast in the   urinary bladder. Enlarged unchanged appearing prostate with rightward   lobular extension. Vicarious gallbladder contrast excretion and cholelithiasis without   cholecystitis. Additional unchanged or likely incidental findings, as detailed above. RECOMMENDATIONS:   Unavailable         XR CHEST PORTABLE   Final Result   Tubes as above. Right basilar pneumonia. MRI BRAIN WO CONTRAST   Final Result   1. There are foci of diffusion restriction in the right frontal, parietal,   and occipital lobes, concerning for acute infarction. 2. Layering debris in the ventricles with diffusion restriction is consistent   with ventriculitis.   This appears slightly lung zone better characterized on   recent CT. VL TRANSCRANIAL DOPPLER COMPLETE   Final Result      MRI BRAIN W WO CONTRAST   Final Result   Prominent ventricular system with debris throughout the CSF. Associated   FLAIR signal abnormality involving the ependymal and subependymal surface   with enhancement of the ependymal surface of the ventricular system   consistent with an infectious process. There is associated leptomeningeal   enhancement extending into the craniocervical region and along the visualized   cervical spinal cord. RECOMMENDATIONS:   Unavailable         MRI CERVICAL SPINE W WO CONTRAST   Final Result   Debris throughout the CSF fluid with leptomeningeal enhancement and dural   enhancement as described above consistent with an infectious process. Multilevel degenerative change with foraminal narrowing bilaterally as   described above. RECOMMENDATIONS:   Unavailable         CTA HEAD NECK W CONTRAST   Final Result   Diminutive anterior cerebral arteries and posterior cerebral arteries. Mildly beaded appearance to the M1 portions of the middle cerebral arteries   bilaterally with intermittent foci of mild stenosis. Subsequent diminutive   middle cerebral artery branches distally and this is more pronounced on the   left compared to the right. These findings are of uncertain etiology   although vasculitis is a consideration. RECOMMENDATIONS:   Unavailable         CT LUMBAR SPINE TRAUMA RECONSTRUCTION   Final Result   Unremarkable non-contrast CT of the lumbar spine. CT ABDOMEN PELVIS WO CONTRAST Additional Contrast? None   Final Result   Examination is partially degraded by motion related artifact. Focal area of soft tissue thickening which appears to involve a loop of small   bowel in the central right mid abdomen. This may be partially exaggerated by   the patient motion.   Localized infectious or inflammatory versus neoplastic   process however is not excluded and short-term follow-up is recommended. Nondisplaced left 6th and 7th rib fractures. Masslike process exophytic from the lateral margin of the right-side of the   prostate gland. Correlate with PSA values. Cholelithiasis. RECOMMENDATIONS:   Unavailable         CT HEAD WO CONTRAST   Final Result   Persistent layering debris within the lateral ventricles. There is mild   dilatation of the ventricular system. Differential includes isodense   subacute hemorrhage versus infection. Follow-up MRI brain with and without   contrast is recommended for further evaluation. No acute traumatic injury of the facial bones. RECOMMENDATIONS:   Unavailable         CT FACIAL BONES WO CONTRAST   Final Result   Persistent layering debris within the lateral ventricles. There is mild   dilatation of the ventricular system. Differential includes isodense   subacute hemorrhage versus infection. Follow-up MRI brain with and without   contrast is recommended for further evaluation. No acute traumatic injury of the facial bones. RECOMMENDATIONS:   Unavailable         XR CHEST PORTABLE   Final Result   1. Support devices as above. 2. No significant change in the focal opacity within the right lower lung. CT THORACIC SPINE TRAUMA RECONSTRUCTION   Final Result   Mild degenerative changes of the thoracic spine without evidence of acute   osseous abnormality. Please see concurrently performed CT chest and   subsequently performed MRI thoracic spine for additional findings. XR ELBOW RIGHT (MIN 3 VIEWS)   Final Result   No acute osseous abnormality or dislocation involving the right elbow or   right forearm. XR RADIUS ULNA RIGHT (2 VIEWS)   Final Result   No acute osseous abnormality or dislocation involving the right elbow or   right forearm. XR KNEE RIGHT (3 VIEWS)   Final Result   1. No convincing acute osseous abnormality.    2. Degenerative changes of the right knee. 3. Trace joint effusion. 4. There appears to be soft tissue swelling anterior to the patella. XR CHEST PORTABLE   Final Result      Right lung base opacity again identified, nonspecific. Please correlate exam   findings and exam findings and history. IR ANGIOGRAM CAROTID C EREBRAL BILATERAL    (Results Pending)   XR CHEST PORTABLE    (Results Pending)   VL DUP LOWER EXTREMITY VENOUS BILATERAL    (Results Pending)   IR ARTHR/ASP/INJ INT JT/BURSA W US    (Results Pending)   XR ABDOMEN (KUB) (SINGLE AP VIEW)    (Results Pending)        Echocardiogram:   No results found for this or any previous visit. ASSESSMENT AND PLAN     Assessment:    // Acute hypoxic respiratory failure, mechanical ventilation  // Pneumonia - RML cavitary lesion, likely abscess likely the result of aspiration  // Altered mental status encephalopathy-   // Sepsis/strep bacteremia  // Traumatic Rhabdomyolysis -improved  // JOZEF -improved  // Rib fractures, Left side  // Left corneal abrasion  // leukocytosis persistent  // fever  // Mild scattered subsegmental pulm embolism    CT scan of the chest reviewed from 01/16/2022 showed a small/very small areas of scattered subsegmental pulm embolism they would not cause any hemodynamically stability and unlikely cause hypoxia no right ventricular strain. Right middle lobe lateral segment cavitary consolidation/necrotic with soft tissue/fluid density shows more area of necrotic and cavitation likely an abscess. CT scan of the chest reviewed from 01/09/2022 which showed lateral segment right middle lobe peripheral area of soft/fluid density likely intraparenchymal with area of cavitation and fluid level likely abscess/necrotizing pneumonia does not look like solid mass less likely to be malignancy. Most likely infectious with strep bacteremia and strep positive in CSF.     Plan:    I personally interviewed/examined the patient; reviewed interval history, interpreted all available radiographic and laboratory data at the time of service.  As mentioned right middle lobe area is most likely abscess/necrotizing pneumonia likely related to aspiration with a streptococcal viridans bacteremia and positive CSF. Less likely malignancy   Patient is currently encephalopathic and getting treatment for strep bacteremia and would recommend to continue with antibiotic therapy as if malignancy is a concern in his current condition and all acute issues getting biopsy/aspiration would not change the management but if concern for getting a sample for analysis/cytology then will need CT-guided aspiration biopsy by interventional radiology. Would recommend follow-up CT scan in 3 to 4 weeks.  Patient need to be on therapeutic anticoagulation neuro critical care had contacted neurosurgery and the will wait for their recommendation if patient is a candidate for full/therapeutic anticoagulation   Continue and wean mechanical ventilation as tolerated   Monitor endotracheal secretions    Weaning and liberation from ventilator ration will depend upon his mentation and ability to protect airways and neurological status   Continue pulmonary toilet, aspiration precautions    Continue antibiotics per infectious disease. On Rocephin 2 g every 12 hours   Follow microbiologic data   Continue to monitor I/O with a goal of even/negative fluid balance   Stress ulcer prophylaxis per neurocrit   DVT prophylaxis as per neuro critical care on Lovenox    Discussed with nursing staff.         Cary Dakins MD.  Pulmonary critical care attending  [facility], [place], [place]

## 2022-01-17 NOTE — PROGRESS NOTES
[facility] - Progress Note    Today's Date and Time: 1/17/2022, 1:57 PM    Impression :   Acute metabolic encephalopathy  Inflammatory intraventricular process with Alpha Streptococci on culture of CSF  Right lung consolidation with cavitary lesion  Bilateral mastoid effusion  Rhabdomyolysis  JOZEF  Rt lung consolidation with possible cavity    Recommendations:     Continue ceftriaxone 2 gm IV q 12 hr for pulmonary lesion and ventricular inflammation with alphahemolytic/viridans Streptococcus infection, pending decision whether to withdraw support and proceed with comfort care. D/C Acyclovir  On Decadron 10 mg IV every 6 hours  The patient is off sedation and remains  CSF fluid remains cloudy  We will follow the follow-up culture data and adjust therapy according    Medical Decision Making/Summary/Discussion:1/17/2022       Infection Control Recommendations   [place] Precautions    Antimicrobial Stewardship Recommendations     Discontinuation of therapy  Coordination of Outpatient Care:   Estimated Length of IV antimicrobials:1-10-22  Patient will need Midline Catheter Insertion: no  Patient will need PICC line Insertion:no  Patient will need: Home IV , [place],  SNF,  LTAC: TBD  Patient will need outpatient wound care:yes    Chief complaint/reason for consultation:   Meningitis      History of Present Illness:   Dante Vyas is a 70y.o.-year-old  male who was initially admitted on 1/9/2022. Patient seen at the request of     INITIAL HISTORY:     Dante Vyas is a 70y.o.-year-old male who was found down at home. Last well-known 48 hours prior to admission. Patient's cousin was talking to him over the phone frequently and stated that he has been confused for the past couple of weeks and was complaining of headaches.       Patient apparently had a dental procedure done 3 weeks back. His baseline mental status is ANO x4.   Patient admitted inpatient for management of suspected meningitis.     Imaging revealed debris's within the bilateral ventricles of his brain likely secondary to subacute hemorrhage versus infection. CT chest showed right middle lobe cavitary lesion. He has clear secretions from ET tube.     Past medical history:  Essential hypertension  Chronic anemia  History of GI bleed    External ventricular drain in place  CXR on 01/13/22 - right basilar opacity, slight left basilar atelectasis    On rocephin 2 g IV q 12 h  Will continue same Rx      CURRENT EVALUATION : 1/17/2022    Patient evaluated and examined in the neuro ICU. Afebrile  VS stable      The patient is on the ventilator at 30% FiO2 and 5 of PEEP. He is off sedation. The external ventricular drain remains in place and still has cloudy appearing fluid draining. The patient does have some twitching episodes noted during my evaluation    Labs, X rays reviewed: 1/17/2022    BUN: 34 > 29 > 35 > 42  Cr: 1.04 > 0.84 > 0.87 > 0.84    WBC: 27.7-->19.2 > 16.5 > 11 > 13.1  Hb:  10 > 10 > 9.7 > 10.4  Plat: 341 > 347 > 300 > 309    Cultures:  Urine:  1-11-22: No growth   Blood:  1-10-22: 1/2 Coagulase negative Staphylococci   1-11-22: no growth  1-12-22: no growth  Sputum :  01/11/22: few gram positive cocci in pairs and chairs  CSF:  1-10-22: viridans streptococci  01/12/22: gram positive cocci in chains on stain. No growth in culture    CXR:  1-12-22: Stable nodular opacity in RLL with cavitary lesion and air fluid level. Discussed with patient, RN, Neuro. I have personally reviewed the past medical history, past surgical history, medications, social history, and family history, and I have updated the database accordingly.     Past Medical History:     Past Medical History:   Diagnosis Date    Anemia 2016    ON IRON    BPH with elevated PSA     post biopsy    Colon polyps 2016    BENEIGN REMOVED WITH COLONOSCOPY    Elevated Gina-Barr virus antibody titer 1989 NEVER TREATED FOR    Elevated fasting glucose     History of blood transfusion 2016    R/T INTESTINAL BLEEDING    History of chronic fatigue syndrome 1989    RESOLVED     History of GI bleed 2016    BROUGHT ON BY MEDS---NIACIN, FISH OIL AND VIT C    Hyperlipidemia 2014    (triglycerides-ELEVATED, TRYING TO CONTROL WITH DIET    Hypertension 1999    ON RX    Wears glasses        Past Surgical  History:     Past Surgical History:   Procedure Laterality Date    COLONOSCOPY  2000    internal hemorrhoids    COLONOSCOPY  02/02/2015    polypx1, repeat 5yrs due to family hx & hx polyps- brannon    COLONOSCOPY  12/09/2015    polyp X2  int. Hemorrhoids  partial prolapse of ileocecal valve    COLONOSCOPY  11/2016    COLONOSCOPY N/A 11/12/2020    COLONOSCOPY POLYPECTOMY SNARE/HOT BIOPSY performed by Latonia Koch DO at [address]    to correct flat arches (age 9)    PRE-MALIGNANT / BENIGN SKIN LESION EXCISION Right 10/15/2021    v-EXCISION Lesion Right cheek, Right nose, scalp, nasal tip performed by Erica Solorzano MD at [address] Bilateral 12/04/2012    benign    PROSTATE BIOPSY Bilateral 07/2014    benign    PROSTATECTOMY  12/10/2019    LAPAROSCOPIC ROBOTIC SIMPLE PROSTATECTOMY     PROSTATECTOMY N/A 12/10/2019    XI LAPAROSCOPIC ROBOTIC SIMPLE PROSTATECTOMY performed by Loi Pro MD at [facility]  12/8/15    Normal upper GI tract, no evidence of blood in upper GI tract, mild distal esophagitis    UPPER GASTROINTESTINAL ENDOSCOPY  11/2016       Medications:         Social History:     Social History     Socioeconomic History    Marital status: Single     Spouse name: Not on file    Number of children: Not on file    Years of education: Not on file    Highest education level: Not on file   Occupational History    Not on file   Tobacco Use    Smoking status: Light Tobacco Smoker     Packs/day: 0.01     Years: 10.00 Pack years: 0.10     Types: Pipe     Start date: 1/1/1970    Smokeless tobacco: Never Used    Tobacco comment: Rare pipe smoker. 4 BOWLS A WEEK No inhalation. Has never smoked cigarettes. Vaping Use    Vaping Use: Never used   Substance and Sexual Activity    Alcohol use: Yes     Alcohol/week: 0.0 standard drinks     Comment: WHISKEY OR WINE- 1 OR 2 A MONTH    Drug use: No    Sexual activity: Not on file   Other Topics Concern    Not on file   Social History Narrative    Not on file     Social Determinants of Health     Financial Resource Strain: Low Risk     Difficulty of Paying Living Expenses: Not hard at all   Food Insecurity: No Food Insecurity    Worried About Running Out of Food in the Last Year: Never true    [address] in the Last Year: Never true   Transportation Needs:     Lack of Transportation (Medical): Not on file    Lack of Transportation (Non-Medical):  Not on file   Physical Activity:     Days of Exercise per Week: Not on file    Minutes of Exercise per Session: Not on file   Stress:     Feeling of Stress : Not on file   Social Connections:     Frequency of Communication with Friends and Family: Not on file    Frequency of Social Gatherings with Friends and Family: Not on file    Attends Taoist Services: Not on file    Active Member of [address] [place] [address] [employer] or Organizations: Not on file    Attends Club or Organization Meetings: Not on file    Marital Status: Not on file   Intimate Partner Violence:     Fear of Current or Ex-Partner: Not on file    Emotionally Abused: Not on file    Physically Abused: Not on file    Sexually Abused: Not on file   Housing Stability:     Unable to Pay for Housing in the Last Year: Not on file    Number of [place] in the Last Year: Not on file    Unstable Housing in the Last Year: Not on file       Family History:     Family History   Problem Relation Age of Onset    Cancer Mother         LUNG    High Blood Pressure Mother    Aaliyah Lee Diabetes Father         IDDM-LATE IN LIFE    Heart Disease Father         CHF    High Blood Pressure Father         Allergies:   Patient has no known allergies. Review of Systems:     Unable to provide. On ventilator. 1/17/2022     Review of Systems   Unable to perform ROS: Intubated           Physical Examination :     Patient Vitals for the past 8 hrs:   BP Temp Temp src Pulse Resp SpO2   01/17/22 1110  100.6 °F (38.1 °C) Axillary      01/17/22 0800 110/62 98 °F (36.7 °C) Oral 123 (!) 39 (!) 87 %   01/17/22 0700 111/66   121 (!) 37 (!) 87 %   01/17/22 0600 103/61   122 (!) 44 (!) 85 %     Physical Exam  Constitutional:       Appearance: He is well-developed. Interventions: He is intubated. HENT:      Head: Normocephalic. Comments: Right-sided frontoparietal external ventricular drain in place with cloudy fluid  Cardiovascular:      Rate and Rhythm: Normal rate. Heart sounds: Normal heart sounds. No friction rub. No gallop. Pulmonary:      Effort: He is intubated. Breath sounds: Rales present. No wheezing. Abdominal:      General: Bowel sounds are normal. There is distension. Palpations: Abdomen is soft. There is no mass. Tenderness: There is no abdominal tenderness. Musculoskeletal:         General: Swelling (Bilateral upper extremity) present. Skin:     General: Skin is warm and dry. Medical Decision Making -Laboratory:   I have independently reviewed/ordered the following labs:    CBC with Differential:   Recent Labs     01/15/22  0549 01/16/22  0445   WBC 17.2* 22.0*   HGB 10.8* 11.5*   HCT 34.9* 37.6*    368     BMP:   Recent Labs     01/15/22  0549 01/16/22  0445    138   K 4.7 4.2   * 104   CO2 21 22   BUN 37* 34*   CREATININE 0.62* 0.78   MG 3.0* 2.7*     Hepatic Function Panel:   No results for input(s): PROT, LABALBU, BILIDIR, IBILI, BILITOT, ALKPHOS, ALT, AST in the last 72 hours.   No results for input(s): RPR in the last 72 hours. No results for input(s): HIV in the last 72 hours. No results for input(s): BC in the last 72 hours. Lab Results   Component Value Date    MUCUS NOT REPORTED 01/10/2022    RBC 4.78 01/16/2022    TRICHOMONAS NOT REPORTED 01/10/2022    WBC 22.0 01/16/2022    YEAST NOT REPORTED 01/10/2022    TURBIDITY Clear 01/10/2022     Lab Results   Component Value Date    CREATININE 0.78 01/16/2022    GLUCOSE 170 01/16/2022       Medical Decision Making-Imaging:     EXAMINATION:   ONE XRAY VIEW OF THE CHEST       1/11/2022 9:02 am       COMPARISON:   01/10/2022 radiograph       HISTORY:   ORDERING SYSTEM PROVIDED HISTORY: intubated   TECHNOLOGIST PROVIDED HISTORY:   intubated       FINDINGS:   Supportive devices are stable.  Heart, mediastinum and pulmonary vascularity   are normal.  Stable nodular opacity in the right lower lung zone representing   a cavitary lesion seen on prior CT.  The lungs appear clear otherwise.  No   significant skeletal finding.           Impression   Stable nodular opacity in the right lower lung zone better characterized on   recent CT.         EXAMINATION:   CTA OF THE CHEST 1/9/2022 7:40 pm       TECHNIQUE:   CTA of the chest was performed after the administration of intravenous   contrast.  Multiplanar reformatted images are provided for review.  MIP   images are provided for review.  Dose modulation, iterative reconstruction,   and/or weight based adjustment of the mA/kV was utilized to reduce the   radiation dose to as low as reasonably achievable.       COMPARISON:   Chest x-ray 01/09/2022       HISTORY:   ORDERING SYSTEM PROVIDED HISTORY: elevated d-dimer, mental status change   TECHNOLOGIST PROVIDED HISTORY:   elevated d-dimer, mental status change   Decision Support Exception - unselect if not a suspected or confirmed   emergency medical condition->Emergency Medical Condition (MA)   Reason for Exam: Elevated d-dimer, mental status change       FINDINGS:   Pulmonary Arteries: No central lobar pulmonary emboli.  Main pulmonary artery   is unremarkable caliber.       Mediastinum: Air-fluid identified involving esophagus.  Slight heterogeneous   appearance of the thyroid gland on left.  Heart is mildly prominent size.  No   definite bulky hilar mediastinal adenopathy.       Lungs/pleura: Right middle lobe atelectasis versus scarring identified. Within the lateral aspect right middle lobe, there is consolidative versus   masslike opacity with air-fluid level.  There vessels corresponding through   this opacity.  The tiny locules of gas as well.  Otherwise mild   hypoventilatory changes elsewhere the lungs.       Upper Abdomen: Evidence of cholelithiasis.  Fatty infiltration liver.       Soft Tissues/Bones: Degenerate changes of the thoracic spine.           Impression   No evidence of pulmonary embolism to the segmental level.       Nonspecific opacification and when question air-fluid level involving the   right middle lobe laterally.  This demonstrates tiny locules of gas. Infection/Pneumonia and/or cavitary mass lesion may be considered. Lindaann Duck   pulmonology consultation.       RECOMMENDATIONS:   Unavailable              Impression   1. Endotracheal tube overlying the proximal trachea 8.6 cm above the level of   the emile.  Advancement is recommended for more optimal positioning. 2. Enteric tube as above. 3. Stable nodular opacity at the right lower lung zone consistent with a   cavitary lesion seen on prior CT. 4. Mild bibasilar atelectasis. Impression   There is a right basilar infiltrate consistent with pneumonia.       Support tubes as described above. Medical Decision Orryrv-Dxdkexrd-Xwjpl:     Culture, CSF [[id number]] (Abnormal) Collected: 01/13/22 7162   Order Status: Completed Specimen: CSF Updated: 01/15/22 1356    Specimen Description . CSF Shunt    Special Requests NOT REPORTED    Direct Exam MANY NEUTROPHILS Abnormal      NO ORGANISMS SEEN     Gram stain made from cytocentrifuged specimen.  Organisms and cells will be concentrated. Culture CULTURE IN PROGRESS   Culture, CSF [[id number]] (Abnormal) Collected: 01/12/22 0927   Order Status: Completed Specimen: CSF Updated: 01/15/22 1345    Specimen Description . CSF SHUNT    Special Requests NOT REPORTED    Direct Exam MANY NEUTROPHILS Abnormal      FEW GRAM POSITIVE COCCI IN CHAINS Abnormal      Gram stain made from cytocentrifuged specimen.  Organisms and cells will be concentrated. NOTIFIED Hosea Doran. RN AT 7358 ON 01.12.22    Culture STREPTOCOCCI, ALPHA HEMOLYTIC SCANT GROWTH Identification and sensitivity to follow. Abnormal    Culture, Blood 1 [[id number]] Collected: 01/10/22 0549   Order Status: Completed Specimen: Blood Updated: 01/15/22 0710    Specimen Description . BLOOD    Special Requests RT [facility] 6ML    Culture NO GROWTH 5 DAYS   Culture, CSF [[id number]] (Abnormal)  Collected: 01/10/22 0806   Order Status: Completed Specimen: CSF Updated: 01/14/22 0809    Specimen Description . CSF SHUNT    Special Requests NOT REPORTED    Direct Exam MANY NEUTROPHILS Abnormal      NO BACTERIA SEEN     Gram stain made from cytocentrifuged specimen.  Organisms and cells will be concentrated. Culture VIRIDANS STREPTOCOCCUS GROUP Abnormal      NOTIFIED [address], AT 0930 ON 01.12.2022   Culture, Blood 1 [[id number]] (Abnormal) Collected: 01/11/22 2037   Order Status: Completed Specimen: Blood Updated: 01/14/22 0655    Specimen Description . BLOOD    Special Requests UNKNOWN AMOUNT, UNKNOWN SITE    Culture POSITIVE Blood Culture Abnormal      DIRECT GRAM STAIN FROM BOTTLE: GRAM POSITIVE COCCI IN CLUSTERS     Detected: Coagulase negative Staphylococcus species (not  S.epidermidis, or S. lugdunensis) Culture Results to Follow Methodology- Polymerase Chain Reaction (PCR) Abnormal      STAPHYLOCOCCUS SPECIES, COAGULASE NEGATIVE A single positive blood culture of coagulase negative Staphylocci, diphtheroids,micrococci, Cutibacterium, viridans Streptocci, Bacillus, or Lactobacillus species should be interpreted with caution and viewed as a likely skin contaminant. Abnormal      (NOTE) Direct Gram Stain from bottle and Polymerase Chain Reaction (PCR) results called to and read back by:BRAD DE JESUS 1/12/22 1505   Culture, Blood 1 [[id number]] Collected: 01/12/22 2157   Order Status: Completed Specimen: Blood Updated: 01/14/22 6541    Specimen Description . BLOOD    Special Requests RTFA 3 ML     Culture NO GROWTH 2 DAYS   Culture, Blood 1 [[id number]] Collected: 01/12/22 2157   Order Status: Completed Specimen: Blood Updated: 01/14/22 6005    Specimen Description . BLOOD    Special Requests R HAND 11 ML     Culture NO GROWTH 2 DAYS   Culture, Blood 1 [[id number]] Collected: 01/11/22 2037   Order Status: Completed Specimen: Blood Updated: 01/14/22 3717    Specimen Description . BLOOD    Special Requests UNKNOWN AMOUNT, UNKNOWN SITE    Culture NO GROWTH 3 DAYS   Quantiferon TB Gold [[id number]] Collected: 01/13/22 1136   Order Status: No result Updated: 01/13/22 1214   Culture, Respiratory [[id number]] (Abnormal) Collected: 01/11/22 1817   Order Status: Completed Specimen: Sputum, Suctioned Updated: 01/13/22 1150    Specimen Description . SUCTIONED SPUTUM    Special Requests NOT REPORTED    Direct Exam < 10 EPITHELIAL CELLS/LPF     >25 NEUTROPHILS/LPF     FEW GRAM POSITIVE COCCI IN PAIRS AND CHAINS Abnormal     Culture NORMAL RESPIRATORY BELLA MODERATE GROWTH   Quantiferon TB Gold [[id number]] Collected: 01/13/22 1136   Order Status: Canceled    Quantiferon TB Gold [[id number]] Collected: 01/13/22 0513   Order Status: Canceled    QUANTIFERON (R) TB GOLD, (INCUBATED) [[id number]] Collected: 01/12/22 2157   Order Status: Canceled Specimen: Blood    Culture, Blood 1 [[id number]] (Abnormal) Collected: 01/10/22 0538   Order Status: Completed Specimen: Blood Updated: 01/12/22 1212    Specimen Description . BLOOD    Special Requests [facility] 6ML    Culture POSITIVE Blood Culture Abnormal      DIRECT GRAM STAIN FROM BOTTLE: GRAM POSITIVE COCCI IN CLUSTERS     Detected: Coagulase negative Staphylococcus species (not  S.epidermidis, or S. lugdunensis) Culture Results to Follow Methodology- Polymerase Chain Reaction (PCR) Abnormal      STAPHYLOCOCCUS SPECIES, COAGULASE NEGATIVE A single positive blood culture of coagulase negative Staphylocci, diphtheroids,micrococci, Cutibacterium, viridans Streptocci, Bacillus, or Lactobacillus species should be interpreted with caution and viewed as a likely skin contaminant. Abnormal      (NOTE) Direct Gram Stain from bottle and Polymerase Chain Reaction (PCR) results called to and read back by: SHANTANU MELGAR at [facility] on 1/11/2022 at [address] [place] [address]. Culture, Blood 1 [[id number]]    Order Status: Canceled Specimen: Blood    Culture, Blood 1 [[id number]]    Order Status: Canceled Specimen: Blood    Quantiferon TB Gold [[id number]] Collected: 01/12/22 1400   Order Status: Canceled    Culture, Urine [[id number]] Collected: 01/11/22 0000   Order Status: Completed Specimen: Urine Updated: 01/11/22 2220    Specimen Description . URINE    Special Requests NOT REPORTED    Culture NO GROWTH   VDRL CSF [[id number]] Collected: 01/10/22 0807   Order Status: Completed Specimen: CSF Updated: 01/11/22 1417    VDRL, CSF Screen NONREACTIVE   Culture, Respiratory [[id number]]    Order Status: Canceled Specimen: Endotracheal    QUANTIFERON (R) TB GOLD, (INCUBATED) [[id number]] Collected: 01/11/22 0600   Order Status: Canceled Specimen: Blood    Herpes simplex virus PCR [[id number]] Collected: 01/10/22 1050   Order Status: Sent Specimen: CSF Updated: 01/10/22 1056   Meningitis Encephalitis Panel CSF, Molecular [[id number]] Collected: 01/10/22 0807   Order Status: Completed Specimen: CSF Updated: 01/10/22 1033    Specimen Description . CSF    ESCHERICHIA COLI K1 CSF FILM ARRAY Not Detected    HAEMOPHILUS INFLUENZA CSF FILM ARRAY Not Detected    LISTERIA MONOCYTOGENES CSF FILM ARRAY Not Detected    NEISSERIA MENIGITIDIS CSF FILM ARRAY Not Detected    STREPTOCOCCUS AGALACTIAE CSF FILM ARRAY Not Detected    STREPTOCOCCUS PNEUMONIAE CSF FILM ARRAY Not Detected    CYTOMEGALOVIRUS (CMV) CSF FILM ARRAY Not Detected    ENTEROVIRUS CSF FILM ARRAY Not Detected    HSV-1 CSF FILM ARRAY Not Detected    HSV-2 CSF FILM ARRAY Not Detected    HHV-6 (HERPESVIRUS 6) CSF FILM ARRAY Not Detected    PARECHOVIRUS CSF FILM ARRAY Not Detected    VARICELLA-ZOSTER CSF FILM ARRAY Not Detected    CRYPTOCOCCUS NEOFORMANS/CLAUDE CSF FILM ARR. Not Detected    Comment: Performed by multiplexed nucleic acid assay. Culture, Urine [[id number]]    Order Status: Canceled Specimen: Urine, clean catch    Culture, Respiratory [[id number]]    Order Status: Canceled Specimen: Sputum    Culture, CSF [[id number]] Collected: 01/10/22 0807   Order Status: Completed Specimen: CSF Updated: 01/10/22 0901    Specimen Description . CSF    Special Requests NOT REPORTED    Direct Exam NOT REPORTED    Culture DUPLICATE ORDER   Gram stain CSF [[id number]] Collected: 01/10/22 0808   Order Status: Completed Specimen: Spinal Fluid Updated: 01/10/22 6199    Specimen Description . SPINAL FLUID    Special Requests NOT REPORTED    Direct Exam DUPLICATE ORDER GRAM STAIN INCLUDED WITH CF CULTURE   Culture, Respiratory [[id number]]    Order Status: Canceled Specimen: Sputum    COVID-19, Rapid [[id number]] Collected: 01/10/22 0127   Order Status: Completed Specimen: Nasopharyngeal Swab Updated: 01/10/22 0156    Specimen Description . NASOPHARYNGEAL SWAB    SARS-CoV-2, Rapid Not Detected    Comment:        Rapid NAAT:  The specimen is NEGATIVE for SARS-CoV-2, the novel coronavirus associated with   COVID-19.         The ID NOW COVID-19 assay is designed to detect the virus that causes COVID-19 in patients   with signs and symptoms of infection who are suspected of COVID-19.    An individual without symptoms of COVID-19 and who is not shedding SARS-CoV-2 virus would   expect to have a negative (not detected) result in this assay. Negative results should be treated as presumptive and, if inconsistent with clinical signs   and symptoms or necessary for patient management,   should be tested with an alternative molecular assay. Negative results do not preclude   SARS-CoV-2 infection and   should not be used as the sole basis for patient management decisions.         Fact sheet for Healthcare Providers: Tyrell   Fact sheet for Patients: [email or web address]           Methodology: Isothermal Nucleic Acid Amplification             Medical Decision Making-Other:     Note:  Labs, medications, radiologic studies were reviewed with personal review of films  Large amounts of data were reviewed  Discussed with nursing Staff, Discharge planner  Infection Control and Prevention measures reviewed  All prior entries were reviewed  Administer medications as ordered  Prognosis: Guarded  Discharge planning reviewed  Follow up as outpatient. Thank you for allowing us to participate in the care of this patient. Please call with questions.     Electronically signed by Emery Varma MD on 1/17/2022 at 1:57 PM

## 2022-01-17 NOTE — PROGRESS NOTES
Spoke with risk management. She advised physicians to not sign POA form. Instead, she suggested physicians write a letter stating patient's condition.

## 2022-01-17 NOTE — CARE COORDINATION
Met with pt's Healthcare power-of-, Marky Prather, and per Marky Prather he would like [facility] inpatient hospice in [facility], states that his father had been there. Placed call to [facility], referral information requested, faxed referral information to [phone] - Placed call to [facility], pt accepted, asked that transportation be arranged for tomorrow morning. Transport request sent to [facility]. Notified Anselmo Manning NP, and pt's Trista Rios, that pt [facility] to [facility]. [facility] with a time. 1704 - Received call back from [facility], transport is set at [facility], at [facility] of transport time. LAN is not needed, per Ana Zhu. Notified Melissa Livingston NP, of transport time, and notified Marky Prather of transport time.

## 2022-01-17 NOTE — PLAN OF CARE
Output by Drain (mL) 01/15/22 0701 - 01/15/22 1900 01/15/22 1901 - 01/16/22 0700 01/16/22 0701 - 01/16/22 1900 01/16/22 1901 - 01/17/22 0700 01/17/22 0701 - 01/17/22 1003   Ventriculostomy Right 78 66 52 0        Drain Removal Note    []Subdural Drain   []Subgaleal Drain  [x]Ventriculostomy Drain    Drain removed, repped with betadine and sterile towels placed to create sterile field. Drain suture cut and drain removed. 2 staples placed over drain hole with hemostasis. No complications, patient tolerated procedure well.     --  Mayra Fernandez CNP  10:03 AM EST

## 2022-01-17 NOTE — PROGRESS NOTES
[facility]. [facility] Gastroenterology Progress Note    Eliza Amaya is a 70 y.o. male patient. Hospitalization Day:7      Chief consult reason:   Ileus on xray    Subjective:  Patient compassionately extubated today after DNR  CC code change. Objective:   VITALS:  /66   Pulse 121   Temp 98.1 °F (36.7 °C) (Oral)   Resp (!) 37   Ht 5' 10\" (1.778 m)   SpO2 (!) 87%   BMI 28.70 kg/m²   TEMPERATURE:  Current - Temp: 98.1 °F (36.7 °C); Max - Temp  Av °F (37.2 °C)  Min: 98.1 °F (36.7 °C)  Max: 99.5 °F (37.5 °C)    Physical Assessment:  General appearance: comfortable. In no acute distress  Mental Status: unresponsive   Lungs:  clear to auscultation bilaterally, normal effort  Heart:  regular rate and rhythm, no murmur  Abdomen:  soft, nontender, mildly distened  Extremities:  no edema, no redness, No clubbing  Skin:  warm, dry, no gross lesions or rashes    CURRENT MEDICATIONS:  Scheduled Meds:  Continuous Infusions:  PRN Meds:LORazepam, morphine **OR** morphine, glycopyrrolate, sodium chloride flush, acetaminophen, labetalol, sodium chloride flush, sodium chloride flush, ondansetron **OR** ondansetron, acetaminophen **OR** acetaminophen, sodium chloride flush      Data Review:  LABS and IMAGING:     CBC  Recent Labs     01/15/22  0549 22  0445   WBC 17.2* 22.0*   HGB 10.8* 11.5*   HCT 34.9* 37.6*   MCV 77.4* 78.7*   MCHC 30.9 30.6   RDW 17.5* 17.9*    368       Immature PLTs   No results found for: PLTFLUORE    ANEMIA STUDIES  No results for input(s): LABIRON, TIBC, IRON, FERRITIN, [id number], FOLATE, OCCULTBLD in the last 72 hours. BMP  Recent Labs     01/15/22  0549 22  0445    138   K 4.7 4.2   * 104   CO2 21 22   BUN 37* 34*   CREATININE 0.62* 0.78   GLUCOSE 130* 170*   CALCIUM 8.5* 8.3*       LFTS  No results for input(s): ALKPHOS, ALT, AST, BILITOT, BILIDIR, LABALBU in the last 72 hours. AMYLASE/LIPASE/AMMONIA  No results for input(s):  AMYLASE, LIPASE, AMMONIA in the last 72 hours. Acute Hepatitis Panel   Lab Results   Component Value Date    HEPBSAG NONREACTIVE 01/10/2022    HEPCAB NONREACTIVE 01/10/2022    HEPBIGM NONREACTIVE 01/10/2022    HEPAIGM NONREACTIVE 01/10/2022       HCV Genotype   No results found for: HEPATITISCGENOTYPE    HCV Quantitative   No results found for: HCVQNT    LIVER WORK UP:    AFP  No results found for: AFP    Alpha 1 antitrypsin   No results found for: A1A    Anti - Liver/Kidney Ab  No results found for: LIVER-KIDNEYMICROSOMALAB    MALKA  No results found for: MALKA    AMA  No results found for: MITOAB    ASMA  No results found for: SMOOTHMUSCAB    Ceruloplasmin  No results found for: CERULOPLSM    Celiac panel  No results found for: TISSTRNTIIGG, TTGIGA, IGA    IgG  No results found for: IGG    IgM  No results found for: IGM    GGT   No results found for: LABGGT    PT/INR  No results for input(s): PROTIME, INR in the last 72 hours. Cancer Markers:  CEA:  No results for input(s): CEA in the last 72 hours. Ca 125:  No results for input(s):  in the last 72 hours. Ca 19-9:   No results for input(s):  in the last 72 hours. AFP: No results for input(s): AFP in the last 72 hours.     Lactic acid:  Recent Labs     01/16/22  0729   LACTACIDWB 1.4         Radiology Review:    ECHO Complete 2D W Doppler W Color    Result Date: 1/11/2022  Transthoracic Echocardiography Report (TTE)  Patient Name Es Churchill   Date of Study             01/11/2022               Precious Nan   Date of      1950  Gender                    Male  Birth   Age          70 year(s)  Race                         Room Number  1335        Height:                   70 inch, 177.8 cm   Corporate ID [id number]    Weight:                   207 pounds, 93.9 kg  #   Patient Acct [de-identified]   BSA:         2.12 m^2     BMI:       29.7 kg/m^2  #   MR #         [id number]     Sonographer               Caroline Bone   Accession #  [id number]  Interpreting Physician [address]   Fellow                   Referring Nurse                           Practitioner   Interpreting             Referring Physician       Venkat Martínez  Fellow                                             APRN-CNP  Additional Comments Technically difficult study. Type of Study   TTE procedure:2D Echocardiogram, M-Mode, Doppler, Color Doppler. Procedure Date Date: 01/11/2022 Start: 08:11 AM Study Location: [facility] Technical Quality: Adequate visualization Indications:LV Function and Rule out vegetation. History / Tech. Comments: Echo done at patient bedside. Procedure explained to patient. HTN Patient Status: Inpatient Height: 70 inches Weight: 207 pounds BSA: 2.12 m^2 BMI: 29.7 kg/m^2 CONCLUSIONS Summary Difficult study. Left ventricle is normal in size. Global left ventricular systolic function is normal. Calculated ejection fraction 54% by Amanda's method. Left atrium is normal in size. Normal right ventricular size and function. No significant valvular abnormalities. Signature ----------------------------------------------------------------------------  Electronically signed by Mely Marmolejo(Sonographer) on 01/11/2022  09:00 AM ---------------------------------------------------------------------------- ----------------------------------------------------------------------------  Electronically signed by Mendez Brown(Interpreting physician) on 01/11/2022  02:15 PM ---------------------------------------------------------------------------- FINDINGS Left Atrium Left atrium is normal in size. Left Ventricle Difficult study. Left ventricle is normal in size. Global left ventricular systolic function is normal. Calculated ejection fraction 54% by Amanda's method. Right Atrium Right atrium is normal in size. Right Ventricle Normal right ventricular size and function. TAPSE value of 2.20.cm noted. Mitral Valve Normal mitral valve structure and function. No mitral regurgitation. Aortic Valve Aortic valve is trileaflet and opens adequately. No aortic insufficiency. Tricuspid Valve Normal tricuspid valve structure and function. No tricuspid regurgitation. Pulmonic Valve The pulmonic valve is normal in structure. No pulmonic insufficiency. Pericardial Effusion No pericardial effusion seen. Miscellaneous E/E' average = 10.9. IVC normal diameter & inspiratory collapse indicating normal RA filling pressure . M-mode / 2D Measurements & Calculations:   LVIDd:4.2 cm(3.7 - 5.6 cm)          Diastolic ZAQLYA:55.1 ml  ANPDV:2.2 cm(2.2 - 4.0 cm)          Systolic NVCCUC:89.7 ml  TGPC:8.5 cm(0.6 - 1.1 cm)           Aortic Root:3 cm(2.0 - 3.7 cm)  LVPWd:0.8 cm(0.6 - 1.1 cm)          LA volume/Index: 32 ml /15m^2  Fractional Shortenin.48 %  Calculated LVEF (%): 54.35 %        RVDd:3.1 cm   Mitral:                                    Aortic   Valve Area (P1/2-Time): 6.67 cm^2          Peak Velocity: 1.13 m/s  Peak E-Wave: 0.75 m/s                      Mean Velocity: 0.70 m/s  Peak A-Wave: 1.02 m/s                      Peak Gradient: 5.11 mmHg  E/A Ratio: 0.74                            Mean Gradient: 2 mmHg  Peak Gradient: 2.25 mmHg  Mean Gradient: 2 mmHg  Deceleration Time: 114 msec                AV VTI: 21.2 cm  P1/2t: 33 msec   Mean Velocity: 0.63 m/s  Diastology / Tissue Doppler Septal Wall E' velocity:0.07 m/s Septal Wall E/E':11.5 Lateral Wall E' velocity:0.07 m/s Lateral Wall E/E':10.3    CT ABDOMEN PELVIS WO CONTRAST Additional Contrast? None    Result Date: 1/10/2022  EXAMINATION: CT OF THE ABDOMEN AND PELVIS WITHOUT CONTRAST 1/10/2022 1:23 am TECHNIQUE: CT of the abdomen and pelvis was performed without the administration of intravenous contrast. Multiplanar reformatted images are provided for review. Dose modulation, iterative reconstruction, and/or weight based adjustment of the mA/kV was utilized to reduce the radiation dose to as low as reasonably achievable. COMPARISON: None.  HISTORY: ORDERING SYSTEM PROVIDED HISTORY: found down TECHNOLOGIST PROVIDED HISTORY: found down Decision Support Exception - unselect if not a suspected or confirmed emergency medical condition->Emergency Medical Condition (MA) Reason for Exam: Patient found down, AMS, [facility] found patient today lying on floor, head facing right ulcer to right cheek. unknown amount of time approximately 48hr estimate. Patient transfer from [place]. DX: Rhabdo, glucose 221 FINDINGS: Some images are mildly degraded by patient motion. Cholelithiasis without evidence of cholecystitis. Exam is limited without intravenous contrast. Liver is homogeneous. Spleen is normal in size. Pancreas is unremarkable. Thickening of the adrenal glands, likely due to hyperplasia. No hydronephrosis. Dominant cyst in the right central kidney measuring 5.6 cm and 6 Hounsfield units. Tiny presumed cyst along the lateral margin of the lower left kidney. Scattered vascular calcifications. Abdominal aorta is normal in caliber. Assessment of bowel is limited without oral contrast.  The nasogastric tube terminates in the proximal to mid stomach. No bowel obstruction. Appendix is normal.  Mild diverticulosis without evidence of acute diverticulitis. In the central aspect of the mid abdomen just to the right of midline, on images 71-84, is a focal area of soft tissue thickening and minimal fat stranding which appears to be associated with a loop of small bowel. Some small bowel loops within the central pelvis are mildly fluid distended. The urinary bladder is largely decompressed with Rangel catheter. Heterogeneous enlargement of the prostate gland asymmetric laterally towards the right where there is an exophytic masslike process. Degenerative changes are present in the spine and pelvis. Please see separate report for CT of the chest performed approximately 6 hours prior on 01/09/2022.   Nondisplaced fracture of the lateral left 7th and 6th ribs. Examination is partially degraded by motion related artifact. Focal area of soft tissue thickening which appears to involve a loop of small bowel in the central right mid abdomen. This may be partially exaggerated by the patient motion. Localized infectious or inflammatory versus neoplastic process however is not excluded and short-term follow-up is recommended. Nondisplaced left 6th and 7th rib fractures. Masslike process exophytic from the lateral margin of the right-side of the prostate gland. Correlate with PSA values. Cholelithiasis. RECOMMENDATIONS: Unavailable     XR ELBOW RIGHT (MIN 3 VIEWS)    Result Date: 1/10/2022  EXAMINATION: THREE XRAY VIEWS OF THE RIGHT ELBOW; TWO XRAY VIEWS OF THE RIGHT FOREARM 1/10/2022 12:56 am COMPARISON: None. HISTORY: ORDERING SYSTEM PROVIDED HISTORY: fall, AMS, prolonged down time TECHNOLOGIST PROVIDED HISTORY: fall, AMS, prolonged down time Reason for Exam: found down Initial evaluation. FINDINGS: No acute osseous abnormality seen of the right elbow or right forearm. There is no evidence of dislocation. Minimal degenerative changes of the right elbow noted. 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 No acute osseous abnormality or dislocation involving the right elbow or right forearm. XR RADIUS ULNA RIGHT (2 VIEWS)    Result Date: 1/10/2022  EXAMINATION: THREE XRAY VIEWS OF THE RIGHT ELBOW; TWO XRAY VIEWS OF THE RIGHT FOREARM 1/10/2022 12:56 am COMPARISON: None. HISTORY: ORDERING SYSTEM PROVIDED HISTORY: fall, AMS, prolonged down time TECHNOLOGIST PROVIDED HISTORY: fall, AMS, prolonged down time Reason for Exam: found down Initial evaluation. FINDINGS: No acute osseous abnormality seen of the right elbow or right forearm. There is no evidence of dislocation. Minimal degenerative changes of the right elbow noted. 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      No acute osseous abnormality or dislocation involving the right elbow or right forearm. XR KNEE RIGHT (3 VIEWS)    Result Date: 1/10/2022  EXAMINATION: THREE XRAY VIEWS OF THE RIGHT KNEE 1/10/2022 12:56 am COMPARISON: None. HISTORY: ORDERING SYSTEM PROVIDED HISTORY: Possible fall - abraisons TECHNOLOGIST PROVIDED HISTORY: Possible fall - abraisons Reason for Exam: found down Initial evaluation. FINDINGS: No acute osseous abnormality is seen. There is mild-to-moderate tricompartmental joint arthrosis. There is spurring of the patella at the attachment of the quadriceps tendon. There appears to be a trace joint effusion. Soft tissue swelling is seen anterior to the patella. 1. No convincing acute osseous abnormality. 2. Degenerative changes of the right knee. 3. Trace joint effusion. 4. There appears to be soft tissue swelling anterior to the patella. XR ABDOMEN (KUB) (SINGLE AP VIEW)    Result Date: 1/16/2022  EXAMINATION: ONE SUPINE XRAY VIEW(S) OF THE ABDOMEN 1/16/2022 1:13 pm COMPARISON: KUB from 01/14/2022 HISTORY: ORDERING SYSTEM PROVIDED HISTORY: Please assess for constipation/ileus TECHNOLOGIST PROVIDED HISTORY: Please assess for constipation/ileus Reason for Exam: supine FINDINGS: Enteric tube remains in satisfactory position, with tip and side hole overlying gas-filled stomach, well below the left hemidiaphragm. Overlying ECG monitor leads. Rangel catheter and contrast in the urinary bladder. No significant retained stool. Interval increased mid and right-sided small bowel loop dilatation, up to 5.5 cm, with persisting gas identified throughout large-bowel into the rectum. No obvious free air. Probable contrast in the renal collecting system. Bones stable. No constipation. Enteric tube remains satisfactory. Increased small bowel dilatation mid and right abdomen, with large bowel gas extending into the rectum. Differential includes ileus versus partial/incomplete small bowel obstruction.  RECOMMENDATION: Continued follow-up with KUB or CT     XR ABDOMEN (KUB) (SINGLE AP VIEW)    Result Date: 1/14/2022  EXAMINATION: ONE SUPINE XRAY VIEW(S) OF THE ABDOMEN 1/14/2022 7:15 am COMPARISON: CT scan of the abdomen and pelvis from 01/10/2022 HISTORY: ORDERING SYSTEM PROVIDED HISTORY: High residual asses if any retention TECHNOLOGIST PROVIDED HISTORY: High residual asses if any retention FINDINGS: Enteric tube tip and side hole projects below the left hemidiaphragm. Rangel catheter. Multiple gas-filled loops of large and small bowel throughout the abdomen and pelvis, suggesting ileus. No markedly distended bowel; cecum measures approximately 7.5 cm. No mass or organomegaly. No abnormal calcification appreciated. Moderate-severe DJD spine. Enteric tube in satisfactory position. Bowel gas pattern suggesting mild ileus. Rangel catheter. CT HEAD WO CONTRAST    Result Date: 1/16/2022  EXAMINATION: CT OF THE HEAD WITHOUT CONTRAST  1/16/2022 12:41 am TECHNIQUE: CT of the head was performed without the administration of intravenous contrast. Dose modulation, iterative reconstruction, and/or weight based adjustment of the mA/kV was utilized to reduce the radiation dose to as low as reasonably achievable. COMPARISON: Noncontrast MRI brain dated 01/14/2022. HISTORY: ORDERING SYSTEM PROVIDED HISTORY: surgery navigation TECHNOLOGIST PROVIDED HISTORY: surgery navigation with stealth protocol, 1mm slice and 0 tilt on gantry. surgery navigation Reason for Exam: surgery navigation FINDINGS: BRAIN/VENTRICLES: No intracranial hemorrhage or mass. 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 Again noted is ventriculomegaly with near complete filling of the ventricular system with debris in this patient with known meningitis/ventriculitis. Debris is also present in cisterna magna.   Right anterior approach ventriculostomy catheter is in place with distal tip at the right foramen of [place]ro. ORBITS: The visualized portion of the orbits demonstrate no acute abnormality. SINUSES: Visualized paranasal sinuses and mastoids are noted for near complete opacification of the right mastoid, complete opacification of right middle ear cavity and moderate opacification of left mastoid. The sinuses are clear. SOFT TISSUES/SKULL:  No acute bone or soft tissue abnormality. Endotracheal and OG tubes are in place. The OG tube is extensively looped in the pharynx. No significant interval change since yesterday's noncontrast MRI. Endotracheal and OG tubes in place. The OG tube is looped in the pharynx. Bilateral mastoid and right middle ear cavity opacification much worse on the right. Infection not excluded. CT HEAD WO CONTRAST    Result Date: 1/10/2022  EXAMINATION: CT OF THE HEAD WITHOUT CONTRAST; CT OF THE FACE WITHOUT CONTRAST 1/10/2022 1:20 am TECHNIQUE: CT of the head was performed without the administration of intravenous contrast. Dose modulation, iterative reconstruction, and/or weight based adjustment of the mA/kV was utilized to reduce the radiation dose to as low as reasonably achievable.; CT of the face was performed without the administration of intravenous contrast. Multiplanar reformatted images are provided for review. Dose modulation, iterative reconstruction, and/or weight based adjustment of the mA/kV was utilized to reduce the radiation dose to as low as reasonably achievable. COMPARISON: 01/09/2022 HISTORY: Trauma FINDINGS: CT HEAD: BRAIN/VENTRICLES: Persistent layering debris within the lateral ventricles. There is mild dilatation of the ventricular system. There are changes chronic small vessel ischemic disease and mild generalized atrophy. There is no acute intracranial hemorrhage, mass effect or midline shift. The gray-white differentiation is maintained without evidence of an acute infarct.  CT FACIAL BONES: FACIAL BONES: The maxilla, pterygoid plates and zygomatic arches are intact. The mandible is intact. The mandibular condyles are normally situated. The nasal bones and maxillary nasal processes are intact. ORBITS: The globes appear intact. The extraocular muscles, optic nerve sheath complexes and lacrimal glands appear unremarkable. No retrobulbar hematoma or mass is seen. The orbital walls and rims are intact. SINUSES/MASTOIDS: The paranasal sinuses are well aerated. No acute fracture is seen. Partial opacification of mastoid air cells. SOFT TISSUES: No acute abnormality of the visualized skull or soft tissues. Persistent layering debris within the lateral ventricles. There is mild dilatation of the ventricular system. Differential includes isodense subacute hemorrhage versus infection. Follow-up MRI brain with and without contrast is recommended for further evaluation. No acute traumatic injury of the facial bones. RECOMMENDATIONS: Unavailable     CT HEAD WO CONTRAST    Result Date: 1/9/2022  EXAMINATION: CT OF THE HEAD WITHOUT CONTRAST; CT OF THE CERVICAL SPINE WITHOUT CONTRAST 1/9/2022 7:02 pm TECHNIQUE: CT of the head was performed without the administration of intravenous contrast. Dose modulation, iterative reconstruction, and/or weight based adjustment of the mA/kV was utilized to reduce the radiation dose to as low as reasonably achievable.; CT of the cervical spine was performed without the administration of intravenous contrast. Multiplanar reformatted images are provided for review. Dose modulation, iterative reconstruction, and/or weight based adjustment of the mA/kV was utilized to reduce the radiation dose to as low as reasonably achievable.  COMPARISON: 01/03/2022 HISTORY: ORDERING SYSTEM PROVIDED HISTORY: altered mental status TECHNOLOGIST PROVIDED HISTORY: altered mental status Decision Support Exception - unselect if not a suspected or confirmed emergency medical condition->Emergency Medical Condition (MA) Reason for Exam: Altered mental status, found on the floor of his home today, unknown time of fall FINDINGS: BRAIN/VENTRICLES: There is no evidence for acute intracranial hemorrhage. There is debris within the bilateral lateral ventricles (right greater than left). There is no evidence for acute intracranial hemorrhage or mass lesion. No obvious acute infarct is identified. ORBITS: The visualized portion of the orbits demonstrate no acute abnormality. SINUSES: There is right mastoid effusion. SOFT TISSUES/SKULL:  No acute abnormality of the visualized skull or soft tissues. CT cervical spine: Bilateral mastoid effusion exam is limited by motion artifact. There is multilevel degenerative change in the cervical spine with decreased disc space height and osteophytosis at multiple levels. The exam is limited by motion artifact. There is loss of the normal cervical lordosis with kyphosis. No obvious displaced fracture fragments are identified. Debris within the bilateral lateral ventricles more pronounced on the right of uncertain clinical significance or etiology. MRI of the brain with contrast is recommended for further evaluation. Differential diagnosis would include subacute isodense hemorrhage versus possible infectious process. Neoplastic process is not considered due to the normal head CT 1 week ago. Bilateral mastoid effusion Limited evaluation of the cervical spine due to motion. Multilevel degenerative change in the cervical spine. Findings were discussed with Dr. Gilda Metzger on 01/09/2022 at 7 20 p.m. Joe Vincent  RECOMMENDATIONS: Unavailable     CT HEAD WO CONTRAST    Result Date: 1/3/2022  EXAMINATION: CT OF THE HEAD WITHOUT CONTRAST  1/3/2022 12:30 pm TECHNIQUE: CT of the head was performed without the administration of intravenous contrast. Dose modulation, iterative reconstruction, and/or weight based adjustment of the mA/kV was utilized to reduce the radiation dose to as low as reasonably achievable. COMPARISON: None. HISTORY: ORDERING SYSTEM PROVIDED HISTORY: Acute nonintractable headache, unspecified headache type TECHNOLOGIST PROVIDED HISTORY: acute headache. Atypical for patient. Lightheadedness and dizziness Reason for Exam: Throbbing headache, lightheaded and dizzy FINDINGS: BRAIN/VENTRICLES: There is no acute intracranial hemorrhage, mass effect or midline shift. No abnormal extra-axial fluid collection. The gray-white differentiation is maintained without evidence of an acute infarct. There is no evidence of hydrocephalus. There is extensive calcification of the falx and tentorium. Findings are nonspecific but could be related to remote infection or other insult. ORBITS: The visualized portion of the orbits demonstrate no acute abnormality. SINUSES: The visualized paranasal sinuses and mastoid air cells demonstrate no acute abnormality. SOFT TISSUES/SKULL:  No acute abnormality of the visualized skull or soft tissues. No acute intracranial abnormality. RECOMMENDATIONS: Unavailable     CT FACIAL BONES WO CONTRAST    Result Date: 1/10/2022  EXAMINATION: CT OF THE HEAD WITHOUT CONTRAST; CT OF THE FACE WITHOUT CONTRAST 1/10/2022 1:20 am TECHNIQUE: CT of the head was performed without the administration of intravenous contrast. Dose modulation, iterative reconstruction, and/or weight based adjustment of the mA/kV was utilized to reduce the radiation dose to as low as reasonably achievable.; CT of the face was performed without the administration of intravenous contrast. Multiplanar reformatted images are provided for review. Dose modulation, iterative reconstruction, and/or weight based adjustment of the mA/kV was utilized to reduce the radiation dose to as low as reasonably achievable. COMPARISON: 01/09/2022 HISTORY: Trauma FINDINGS: CT HEAD: BRAIN/VENTRICLES: Persistent layering debris within the lateral ventricles. There is mild dilatation of the ventricular system.   There are changes chronic small vessel ischemic disease and mild generalized atrophy. There is no acute intracranial hemorrhage, mass effect or midline shift. The gray-white differentiation is maintained without evidence of an acute infarct. CT FACIAL BONES: FACIAL BONES: The maxilla, pterygoid plates and zygomatic arches are intact. The mandible is intact. The mandibular condyles are normally situated. The nasal bones and maxillary nasal processes are intact. ORBITS: The globes appear intact. The extraocular muscles, optic nerve sheath complexes and lacrimal glands appear unremarkable. No retrobulbar hematoma or mass is seen. The orbital walls and rims are intact. SINUSES/MASTOIDS: The paranasal sinuses are well aerated. No acute fracture is seen. Partial opacification of mastoid air cells. SOFT TISSUES: No acute abnormality of the visualized skull or soft tissues. Persistent layering debris within the lateral ventricles. There is mild dilatation of the ventricular system. Differential includes isodense subacute hemorrhage versus infection. Follow-up MRI brain with and without contrast is recommended for further evaluation. No acute traumatic injury of the facial bones. RECOMMENDATIONS: Unavailable     CT CERVICAL SPINE WO CONTRAST    Result Date: 1/9/2022  EXAMINATION: CT OF THE HEAD WITHOUT CONTRAST; CT OF THE CERVICAL SPINE WITHOUT CONTRAST 1/9/2022 7:02 pm TECHNIQUE: CT of the head was performed without the administration of intravenous contrast. Dose modulation, iterative reconstruction, and/or weight based adjustment of the mA/kV was utilized to reduce the radiation dose to as low as reasonably achievable.; CT of the cervical spine was performed without the administration of intravenous contrast. Multiplanar reformatted images are provided for review.  Dose modulation, iterative reconstruction, and/or weight based adjustment of the mA/kV was utilized to reduce the radiation dose to as low as reasonably achievable. COMPARISON: 01/03/2022 HISTORY: ORDERING SYSTEM PROVIDED HISTORY: altered mental status TECHNOLOGIST PROVIDED HISTORY: altered mental status Decision Support Exception - unselect if not a suspected or confirmed emergency medical condition->Emergency Medical Condition (MA) Reason for Exam: Altered mental status, found on the floor of his home today, unknown time of fall FINDINGS: BRAIN/VENTRICLES: There is no evidence for acute intracranial hemorrhage. There is debris within the bilateral lateral ventricles (right greater than left). There is no evidence for acute intracranial hemorrhage or mass lesion. No obvious acute infarct is identified. ORBITS: The visualized portion of the orbits demonstrate no acute abnormality. SINUSES: There is right mastoid effusion. SOFT TISSUES/SKULL:  No acute abnormality of the visualized skull or soft tissues. CT cervical spine: Bilateral mastoid effusion exam is limited by motion artifact. There is multilevel degenerative change in the cervical spine with decreased disc space height and osteophytosis at multiple levels. The exam is limited by motion artifact. There is loss of the normal cervical lordosis with kyphosis. No obvious displaced fracture fragments are identified. Debris within the bilateral lateral ventricles more pronounced on the right of uncertain clinical significance or etiology. MRI of the brain with contrast is recommended for further evaluation. Differential diagnosis would include subacute isodense hemorrhage versus possible infectious process. Neoplastic process is not considered due to the normal head CT 1 week ago. Bilateral mastoid effusion Limited evaluation of the cervical spine due to motion. Multilevel degenerative change in the cervical spine. Findings were discussed with Dr. Colleen Kowalski on 01/09/2022 at 7 20 p.m. Esteban Patel  RECOMMENDATIONS: Unavailable     MRI CERVICAL SPINE W WO CONTRAST    Result Date: 1/10/2022  EXAMINATION: MRI OF THE CERVICAL SPINE WITHOUT AND WITH CONTRAST  1/10/2022 1:18 pm: TECHNIQUE: Multiplanar multisequence MRI of the cervical spine was performed without and with the administration of intravenous contrast. COMPARISON: None. HISTORY: ORDERING SYSTEM PROVIDED HISTORY: found down TECHNOLOGIST PROVIDED HISTORY: found down Reason for Exam: found down FINDINGS: BONES/ALIGNMENT: The vertebral body heights are maintained. There is age-appropriate bone marrow signal.  There is multilevel degenerative disc disease with loss of disc signal.  There is no disc space narrowing. There is no spondylolisthesis. SPINAL CORD: The spinal cord is normal in caliber and signal.  There is debris visualized throughout the CSF fluid of the visualized brain and in the spinal canal.  There is leptomeningeal enhancement along the spinal cord. There is also a degree of dural enhancement within the visualized lower intracranial region and throughout the spinal column. SOFT TISSUES: The posterior paraspinal soft tissues are unremarkable. The prevertebral soft tissues are unremarkable. C2-C3: There is a disc osteophyte complex with uncovertebral and facet hypertrophy that is worse on the right compared to left. There is no canal stenosis or left foraminal narrowing. There is moderate right foraminal narrowing. C3-C4: There is a disc osteophyte complex with uncovertebral and facet hypertrophy that is worse on the right compared to left. There is no canal stenosis or left foraminal narrowing. There is mild right foraminal narrowing. C4-C5: There is a disc osteophyte complex with uncovertebral and facet hypertrophy. There is no canal stenosis. There is mild left and moderate to severe right foraminal narrowing. C5-C6: There is a disc osteophyte complex with uncovertebral and facet hypertrophy. There is no canal stenosis. There is moderate left and severe right foraminal narrowing.  C6-C7: There is a disc osteophyte complex with uncovertebral and facet hypertrophy. There is no canal stenosis. There is mild left and moderate right foraminal narrowing. C7-T1: There is no significant disc protrusion, spinal canal stenosis or neural foraminal narrowing. Debris throughout the CSF fluid with leptomeningeal enhancement and dural enhancement as described above consistent with an infectious process. Multilevel degenerative change with foraminal narrowing bilaterally as described above. RECOMMENDATIONS: Unavailable     MRI THORACIC SPINE W WO CONTRAST    Result Date: 1/11/2022  EXAMINATION: MRI OF THE THORACIC SPINE WITHOUT AND WITH CONTRAST  1/11/2022 4:35 pm TECHNIQUE: Multiplanar multisequence MRI of the thoracic spine was performed without and with the administration of intravenous contrast. COMPARISON: None HISTORY: ORDERING SYSTEM PROVIDED HISTORY: assess CSF infection TECHNOLOGIST PROVIDED HISTORY: assess CSF infection Reason for Exam: CSF infection; found down. FINDINGS: BONES/ALIGNMENT: There is normal alignment of the spine. The vertebral body heights are maintained. The bone marrow signal appears unremarkable. SPINAL CORD: No spinal cord signal abnormality is evident. There is diffusely heterogeneous appearance of the CSF with diffuse smooth leptomeningeal enhancement throughout the thoracic spinal canal as well as scattered subtle areas of dural enhancement. No discrete mass is seen within the spinal canal.  There is no evidence of abscess. SOFT TISSUES:  Paraspinal soft tissues are unremarkable. DEGENERATIVE CHANGES: Mild spinal canal stenosis at T7-8, T8-9, and T9-10 secondary to disc bulges. No neural foraminal narrowing. 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 No evidence of abscess, discitis, or osteomyelitis.      MRI LUMBAR SPINE W WO CONTRAST    Result Date: 1/11/2022  EXAMINATION: MRI OF THE LUMBAR SPINE WITHOUT AND WITH CONTRAST  1/11/2022 4:35 pm TECHNIQUE: Multiplanar multisequence MRI FINDINGS: AP portable view of the chest at 9 o'clock demonstrates overlying cardiac monitoring electrodes. Endotracheal tube terminates 2.9 cm above the emile. Right internal jugular catheter terminates in the distal superior vena cava. Intestinal tube extends beyond the body of the stomach. Size is stable. Continued focal opacityin the right lower lobe consistent with right lower lobe airspace disease is noted. No pneumothorax is noted. No pneumothorax post right IJ line placement. Other tubes and lines as above. Continued right focal opacity laterally it the base. XR CHEST PORTABLE    Result Date: 1/16/2022  EXAMINATION: ONE XRAY VIEW OF THE CHEST 1/16/2022 6:45 am COMPARISON: 1/15/2022 HISTORY: ORDERING SYSTEM PROVIDED HISTORY: intubated TECHNOLOGIST PROVIDED HISTORY: intubated FINDINGS: Tip of ET tube is seen in region of midthoracic trachea Tip of NG tube is seen in stomach Heart size is normal No focal consolidation on the left Patchy opacity is seen in the right base, similar prior     Right lower lobe airspace disease similar to prior     XR CHEST PORTABLE    Result Date: 1/15/2022  EXAMINATION: ONE XRAY VIEW OF THE CHEST 1/15/2022 7:49 am COMPARISON: Chest x-ray dated 01/14/2022 HISTORY: ORDERING SYSTEM PROVIDED HISTORY: intubated TECHNOLOGIST PROVIDED HISTORY: intubated Reason for Exam: intubated port supine at 705am FINDINGS: ET tube terminates above the emile. Enteric tube tip courses through the chest below the level of diaphragm; tip is not visualized. Right basilar pneumonia. The left lung is clear. No pneumothorax or pleural effusion. Cardiac silhouette is unremarkable. Visualized osseous structures are unremarkable. Tubes as above. Right basilar pneumonia. XR CHEST PORTABLE    Result Date: 1/14/2022  EXAMINATION: ONE XRAY VIEW OF THE CHEST 1/14/2022 7:16 am COMPARISON: Chest radiograph performed 01/13/2022.  HISTORY: ORDERING SYSTEM PROVIDED HISTORY: intubated TECHNOLOGIST PROVIDED HISTORY: intubated FINDINGS: There is a right basilar infiltrate. There is no pneumothorax. The mediastinal structures are unremarkable. The upper abdomen is unremarkable. The extrathoracic soft tissues are unremarkable. There is an endotracheal tube with the tip in the distal midtrachea. There is a gastric tube and the tip is not visualized. There is a right basilar infiltrate consistent with pneumonia. Support tubes as described above. XR CHEST PORTABLE    Result Date: 1/13/2022  EXAMINATION: ONE XRAY VIEW OF THE CHEST 1/13/2022 6:25 am COMPARISON: AP chest from 01/12/2022; CT of the chest from 01/09/2022. HISTORY: ORDERING SYSTEM PROVIDED HISTORY: intubated TECHNOLOGIST PROVIDED HISTORY: intubated Reason for Exam: Semi uprt port History of chronic fatigue syndrome, hypertension, anemia, respiratory failure, kidney failure, GERD, and septicemia. FINDINGS: Interval advancement ETT, now satisfactory at approximately 4.2 cm above the emile; enteric tube tip position remains stable and satisfactory. Overlying ECG monitor leads and gown snaps. Cardiomediastinal shadow midline and WNL. Focal opacity right lung base again identified, similar by comparison; probable minimal left basilar atelectasis. No air-fluid level seen radiographically. No new pulmonary or significant pleural finding. No pneumothorax. Bones stable. Improved ETT position, now satisfactory; enteric tube position unchanged. Right basilar opacity, and probable slight left basilar atelectasis, as discussed above.      XR CHEST PORTABLE    Result Date: 1/12/2022  EXAMINATION: ONE XRAY VIEW OF THE CHEST 1/12/2022 6:21 am COMPARISON: 01/11/2022, 844 hours, CT chest from 01/09/2022 HISTORY: ORDERING SYSTEM PROVIDED HISTORY: intubated TECHNOLOGIST PROVIDED HISTORY: intubated Reason for Exam: supine port 79-year-old intubated male FINDINGS: Portable supine view of the chest. Endotracheal tube is seen overlying the proximal trachea approximately 8.6 cm above the level of the emile. Enteric tube traverses the GE junction with distal tip excluded from the field of view. Cardiac monitor leads overlie the chest. Cardiac and mediastinal contours remain unchanged. No obvious pneumothorax on limited portable supine imaging. Stable nodular opacity at the right lower lung zone consistent with a cavitary lesion seen on prior CT. Mild bibasilar atelectasis. Visualized osseous structures remain unchanged. 1. Endotracheal tube overlying the proximal trachea 8.6 cm above the level of the emile. Advancement is recommended for more optimal positioning. 2. Enteric tube as above. 3. Stable nodular opacity at the right lower lung zone consistent with a cavitary lesion seen on prior CT. 4. Mild bibasilar atelectasis. The findings were sent to the Radiology Results [id number] at 6:58 am on 1/12/2022to be communicated to a licensed caregiver. XR CHEST PORTABLE    Result Date: 1/11/2022  EXAMINATION: ONE XRAY VIEW OF THE CHEST 1/11/2022 9:02 am COMPARISON: 01/10/2022 radiograph HISTORY: ORDERING SYSTEM PROVIDED HISTORY: intubated TECHNOLOGIST PROVIDED HISTORY: intubated FINDINGS: Supportive devices are stable. Heart, mediastinum and pulmonary vascularity are normal.  Stable nodular opacity in the right lower lung zone representing a cavitary lesion seen on prior CT. The lungs appear clear otherwise. No significant skeletal finding. Stable nodular opacity in the right lower lung zone better characterized on recent CT. XR CHEST PORTABLE    Result Date: 1/10/2022  EXAMINATION: ONE XRAY VIEW OF THE CHEST 1/10/2022 1:53 am COMPARISON: 01/10/2022. HISTORY: ORDERING SYSTEM PROVIDED HISTORY: ett TECHNOLOGIST PROVIDED HISTORY: ett Reason for Exam: et tube and ng placement   supine port Initial evaluation. FINDINGS: Endotracheal tube with the tip projecting approximately 6 cm above the emile. Enteric tube coursing below the diaphragm.  The side port projects in the region the gastric cardia. The cardiac silhouette is within normal limits for size. There is no significant change in the focal opacity within the right lower lung. No focal consolidation within the left lung. No pleural effusion or pneumothorax. 1. Support devices as above. 2. No significant change in the focal opacity within the right lower lung. XR CHEST PORTABLE    Result Date: 1/10/2022  EXAMINATION: ONE XRAY VIEW OF THE CHEST 1/9/2022 11:49 pm COMPARISON: 01/09/2022 HISTORY: ORDERING SYSTEM PROVIDED HISTORY: transfer TECHNOLOGIST PROVIDED HISTORY: transfer Reason for Exam: found down   supine port FINDINGS: Right lung base masslike opacity opacity again identified. Otherwise unremarkable size. Lungs clear. No pleural effusion pneumothorax. Right lung base opacity again identified, nonspecific. Please correlate exam findings and exam findings and history. XR CHEST PORTABLE    Result Date: 1/9/2022  EXAMINATION: ONE XRAY VIEW OF THE CHEST 1/9/2022 7:25 pm COMPARISON: 12/07/2015 HISTORY: ORDERING SYSTEM PROVIDED HISTORY: altered mental status TECHNOLOGIST PROVIDED HISTORY: altered mental status Reason for Exam: Altered mental status FINDINGS: The cardiomediastinal silhouette is normal in size and contour. Right lung base masslike opacity identified. No pleural effusion or pneumothorax is present. Right lung base opacity. Please correlate exam findings. This could represent a focus of pneumonia versus underlying mass. CT CHEST PULMONARY EMBOLISM W CONTRAST    Result Date: 1/16/2022  EXAMINATION: CTA OF THE CHEST 1/16/2022 9:24 am TECHNIQUE: CTA of the chest was performed after the administration of intravenous contrast.  Multiplanar reformatted images are provided for review. MIP images are provided for review.  Dose modulation, iterative reconstruction, and/or weight based adjustment of the mA/kV was utilized to reduce the radiation dose to as low as reasonably achievable. COMPARISON: 01/15/2022 CT HISTORY: ORDERING SYSTEM PROVIDED HISTORY: tachycardai, decrease saturation. admitted in [facility] for cns infection TECHNOLOGIST PROVIDED HISTORY: tachycardai, decrease saturation. admitted in [facility] for cns infection FINDINGS: Pulmonary Arteries: The study is of good technical quality. Acute bilateral pulmonary embolism is confirmed. Scattered segmental and subsegmental filling defects are observed in the right upper lobe, right lower lobe and left upper lobe. RV: LV ratio is 1.0. Mediastinum: The heart is normal in size. Stable small pericardial effusion. No lymph node enlargement. Diffuse heterogeneity of the thyroid with no discrete nodule. The mass described on prior CT is less distinct on current exam.  Enteric tube remains within the esophagus. Lungs/pleura: The airways are patent. Endotracheal tube stable. Trace pleural fluid in the lower chest.  Cavitary mass in the lateral segment of the right middle lobe measuring 6.3 x 5.4 cm. Relatively stable air-fluid level. This does communicate with the pleural space along the minor fissure and major fissure. Mild dependent airspace opacities are otherwise stable in both lung bases. Upper Abdomen: Visualized abdominal structures in the upper abdomen are normal. Soft Tissues/Bones: No skeletal abnormalities throughout the chest.     1. Acute bilateral pulmonary embolism as described above. There is no evidence of right ventricular strain. 2. Slight increase in the size of a cavitary mass in the right middle lobe abutting the lateral pleural surface. Pulmonary abscess would be favored over empyema due to location. Fungal infection considered less likely. Underlying malignancy can not be definitively excluded and continued follow-up is advised. 3. Trace bilateral pleural effusions appear relatively stable with dependent airspace opacities in both lower lobes.  Findings were discussed with Tim Cosme RN, at 9:52 am on 1/16/2022. CT CHEST PULMONARY EMBOLISM W CONTRAST    Addendum Date: 1/13/2022    ADDENDUM: Heterogeneous thyroid gland with 2.9 cm nodule on left. This can be further evaluate with thyroid ultrasound this may change patient management. Result Date: 1/13/2022  EXAMINATION: CTA OF THE CHEST 1/9/2022 7:40 pm TECHNIQUE: CTA of the chest was performed after the administration of intravenous contrast.  Multiplanar reformatted images are provided for review. MIP images are provided for review. Dose modulation, iterative reconstruction, and/or weight based adjustment of the mA/kV was utilized to reduce the radiation dose to as low as reasonably achievable. COMPARISON: Chest x-ray 01/09/2022 HISTORY: ORDERING SYSTEM PROVIDED HISTORY: elevated d-dimer, mental status change TECHNOLOGIST PROVIDED HISTORY: elevated d-dimer, mental status change Decision Support Exception - unselect if not a suspected or confirmed emergency medical condition->Emergency Medical Condition (MA) Reason for Exam: Elevated d-dimer, mental status change FINDINGS: Pulmonary Arteries: No central lobar pulmonary emboli. Main pulmonary artery is unremarkable caliber. Mediastinum: Air-fluid identified involving esophagus. Slight heterogeneous appearance of the thyroid gland on left. Heart is mildly prominent size. No definite bulky hilar mediastinal adenopathy. Lungs/pleura: Right middle lobe atelectasis versus scarring identified. Within the lateral aspect right middle lobe, there is consolidative versus masslike opacity with air-fluid level. There vessels corresponding through this opacity. The tiny locules of gas as well. Otherwise mild hypoventilatory changes elsewhere the lungs. Upper Abdomen: Evidence of cholelithiasis. Fatty infiltration liver. Soft Tissues/Bones: Degenerate changes of the thoracic spine. No evidence of pulmonary embolism to the segmental level.  Nonspecific opacification and when question air-fluid level involving the right middle lobe laterally. This demonstrates tiny locules of gas. Infection/Pneumonia and/or cavitary mass lesion may be considered. .  Consider pulmonology consultation. RECOMMENDATIONS: Unavailable     VL DUP LOWER EXTREMITY VENOUS BILATERAL    Result Date: 1/11/2022    [facility]  Vascular Lower Extremities DVT Study Procedure   Patient Name   Rekha Casey    Date of Study           01/11/2022                 Eliseo Reyna   Date of Birth  1950   Gender                  Male   Age            70 year(s)   Race                       Room Number    4123 port    Height:                 70 inch, 177.8 cm   Corporate ID # [id number]     Weight:                 207 pounds, 93.9 kg   Patient Acct # [de-identified]    BSA:        2.12 m^2    BMI:       29.7 kg/m^2   MR #           [id number]      [address] [place] [address] IRAIDA Dee   Accession #    [id number]   Interpreting Physician  Renato Roque   Referring                   Referring Physician     Austin Logan  Nurse  Practitioner  Procedure Type of Study:   Veins: Lower Extremities DVT Study, Venous Scan Lower Bilateral.  Indications for Study:Elevated D-Dimer. Patient Status: In Patient. Technical Quality:Adequate visualization.   - Critical Result:Findings were reported to Dr. Khan on 1/11/2022 while     study was being done by Dena Bowen RVT. Conclusions   Summary   Simultaneous real time imaging utilizing B-Mode, color doppler and  spectral waveform analysis was performed on the bilateral lower  extremities for venous examination of the deep and superficial systems. Findings are:   Right:  Chronic deep venous thrombosis identified in the below knee peroneal and  gastrocnemius veins. Left:  No evidence of deep or superficial venous thrombosis.    Signature   ----------------------------------------------------------------  Electronically signed by Naatsha Lee RVT(Sonographer) on  01/11/2022 02:10 PM ----------------------------------------------------------------   ----------------------------------------------------------------  Electronically signed by Tin Bliss(Interpreting  physician) on 01/11/2022 04:00 PM  ----------------------------------------------------------------  Findings:   Right Impression:                    Left Impression:  The peroneal and deep calf muscle    The common femoral, femoral,  veins are non compressible with      popliteal and tibial veins  echoes. demonstrate normal compressibility                                       and augmentation. The common femoral, femoral,  popliteal and tibial veins           Normal compressibility of the great  demonstrate normal compressibility   saphenous vein. and augmentation. Normal compressibility of the small  Normal compressibility of the great  saphenous vein. saphenous vein. Normal compressibility of the small  saphenous vein. Risk Factors History +---------+----------+-----------------------------------------------------+ ! Diagnosis! Date      ! Comments                                             ! +---------+----------+-----------------------------------------------------+ ! Other    !01/11/2022! ICP 1; hematocrit 31.6; /93                    ! +---------+----------+-----------------------------------------------------+   - The patient's risk factor(s) include: dyslipidemia and arterial     hypertension.   - The patient has skin cancer. Velocities are measured in cm/s ; Diameters are measured in cm Right Lower Extremities DVT Study Measurements Right 2D Measurements +------------------------------------+----------+---------------+----------+ ! Location                            ! Visualized! Compressibility! Thrombosis! +------------------------------------+----------+---------------+----------+ ! Common Femoral                      !Yes       ! Yes !Yes       !Yes            ! None      ! +------------------------------------+----------+---------------+----------+ Right Doppler Measurements +---------------------------+------+------+--------------------------------+ ! Location                   ! Signal!Reflux! Reflux (msec)                   ! +---------------------------+------+------+--------------------------------+ ! Common Femoral             !Phasic!      !                                ! +---------------------------+------+------+--------------------------------+ ! Prox Femoral               !Phasic!      !                                ! +---------------------------+------+------+--------------------------------+ ! Popliteal                  !Phasic!      !                                ! +---------------------------+------+------+--------------------------------+ Left Lower Extremities DVT Study Measurements Left 2D Measurements +------------------------------------+----------+---------------+----------+ ! Location                            ! Visualized! Compressibility! Thrombosis! +------------------------------------+----------+---------------+----------+ ! Common Femoral                      !Yes       ! Yes            ! None      ! +------------------------------------+----------+---------------+----------+ ! Prox Femoral                        !Yes       ! Yes            ! None      ! +------------------------------------+----------+---------------+----------+ ! Mid Femoral                         !Yes       ! Yes            ! None      ! +------------------------------------+----------+---------------+----------+ ! Dist Femoral                        !Yes       ! Yes            ! None      ! +------------------------------------+----------+---------------+----------+ ! Deep Femoral                        !Yes       ! Yes            ! None      ! +------------------------------------+----------+---------------+----------+ ! Popliteal !Yes       !Yes            ! None      ! +------------------------------------+----------+---------------+----------+ ! Sapheno Femoral Junction            ! Yes       ! Yes            ! None      ! +------------------------------------+----------+---------------+----------+ ! PTV                                 ! Yes       ! Yes            ! None      ! +------------------------------------+----------+---------------+----------+ ! Peroneal                            !No        !               !          ! +------------------------------------+----------+---------------+----------+ ! Gastroc                             ! Yes       ! Yes            ! None      ! +------------------------------------+----------+---------------+----------+ ! GSV Thigh                           ! Yes       ! Yes            ! None      ! +------------------------------------+----------+---------------+----------+ ! GSV Knee                            ! Yes       ! Yes            ! None      ! +------------------------------------+----------+---------------+----------+ ! GSV Ankle                           ! Yes       ! Yes            ! None      ! +------------------------------------+----------+---------------+----------+ ! SSV                                 ! Yes       ! Yes            ! None      ! +------------------------------------+----------+---------------+----------+ Left Doppler Measurements +---------------------------+------+------+--------------------------------+ ! Location                   ! Signal!Reflux! Reflux (msec)                   ! +---------------------------+------+------+--------------------------------+ ! Common Femoral             !Phasic!      !                                ! +---------------------------+------+------+--------------------------------+ ! Prox Femoral               !Phasic!      !                                ! +---------------------------+------+------+--------------------------------+ ! Popliteal !Phasic!      !                                ! +---------------------------+------+------+--------------------------------+    CTA HEAD NECK W CONTRAST    Result Date: 1/10/2022  EXAMINATION: CTA OF THE HEAD AND NECK WITH CONTRAST 1/10/2022 8:09 am: TECHNIQUE: CTA of the head and neck was performed with the administration of intravenous contrast. Multiplanar reformatted images are provided for review. MIP images are provided for review. Stenosis of the internal carotid arteries measured using NASCET criteria. Dose modulation, iterative reconstruction, and/or weight based adjustment of the mA/kV was utilized to reduce the radiation dose to as low as reasonably achievable. COMPARISON: CT brain performed 01/10/2022. HISTORY: ORDERING SYSTEM PROVIDED HISTORY: assess for vasculitis TECHNOLOGIST PROVIDED HISTORY: assess for vasculitis Decision Support Exception - unselect if not a suspected or confirmed emergency medical condition->Emergency Medical Condition (MA) Reason for Exam: assess for vasculitis FINDINGS: CTA NECK: AORTIC ARCH/ARCH VESSELS: No dissection or arterial injury. No significant stenosis of the brachiocephalic or subclavian arteries. CAROTID ARTERIES: No dissection, arterial injury, or hemodynamically significant stenosis by NASCET criteria. VERTEBRAL ARTERIES: No dissection, arterial injury, or significant stenosis. There is a dominant right vertebral artery. SOFT TISSUES: The lung apices are clear. No cervical or superior mediastinal lymphadenopathy. The larynx and pharynx are unremarkable. No acute abnormality of the salivary and thyroid glands. BONES: No acute osseous abnormality. CTA HEAD: ANTERIOR CIRCULATION: The internal carotid arteries are patent. The anterior cerebral arteries are patent and mildly diminutive. The middle cerebral arteries demonstrate a mildly beaded pattern in the M1 portions with intermittent foci of mild stenosis.   The distal middle cerebral artery branches are diminutive and this is more pronounced on the left compared to the right. POSTERIOR CIRCULATION: There is a dominant right vertebral artery. The vertebral arteries are patent. There is a focus of apparent fenestration within the basilar artery. There is a focus of mild-to-moderate stenosis in the distal aspect of the basilar artery. The posterior cerebral arteries are patent and mildly diminutive. OTHER: No dural venous sinus thrombosis on this non-dedicated study. BRAIN: No mass effect or midline shift. No extra-axial fluid collection. The gray-white differentiation is maintained. Diminutive anterior cerebral arteries and posterior cerebral arteries. Mildly beaded appearance to the M1 portions of the middle cerebral arteries bilaterally with intermittent foci of mild stenosis. Subsequent diminutive middle cerebral artery branches distally and this is more pronounced on the left compared to the right. These findings are of uncertain etiology although vasculitis is a consideration. RECOMMENDATIONS: Unavailable     CT CHEST ABDOMEN PELVIS W CONTRAST    Result Date: 1/15/2022  EXAMINATION: CT OF THE CHEST, ABDOMEN, AND PELVIS WITH CONTRAST 1/15/2022 1:21 pm TECHNIQUE: CT of the chest, abdomen and pelvis was performed with the administration of intravenous contrast. Multiplanar reformatted images are provided for review. Dose modulation, iterative reconstruction, and/or weight based adjustment of the mA/kV was utilized to reduce the radiation dose to as low as reasonably achievable. COMPARISON: None HISTORY: ORDERING SYSTEM PROVIDED HISTORY: Follow up cavitary lesion of right lung. Follow up tissue thickening of bowel seen on previous CT. TECHNOLOGIST PROVIDED HISTORY: Follow up cavitary lesion of right lung. Follow up tissue thickening of bowel seen on previous CT. Reason for Exam: f/u lung lesion FINDINGS: Chest: Mediastinum: Enteric tube/ETT remain in satisfactory position.   3.3 x 2.0 cm low-density left thyroid mass. Slightly larger but still small anterior pericardial effusion. Cardiac chambers and thoracic aorta otherwise unchanged and/or unremarkable. No bulky lymphadenopathy. Lungs/pleura: Previously identified RML lateral segment lobular shaped cavitated mass with a fluid level shows mild interval enlargement, now measuring 5.2 x 5.4 x 4.8 cm versus 4.5 x 4.8 x 4.3 cm previously. Bilateral small pleural effusions appear slightly larger, with associated compressive atelectasis. Remainder lung fields clear and unchanged. Mild bronchial wall thickening and LLL calcified granuloma again noted. No pneumothorax or additional suspicious finding. Soft Tissues/Bones: No acute abnormality. Abdomen/Pelvis: Organs: Unenhanced liver, spleen, adrenal glands and pancreas unchanged without acute abnormality; low-density 11 mm left adrenal nodule is likely benign. Hyperdense gallbladder, likely secondary to vicarious contrast excretion; stones again demonstrated without CT evidence cholecystitis. No obvious dilatation biliary tree. Large unchanged right kidney cyst and some contrast within the right renal collecting system. No acute renal abnormality. No hydronephrosis. GI/Bowel: Fluid-filled mildly distended stomach. 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 Decompressed left colon; air-filled mildly distended ascending and transverse colon with air-fluid levels but no wall thickening or definite focal lesion splenic flexure. Gaseous mild-moderate dilatation and fluid with levels throughout predominantly ileal loops (no significant dilatation jejunum). No abnormal wall thickening. Pelvis: Rangel catheter, air and some contrast in the bladder, without wall thickening. Similar abnormally enlarged prostate measuring ~ 6.0 x 4.8 x 5.3 cm with rightward lobular extension as previously noted.   Moderate pathologic free fluid in the posterior pelvis. Bilateral fat containing inguinal hernias, unchanged. Peritoneum/Retroperitoneum: Unenhanced major vascular structures unchanged with scattered calcified plaque but no aneurysm. No bulky lymphadenopathy. No adenopathy in the mesentery, specifically right upper quadrant. No free fluid. Additional mild soft tissue stranding/fluid in the gutters, slightly more on the left. Bones/Soft Tissues: No acute abnormality. Probable osteopenia and multilevel DDD/DJD, similar by comparison. Bilateral fluid collections both hip joints and unchanged degenerative findings. No bony destructive lesion. No acute CT finding in the chest; slightly larger cavitated lesion lateral segment RML. Slightly larger small bilateral pleural effusions with associated mild compressive atelectasis. Unchanged differential including infectious or inflammatory etiologies and neoplasm. 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 No associated adenopathy identified. Dilated fluid-filled bowel, mostly ileal and right colon to the splenic flexure with air-fluid levels but no clear cut colitis, or focal lesion splenic flexure, as noted. Mildly distended fluid-filled stomach. Small amount of pathologic free fluid, mostly in the pelvis. No free air. Findings more suggestive of generalized ileus; if there is clinical concern for developing obstruction, consider SBFT. Bilateral hip joint effusions; correlate clinically. Slightly larger but small anterior pericardial effusion. Rangel catheter in place with a small amount of air and contrast in the urinary bladder. Enlarged unchanged appearing prostate with rightward lobular extension. Vicarious gallbladder contrast excretion and cholelithiasis without cholecystitis. Additional unchanged or likely incidental findings, as detailed above.  RECOMMENDATIONS: Unavailable     MRI BRAIN W WO CONTRAST    Result Date: 1/10/2022  EXAMINATION: MRI OF THE BRAIN WITHOUT AND WITH CONTRAST  1/10/2022 1:18 pm TECHNIQUE: Multiplanar multisequence MRI of the head/brain was performed without and with the administration of intravenous contrast. COMPARISON: CT brain performed 01/10/2022. HISTORY: ORDERING SYSTEM PROVIDED HISTORY: infection TECHNOLOGIST PROVIDED HISTORY: infection Reason for Exam: infection FINDINGS: INTRACRANIAL STRUCTURES/VENTRICLES:  The sellar and suprasellar structures, optic chiasm, corpus callosum, pineal gland, tectum, and midline brainstem structures are unremarkable. The craniocervical junction is unremarkable. There is no acute hemorrhage, mass effect, midline shift. There is FLAIR signal abnormality in the periventricular white matter. The ventricles are symmetric and enlarged containing a large amount of debris and this is worse on the right compared to the left. The debris is seen in the 3rd ventricle and 4th ventricle. There is a shunt tube from a right frontal approach with the tip in the right frontal horn. There is enhancement involving the ependymal surface of the ventricular system and along the periphery of the choroid plexus. Left a meningeal enhancement extends into the cranial cervical junction and visualized cervical spinal cord. ORBITS: The visualized portion of the orbits demonstrate no acute abnormality. SINUSES: There is fluid throughout the mastoid air cells. The paranasal sinuses are normally aerated. BONES/SOFT TISSUES: The bone marrow signal intensity appears normal. The soft tissues demonstrate no acute abnormality. Prominent ventricular system with debris throughout the CSF. Associated FLAIR signal abnormality involving the ependymal and subependymal surface with enhancement of the ependymal surface of the ventricular system consistent with an infectious process. There is associated leptomeningeal enhancement extending into the craniocervical region and along the visualized cervical spinal cord.  RECOMMENDATIONS: Unavailable     MRI BRAIN WO CONTRAST    Result Date: 1/14/2022  EXAMINATION: MRI OF THE BRAIN WITHOUT CONTRAST  1/14/2022 3:47 pm TECHNIQUE: Multiplanar multisequence MRI of the brain was performed without the administration of intravenous contrast. COMPARISON: 01/10/2022 HISTORY: ORDERING SYSTEM PROVIDED HISTORY: eval for acute stroke in setting of vascultitis with narrowing intracranial vasculature and elevated TCD TECHNOLOGIST PROVIDED HISTORY: eval for acute stroke in setting of vascultitis with narrowing intracranial vasculature and elevated TCD Reason for Exam: eval for acute stroke in setting of vascultitis with narrowing intracranial vasculature and elevated TCD FINDINGS: INTRACRANIAL STRUCTURES/VENTRICLES: There is diffuse ventricular enlargement with layering complex fluid that demonstrates diffusion restriction. The ventricular dilation is increased in the interval with the frontal horns now measuring 5 cm in diameter (previously 4.3 cm). Right frontal approach ventricular shunt catheter is again noted. There is associated FLAIR signal abnormality extending throughout the ventricles and adjacent to the ventral in the white matter. There are multiple foci of diffusion restriction in the right frontal and parietal lobes, which appear increased in the interval. There is also diffusion restriction in the right occipital lobe, similar to slightly increased from prior examination. There are scattered areas of susceptibility artifact, likely a combination of blood products and gas. ORBITS: The visualized portion of the orbits demonstrate no acute abnormality. SINUSES: Paranasal sinuses are predominantly clear. Bilateral mastoid air cell effusions. BONES/SOFT TISSUES: The bone marrow signal intensity appears normal. The soft tissues demonstrate no acute abnormality. 1. There are foci of diffusion restriction in the right frontal, parietal, and occipital lobes, concerning for acute infarction.  2. Layering debris in the ventricles with diffusion restriction is consistent with ventriculitis. This appears slightly increased from prior examination. There is also increased ventriculomegaly despite right frontal approach ventricular shunt catheter. 3. Mastoid air cell effusions. RECOMMENDATIONS: Unavailable     CT MAXILLOFACIAL W CONTRAST    Result Date: 1/12/2022  EXAMINATION: CT OF THE FACE WITH CONTRAST, 1/12/2022 TECHNIQUE: CT of the face was performed with the administration of intravenous contrast. Multiplanar reformatted images are provided for review. Dose modulation, iterative reconstruction, and/or weight based adjustment of the mA/kV was utilized to reduce the radiation dose to as low as reasonably achievable. COMPARISON: None HISTORY: ORDERING SYSTEM PROVIDED HISTORY:  Ventriculitis/meningitis TECHNOLOGIST PROVIDED HISTORY: Ventriculitis/meningitis Reason for Exam:  Ventriculitis/meningitis FINDINGS: The paranasal sinuses are clear without evidence of acute or chronic sinusitis. There are nonspecific right larger than left moderate mastoid effusions without bony erosion which could be secondary to intubation. There is no facial soft tissue infection or abscess. The major facial vessels are patent. There is no orbital inflammation. The small bore enteric tube is partially coiled in the mouth. Meningitis, ventriculitis, and hydrocephalus are partially imaged. No evidence of facial infection or sinusitis.      VL TRANSCRANIAL DOPPLER COMPLETE    Result Date: 1/14/2022    [facility]  Vascular Transcranial Procedure   Patient Name  Libby Flores   Date of Study         01/14/2022                Cody Aldana   Date of Birth 1950  Gender                Male   Age           70 year(s)  Race                     Room Number   0528        Height:               70 inch, 177.8 cm   Corporate ID  [id number]    Weight:               207 pounds, 93.9 kg  #   Patient Acct  [de-identified]   BSA: 2.12 m^2   BMI:         29.7 kg/m^2  #   MR #          [id number]     Sonographer           Nicolle Wood RVT, RDMS   Accession #   [id number]  Interpreting          [address] [place] [address]   Referring                 Referring Physician   Priyank ABBASI-CNP  Nurse  Practitioner  Procedure Type of Study:   Cerebral: Transcranial.  Indications for Study:Stenosis of major intracranial artery. Patient Status: In Patient. Technical Quality:Limited visualization. Limitation reason:acoustic interferrence. Conclusions   Summary   Severe vasospasm in the bilateral MCAs with moderate spasm noted in the  basilar artery.    Signature   ----------------------------------------------------------------  Electronically signed by Nicolle Wood RVT, RDMS(Sonographer) on 01/14/2022 12:10 PM  ----------------------------------------------------------------   ----------------------------------------------------------------  Electronically signed by Delilah Hodgkins Reyes,Arthur(Interpreting  physician) on 01/14/2022 05:23 PM  ----------------------------------------------------------------  Findings:   Right Impression:                     Left Impression:  Right Lindegaard Ratio = 6.65         Left Lindegaard Ratio = 7.31  MEAN VELOCITIES:                      MEAN VELOCITIES:  Transtemporal Approach                Transtemporal Approach  Proximal MCA: 164                     Proximal MCA: 174  Mid MCA: 171                          Mid MCA: 157  Distal MCA: 174                       Distal MCA: 112  Proximal ALCIDES: 137                     Proximal ALCIDES: 93.9  Mid ALCIDES: 124                          Mid ALCIDES: 159  PCA: 35.8                             PCA: 60.8  T ICA: 73.5                           T ICA: 60.1  Submandibular Approach                Submandibular Approach  D ICA: 26.2                           D ICA: 23.9  Transorbital Approach                 Transorbital Approach  Siphon: 20 Study           01/11/2022               Julia Juarez   Date of      1950  Gender                  Male  Birth   Age          70 year(s)  Race                       Room Number  0528        Height:                 70 inch, 177.8 cm   Corporate ID [id number]    Weight:                 207 pounds, 93.9 kg  #   Patient Acct [de-identified]   BSA:        2.12 m^2    BMI:         29.7 kg/m^2  #   MR #         [id number]     Caryn Booker, T   Accession #  [id number]  Interpreting Physician  Cat Dickey   Referring                Referring Physician     Chilo Workman, CNP  Nurse                                            Rafiq Buckner  Practitioner  Procedure Type of Study:   Cerebral: Transcranial.  Indications for Study:Stenosis of major intracranial artery. Patient Status: In Patient. Technical Quality:Limited visualization. Conclusions   Summary   Mild vasospasm noted in the right MCA with moderate spasm in the left MCA.    Signature   ----------------------------------------------------------------  Electronically signed by Santiago Galarza RVT(Sonographer) on  01/11/2022 03:56 PM  ----------------------------------------------------------------   ----------------------------------------------------------------  Electronically signed by Marsa Aly Reyes,Arthur(Interpreting  physician) on 01/12/2022 08:50 PM  ----------------------------------------------------------------  Findings:   Right Impression:                     Left Impression:  Right Lindegaard Ratio =4.28          Left Lindegaard Ratio =6.74  MEAN VELOCITIES:                      MEAN VELOCITIES:  Transtemporal Approach                Transtemporal Approach  Proximal MCA: 114                     Proximal MCA: 183  Mid MCA: 129                          Mid MCA: 189  Distal MCA: 97.0                      Distal MCA: 168  Proximal ALCIDES: 57.8                    Proximal ALCIDES: 40.8  Mid ALCIDES: 64.7                         Mid ALCIDES: 48.9 maintained. No osseous destructive lesion is seen. DEGENERATIVE CHANGES: No significant degenerative changes of the lumbar spine. SOFT TISSUES/RETROPERITONEUM: No paraspinal mass is seen. Unremarkable non-contrast CT of the lumbar spine. CT THORACIC SPINE TRAUMA RECONSTRUCTION    Result Date: 1/12/2022  EXAMINATION: CT OF THE THORACIC SPINE WITHOUT CONTRAST  1/10/2022 1:14 am: TECHNIQUE: CT of the thoracic spine was performed without the administration of intravenous contrast. Multiplanar reformatted images are provided for review. Dose modulation, iterative reconstruction, and/or weight based adjustment of the mA/kV was utilized to reduce the radiation dose to as low as reasonably achievable. COMPARISON: Subsequent MRI thoracic spine HISTORY: ORDERING SYSTEM PROVIDED HISTORY: found down TECHNOLOGIST PROVIDED HISTORY: found down Reason for Exam:  recons off PE from [facility], Patient found down, POA in [facility] called yetruday pt ams, POA found patient today lying on floor, head facing right ulcer to right cheek. unknown amount of time approximately 48hr estimate. Patient transfer from [facility] [place]. DX: Rhabdo, glucose 221, FINDINGS: BONES/ALIGNMENT: There is normal alignment of the spine. The vertebral body heights are maintained. No osseous destructive lesion is seen. DEGENERATIVE CHANGES: Mild multilevel degenerative changes of the thoracic spine with osteophyte formation. No bony neural foraminal narrowing of the thoracic spine. SOFT TISSUES: No paraspinal mass is seen. Please see concurrently performed CT chest for additional findings peer     Mild degenerative changes of the thoracic spine without evidence of acute osseous abnormality. Please see concurrently performed CT chest and subsequently performed MRI thoracic spine for additional findings.          ENDOSCOPY     Active Problems:    JOZEF (acute kidney injury) ([address]    Acute respiratory failure (HCC)    Rhabdomyolysis    Altered mental status Septicemia ([facility]    Closed fracture of multiple ribs of left side    Abrasion of left cornea    Pneumonia of right middle lobe due to infectious organism    Altered mental state    Ventilator dependence ([facility]    Cerebral ventriculitis    Intracranial vascular stenosis    Cerebritis    Cerebral ischemic stroke due to global hypoperfusion with watershed infarct [facility])  Resolved Problems:    Traumatic rhabdomyolysis ([facility]       GI Assessment:  1. Ileus  2. Strep virdans speticemia  3. Constipation         Plan of care:  1. Patient made DNR CC ON. Compassionately extubated. GI will sign off. This plan was formulated in collaboration with   Please feel free to contact me with any questions or concerns. Thank you for allowing me to participate in the care of your patient. Anastacio Vu MD on 1/17/2022 at 8:27 [address] [place] [address] Gastroenterology    Please note that this note was generated using a voice recognition dictation software. Although every effort was made to ensure the accuracy of this automated transcription, some errors in transcription may have occurred.

## 2022-01-17 NOTE — FLOWSHEET NOTE
SPIRITUAL CARE DEPARTMENT - [address]  PROGRESS NOTE    Shift date: 1/16/2022  Shift day: Sunday    Shift # 2    Room # 0758/6109-84   Name: Peter Miguel            Age: 70 y.o. Gender: male          Moravian: Gaye Burrell 33 of Mu-ism:      Referral: EOL    Admit Date & Time: 1/9/2022 11:26 PM    PATIENT/EVENT DESCRIPTION:  Peter Miguel is a 70 y.o. male   who was switched to [facility]       SPIRITUAL ASSESSMENT/INTERVENTION:   present at bedside when HCPOA (Pt's Cousin, Sadie Serna) made the decision to change pt's status to [facility].  provided compassionate and active listening.  encouraged Siva to remember that he is not making the decision for this code change but his job is to merely expresses Vamshi's wishes to the medical team.   offered EOL prayers and readings. SPIRITUAL CARE FOLLOW-UP PLAN:  Chaplains will remain available to offer spiritual and emotional support as needed. 01/16/22 2214   Encounter Summary   Services provided to: Family   Referral/Consult From: Other    Support System Family members   Continue Visiting   (1/16/22)   Complexity of Encounter Moderate   Length of Encounter 45 minutes   Spiritual Assessment Completed Yes   Grief and Life Adjustment   Type End of life   Assessment Tearful;Coping   Intervention End of life care;Prayer;Scripture; Active listening;Explored feelings, thoughts, concerns; Discussed death;Discussed belief system/Mandaen practices/kavon   Outcome Expressed gratitude;Comfort;Coping;Grieving       Electronically signed by Chaplain Resident Jody Barillas MDiv.on 1/16/2022 at 10:16 PM   [facility] [place]  [phone]

## 2022-01-17 NOTE — FLOWSHEET NOTE
Loren Levo son of pt Juanbree Eckert brittny to find out if the Med POA that is in place will cover money and property we said that it would not. Chaplains remain available for spiritual or emotional support as needed.      01/17/22 1147   Encounter Summary   Services provided to: Family   Referral/Consult From: Other    Support System Family members   Complexity of Encounter Moderate   Length of Encounter 30 minutes

## 2022-01-17 NOTE — PROGRESS NOTES
Daily Progress Note  Neuro Critical Care    Patient Name: Sanjeev Navarro  Patient : 1950  Room/Bed: 5714/9590-13  Code Status: FULL CODE  Allergies: No Known Allergies    CHIEF COMPLAINT:       AMS     INTERVAL HISTORY      Initial Presentation (Admitted 1/10/22): The patient is a 74 y. o. male with a history of hypertension who presented as a transfer from [facility] ED for intraventricular debris. Initially presented to the [facility] ED after being found down at home with altered mental status. He reportedly saw his PCP one week ago for headaches and CTH at that time was negative for acute abnormality. His POA tried calling him and felt that he sounded confused. Patient is normal A&O x4 at baseline. EMS was called to the home and patient was found down for an unknown amount of time but suspected prolonged down time as long as 48 hours. On arrival to [facility] ED, GCS 12. Patient awake and able to follow simple commands but not answering questions appropriately. CT Head showed debris within bilateral ventricles secondary to subacute hemorrhage vs infectious process. CT C-spine negative. CT Chest showed possible cavitary lung lesion in the right middle lobe. Labs were remarkable for elevated CK consistent with rhabdomyolysis, mild JOZEF, leukocytosis to 27.7, and mild elevation of lactate. Patient was transferred to [facility] for neurosurgical evaluation. While in [facility] ED, patient GCS declined and he became tachypneic. Patient was subsequently intubated. Febrile with leukocytosis. Trauma was consulted due to possible traumatic etiology of ventricular debris and imaging demonstrated two left sided rib fractures but otherwise negative for traumatic injury. Neurosurgery consulted. Started empirically on Decadron and antibiotic/antiviral coverage for meningitis.     Hospital Course:  1/10: Admitted to the Neuro ICU. EVD placed at bedside and set at [address] [facility].  CSF studies; Glucose <2, Protein 966, , WBC 80208. Meningitis panel negative. Acyclovir discontinued. Continued on Rocephin, Vancomycin and Ampicillin. Received 4 doses of IV Decadron. MRI Brain with/without contrast showed known debris in the ventricular system with enhancement of the ependymal surface of the ventricular system and leptomeningeal enhancement. MRI Cervical spine with no new findings.        1/12:   EVD set at [address] [facility], ICP 3-11, 20cc out/24h. ID discontinued antibiotics overnight. Discussed case this AM; started on Rocephin 2g Q12h. Neurosurgery following and concerned about subarachnoid purulence in cisterns and cervical space. Dr. Cheli Sexton considering intraoperative subarachnoid culture. Awaiting MRI Thoracic and Lumbar spine. Will consider CT facial bone with contrast.  Repeat blood culture and respiratory culture ordered. Neuro Endovascular reviewed CTA findings (concern for vasculitis) and recommended TCD which showed elevated mean velocities in the left MCA (189 max, LR 6.74). Recommended Verapamil, started on 80mg Q8h. Clinical exam remains poor. Fentanyl infusion changed to PRN dosing, will use Precedex if needs more sedation (becomes hypertensive, tachypneic intermittently).    1/12: SBP >180 ON, PRN hydralazine orderedResp cx Gram + cocci, 1/2 + blood cultures likely staph. Straight cath x2 -> amaya. In the am, pt is obtunded, not following commands. Pupils 2mm slugglishly reactive. Spontaneous movement in the left upper extremity, flicker movement in the b/l lower extremities. CT maxillofacial w contrast ordered to asses if abcesss present. NS switched to LR 80ml/hr. CSF cell count WBC decreasing 89125 -> 70413. Plan to continue with recophin for now. Family updated about management plan, mri finding and ltme.      1/13: Overnight pt was stable. More spontaneous eye opening.s ICPs in 2-8. Output 19 in the last 12 hours. TB test in process. Hyponatremic. Free water bolus started.  Tube feeds ordered. LTME DC. Plan to send csf sample for infectious workup. A line has been removed. Follow TCD doppler tomorrow. VL lower extremity duplex scan ordered. 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 CSF is positive for viridans streptococcus. Plan to continue with ceftriaxone. CT maxillofacial unremarkable for abscess or sinusitis. 1/14: Pt exam has improved a little. He opens eyes more frequently. Had few eye blinks. Withdraw to pain in all four extremities. EVD clamped only for CSF sample. Residual 400s. Xray kub concerning for ileus. Bowel regimens increased to colace, glycolax, simethicone. Free water bolus increased to 250 every 4 hours. MRI brain wo contrast eval for acute stroke in setting of vascultitis with narrowing intracranial vasculature and elevated TCD.    1/15: Overnight, pt had residual of 60cc with bile. 2 mild to moderate bowel movement. Glycerin suppository added. Verapamil increased to 125. On exam pt's brain stem reflexes are intact. Frequent eye blinking's. Localizes with left upper extremity. Plan to repeat TCD in few days. Continue with rocephin. On CPAP mode. Leukocytosis increasing. Afebrile overnight. 1/16: Pt was tachycardic overngiht. Desaturating. CTPE consistent with PE. Central line placed. Vascular surgery consulted. Plan for repeat VL duplex venous lower. Pt NPO. Possible plan for surgery tomorrow. On exam, pt is comatose. Not following commands. Brainstem reflex intact. Flicker movement in all four extremities. GI consulted for persistent ileus pattern. No BM overnight. CSF lab work sent again. CSF WBC 939591. POA cousin updated about the management plan. No plan for neurosurgery intervention due to PE. IR guided drainage of effusions. Follow gram stain, culture, body fluid crystals, cell differentials. POA dicussed with NSG and plan to make pt DNR CC with terminal extubation. Pending paperwork. 1/17: Overnight: no updates. Pt on comfort care measures. Hospice consulted. Plan to stepdown to [facility] department. CURRENT MEDICATIONS:  SCHEDULED MEDICATIONS:    CONTINUOUS INFUSIONS:    PRN MEDICATIONS:   LORazepam, morphine **OR** morphine, glycopyrrolate, sodium chloride flush, sodium chloride flush, sodium chloride flush, ondansetron **OR** ondansetron, acetaminophen **OR** acetaminophen, sodium chloride flush    VITALS:  Temperature Range: Temp: 100.6 °F (38.1 °C) Temp  Av °F (37.2 °C)  Min: 98 °F (36.7 °C)  Max: 100.6 °F (38.1 °C)  BP Range: Systolic (49ORE), SIS:692 , Min:103 , ZXL:945     Diastolic (93SSC), TND:28, Min:56, Max:81    Pulse Range: Pulse  Av.4  Min: 110  Max: 123  Respiration Range: Resp  Av  Min: 21  Max: 54  Current Pulse Ox: SpO2: (!) 87 %  24HR Pulse Ox Range: SpO2  Av.5 %  Min: 84 %  Max: 93 %  Patient Vitals for the past 12 hrs:   BP Temp Temp src Pulse Resp SpO2   22 1110  100.6 °F (38.1 °C) Axillary      22 0800 110/62 98 °F (36.7 °C) Oral 123 (!) 39 (!) 87 %   22 0700 111/66   121 (!) 37 (!) 87 %   22 0600 103/61   122 (!) 44 (!) 85 %   22 0500 113/67   122 (!) 35 (!) 84 %     Estimated body mass index is 28.7 kg/m² as calculated from the following:    Height as of this encounter: 5' 10\" (1.778 m).     Weight as of an earlier encounter on 22: 200 lb (90.7 kg).  []<16 Severe malnutrition  []1616.99 Moderate malnutrition  []1718.49 Mild malnutrition  []18.524.9 Normal  [x]2529.9 Overweight (not obese)  []3034.9 Obese class 1 (Low Risk)  []3539.9 Obese class 2 (Moderate Risk)  []?40 Obese class 3 (High Risk)    RECENT LABS:   Lab Results   Component Value Date    WBC 22.0 (H) 2022    HGB 11.5 (L) 2022    HCT 37.6 (L) 2022     2022    CHOL 127 03/15/2021    TRIG 123 2022    HDL 27 (L) 03/15/2021    LDLCHOLESTEROL 66 03/15/2021    ALT 32 2022    AST 48 (H) 2022     2022    K 4.2 2022     2022    CREATININE 0.78 01/16/2022    BUN 34 (H) 01/16/2022    CO2 22 01/16/2022    TSH 0.50 01/10/2022    PSA 0.95 04/28/2021    INR 1.1 01/10/2022    LABA1C 6.7 (H) 01/11/2022     24 HOUR INTAKE/OUTPUT:    Intake/Output Summary (Last 24 hours) at 1/17/2022 1410  Last data filed at 1/17/2022 1200  Gross per 24 hour   Intake 455 ml   Output 1550 ml   Net -1095 ml       IMAGING:     Place summary of recent imaging here. Labs and Images reviewed with:  [] Dr. Alejandra Hurd. [place]    [x] Dr. Erickson Patch  [] Dr. Donny Haque  [] There are no new interval images to review. PHYSICAL EXAM       Patient is extubated 1/16. Currently tachypenic. Comfort measures in place. DRAINS:  [] There are no drains for Neuro Critical Care to monitor at this time. ASSESSMENT AND PLAN:       The patient is 71 yo male with a history of hypertension who presented to [email or web address] ED as a transfer from [facility] for ventricular debris. Initially presented to [facility] ED with altered mentation after being found down. CT Head showed debris within bilateral ventricles secondary to subacute hemorrhage vs infectious process. Noted to have right middle lobe opacification concerning for cavitary mass lesion. Labs revealed elevated CK, JOZEF, leukocytosis. Transferred for Neurosurgical evaluation. Neurosurgery consulted. EVD placed urgently 1/10. CSF studies concerning for bacterial meningitis. MRI Brain re-demonstrated debris in the ventricular system and showed enhancement of the ependymal surface of the ventricular system and leptomeningeal enhancement. ID consulted; recommending CNS dosed Rocephin. Neuro Endovasuclar consulted due to concerns for vasculitis on CTA Head/Neck and recommended TCD which showed elevated L MCA velocities. Patient was started on Verapamil. CT maxillofacial ordered. Follow Csf culture. Csf culture 1/10 alpha hemolytic strep. On ceftraixone. EVD clamped. Free water flushes stopped.       - Acute debris within the ventricular system with enhancement of the ependymal surface of the ventricular system and leptomeningeal enhancement  - Concern for bacterial meningitis/ventriculitis. Csf culture 1/10 positive for alpha hemolytic strep. CSF culture 1/10 reported today growing viridians streptoccocus.  - CTA Head/Neck showed beading of the bilateral M1 MCAs and diminutive anterior and posterior cerebral arteries. MRI brain wo contrast pending: eval for acute stroke in setting of vascultitis with narrowing intracranial vasculature and elevated TCD. - Plan for terminal extubation. PT is DNR CC.  - Hospice care consulted. DISPOSITION:  [] To remain ICU:   [x] OK for out of ICU from Neuro Critical Care standpoint  Pt is terminally Extubated. Hospice care consulted.        Shavon White MD  Neuro Critical Care  1/17/2022     2:10 PM

## 2022-01-17 NOTE — PROGRESS NOTES
pPULMONARY & CRITICAL CARE MEDICINE PROGRESS NOTE     Patient:  Prakash Francis  MRN: [id number]  6 [facility] date: 1/9/2022  Primary Care Physician: Kingston Santana MD  Consulting Physician: Sravan Levine MD  CODE Status: DNR-CC  LOS: 7     SUBJECTIVE     CHIEF COMPLAINT/REASON FOR INITIAL CONSULT: RML cavitary lesion and acute hypoxic respiratory failure in the setting of CNS infection    BRIEF HOSPITAL COURSE:   History obtained from chart review and unobtainable from patient due to mental status and and being on the ventilator. Prakash Francis is a 70 y.o. white gentleman was admitted with altered mental status, traumatic rhabdomyolysis. Patient was apparently found down at home which was felt to be as long as 48 hours. Per chart, patient had dental work 3 weeks prior to admission. In the emergency room radiologic studies showed debris within the bilateral lateral ventricles of the brain which was felt to be related to hemorrhage or infection. Radiologic studies also revealed right middle lobe cavitary lesion. Patient had leukocytosis; microbiologic studies so far are unrevealing for any infectious process. Patient was started on empiric antibiotics for CNS infection.       INTERVAL HISTORY:  01/17/22   DNR CC  Terminally extubated    REVIEW OF SYSTEMS:  Unobtainable from patient due to sedation/mechanical ventilation    OBJECTIVE     VENTILATOR SETTINGS:  Vent Information  $Ventilation: (S) Off Vent  Skin Assessment: Clean, dry, & intact  Equipment Changed: HME  Vent Type: Servo i  Vent Mode: PRVC  Vt Ordered: 510 mL  Rate Set: 12 bmp  Pressure Support: 10 cmH20  FiO2 : 30 %  SpO2: (!) 87 %  SpO2/FiO2 ratio: 323.33  Sensitivity: 5  PEEP/CPAP: 5  I Time/ I Time %: 0.9 s  Humidification Source: HME     PaO2/FiO2 RATIO:  Recent Labs     01/16/22  0412   POCPO2 89.8      FiO2 : 30 %     VITAL SIGNS:   LAST:  /62   Pulse 123   Temp 100.6 °F (38.1 °C) (Axillary)   Resp (!) 39   Ht 5' 10\" (1.778 m)   SpO2 (!) 87%   BMI 28.70 kg/m²   8-24 HR RANGE:  TEMP Temp  Av °F (37.2 °C)  Min: 98 °F (36.7 °C)  Max: 100.6 °F (90.4 °C)   BP Systolic (56JFZ), WGS:565 , Min:103 , UCS:813      Diastolic (44GML), XWO:52, Min:56, Max:81     PULSE Pulse  Av.2  Min: 110  Max: 123   RR Resp  Av.8  Min: 35  Max: 44   O2 SAT SpO2  Av.8 %  Min: 84 %  Max: 87 %   OXYGEN DELIVERY O2 Flow Rate (L/min)  Av L/min  Min: 6 L/min  Max: 6 L/min        SYSTEMIC EXAMINATION:   General appearance - Mechanically ventilated, ill-appearing  Mental status -no eye movement or blinking currently, somnolent, unresponsive  Eyes - pupils equal and reactive, sclera anicteric  Mouth -oral endotracheal tube present  Neck - supple, no significant adenopathy, carotids upstroke normal bilaterally, no bruits  Chest - Breath sounds bilaterally were dimnished to auscultation at bases. There were no wheezes, rhonchi or rales. There is no intercostal recession or use of accessory muscles  Heart - normal rate, regular rhythm, normal S1, S2, no murmurs, rubs, clicks or gallops  Abdomen - soft, nontender, nondistended, no masses or organomegaly  Neurological -full CNS exam cannot be done as patient is not responsive to commands. No spontaneous movement positive pupillary reflex  Extremities - peripheral pulses normal, mild pedal edema, no clubbing or cyanosis  Skin - normal coloration and turgor, no rashes, no suspicious skin lesions noted     DATA REVIEW     Medications:  Scheduled Meds:    Continuous Infusions:      INPUT/OUTPUT:  In:  [I.V.:1877;  NG/GT:98]  Out: 1477 [Urine:1425; Drains:52]  Date 22 0000 - 22   Shift 8443-5889 4833-7546 7156-0997 24 Hour Total   INTAKE   Shift Total       OUTPUT   Urine 600 100  700   Drains 0   0   Shift Total 600 100  700   Weight (kg)            LABS:  ABGs:   Recent Labs     01/15/22  0449 22  0412   POCPH 7.498* 7.547*   POCPCO2 34.6* 31.2*   POCPO2 87.8 89.8   POCHCO3 26.9 27.1 [id number] 98 98     CBC:   Recent Labs     01/15/22  0549 01/16/22  0445   WBC 17.2* 22.0*   HGB 10.8* 11.5*   HCT 34.9* 37.6*   MCV 77.4* 78.7*    368   RBC 4.51 4.78   MCH 23.9* 24.1*   MCHC 30.9 30.6   RDW 17.5* 17.9*     CRP:   Recent Labs     01/16/22  1309   .5*     LDH:   No results for input(s): LDH in the last 72 hours. BMP:   Recent Labs     01/15/22  0549 01/16/22  0445    138   K 4.7 4.2   * 104   CO2 21 22   BUN 37* 34*   CREATININE 0.62* 0.78   GLUCOSE 130* 170*   PHOS 3.1 2.5     Liver Function Test:   No results for input(s): PROT, LABALBU, ALT, AST, GGT, ALKPHOS, BILITOT in the last 72 hours. Coagulation Profile:   No results for input(s): INR, PROTIME, APTT in the last 72 hours. D-Dimer:  No results for input(s): DDIMER in the last 72 hours. Lactic Acid:  No results for input(s): LACTA in the last 72 hours. Cardiac Enzymes:  No results for input(s): CKTOTAL, CKMB, CKMBINDEX, TROPONINI in the last 72 hours. Invalid input(s): TROPONIN, HSTROP  BNP/ProBNP:   No results for input(s): BNP, PROBNP in the last 72 hours. Triglycerides:  No results for input(s): TRIG in the last 72 hours. Microbiology:  Urine Culture:  No components found for: CURINE  Blood Culture:  No components found for: CBLOOD, CFUNGUSBL  Sputum Culture:  No components found for: CSPUTUM  Recent Labs     01/16/22  1144   [address] [facility] . CSF SHUNT   SPECIAL NOT REPORTED   CULTURE NO GROWTH 19 HOURS     Recent Labs     01/16/22  1144   [facility] . CSF SHUNT   SPECIAL NOT REPORTED   CULTURE NO GROWTH 19 HOURS        Pathology:    Radiology Reports:  XR ABDOMEN (KUB) (SINGLE AP VIEW)   Final Result   No constipation. Enteric tube remains satisfactory. Increased small bowel   dilatation mid and right abdomen, with large bowel gas extending into the   rectum. Differential includes ileus versus partial/incomplete small bowel   obstruction.       RECOMMENDATION:   Continued follow-up with KUB or CT         CT CHEST PULMONARY EMBOLISM W CONTRAST   Final Result   1. Acute bilateral pulmonary embolism as described above. There is no   evidence of right ventricular strain. 2. Slight increase in the size of a cavitary mass in the right middle lobe   abutting the lateral pleural surface. Pulmonary abscess would be favored   over empyema due to location. Fungal infection considered less likely. Underlying malignancy can not be definitively excluded and continued   follow-up is advised. 3. Trace bilateral pleural effusions appear relatively stable with dependent   airspace opacities in both lower lobes. Findings were discussed with Katia Green RN, at 9:52 am on 1/16/2022. XR CHEST PORTABLE   Final Result   No pneumothorax post right IJ line placement. Other tubes and lines as   above. Continued right focal opacity laterally it the base. XR CHEST PORTABLE   Final Result   Right lower lobe airspace disease similar to prior         CT HEAD WO CONTRAST   Final Result   No significant interval change since yesterday's noncontrast MRI. Endotracheal and OG tubes in place. The OG tube is looped in the pharynx. Bilateral mastoid and right middle ear cavity opacification much worse on the   right. Infection not excluded. CT CHEST ABDOMEN PELVIS W CONTRAST   Final Result   No acute CT finding in the chest; slightly larger cavitated lesion lateral   segment RML. Slightly larger small bilateral pleural effusions with   associated mild compressive atelectasis. Unchanged differential including   infectious or inflammatory etiologies and neoplasm. 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 No   associated adenopathy identified. Dilated fluid-filled bowel, mostly ileal and right colon to the splenic   flexure with air-fluid levels but no clear cut colitis, or focal lesion   splenic flexure, as noted. Mildly distended fluid-filled stomach.   Small   amount of pathologic free fluid, mostly in the pelvis. No free air. Findings more suggestive of generalized ileus; if there is clinical concern   for developing obstruction, consider SBFT. Bilateral hip joint effusions; correlate clinically. Slightly larger but small anterior pericardial effusion. Rangel catheter in place with a small amount of air and contrast in the   urinary bladder. Enlarged unchanged appearing prostate with rightward   lobular extension. Vicarious gallbladder contrast excretion and cholelithiasis without   cholecystitis. Additional unchanged or likely incidental findings, as detailed above. RECOMMENDATIONS:   Unavailable         XR CHEST PORTABLE   Final Result   Tubes as above. Right basilar pneumonia. MRI BRAIN WO CONTRAST   Final Result   1. There are foci of diffusion restriction in the right frontal, parietal,   and occipital lobes, concerning for acute infarction. 2. Layering debris in the ventricles with diffusion restriction is consistent   with ventriculitis. This appears slightly increased from prior examination. There is also increased ventriculomegaly despite right frontal approach   ventricular shunt catheter. 3. Mastoid air cell effusions. RECOMMENDATIONS:   Unavailable         VL TRANSCRANIAL DOPPLER COMPLETE   Final Result      XR CHEST PORTABLE   Final Result   There is a right basilar infiltrate consistent with pneumonia. Support tubes as described above. XR ABDOMEN (KUB) (SINGLE AP VIEW)   Final Result   Enteric tube in satisfactory position. Bowel gas pattern suggesting mild   ileus. Rangel catheter. XR CHEST PORTABLE   Final Result   Improved ETT position, now satisfactory; enteric tube position unchanged. Right basilar opacity, and probable slight left basilar atelectasis, as   discussed above. CT MAXILLOFACIAL W CONTRAST   Final Result   No evidence of facial infection or sinusitis. XR CHEST PORTABLE   Final Result   1. Endotracheal tube overlying the proximal trachea 8.6 cm above the level of   the emile. Advancement is recommended for more optimal positioning. 2. Enteric tube as above. 3. Stable nodular opacity at the right lower lung zone consistent with a   cavitary lesion seen on prior CT. 4. Mild bibasilar atelectasis. The findings were sent to the Radiology Results [address] at 6:58   am on 1/12/2022to be communicated to a licensed caregiver. 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 No evidence of abscess, discitis, or osteomyelitis. MRI LUMBAR SPINE W WO CONTRAST   Final Result   1. 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 No   evidence of abscess. 2. Mild-to-moderate multilevel degenerative changes as detailed above. VL DUP LOWER EXTREMITY VENOUS BILATERAL   Final Result      XR CHEST PORTABLE   Final Result   Stable nodular opacity in the right lower lung zone better characterized on   recent CT. VL TRANSCRANIAL DOPPLER COMPLETE   Final Result      MRI BRAIN W WO CONTRAST   Final Result   Prominent ventricular system with debris throughout the CSF. Associated   FLAIR signal abnormality involving the ependymal and subependymal surface   with enhancement of the ependymal surface of the ventricular system   consistent with an infectious process. There is associated leptomeningeal   enhancement extending into the craniocervical region and along the visualized   cervical spinal cord. RECOMMENDATIONS:   Unavailable         MRI CERVICAL SPINE W WO CONTRAST   Final Result   Debris throughout the CSF fluid with leptomeningeal enhancement and dural   enhancement as described above consistent with an infectious process. Multilevel degenerative change with foraminal narrowing bilaterally as   described above. RECOMMENDATIONS:   Unavailable         CTA HEAD NECK W CONTRAST   Final Result   Diminutive anterior cerebral arteries and posterior cerebral arteries. Mildly beaded appearance to the M1 portions of the middle cerebral arteries   bilaterally with intermittent foci of mild stenosis. Subsequent diminutive   middle cerebral artery branches distally and this is more pronounced on the   left compared to the right. These findings are of uncertain etiology   although vasculitis is a consideration. RECOMMENDATIONS:   Unavailable         CT LUMBAR SPINE TRAUMA RECONSTRUCTION   Final Result   Unremarkable non-contrast CT of the lumbar spine. CT ABDOMEN PELVIS WO CONTRAST Additional Contrast? None   Final Result   Examination is partially degraded by motion related artifact. Focal area of soft tissue thickening which appears to involve a loop of small   bowel in the central right mid abdomen. This may be partially exaggerated by   the patient motion. Localized infectious or inflammatory versus neoplastic   process however is not excluded and short-term follow-up is recommended. Nondisplaced left 6th and 7th rib fractures. Masslike process exophytic from the lateral margin of the right-side of the   prostate gland. Correlate with PSA values. Cholelithiasis. RECOMMENDATIONS:   Unavailable         CT HEAD WO CONTRAST   Final Result   Persistent layering debris within the lateral ventricles. There is mild   dilatation of the ventricular system. Differential includes isodense   subacute hemorrhage versus infection. Follow-up MRI brain with and without   contrast is recommended for further evaluation. No acute traumatic injury of the facial bones. RECOMMENDATIONS:   Unavailable         CT FACIAL BONES WO CONTRAST   Final Result   Persistent layering debris within the lateral ventricles. There is mild   dilatation of the ventricular system.   Differential includes isodense   subacute hemorrhage versus infection. Follow-up MRI brain with and without   contrast is recommended for further evaluation. No acute traumatic injury of the facial bones. RECOMMENDATIONS:   Unavailable         XR CHEST PORTABLE   Final Result   1. Support devices as above. 2. No significant change in the focal opacity within the right lower lung. CT THORACIC SPINE TRAUMA RECONSTRUCTION   Final Result   Mild degenerative changes of the thoracic spine without evidence of acute   osseous abnormality. Please see concurrently performed CT chest and   subsequently performed MRI thoracic spine for additional findings. XR ELBOW RIGHT (MIN 3 VIEWS)   Final Result   No acute osseous abnormality or dislocation involving the right elbow or   right forearm. XR RADIUS ULNA RIGHT (2 VIEWS)   Final Result   No acute osseous abnormality or dislocation involving the right elbow or   right forearm. XR KNEE RIGHT (3 VIEWS)   Final Result   1. No convincing acute osseous abnormality. 2. Degenerative changes of the right knee. 3. Trace joint effusion. 4. There appears to be soft tissue swelling anterior to the patella. XR CHEST PORTABLE   Final Result      Right lung base opacity again identified, nonspecific. Please correlate exam   findings and exam findings and history. IR ANGIOGRAM CAROTID C EREBRAL BILATERAL    (Results Pending)        Echocardiogram:   No results found for this or any previous visit.        ASSESSMENT AND PLAN     Assessment:    // Acute hypoxic respiratory failure, mechanical ventilation  // Pneumonia - RML cavitary lesion, likely abscess likely the result of aspiration  // Altered mental status encephalopathy-   // Sepsis/strep bacteremia  // Traumatic Rhabdomyolysis -improved  // JOZEF -improved  // Rib fractures, Left side  // Left corneal abrasion  // leukocytosis persistent  // fever    CT scan of the chest reviewed from 01/09/2022 which showed lateral segment right middle lobe peripheral area of soft/fluid density likely intraparenchymal with area of cavitation and fluid level likely abscess/necrotizing pneumonia does not look like solid mass less likely to be malignancy. Most likely infectious with strep bacteremia and strep positive in CSF. Plan:    I personally interviewed/examined the patient; reviewed interval history, interpreted all available radiographic and laboratory data at the time of service.  As mentioned above right middle lobe area is most likely abscess/necrotizing pneumonia likely related to aspiration with a streptococcal viridans bacteremia and positive CSF. Less likely malignancy   Patient is currently encephalopathic and getting treatment for strep bacteremia and would recommend to continue with antibiotic therapy as if malignancy is a concern in his current condition and all acute issues getting biopsy/aspiration would not change the management but if concern for getting a sample for analysis/cytology then will need CT-guided aspiration biopsy by interventional radiology. Would recommend follow-up CT scan in 3 to 4 weeks   Continue and wean mechanical ventilation as tolerated   Monitor endotracheal secretions    Is weaning from ventilation and liberation will depend upon his mentation and ability to protect airways and neurological status   Continue pulmonary toilet, aspiration precautions    Continue antibiotics per infectious disease   Follow microbiologic data   Continue to monitor I/O with a goal of even/negative fluid balance   Stress ulcer prophylaxis per neurocrit   DVT prophylaxis as per neuro critical care    Discussed with Dr. Tiffany Joiner    The patient is/remains critically ill with illness/injury that acutely impairs one or more vital organ systems, such that there is a high probability of imminent or life threatening deterioration in the patient's condition.  Critical care time of 35 minutes was spent (excluding procedures), in coordination of care during bedside rounds and discussion of patient care in detail, and recommendations of the team were adopted in the plan. Necessity of all invasive devices was also confirmed. Adeola Perez MD.  Pulmonary critical care attending  [facility] CRITICAL CARE MEDICINE PROGRESS NOTE     Patient:  Yenni Cruz  MRN: [id number]  Admit date: 1/9/2022  Primary Care Physician: Karen Carmona MD  Consulting Physician: Isra Phillips MD  CODE Status: DNR-CC  LOS: 7     SUBJECTIVE     CHIEF COMPLAINT/REASON FOR INITIAL CONSULT: RML cavitary lesion and acute hypoxic respiratory failure in the setting of CNS infection    BRIEF HOSPITAL COURSE:   History obtained from chart review and unobtainable from patient due to mental status and and being on the ventilator. Yenni Cruz is a 70 y.o. white gentleman was admitted with altered mental status, traumatic rhabdomyolysis. Patient was apparently found down at home which was felt to be as long as 48 hours. Per chart, patient had dental work 3 weeks prior to admission. In the emergency room radiologic studies showed debris within the bilateral lateral ventricles of the brain which was felt to be related to hemorrhage or infection. Radiologic studies also revealed right middle lobe cavitary lesion. Patient had leukocytosis; microbiologic studies so far are unrevealing for any infectious process. Patient was started on empiric antibiotics for CNS infection. INTERVAL HISTORY:  01/17/22   Overnight events noted, chart reviewed, labs and medications reviewed culture data seen CT scan of the chest results seen. Patient remain somnolent and encephalopathic did not follow command when I saw him he was not on any sedation.   He is afebrile T-max of 98.1 tachycardic with heart rate of around 90s to 100 this morning hemodynamically stable with systolic blood pressure periodically elevated. He remains on ventilator remains on 30% FiO2 saturating 100% on ventilator respiratory rate is usually low to mid 20s. Ventilator setting PRVC/12/510/5/30 percent currently he is on CPAP/pressure support 10/5. ABG 7.5 //  CSF culture strep viridans initial blood culture was strep evidence. CT scan of the chest reviewed from 2022 which showed lateral segment right middle lobe peripheral area of soft/fluid density likely intraparenchymal with area of cavitation and fluid level likely abscess/necrotizing pneumonia does not look like solid mass less likely to be malignancy. Most likely infectious with strep bacteremia and strep positive in CSF.       REVIEW OF SYSTEMS:  Unobtainable from patient due to sedation/mechanical ventilation    OBJECTIVE     VENTILATOR SETTINGS:  Vent Information  $Ventilation: (S) Off Vent  Skin Assessment: Clean, dry, & intact  Equipment Changed: HME  Vent Type: Servo i  Vent Mode: PRVC  Vt Ordered: 510 mL  Rate Set: 12 bmp  Pressure Support: 10 cmH20  FiO2 : 30 %  SpO2: (!) 87 %  SpO2/FiO2 ratio: 323.33  Sensitivity: 5  PEEP/CPAP: 5  I Time/ I Time %: 0.9 s  Humidification Source: HME     PaO2/FiO2 RATIO:  Recent Labs     22  0412   POCPO2 89.8      FiO2 : 30 %     VITAL SIGNS:   LAST:  /62   Pulse 123   Temp 100.6 °F (38.1 °C) (Axillary)   Resp (!) 39   Ht 5' 10\" (1.778 m)   SpO2 (!) 87%   BMI 28.70 kg/m²   8-24 HR RANGE:  TEMP Temp  Av °F (37.2 °C)  Min: 98 °F (36.7 °C)  Max: 100.6 °F (24.1 °C)   BP Systolic (35OYM), BKO:045 , Min:103 , DJB:929      Diastolic (57XXY), DOC:72, Min:56, Max:81     PULSE Pulse  Av.2  Min: 110  Max: 123   RR Resp  Av.8  Min: 35  Max: 44   O2 SAT SpO2  Av.8 %  Min: 84 %  Max: 87 %   OXYGEN DELIVERY O2 Flow Rate (L/min)  Av L/min  Min: 6 L/min  Max: 6 L/min        SYSTEMIC EXAMINATION:   General appearance - Mechanically ventilated, ill-appearing  Mental status -some spontaneous eyes open, somnolent, unresponsive  Eyes - pupils equal and reactive, sclera anicteric  Mouth -oral endotracheal tube present  Neck - supple, no significant adenopathy, carotids upstroke normal bilaterally, no bruits  Chest - Breath sounds bilaterally were dimnished to auscultation at bases. There were no wheezes, rhonchi or rales. There is no intercostal recession or use of accessory muscles  Heart - normal rate, regular rhythm, normal S1, S2, no murmurs, rubs, clicks or gallops  Abdomen - soft, nontender, nondistended, no masses or organomegaly  Neurological -full CNS exam cannot be done as patient is not responsive to commands. Extremities - peripheral pulses normal, mild pedal edema, no clubbing or cyanosis  Skin - normal coloration and turgor, no rashes, no suspicious skin lesions noted     DATA REVIEW     Medications:  Scheduled Meds:    Continuous Infusions:      INPUT/OUTPUT:  In: 1975 [I.V.:1877; NG/GT:98]  Out: 1477 [Urine:1425; Drains:52]  Date 01/17/22 0000 - 01/17/22 2359   Shift 1016-2671 6449-0156 5611-5375 24 Hour Total   INTAKE   Shift Total       OUTPUT   Urine 600 100  700   Drains 0   0   Shift Total 600 100  700   Weight (kg)            LABS:  ABGs:   Recent Labs     01/15/22  0449 01/16/22  0412   POCPH 7.498* 7.547*   POCPCO2 34.6* 31.2*   POCPO2 87.8 89.8   POCHCO3 26.9 27.1   IPLB1OCR 98 98     CBC:   Recent Labs     01/15/22  0549 01/16/22 0445   WBC 17.2* 22.0*   HGB 10.8* 11.5*   HCT 34.9* 37.6*   MCV 77.4* 78.7*    368   RBC 4.51 4.78   MCH 23.9* 24.1*   MCHC 30.9 30.6   RDW 17.5* 17.9*     CRP:   Recent Labs     01/16/22  1309   .5*     LDH:   No results for input(s): LDH in the last 72 hours.   BMP:   Recent Labs     01/15/22  0549 01/16/22 0445    138   K 4.7 4.2   * 104   CO2 21 22   BUN 37* 34*   CREATININE 0.62* 0.78   GLUCOSE 130* 170*   PHOS 3.1 2.5     Liver Function Test:   No results for input(s): PROT, LABALBU, ALT, AST, GGT, ALKPHOS, BILITOT in the last 72 hours. Coagulation Profile:   No results for input(s): INR, PROTIME, APTT in the last 72 hours. D-Dimer:  No results for input(s): DDIMER in the last 72 hours. Lactic Acid:  No results for input(s): LACTA in the last 72 hours. Cardiac Enzymes:  No results for input(s): CKTOTAL, CKMB, CKMBINDEX, TROPONINI in the last 72 hours. Invalid input(s): TROPONIN, HSTROP  BNP/ProBNP:   No results for input(s): BNP, PROBNP in the last 72 hours. Triglycerides:  No results for input(s): TRIG in the last 72 hours. Microbiology:  Urine Culture:  No components found for: CURINE  Blood Culture:  No components found for: CBLOOD, CFUNGUSBL  Sputum Culture:  No components found for: CSPUTUM  Recent Labs     01/16/22  1144   [address] [facility] . CSF SHUNT   SPECIAL NOT REPORTED   CULTURE NO GROWTH 19 HOURS     Recent Labs     01/16/22  1144   [facility] . CSF SHUNT   SPECIAL NOT REPORTED   CULTURE NO GROWTH 19 HOURS        Pathology:    Radiology Reports:  XR ABDOMEN (KUB) (SINGLE AP VIEW)   Final Result   No constipation. Enteric tube remains satisfactory. Increased small bowel   dilatation mid and right abdomen, with large bowel gas extending into the   rectum. Differential includes ileus versus partial/incomplete small bowel   obstruction. RECOMMENDATION:   Continued follow-up with KUB or CT         CT CHEST PULMONARY EMBOLISM W CONTRAST   Final Result   1. Acute bilateral pulmonary embolism as described above. There is no   evidence of right ventricular strain. 2. Slight increase in the size of a cavitary mass in the right middle lobe   abutting the lateral pleural surface. Pulmonary abscess would be favored   over empyema due to location. Fungal infection considered less likely. Underlying malignancy can not be definitively excluded and continued   follow-up is advised. 3. Trace bilateral pleural effusions appear relatively stable with dependent   airspace opacities in both lower lobes. Findings were discussed with Tim Cosme RN, at 9:52 am on 1/16/2022. XR CHEST PORTABLE   Final Result   No pneumothorax post right IJ line placement. Other tubes and lines as   above. Continued right focal opacity laterally it the base. XR CHEST PORTABLE   Final Result   Right lower lobe airspace disease similar to prior         CT HEAD WO CONTRAST   Final Result   No significant interval change since yesterday's noncontrast MRI. Endotracheal and OG tubes in place. The OG tube is looped in the pharynx. Bilateral mastoid and right middle ear cavity opacification much worse on the   right. Infection not excluded. CT CHEST ABDOMEN PELVIS W CONTRAST   Final Result   No acute CT finding in the chest; slightly larger cavitated lesion lateral   segment RML. Slightly larger small bilateral pleural effusions with   associated mild compressive atelectasis. Unchanged differential including   infectious or inflammatory etiologies and neoplasm. 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 No   associated adenopathy identified. Dilated fluid-filled bowel, mostly ileal and right colon to the splenic   flexure with air-fluid levels but no clear cut colitis, or focal lesion   splenic flexure, as noted. Mildly distended fluid-filled stomach. Small   amount of pathologic free fluid, mostly in the pelvis. No free air. Findings more suggestive of generalized ileus; if there is clinical concern   for developing obstruction, consider SBFT. Bilateral hip joint effusions; correlate clinically. Slightly larger but small anterior pericardial effusion. Rangel catheter in place with a small amount of air and contrast in the   urinary bladder. Enlarged unchanged appearing prostate with rightward   lobular extension. Vicarious gallbladder contrast excretion and cholelithiasis without   cholecystitis.       Additional unchanged or likely incidental heterogeneous debris in the   CSF. No evidence of abscess, discitis, or osteomyelitis. MRI LUMBAR SPINE W WO CONTRAST   Final Result   1. 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 No   evidence of abscess. 2. Mild-to-moderate multilevel degenerative changes as detailed above. VL DUP LOWER EXTREMITY VENOUS BILATERAL   Final Result      XR CHEST PORTABLE   Final Result   Stable nodular opacity in the right lower lung zone better characterized on   recent CT. VL TRANSCRANIAL DOPPLER COMPLETE   Final Result      MRI BRAIN W WO CONTRAST   Final Result   Prominent ventricular system with debris throughout the CSF. Associated   FLAIR signal abnormality involving the ependymal and subependymal surface   with enhancement of the ependymal surface of the ventricular system   consistent with an infectious process. There is associated leptomeningeal   enhancement extending into the craniocervical region and along the visualized   cervical spinal cord. RECOMMENDATIONS:   Unavailable         MRI CERVICAL SPINE W WO CONTRAST   Final Result   Debris throughout the CSF fluid with leptomeningeal enhancement and dural   enhancement as described above consistent with an infectious process. Multilevel degenerative change with foraminal narrowing bilaterally as   described above. RECOMMENDATIONS:   Unavailable         CTA HEAD NECK W CONTRAST   Final Result   Diminutive anterior cerebral arteries and posterior cerebral arteries. Mildly beaded appearance to the M1 portions of the middle cerebral arteries   bilaterally with intermittent foci of mild stenosis. Subsequent diminutive   middle cerebral artery branches distally and this is more pronounced on the   left compared to the right. These findings are of uncertain etiology   although vasculitis is a consideration.       RECOMMENDATIONS:   Unavailable         CT LUMBAR SPINE TRAUMA RECONSTRUCTION   Final Result Unremarkable non-contrast CT of the lumbar spine. CT ABDOMEN PELVIS WO CONTRAST Additional Contrast? None   Final Result   Examination is partially degraded by motion related artifact. Focal area of soft tissue thickening which appears to involve a loop of small   bowel in the central right mid abdomen. This may be partially exaggerated by   the patient motion. Localized infectious or inflammatory versus neoplastic   process however is not excluded and short-term follow-up is recommended. Nondisplaced left 6th and 7th rib fractures. Masslike process exophytic from the lateral margin of the right-side of the   prostate gland. Correlate with PSA values. Cholelithiasis. RECOMMENDATIONS:   Unavailable         CT HEAD WO CONTRAST   Final Result   Persistent layering debris within the lateral ventricles. There is mild   dilatation of the ventricular system. Differential includes isodense   subacute hemorrhage versus infection. Follow-up MRI brain with and without   contrast is recommended for further evaluation. No acute traumatic injury of the facial bones. RECOMMENDATIONS:   Unavailable         CT FACIAL BONES WO CONTRAST   Final Result   Persistent layering debris within the lateral ventricles. There is mild   dilatation of the ventricular system. Differential includes isodense   subacute hemorrhage versus infection. Follow-up MRI brain with and without   contrast is recommended for further evaluation. No acute traumatic injury of the facial bones. RECOMMENDATIONS:   Unavailable         XR CHEST PORTABLE   Final Result   1. Support devices as above. 2. No significant change in the focal opacity within the right lower lung. CT THORACIC SPINE TRAUMA RECONSTRUCTION   Final Result   Mild degenerative changes of the thoracic spine without evidence of acute   osseous abnormality.   Please see concurrently performed CT chest and   subsequently performed MRI thoracic spine for additional findings. XR ELBOW RIGHT (MIN 3 VIEWS)   Final Result   No acute osseous abnormality or dislocation involving the right elbow or   right forearm. XR RADIUS ULNA RIGHT (2 VIEWS)   Final Result   No acute osseous abnormality or dislocation involving the right elbow or   right forearm. XR KNEE RIGHT (3 VIEWS)   Final Result   1. No convincing acute osseous abnormality. 2. Degenerative changes of the right knee. 3. Trace joint effusion. 4. There appears to be soft tissue swelling anterior to the patella. XR CHEST PORTABLE   Final Result      Right lung base opacity again identified, nonspecific. Please correlate exam   findings and exam findings and history. IR ANGIOGRAM CAROTID C EREBRAL BILATERAL    (Results Pending)        Echocardiogram:   No results found for this or any previous visit. ASSESSMENT AND PLAN     Assessment:    // Acute hypoxic respiratory failure, mechanical ventilation  // Pneumonia - RML cavitary lesion, likely abscess likely the result of aspiration  // Altered mental status encephalopathy-   // Sepsis/strep bacteremia  // Traumatic Rhabdomyolysis -improved  // JOZEF -improved  // Rib fractures, Left side  // Left corneal abrasion  // leukocytosis persistent  // fever  // Mild scattered subsegmental pulm embolism    CT scan of the chest reviewed from 01/16/2022 showed a small/very small areas of scattered subsegmental pulm embolism they would not cause any hemodynamically stability and unlikely cause hypoxia no right ventricular strain. Right middle lobe lateral segment cavitary consolidation/necrotic with soft tissue/fluid density shows more area of necrotic and cavitation likely an abscess.   CT scan of the chest reviewed from 01/09/2022 which showed lateral segment right middle lobe peripheral area of soft/fluid density likely intraparenchymal with area of cavitation and fluid level likely abscess/necrotizing pneumonia does not look like solid mass less likely to be malignancy. Most likely infectious with strep bacteremia and strep positive in CSF. Plan:  Patient terminally extubated.          Sam Theodore MD  PGY-3, Internal medicine resident  [place], [place]

## 2022-01-17 NOTE — PROGRESS NOTES
Patient made DNR CC and was extubated. Vascular surgery to sign off.     Bridgette Stevens DO PGY 3  General Surgery Resident  01/17/22 7:21 AM

## 2022-01-17 NOTE — PLAN OF CARE
Problem: Falls - Risk of:  Goal: Will remain free from falls  Description: Will remain free from falls  1/17/2022 0537 by Liliana Wilburn RN  Outcome: Ongoing  1/16/2022 1707 by Rose Russo RN  Outcome: Ongoing  Goal: Absence of physical injury  Description: Absence of physical injury  1/17/2022 0537 by Liliana Wilburn RN  Outcome: Ongoing  1/16/2022 1707 by Rose Russo RN  Outcome: Ongoing     Problem: Nutrition  Goal: Optimal nutrition therapy  1/17/2022 0537 by Liliana Wilburn RN  Outcome: Ongoing  1/16/2022 1707 by Rose Russo RN  Outcome: Ongoing     Problem: OXYGENATION/RESPIRATORY FUNCTION  Goal: Patient will maintain patent airway  1/17/2022 0537 by Liliana Wilburn RN  Outcome: Ongoing  1/16/2022 1707 by Rose Russo RN  Outcome: Ongoing  Goal: Patient will achieve/maintain normal respiratory rate/effort  Description: Respiratory rate and effort will be within normal limits for the patient  1/17/2022 0537 by Liliana Wilburn RN  Outcome: Ongoing  1/16/2022 1707 by Rose Russo RN  Outcome: Ongoing     Problem: MECHANICAL VENTILATION  Goal: Patient will maintain patent airway  1/17/2022 0537 by Liliana Wilburn RN  Outcome: Ongoing  1/16/2022 1707 by Rose Russo RN  Outcome: Ongoing  Goal: Oral health is maintained or improved  1/17/2022 0537 by Liliana Wilburn RN  Outcome: Ongoing  1/16/2022 1707 by Rose Russo RN  Outcome: Ongoing  Goal: ET tube will be managed safely  1/17/2022 0537 by Liliana Wilburn RN  Outcome: Ongoing  1/16/2022 1707 by Rose Russo RN  Outcome: Ongoing  Goal: Ability to express needs and understand communication  1/17/2022  by Liliana Wilburn RN  Outcome: Ongoing  1/16/2022 1707 by Rose Russo RN  Outcome: Ongoing  Goal: Mobility/activity is maintained at optimum level for patient  1/17/2022 0537 by Liliana Wilburn RN  Outcome: Ongoing  1/16/2022 1707 by Rose Russo RN  Outcome: Ongoing     Problem: SKIN INTEGRITY  Goal: Skin integrity is maintained or improved  1/17/2022 0537 by Esdras Polk RN  Outcome: Ongoing  1/16/2022 1707 by Makeda Gallegos RN  Outcome: Ongoing     Problem: NUTRITION  Goal: Nutritional status is improving  1/17/2022 0537 by Esdras Polk RN  Outcome: Ongoing  1/16/2022 1707 by Makeda Gallegos RN  Outcome: Ongoing     Problem: Skin Integrity:  Goal: Will show no infection signs and symptoms  Description: Will show no infection signs and symptoms  1/17/2022 0537 by Esdras Polk RN  Outcome: Ongoing  1/16/2022 1707 by Makeda Gallegos RN  Outcome: Ongoing  Goal: Absence of new skin breakdown  Description: Absence of new skin breakdown  1/17/2022 0537 by Esdras Polk RN  Outcome: Ongoing  1/16/2022 1707 by Makeda Gallegos RN  Outcome: Ongoing     Problem: HEMODYNAMIC STATUS  Goal: Patient has stable vital signs and fluid balance  1/17/2022 0537 by Esdras oPlk RN  Outcome: Ongoing  1/16/2022 1707 by Makeda Gallegos RN  Outcome: Ongoing     Problem: ACTIVITY INTOLERANCE/IMPAIRED MOBILITY  Goal: Mobility/activity is maintained at optimum level for patient  1/17/2022 0537 by Esdras Polk RN  Outcome: Ongoing  1/16/2022 1707 by Makeda Gallegos RN  Outcome: Ongoing     Problem: COMMUNICATION IMPAIRMENT  Goal: Ability to express needs and understand communication  1/17/2022 0537 by Esdras Polk RN  Outcome: Ongoing  1/16/2022 1707 by Makeda Gallegos RN  Outcome: Ongoing

## 2022-01-17 NOTE — PLAN OF CARE
Overnight: no updates. Pt on comfort care measures. Morning: Hospice consulted. Plan to stepdown to [facility] department. 5pm: Pt is schedule for hospice care tomorrow at [place].

## 2022-01-17 NOTE — PROGRESS NOTES
Code status changed to DNR CC, extubated  Infectious disease signing off. Comfort measures in place. Thank you      Todd Vera  PGY-1  Infectious disease  [address] [facility]   10:45 [place] 1/17/2022      ATTESTATION:    I have discussed the case, including pertinent history and exam findings with the residents and students. I have seen and examined the patient and the key elements of the encounter have been performed by me. I was present when the student obtained his information or examined the patient. I have reviewed the laboratory data, other diagnostic studies and discussed them with the residents. I have updated the medical record where necessary. I agree with the assessment, plan and orders as documented by the resident/ student.     lAan Peraza MD.

## 2022-01-18 NOTE — PROGRESS NOTES
movement   6a. Motor left le - no movement   6b  Motor right le - no movement   7. Limb Ataxia: 0 - absent   8. Sensory: 2 - severe to total sensory loss; patient is not aware of being touched in face, arm, leg   9. Best Language:  3 - mute, global aphasia; no usable speech or auditory comprehension   10. Dysarthria: 2 - severe; patient speech is so slurred as to be unintelligible in the absence of or our of proportion to any dysphagia, or is mute/anarthric    11. Extinction and Inattention: 0 - no abnormality         Total:   34     MRS: 05      LABS:   Reviewed. Lab Results   Component Value Date    HGB 11.5 (L) 2022    WBC 22.0 (H) 2022     2022     2022    BUN 34 (H) 2022    CREATININE 0.78 2022    AST 48 (H) 2022    ALT 32 2022    MG 2.7 (H) 2022    APTT 22.4 01/10/2022    INR 1.1 01/10/2022      Lab Results   Component Value Date    COVID19 Not Detected 01/10/2022    COVID19 Not Detected 2022    COVID19 Not Detected 2020       RADIOLOGY:   Images were personally reviewed including:     MRI Brain 2022:   1. There are foci of diffusion restriction in the right frontal, parietal,   and occipital lobes, concerning for acute infarction. 2. Layering debris in the ventricles with diffusion restriction is consistent   with ventriculitis.  This appears slightly increased from prior examination. There is also increased ventriculomegaly despite right frontal approach   ventricular shunt catheter.    3. Mastoid air cell effusions.           MRI Brain:   Prominent ventricular system with debris throughout the CSF.  Associated   FLAIR signal abnormality involving the ependymal and subependymal surface   with enhancement of the ependymal surface of the ventricular system   consistent with an infectious process.  There is associated leptomeningeal   enhancement extending into the craniocervical region and along the visualized cervical spinal cord.      CTA head and neck:   Diminutive anterior cerebral arteries and posterior cerebral arteries. Mildly beaded appearance to the M1 portions of the middle cerebral arteries   bilaterally with intermittent foci of mild stenosis.  Subsequent diminutive   middle cerebral artery branches distally and this is more pronounced on the   left compared to the right.  These findings are of uncertain etiology   although vasculitis is a consideration.          ASSESSMENT:   69 y/o M with pmh signicant for Htn, dyslipidemia, admitted with acute ventriculitis, was well as finding of diffuse vasospasm with R hemisphere diffuse strokes. S/p   DSA with IA verapamil 1/14/2022, slight improvement of the right MCA vasospasm. Pt made dnr cca 1/16/2022, terminally extubated. PLAN:   --no further therapy, pt is dnr cca. Case discussed with Dr. Lee Stevie attending.     Brannon Saab MD  Stroke, [facility]  [id number]  Electronically signed 1/18/2022 at 12:05 AM

## 2022-01-18 NOTE — PROGRESS NOTES
Nurse to nurse report called to Brody Small at [facility]. Patient picked up for transport at 0830.

## 2022-01-18 NOTE — DISCHARGE SUMMARY
Neuro Critical Care   Discharge Summary      PATIENT NAME: Eliza Amaya  YOB: 1950  MEDICAL RECORD NO. [id number]  DATE: 1/18/2022  PRIMARY CARE PHYSICIAN: Jono Fishman MD  DISCHARGE DATE:  01/18/22   DISCHARGE DIAGNOSIS:   Patient Active Problem List   Diagnosis Code    Hypertension I10    Hyperlipidemia E78.5    BPH with elevated PSA N40.0, R97.20    History of chronic fatigue syndrome Z87.898    GI bleed K92.2    Symptomatic anemia D64.9    Iron deficiency anemia D50.9    Gastroesophageal reflux disease without esophagitis K21.9    Gastritis without bleeding K29.70    Vitamin B12 deficiency E53.8    BPH with obstruction/lower urinary tract symptoms N40.1, N13.8    Neoplasm of uncertain behavior of skin D48.5    Basal cell carcinoma (BCC) of left cheek C44.319    Basal cell carcinoma of right nasal sidewall C44.311    Basal cell carcinoma of right cheek C44.319    Basal cell carcinoma of nasal tip C44. 311    Basal cell carcinoma of scalp C44.41    JOZEF (acute kidney injury) ([address] N17.9    Acute respiratory failure (HCC) J96.00    Rhabdomyolysis M62.82    Altered mental status R41.82    Septicemia ([address] A41.9    Closed fracture of multiple ribs of left side S22.42XA    Abrasion of left cornea S05. 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) I63.9       Rocio Meyer is a 70 y.o. yo male who presented to [facility] on 1/9/2022 11:26 PM with altered mental status and history of headaches found to have ventricular debris on [facility] representative of infection. He required intubation for airway protection due to decline in neuro exam and EVD was placed at bedside.  He was also found to have rhabdomyolysis, mild JOZEF, and other potential sources of infection including a  Right middle lobe cavitary lung mass. He was admitted to neuro icu for management of ventriculitis/meningitis. His course was complicated by minimal improvements in neuro exam and bilateral segmental and subsegmental pulmonary emboli. Ultimately it was determined that long term ventilatory and nutrition support were not consistent with the patient's wishes and it was decided to change code status to DNR CC and admit the patient for hospice care. Labs and imaging were followed daily. At time of discharge, Haley Lundy was tolerating a No diet orders on file, having bowel movements, and is in stable condition to be discharged to hospice. PROCEDURES:    Intubated 1/9  EVD 1/10  Extubated 1/16    PHYSICAL EXAMINATION        Discharge Vitals:  height is 5' 10\" (1.778 m). His oral temperature is 98.2 °F (36.8 °C). His blood pressure is 110/62 and his pulse is 130. His respiration is 41 (abnormal) and oxygen saturation is 83% (abnormal). CONSTITUTIONAL:  Extubated, mildly tachypneic but satting 93+.  Does not wake up or respond to noxious stimuli off sedation   HEAD:  normocephalic, atraumatic    EYES:  PERRLA, EOMI.   ENT:  moist mucous membranes   NECK:  supple, symmetric   LUNGS:  Equal air entry bilaterally   CARDIOVASCULAR:  normal s1 / s2, RRR, distal pulses intact   ABDOMEN:  Soft, no rigidity   NEUROLOGIC:  Mental Status:  Not awake does not arouse to noxious stimuli             Cranial Nerves:    Cannot be tested due to condition    Motor Exam:    Not tested due to condition    Sensory:    Touch:    Not tested    Deep Tendon Reflexes:    Not tested       LABS/IMAGING     Recent Labs     01/16/22  0445   WBC 22.0*   HGB 11.5*   HCT 37.6*         K 4.2      CO2 22   BUN 34*   CREATININE 0.78       ECHO Complete 2D W Doppler W Color    Result Date: 1/11/2022  Transthoracic Echocardiography Report (TTE)  Patient Name Atiya Villeda   Date of Study             01/11/2022               Eugene Dodd   Date of      1950  Gender                    Male  Birth   Age          70 year(s)  Race                         Room Number  5542        Height:                   70 inch, 177.8 cm   Corporate ID [id number]    Weight:                   207 pounds, 93.9 kg  #   Patient Acct [de-identified]   BSA:         2.12 m^2     BMI:       29.7 kg/m^2  #   MR #         [id number]     [facility]   Accession #  [id number]  Interpreting Physician    [address]   Fellow                   Referring Nurse                           Practitioner   Interpreting             Referring Physician       Yun Guajardo  Fellow                                             APRN-CNP  Additional Comments Technically difficult study. Type of Study   TTE procedure:2D Echocardiogram, M-Mode, Doppler, Color Doppler. Procedure Date Date: 01/11/2022 Start: 08:11 AM Study Location: [facility] Technical Quality: Adequate visualization Indications:LV Function and Rule out vegetation. History / Tech. Comments: Echo done at patient bedside. Procedure explained to patient. HTN Patient Status: Inpatient Height: 70 inches Weight: 207 pounds BSA: 2.12 m^2 BMI: 29.7 kg/m^2 CONCLUSIONS Summary Difficult study. Left ventricle is normal in size. Global left ventricular systolic function is normal. Calculated ejection fraction 54% by Amanda's method. Left atrium is normal in size. Normal right ventricular size and function. No significant valvular abnormalities.  Signature ----------------------------------------------------------------------------  Electronically signed by Mely Valadez(Sonographer) on 01/11/2022  09:00 AM ---------------------------------------------------------------------------- ----------------------------------------------------------------------------  Electronically signed by Mendez Brown(Interpreting physician) on 2022  02:15 PM ---------------------------------------------------------------------------- FINDINGS Left Atrium Left atrium is normal in size. Left Ventricle Difficult study. Left ventricle is normal in size. Global left ventricular systolic function is normal. Calculated ejection fraction 54% by Amanda's method. Right Atrium Right atrium is normal in size. Right Ventricle Normal right ventricular size and function. TAPSE value of 2.20.cm noted. Mitral Valve Normal mitral valve structure and function. No mitral regurgitation. Aortic Valve Aortic valve is trileaflet and opens adequately. No aortic insufficiency. Tricuspid Valve Normal tricuspid valve structure and function. No tricuspid regurgitation. Pulmonic Valve The pulmonic valve is normal in structure. No pulmonic insufficiency. Pericardial Effusion No pericardial effusion seen. Miscellaneous E/E' average = 10.9. IVC normal diameter & inspiratory collapse indicating normal RA filling pressure .  M-mode / 2D Measurements & Calculations:   LVIDd:4.2 cm(3.7 - 5.6 cm)          Diastolic GJHAES:57.9 ml  LMKOZ:5.6 cm(2.2 - 4.0 cm)          Systolic SHZLKQ:99.7 ml  LGM.8 cm(0.6 - 1.1 cm)           Aortic Root:3 cm(2.0 - 3.7 cm)  LVPWd:0.8 cm(0.6 - 1.1 cm)          LA volume/Index: 32 ml /15m^2  Fractional Shortenin.48 %  Calculated LVEF (%): 54.35 %        RVDd:3.1 cm   Mitral:                                    Aortic   Valve Area (P1/2-Time): 6.67 cm^2          Peak Velocity: 1.13 m/s  Peak E-Wave: 0.75 m/s                      Mean Velocity: 0.70 m/s  Peak A-Wave: 1.02 m/s                      Peak Gradient: 5.11 mmHg  E/A Ratio: 0.74                            Mean Gradient: 2 mmHg  Peak Gradient: 2.25 mmHg  Mean Gradient: 2 mmHg  Deceleration Time: 114 msec                AV VTI: 21.2 cm  P1/2t: 33 msec   Mean Velocity: 0.63 m/s  Diastology / Tissue Doppler Septal Wall E' velocity:0.07 m/s Septal Wall E/E':11.5 Lateral Wall E' velocity:0.07 m/s Lateral Wall E/E':10.3    CT ABDOMEN PELVIS WO CONTRAST Additional Contrast? None    Result Date: 1/10/2022  EXAMINATION: CT OF THE ABDOMEN AND PELVIS WITHOUT CONTRAST 1/10/2022 1:23 am TECHNIQUE: CT of the abdomen and pelvis was performed without the administration of intravenous contrast. Multiplanar reformatted images are provided for review. Dose modulation, iterative reconstruction, and/or weight based adjustment of the mA/kV was utilized to reduce the radiation dose to as low as reasonably achievable. COMPARISON: None. HISTORY: ORDERING SYSTEM PROVIDED HISTORY: found down TECHNOLOGIST PROVIDED HISTORY: found down Decision Support Exception - unselect if not a suspected or confirmed emergency medical condition->Emergency Medical Condition (MA) Reason for Exam: Patient found down, AMS, POA in [facility] called yetruday pt GIACOMO gonsalez found patient today lying on floor, head facing right ulcer to right cheek. unknown amount of time approximately 48hr estimate. Patient transfer from [place]. DX: Rhabdo, glucose 221 FINDINGS: Some images are mildly degraded by patient motion. Cholelithiasis without evidence of cholecystitis. Exam is limited without intravenous contrast. Liver is homogeneous. Spleen is normal in size. Pancreas is unremarkable. Thickening of the adrenal glands, likely due to hyperplasia. No hydronephrosis. Dominant cyst in the right central kidney measuring 5.6 cm and 6 Hounsfield units. Tiny presumed cyst along the lateral margin of the lower left kidney. Scattered vascular calcifications. Abdominal aorta is normal in caliber. Assessment of bowel is limited without oral contrast.  The nasogastric tube terminates in the proximal to mid stomach. No bowel obstruction. Appendix is normal.  Mild diverticulosis without evidence of acute diverticulitis.   In the central aspect of the mid abdomen just to the right of midline, on images 71-84, is a focal area of soft tissue thickening and minimal fat stranding which appears to be associated with a loop of small bowel. Some small bowel loops within the central pelvis are mildly fluid distended. The urinary bladder is largely decompressed with Rangel catheter. Heterogeneous enlargement of the prostate gland asymmetric laterally towards the right where there is an exophytic masslike process. Degenerative changes are present in the spine and pelvis. Please see separate report for CT of the chest performed approximately 6 hours prior on 01/09/2022. Nondisplaced fracture of the lateral left 7th and 6th ribs. Examination is partially degraded by motion related artifact. Focal area of soft tissue thickening which appears to involve a loop of small bowel in the central right mid abdomen. This may be partially exaggerated by the patient motion. Localized infectious or inflammatory versus neoplastic process however is not excluded and short-term follow-up is recommended. Nondisplaced left 6th and 7th rib fractures. Masslike process exophytic from the lateral margin of the right-side of the prostate gland. Correlate with PSA values. Cholelithiasis. RECOMMENDATIONS: Unavailable     XR ELBOW RIGHT (MIN 3 VIEWS)    Result Date: 1/10/2022  EXAMINATION: THREE XRAY VIEWS OF THE RIGHT ELBOW; TWO XRAY VIEWS OF THE RIGHT FOREARM 1/10/2022 12:56 am COMPARISON: None. HISTORY: ORDERING SYSTEM PROVIDED HISTORY: fall, AMS, prolonged down time TECHNOLOGIST PROVIDED HISTORY: fall, AMS, prolonged down time Reason for Exam: found down Initial evaluation. FINDINGS: No acute osseous abnormality seen of the right elbow or right forearm. There is no evidence of dislocation. Minimal degenerative changes of the right elbow noted. 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 No acute osseous abnormality or dislocation involving the right elbow or right forearm.      XR RADIUS ULNA RIGHT (2 VIEWS)    Result Date: 1/10/2022  EXAMINATION: THREE XRAY VIEWS OF THE RIGHT ELBOW; TWO XRAY VIEWS OF THE RIGHT FOREARM 1/10/2022 12:56 am COMPARISON: None. HISTORY: ORDERING SYSTEM PROVIDED HISTORY: fall, AMS, prolonged down time TECHNOLOGIST PROVIDED HISTORY: fall, AMS, prolonged down time Reason for Exam: found down Initial evaluation. FINDINGS: No acute osseous abnormality seen of the right elbow or right forearm. There is no evidence of dislocation. Minimal degenerative changes of the right elbow noted. 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 No acute osseous abnormality or dislocation involving the right elbow or right forearm. XR KNEE RIGHT (3 VIEWS)    Result Date: 1/10/2022  EXAMINATION: THREE XRAY VIEWS OF THE RIGHT KNEE 1/10/2022 12:56 am COMPARISON: None. HISTORY: ORDERING SYSTEM PROVIDED HISTORY: Possible fall - abraisons TECHNOLOGIST PROVIDED HISTORY: Possible fall - abraisons Reason for Exam: found down Initial evaluation. FINDINGS: No acute osseous abnormality is seen. There is mild-to-moderate tricompartmental joint arthrosis. There is spurring of the patella at the attachment of the quadriceps tendon. There appears to be a trace joint effusion. Soft tissue swelling is seen anterior to the patella. 1. No convincing acute osseous abnormality. 2. Degenerative changes of the right knee. 3. Trace joint effusion. 4. There appears to be soft tissue swelling anterior to the patella. XR ABDOMEN (KUB) (SINGLE AP VIEW)    Result Date: 1/16/2022  EXAMINATION: ONE SUPINE XRAY VIEW(S) OF THE ABDOMEN 1/16/2022 1:13 pm COMPARISON: KUB from 01/14/2022 HISTORY: ORDERING SYSTEM PROVIDED HISTORY: Please assess for constipation/ileus TECHNOLOGIST PROVIDED HISTORY: Please assess for constipation/ileus Reason for Exam: supine FINDINGS: Enteric tube remains in satisfactory position, with tip and side hole overlying gas-filled stomach, well below the left hemidiaphragm. Overlying ECG monitor leads. Rangel catheter and contrast in the urinary bladder. No significant retained stool. Interval increased mid and right-sided small bowel loop dilatation, up to 5.5 cm, with persisting gas identified throughout large-bowel into the rectum. No obvious free air. Probable contrast in the renal collecting system. Bones stable. No constipation. Enteric tube remains satisfactory. Increased small bowel dilatation mid and right abdomen, with large bowel gas extending into the rectum. Differential includes ileus versus partial/incomplete small bowel obstruction. RECOMMENDATION: Continued follow-up with KUB or CT     XR ABDOMEN (KUB) (SINGLE AP VIEW)    Result Date: 1/14/2022  EXAMINATION: ONE SUPINE XRAY VIEW(S) OF THE ABDOMEN 1/14/2022 7:15 am COMPARISON: CT scan of the abdomen and pelvis from 01/10/2022 HISTORY: ORDERING SYSTEM PROVIDED HISTORY: High residual asses if any retention TECHNOLOGIST PROVIDED HISTORY: High residual asses if any retention FINDINGS: Enteric tube tip and side hole projects below the left hemidiaphragm. Rangel catheter. Multiple gas-filled loops of large and small bowel throughout the abdomen and pelvis, suggesting ileus. No markedly distended bowel; cecum measures approximately 7.5 cm. No mass or organomegaly. No abnormal calcification appreciated. Moderate-severe DJD spine. Enteric tube in satisfactory position. Bowel gas pattern suggesting mild ileus. Rangel catheter. CT HEAD WO CONTRAST    Result Date: 1/16/2022  EXAMINATION: CT OF THE HEAD WITHOUT CONTRAST  1/16/2022 12:41 am TECHNIQUE: CT of the head was performed without the administration of intravenous contrast. Dose modulation, iterative reconstruction, and/or weight based adjustment of the mA/kV was utilized to reduce the radiation dose to as low as reasonably achievable. COMPARISON: Noncontrast MRI brain dated 01/14/2022.  HISTORY: ORDERING SYSTEM PROVIDED HISTORY: surgery navigation TECHNOLOGIST PROVIDED HISTORY: surgery navigation with stealth protocol, 1mm slice and 0 tilt on gantry. surgery navigation Reason for Exam: surgery navigation FINDINGS: BRAIN/VENTRICLES: No intracranial hemorrhage or mass. 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 Again noted is ventriculomegaly with near complete filling of the ventricular system with debris in this patient with known meningitis/ventriculitis. Debris is also present in cisterna magna. Right anterior approach ventriculostomy catheter is in place with distal tip at the right foramen of Monro. ORBITS: The visualized portion of the orbits demonstrate no acute abnormality. SINUSES: Visualized paranasal sinuses and mastoids are noted for near complete opacification of the right mastoid, complete opacification of right middle ear cavity and moderate opacification of left mastoid. The sinuses are clear. SOFT TISSUES/SKULL:  No acute bone or soft tissue abnormality. Endotracheal and OG tubes are in place. The OG tube is extensively looped in the pharynx. No significant interval change since yesterday's noncontrast MRI. Endotracheal and OG tubes in place. The OG tube is looped in the pharynx. Bilateral mastoid and right middle ear cavity opacification much worse on the right. Infection not excluded. CT HEAD WO CONTRAST    Result Date: 1/10/2022  EXAMINATION: CT OF THE HEAD WITHOUT CONTRAST; CT OF THE FACE WITHOUT CONTRAST 1/10/2022 1:20 am TECHNIQUE: CT of the head was performed without the administration of intravenous contrast. Dose modulation, iterative reconstruction, and/or weight based adjustment of the mA/kV was utilized to reduce the radiation dose to as low as reasonably achievable.; CT of the face was performed without the administration of intravenous contrast. Multiplanar reformatted images are provided for review.  Dose modulation, iterative reconstruction, and/or weight based adjustment of the mA/kV was utilized to reduce the radiation dose to as low as reasonably achievable. COMPARISON: 01/09/2022 HISTORY: Trauma FINDINGS: CT HEAD: BRAIN/VENTRICLES: Persistent layering debris within the lateral ventricles. There is mild dilatation of the ventricular system. There are changes chronic small vessel ischemic disease and mild generalized atrophy. There is no acute intracranial hemorrhage, mass effect or midline shift. The gray-white differentiation is maintained without evidence of an acute infarct. CT FACIAL BONES: FACIAL BONES: The maxilla, pterygoid plates and zygomatic arches are intact. The mandible is intact. The mandibular condyles are normally situated. The nasal bones and maxillary nasal processes are intact. ORBITS: The globes appear intact. The extraocular muscles, optic nerve sheath complexes and lacrimal glands appear unremarkable. No retrobulbar hematoma or mass is seen. The orbital walls and rims are intact. SINUSES/MASTOIDS: The paranasal sinuses are well aerated. No acute fracture is seen. Partial opacification of mastoid air cells. SOFT TISSUES: No acute abnormality of the visualized skull or soft tissues. Persistent layering debris within the lateral ventricles. There is mild dilatation of the ventricular system. Differential includes isodense subacute hemorrhage versus infection. Follow-up MRI brain with and without contrast is recommended for further evaluation. No acute traumatic injury of the facial bones.  RECOMMENDATIONS: Unavailable     CT HEAD WO CONTRAST    Result Date: 1/9/2022  EXAMINATION: CT OF THE HEAD WITHOUT CONTRAST; CT OF THE CERVICAL SPINE WITHOUT CONTRAST 1/9/2022 7:02 pm TECHNIQUE: CT of the head was performed without the administration of intravenous contrast. Dose modulation, iterative reconstruction, and/or weight based adjustment of the mA/kV was utilized to reduce the radiation dose to as low as reasonably achievable.; CT of the cervical spine was performed without the administration of intravenous contrast. Multiplanar reformatted images are provided for review. Dose modulation, iterative reconstruction, and/or weight based adjustment of the mA/kV was utilized to reduce the radiation dose to as low as reasonably achievable. COMPARISON: 01/03/2022 HISTORY: ORDERING SYSTEM PROVIDED HISTORY: altered mental status TECHNOLOGIST PROVIDED HISTORY: altered mental status Decision Support Exception - unselect if not a suspected or confirmed emergency medical condition->Emergency Medical Condition (MA) Reason for Exam: Altered mental status, found on the floor of his home today, unknown time of fall FINDINGS: BRAIN/VENTRICLES: There is no evidence for acute intracranial hemorrhage. There is debris within the bilateral lateral ventricles (right greater than left). There is no evidence for acute intracranial hemorrhage or mass lesion. No obvious acute infarct is identified. ORBITS: The visualized portion of the orbits demonstrate no acute abnormality. SINUSES: There is right mastoid effusion. SOFT TISSUES/SKULL:  No acute abnormality of the visualized skull or soft tissues. CT cervical spine: Bilateral mastoid effusion exam is limited by motion artifact. There is multilevel degenerative change in the cervical spine with decreased disc space height and osteophytosis at multiple levels. The exam is limited by motion artifact. There is loss of the normal cervical lordosis with kyphosis. No obvious displaced fracture fragments are identified. Debris within the bilateral lateral ventricles more pronounced on the right of uncertain clinical significance or etiology. MRI of the brain with contrast is recommended for further evaluation. Differential diagnosis would include subacute isodense hemorrhage versus possible infectious process. Neoplastic process is not considered due to the normal head CT 1 week ago.  Bilateral mastoid effusion Limited to reduce the radiation dose to as low as reasonably achievable.; CT of the face was performed without the administration of intravenous contrast. Multiplanar reformatted images are provided for review. Dose modulation, iterative reconstruction, and/or weight based adjustment of the mA/kV was utilized to reduce the radiation dose to as low as reasonably achievable. COMPARISON: 01/09/2022 HISTORY: Trauma FINDINGS: CT HEAD: BRAIN/VENTRICLES: Persistent layering debris within the lateral ventricles. There is mild dilatation of the ventricular system. There are changes chronic small vessel ischemic disease and mild generalized atrophy. There is no acute intracranial hemorrhage, mass effect or midline shift. The gray-white differentiation is maintained without evidence of an acute infarct. CT FACIAL BONES: FACIAL BONES: The maxilla, pterygoid plates and zygomatic arches are intact. The mandible is intact. The mandibular condyles are normally situated. The nasal bones and maxillary nasal processes are intact. ORBITS: The globes appear intact. The extraocular muscles, optic nerve sheath complexes and lacrimal glands appear unremarkable. No retrobulbar hematoma or mass is seen. The orbital walls and rims are intact. SINUSES/MASTOIDS: The paranasal sinuses are well aerated. No acute fracture is seen. Partial opacification of mastoid air cells. SOFT TISSUES: No acute abnormality of the visualized skull or soft tissues. Persistent layering debris within the lateral ventricles. There is mild dilatation of the ventricular system. Differential includes isodense subacute hemorrhage versus infection. Follow-up MRI brain with and without contrast is recommended for further evaluation. No acute traumatic injury of the facial bones.  RECOMMENDATIONS: Unavailable     CT CERVICAL SPINE WO CONTRAST    Result Date: 1/9/2022  EXAMINATION: CT OF THE HEAD WITHOUT CONTRAST; CT OF THE CERVICAL SPINE WITHOUT CONTRAST 1/9/2022 7:02 pm TECHNIQUE: CT of the head was performed without the administration of intravenous contrast. Dose modulation, iterative reconstruction, and/or weight based adjustment of the mA/kV was utilized to reduce the radiation dose to as low as reasonably achievable.; CT of the cervical spine was performed without the administration of intravenous contrast. Multiplanar reformatted images are provided for review. Dose modulation, iterative reconstruction, and/or weight based adjustment of the mA/kV was utilized to reduce the radiation dose to as low as reasonably achievable. COMPARISON: 01/03/2022 HISTORY: ORDERING SYSTEM PROVIDED HISTORY: altered mental status TECHNOLOGIST PROVIDED HISTORY: altered mental status Decision Support Exception - unselect if not a suspected or confirmed emergency medical condition->Emergency Medical Condition (MA) Reason for Exam: Altered mental status, found on the floor of his home today, unknown time of fall FINDINGS: BRAIN/VENTRICLES: There is no evidence for acute intracranial hemorrhage. There is debris within the bilateral lateral ventricles (right greater than left). There is no evidence for acute intracranial hemorrhage or mass lesion. No obvious acute infarct is identified. ORBITS: The visualized portion of the orbits demonstrate no acute abnormality. SINUSES: There is right mastoid effusion. SOFT TISSUES/SKULL:  No acute abnormality of the visualized skull or soft tissues. CT cervical spine: Bilateral mastoid effusion exam is limited by motion artifact. There is multilevel degenerative change in the cervical spine with decreased disc space height and osteophytosis at multiple levels. The exam is limited by motion artifact. There is loss of the normal cervical lordosis with kyphosis. No obvious displaced fracture fragments are identified. Debris within the bilateral lateral ventricles more pronounced on the right of uncertain clinical significance or etiology.   MRI of the hypertrophy that is worse on the right compared to left. There is no canal stenosis or left foraminal narrowing. There is mild right foraminal narrowing. C4-C5: There is a disc osteophyte complex with uncovertebral and facet hypertrophy. There is no canal stenosis. There is mild left and moderate to severe right foraminal narrowing. C5-C6: There is a disc osteophyte complex with uncovertebral and facet hypertrophy. There is no canal stenosis. There is moderate left and severe right foraminal narrowing. C6-C7: There is a disc osteophyte complex with uncovertebral and facet hypertrophy. There is no canal stenosis. There is mild left and moderate right foraminal narrowing. C7-T1: There is no significant disc protrusion, spinal canal stenosis or neural foraminal narrowing. Debris throughout the CSF fluid with leptomeningeal enhancement and dural enhancement as described above consistent with an infectious process. Multilevel degenerative change with foraminal narrowing bilaterally as described above. RECOMMENDATIONS: Unavailable     MRI THORACIC SPINE W WO CONTRAST    Result Date: 1/11/2022  EXAMINATION: MRI OF THE THORACIC SPINE WITHOUT AND WITH CONTRAST  1/11/2022 4:35 pm TECHNIQUE: Multiplanar multisequence MRI of the thoracic spine was performed without and with the administration of intravenous contrast. COMPARISON: None HISTORY: ORDERING SYSTEM PROVIDED HISTORY: assess CSF infection TECHNOLOGIST PROVIDED HISTORY: assess CSF infection Reason for Exam: CSF infection; found down. FINDINGS: BONES/ALIGNMENT: There is normal alignment of the spine. The vertebral body heights are maintained. The bone marrow signal appears unremarkable. SPINAL CORD: No spinal cord signal abnormality is evident. There is diffusely heterogeneous appearance of the CSF with diffuse smooth leptomeningeal enhancement throughout the thoracic spinal canal as well as scattered subtle areas of dural enhancement.   No discrete mass is seen within the spinal canal.  There is no evidence of abscess. SOFT TISSUES:  Paraspinal soft tissues are unremarkable. DEGENERATIVE CHANGES: Mild spinal canal stenosis at T7-8, T8-9, and T9-10 secondary to disc bulges. No neural foraminal narrowing. 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 No evidence of abscess, discitis, or osteomyelitis. MRI LUMBAR SPINE W WO CONTRAST    Result Date: 1/11/2022  EXAMINATION: MRI OF THE LUMBAR SPINE WITHOUT AND WITH CONTRAST  1/11/2022 4:35 pm TECHNIQUE: Multiplanar multisequence MRI of the lumbar spine was performed without and with the administration of intravenous contrast. COMPARISON: None. HISTORY: ORDERING SYSTEM PROVIDED HISTORY: asses CSF infection TECHNOLOGIST PROVIDED HISTORY: asses CSF infection Reason for Exam: CSF infection; found down FINDINGS: BONES/ALIGNMENT: There is normal alignment of the spine. The vertebral body heights are maintained. The bone marrow signal appears unremarkable. SPINAL CORD:  The conus terminates normally at the T12-L1 level. There is diffuse heterogeneous appearance of the CSF on T2 weighted sequences and diffuse leptomeningeal enhancement. There is no evidence of abscess. SOFT TISSUES: No paraspinal fluid collection or mass. Right renal cyst is partially imaged. L1-L2: Mild disc height loss and desiccation. Mild bilateral neural foraminal and mild spinal canal stenosis secondary to disc bulge. L2-L3: Disc height loss and desiccation. Mild bilateral neural foraminal narrowing and moderate spinal canal stenosis secondary to disc bulge. L3-L4: Mild disc height loss and desiccation. Mild left and moderate right neural foraminal and mild spinal canal stenosis secondary to disc bulge. L4-L5: Mild disc height loss and desiccation. Mild bilateral neural foraminal narrowing and mild spinal canal stenosis secondary to disc bulge and facet hypertrophy.  L5-S1: Moderate disc height loss and desiccation. Mild bilateral neural foraminal narrowing secondary to disc bulge. No spinal canal stenosis. 1. 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 No evidence of abscess. 2. Mild-to-moderate multilevel degenerative changes as detailed above. XR CHEST PORTABLE    Result Date: 1/16/2022  EXAMINATION: ONE XRAY VIEW OF THE CHEST 1/16/2022 9:17 am COMPARISON: 16 January 2022 is 716 hours HISTORY: ORDERING SYSTEM PROVIDED HISTORY: CVL placement TECHNOLOGIST PROVIDED HISTORY: CVL placement Reason for Exam: ap upright, line placement image FINDINGS: AP portable view of the chest at 9 o'clock demonstrates overlying cardiac monitoring electrodes. Endotracheal tube terminates 2.9 cm above the emile. Right internal jugular catheter terminates in the distal superior vena cava. Intestinal tube extends beyond the body of the stomach. Size is stable. Continued focal opacityin the right lower lobe consistent with right lower lobe airspace disease is noted. No pneumothorax is noted. No pneumothorax post right IJ line placement. Other tubes and lines as above. Continued right focal opacity laterally it the base.      XR CHEST PORTABLE    Result Date: 1/16/2022  EXAMINATION: ONE XRAY VIEW OF THE CHEST 1/16/2022 6:45 am COMPARISON: 1/15/2022 HISTORY: ORDERING SYSTEM PROVIDED HISTORY: intubated TECHNOLOGIST PROVIDED HISTORY: intubated FINDINGS: Tip of ET tube is seen in region of midthoracic trachea Tip of NG tube is seen in stomach Heart size is normal No focal consolidation on the left Patchy opacity is seen in the right base, similar prior     Right lower lobe airspace disease similar to prior     XR CHEST PORTABLE    Result Date: 1/15/2022  EXAMINATION: ONE XRAY VIEW OF THE CHEST 1/15/2022 7:49 am COMPARISON: Chest x-ray dated 01/14/2022 HISTORY: ORDERING SYSTEM PROVIDED HISTORY: intubated TECHNOLOGIST PROVIDED HISTORY: intubated Reason for Exam: intubated port supine at 705am FINDINGS: ET tube terminates above the emile. Enteric tube tip courses through the chest below the level of diaphragm; tip is not visualized. Right basilar pneumonia. The left lung is clear. No pneumothorax or pleural effusion. Cardiac silhouette is unremarkable. Visualized osseous structures are unremarkable. Tubes as above. Right basilar pneumonia. XR CHEST PORTABLE    Result Date: 1/14/2022  EXAMINATION: ONE XRAY VIEW OF THE CHEST 1/14/2022 7:16 am COMPARISON: Chest radiograph performed 01/13/2022. HISTORY: ORDERING SYSTEM PROVIDED HISTORY: intubated TECHNOLOGIST PROVIDED HISTORY: intubated FINDINGS: There is a right basilar infiltrate. There is no pneumothorax. The mediastinal structures are unremarkable. The upper abdomen is unremarkable. The extrathoracic soft tissues are unremarkable. There is an endotracheal tube with the tip in the distal midtrachea. There is a gastric tube and the tip is not visualized. There is a right basilar infiltrate consistent with pneumonia. Support tubes as described above. XR CHEST PORTABLE    Result Date: 1/13/2022  EXAMINATION: ONE XRAY VIEW OF THE CHEST 1/13/2022 6:25 am COMPARISON: AP chest from 01/12/2022; CT of the chest from 01/09/2022. HISTORY: ORDERING SYSTEM PROVIDED HISTORY: intubated TECHNOLOGIST PROVIDED HISTORY: intubated Reason for Exam: Semi uprt port History of chronic fatigue syndrome, hypertension, anemia, respiratory failure, kidney failure, GERD, and septicemia. FINDINGS: Interval advancement ETT, now satisfactory at approximately 4.2 cm above the emile; enteric tube tip position remains stable and satisfactory. Overlying ECG monitor leads and gown snaps. Cardiomediastinal shadow midline and WNL. Focal opacity right lung base again identified, similar by comparison; probable minimal left basilar atelectasis. No air-fluid level seen radiographically. No new pulmonary or significant pleural finding. No pneumothorax. Bones stable. Improved ETT position, now satisfactory; enteric tube position unchanged. Right basilar opacity, and probable slight left basilar atelectasis, as discussed above. XR CHEST PORTABLE    Result Date: 1/12/2022  EXAMINATION: ONE XRAY VIEW OF THE CHEST 1/12/2022 6:21 am COMPARISON: 01/11/2022, 844 hours, CT chest from 01/09/2022 HISTORY: ORDERING SYSTEM PROVIDED HISTORY: intubated TECHNOLOGIST PROVIDED HISTORY: intubated Reason for Exam: supine port 79-year-old intubated male FINDINGS: Portable supine view of the chest. Endotracheal tube is seen overlying the proximal trachea approximately 8.6 cm above the level of the emile. Enteric tube traverses the GE junction with distal tip excluded from the field of view. Cardiac monitor leads overlie the chest. Cardiac and mediastinal contours remain unchanged. No obvious pneumothorax on limited portable supine imaging. Stable nodular opacity at the right lower lung zone consistent with a cavitary lesion seen on prior CT. Mild bibasilar atelectasis. Visualized osseous structures remain unchanged. 1. Endotracheal tube overlying the proximal trachea 8.6 cm above the level of the emile. Advancement is recommended for more optimal positioning. 2. Enteric tube as above. 3. Stable nodular opacity at the right lower lung zone consistent with a cavitary lesion seen on prior CT. 4. Mild bibasilar atelectasis. The findings were sent to the Radiology Results [id number] at 6:58 am on 1/12/2022to be communicated to a licensed caregiver. XR CHEST PORTABLE    Result Date: 1/11/2022  EXAMINATION: ONE XRAY VIEW OF THE CHEST 1/11/2022 9:02 am COMPARISON: 01/10/2022 radiograph HISTORY: ORDERING SYSTEM PROVIDED HISTORY: intubated TECHNOLOGIST PROVIDED HISTORY: intubated FINDINGS: Supportive devices are stable. Heart, mediastinum and pulmonary vascularity are normal.  Stable nodular opacity in the right lower lung zone representing a cavitary lesion seen on prior CT. The lungs appear clear otherwise. No significant skeletal finding. Stable nodular opacity in the right lower lung zone better characterized on recent CT. XR CHEST PORTABLE    Result Date: 1/10/2022  EXAMINATION: ONE XRAY VIEW OF THE CHEST 1/10/2022 1:53 am COMPARISON: 01/10/2022. HISTORY: ORDERING SYSTEM PROVIDED HISTORY: ett TECHNOLOGIST PROVIDED HISTORY: ett Reason for Exam: et tube and ng placement   supine port Initial evaluation. FINDINGS: Endotracheal tube with the tip projecting approximately 6 cm above the emile. Enteric tube coursing below the diaphragm. The side port projects in the region the gastric cardia. The cardiac silhouette is within normal limits for size. There is no significant change in the focal opacity within the right lower lung. No focal consolidation within the left lung. No pleural effusion or pneumothorax. 1. Support devices as above. 2. No significant change in the focal opacity within the right lower lung. XR CHEST PORTABLE    Result Date: 1/10/2022  EXAMINATION: ONE XRAY VIEW OF THE CHEST 1/9/2022 11:49 pm COMPARISON: 01/09/2022 HISTORY: ORDERING SYSTEM PROVIDED HISTORY: transfer TECHNOLOGIST PROVIDED HISTORY: transfer Reason for Exam: found down   supine port FINDINGS: Right lung base masslike opacity opacity again identified. Otherwise unremarkable size. Lungs clear. No pleural effusion pneumothorax. Right lung base opacity again identified, nonspecific. Please correlate exam findings and exam findings and history. XR CHEST PORTABLE    Result Date: 1/9/2022  EXAMINATION: ONE XRAY VIEW OF THE CHEST 1/9/2022 7:25 pm COMPARISON: 12/07/2015 HISTORY: ORDERING SYSTEM PROVIDED HISTORY: altered mental status TECHNOLOGIST PROVIDED HISTORY: altered mental status Reason for Exam: Altered mental status FINDINGS: The cardiomediastinal silhouette is normal in size and contour. Right lung base masslike opacity identified.   No pleural effusion or pneumothorax is present. Right lung base opacity. Please correlate exam findings. This could represent a focus of pneumonia versus underlying mass. CT CHEST PULMONARY EMBOLISM W CONTRAST    Result Date: 1/16/2022  EXAMINATION: CTA OF THE CHEST 1/16/2022 9:24 am TECHNIQUE: CTA of the chest was performed after the administration of intravenous contrast.  Multiplanar reformatted images are provided for review. MIP images are provided for review. Dose modulation, iterative reconstruction, and/or weight based adjustment of the mA/kV was utilized to reduce the radiation dose to as low as reasonably achievable. COMPARISON: 01/15/2022 CT HISTORY: ORDERING SYSTEM PROVIDED HISTORY: tachycardai, decrease saturation. admitted in [facility] for cns infection TECHNOLOGIST PROVIDED HISTORY: tachycardai, decrease saturation. admitted in [facility] for cns infection FINDINGS: Pulmonary Arteries: The study is of good technical quality. Acute bilateral pulmonary embolism is confirmed. Scattered segmental and subsegmental filling defects are observed in the right upper lobe, right lower lobe and left upper lobe. RV: LV ratio is 1.0. Mediastinum: The heart is normal in size. Stable small pericardial effusion. No lymph node enlargement. Diffuse heterogeneity of the thyroid with no discrete nodule. The mass described on prior CT is less distinct on current exam.  Enteric tube remains within the esophagus. Lungs/pleura: The airways are patent. Endotracheal tube stable. Trace pleural fluid in the lower chest.  Cavitary mass in the lateral segment of the right middle lobe measuring 6.3 x 5.4 cm. Relatively stable air-fluid level. This does communicate with the pleural space along the minor fissure and major fissure. Mild dependent airspace opacities are otherwise stable in both lung bases.  Upper Abdomen: Visualized abdominal structures in the upper abdomen are normal. Soft Tissues/Bones: No skeletal abnormalities throughout the chest.     1. Acute bilateral pulmonary embolism as described above. There is no evidence of right ventricular strain. 2. Slight increase in the size of a cavitary mass in the right middle lobe abutting the lateral pleural surface. Pulmonary abscess would be favored over empyema due to location. Fungal infection considered less likely. Underlying malignancy can not be definitively excluded and continued follow-up is advised. 3. Trace bilateral pleural effusions appear relatively stable with dependent airspace opacities in both lower lobes. Findings were discussed with FAITH Amaya RN, at 9:52 am on 1/16/2022. CT CHEST PULMONARY EMBOLISM W CONTRAST    Addendum Date: 1/13/2022    ADDENDUM: Heterogeneous thyroid gland with 2.9 cm nodule on left. This can be further evaluate with thyroid ultrasound this may change patient management. Result Date: 1/13/2022  EXAMINATION: CTA OF THE CHEST 1/9/2022 7:40 pm TECHNIQUE: CTA of the chest was performed after the administration of intravenous contrast.  Multiplanar reformatted images are provided for review. MIP images are provided for review. Dose modulation, iterative reconstruction, and/or weight based adjustment of the mA/kV was utilized to reduce the radiation dose to as low as reasonably achievable. COMPARISON: Chest x-ray 01/09/2022 HISTORY: ORDERING SYSTEM PROVIDED HISTORY: elevated d-dimer, mental status change TECHNOLOGIST PROVIDED HISTORY: elevated d-dimer, mental status change Decision Support Exception - unselect if not a suspected or confirmed emergency medical condition->Emergency Medical Condition (MA) Reason for Exam: Elevated d-dimer, mental status change FINDINGS: Pulmonary Arteries: No central lobar pulmonary emboli. Main pulmonary artery is unremarkable caliber. Mediastinum: Air-fluid identified involving esophagus. Slight heterogeneous appearance of the thyroid gland on left. Heart is mildly prominent size. No definite bulky hilar mediastinal adenopathy. Lungs/pleura: Right middle lobe atelectasis versus scarring identified. Within the lateral aspect right middle lobe, there is consolidative versus masslike opacity with air-fluid level. There vessels corresponding through this opacity. The tiny locules of gas as well. Otherwise mild hypoventilatory changes elsewhere the lungs. Upper Abdomen: Evidence of cholelithiasis. Fatty infiltration liver. Soft Tissues/Bones: Degenerate changes of the thoracic spine. No evidence of pulmonary embolism to the segmental level. Nonspecific opacification and when question air-fluid level involving the right middle lobe laterally. This demonstrates tiny locules of gas. Infection/Pneumonia and/or cavitary mass lesion may be considered. .  Consider pulmonology consultation. RECOMMENDATIONS: Unavailable     VL DUP LOWER EXTREMITY VENOUS BILATERAL    Result Date: 1/11/2022    [facility]  Vascular Lower Extremities DVT Study Procedure   Patient Name   Park Dhillon    Date of Study           01/11/2022                 [facility]   Date of Birth  1950   Gender                  Male   Age            70 year(s)   Race                       Room Number    3234 port    Height:                 70 inch, 177.8 cm   Corporate ID # [id number]     Weight:                 207 pounds, 93.9 kg   Patient Acct # [de-identified]    BSA:        2.12 m^2    BMI:       29.7 kg/m^2   MR #           [id number]      Dimitrios Guajardo RVT   Accession #    [id number]   Interpreting Physician  Berenice Crowley   Referring                   Referring Physician     Jefferson Brewster  Nurse  Practitioner  Procedure Type of Study:   Veins: Lower Extremities DVT Study, Venous Scan Lower Bilateral.  Indications for Study:Elevated D-Dimer. Patient Status: In Patient. Technical Quality:Adequate visualization.   - Critical Result:Findings were reported to Dr. Alex Crawford on 1/11/2022 while     study was being done by Marlene Atwood RVT. Conclusions   Summary   Simultaneous real time imaging utilizing B-Mode, color doppler and  spectral waveform analysis was performed on the bilateral lower  extremities for venous examination of the deep and superficial systems. Findings are:   Right:  Chronic deep venous thrombosis identified in the below knee peroneal and  gastrocnemius veins. Left:  No evidence of deep or superficial venous thrombosis. Signature   ----------------------------------------------------------------  Electronically signed by Jason Zendejas RVT(Sonographer) on  01/11/2022 02:10 PM  ----------------------------------------------------------------   ----------------------------------------------------------------  Electronically signed by Tin Ramírez(Interpreting  physician) on 01/11/2022 04:00 PM  ----------------------------------------------------------------  Findings:   Right Impression:                    Left Impression:  The peroneal and deep calf muscle    The common femoral, femoral,  veins are non compressible with      popliteal and tibial veins  echoes. demonstrate normal compressibility                                       and augmentation. The common femoral, femoral,  popliteal and tibial veins           Normal compressibility of the great  demonstrate normal compressibility   saphenous vein. and augmentation. Normal compressibility of the small  Normal compressibility of the great  saphenous vein. saphenous vein. Normal compressibility of the small  saphenous vein. Risk Factors History +---------+----------+-----------------------------------------------------+ ! Diagnosis! Date      ! Comments                                             ! +---------+----------+-----------------------------------------------------+ ! Other    !01/11/2022! ICP 1; hematocrit 31.6; /93                    ! +---------+----------+-----------------------------------------------------+   - The patient's risk factor(s) include: dyslipidemia and arterial     hypertension.   - The patient has skin cancer. Velocities are measured in cm/s ; Diameters are measured in cm Right Lower Extremities DVT Study Measurements Right 2D Measurements +------------------------------------+----------+---------------+----------+ ! Location                            ! Visualized! Compressibility! Thrombosis! +------------------------------------+----------+---------------+----------+ ! Common Femoral                      !Yes       ! Yes            ! None      ! +------------------------------------+----------+---------------+----------+ ! Prox Femoral                        !Yes       ! Yes            ! None      ! +------------------------------------+----------+---------------+----------+ ! Mid Femoral                         !Yes       ! Yes            ! None      ! +------------------------------------+----------+---------------+----------+ ! Dist Femoral                        !Yes       ! Yes            ! None      ! +------------------------------------+----------+---------------+----------+ ! Deep Femoral                        !Yes       ! Yes            ! None      ! +------------------------------------+----------+---------------+----------+ ! Popliteal                           !Yes       ! Yes            ! None      ! +------------------------------------+----------+---------------+----------+ ! Sapheno Femoral Junction            ! Yes       ! Yes            ! None      ! +------------------------------------+----------+---------------+----------+ ! PTV                                 ! Yes       ! Yes            ! None      ! +------------------------------------+----------+---------------+----------+ ! Peroneal                            !Yes       ! No             !Chronic   ! +------------------------------------+----------+---------------+----------+ !Gastroc                             ! Yes       ! No             !Chronic   ! +------------------------------------+----------+---------------+----------+ ! GSV Thigh                           ! Yes       ! Yes            ! None      ! +------------------------------------+----------+---------------+----------+ ! GSV Knee                            ! Yes       ! Yes            ! None      ! +------------------------------------+----------+---------------+----------+ ! GSV Ankle                           ! Yes       ! Yes            ! None      ! +------------------------------------+----------+---------------+----------+ ! SSV                                 ! Yes       ! Yes            ! None      ! +------------------------------------+----------+---------------+----------+ Right Doppler Measurements +---------------------------+------+------+--------------------------------+ ! Location                   ! Signal!Reflux! Reflux (msec)                   ! +---------------------------+------+------+--------------------------------+ ! Common Femoral             !Phasic!      !                                ! +---------------------------+------+------+--------------------------------+ ! Prox Femoral               !Phasic!      !                                ! +---------------------------+------+------+--------------------------------+ ! Popliteal                  !Phasic!      !                                ! +---------------------------+------+------+--------------------------------+ Left Lower Extremities DVT Study Measurements Left 2D Measurements +------------------------------------+----------+---------------+----------+ ! Location                            ! Visualized! Compressibility! Thrombosis! +------------------------------------+----------+---------------+----------+ ! Common Femoral                      !Yes       ! Yes            ! None      ! +------------------------------------+----------+---------------+----------+ ! Prox Femoral                        !Yes       ! Yes            ! None      ! +------------------------------------+----------+---------------+----------+ ! Mid Femoral                         !Yes       ! Yes            ! None      ! +------------------------------------+----------+---------------+----------+ ! Dist Femoral                        !Yes       ! Yes            ! None      ! +------------------------------------+----------+---------------+----------+ ! Deep Femoral                        !Yes       ! Yes            ! None      ! +------------------------------------+----------+---------------+----------+ ! Popliteal                           !Yes       ! Yes            ! None      ! +------------------------------------+----------+---------------+----------+ ! Sapheno Femoral Junction            ! Yes       ! Yes            ! None      ! +------------------------------------+----------+---------------+----------+ ! PTV                                 ! Yes       ! Yes            ! None      ! +------------------------------------+----------+---------------+----------+ ! Peroneal                            !No        !               !          ! +------------------------------------+----------+---------------+----------+ ! Gastroc                             ! Yes       ! Yes            ! None      ! +------------------------------------+----------+---------------+----------+ ! GSV Thigh                           ! Yes       ! Yes            ! None      ! +------------------------------------+----------+---------------+----------+ ! GSV Knee                            ! Yes       ! Yes            ! None      ! +------------------------------------+----------+---------------+----------+ ! GSV Ankle                           ! Yes       ! Yes            ! None      ! +------------------------------------+----------+---------------+----------+ ! SSV                                 ! Yes       ! Yes            ! None      ! +------------------------------------+----------+---------------+----------+ Left Doppler Measurements +---------------------------+------+------+--------------------------------+ ! Location                   ! Signal!Reflux! Reflux (msec)                   ! +---------------------------+------+------+--------------------------------+ ! Common Femoral             !Phasic!      !                                ! +---------------------------+------+------+--------------------------------+ ! Prox Femoral               !Phasic!      !                                ! +---------------------------+------+------+--------------------------------+ ! Popliteal                  !Phasic!      !                                ! +---------------------------+------+------+--------------------------------+    CTA HEAD NECK W CONTRAST    Result Date: 1/10/2022  EXAMINATION: CTA OF THE HEAD AND NECK WITH CONTRAST 1/10/2022 8:09 am: TECHNIQUE: CTA of the head and neck was performed with the administration of intravenous contrast. Multiplanar reformatted images are provided for review. MIP images are provided for review. Stenosis of the internal carotid arteries measured using NASCET criteria. Dose modulation, iterative reconstruction, and/or weight based adjustment of the mA/kV was utilized to reduce the radiation dose to as low as reasonably achievable. COMPARISON: CT brain performed 01/10/2022. HISTORY: ORDERING SYSTEM PROVIDED HISTORY: assess for vasculitis TECHNOLOGIST PROVIDED HISTORY: assess for vasculitis Decision Support Exception - unselect if not a suspected or confirmed emergency medical condition->Emergency Medical Condition (MA) Reason for Exam: assess for vasculitis FINDINGS: CTA NECK: AORTIC ARCH/ARCH VESSELS: No dissection or arterial injury. No significant stenosis of the brachiocephalic or subclavian arteries. CAROTID ARTERIES: No dissection, arterial injury, or hemodynamically significant stenosis by NASCET criteria. contrast. Multiplanar reformatted images are provided for review. Dose modulation, iterative reconstruction, and/or weight based adjustment of the mA/kV was utilized to reduce the radiation dose to as low as reasonably achievable. COMPARISON: None HISTORY: ORDERING SYSTEM PROVIDED HISTORY: Follow up cavitary lesion of right lung. Follow up tissue thickening of bowel seen on previous CT. TECHNOLOGIST PROVIDED HISTORY: Follow up cavitary lesion of right lung. Follow up tissue thickening of bowel seen on previous CT. Reason for Exam: f/u lung lesion FINDINGS: Chest: Mediastinum: Enteric tube/ETT remain in satisfactory position. 3.3 x 2.0 cm low-density left thyroid mass. Slightly larger but still small anterior pericardial effusion. Cardiac chambers and thoracic aorta otherwise unchanged and/or unremarkable. No bulky lymphadenopathy. Lungs/pleura: Previously identified RML lateral segment lobular shaped cavitated mass with a fluid level shows mild interval enlargement, now measuring 5.2 x 5.4 x 4.8 cm versus 4.5 x 4.8 x 4.3 cm previously. Bilateral small pleural effusions appear slightly larger, with associated compressive atelectasis. Remainder lung fields clear and unchanged. Mild bronchial wall thickening and LLL calcified granuloma again noted. No pneumothorax or additional suspicious finding. Soft Tissues/Bones: No acute abnormality. Abdomen/Pelvis: Organs: Unenhanced liver, spleen, adrenal glands and pancreas unchanged without acute abnormality; low-density 11 mm left adrenal nodule is likely benign. Hyperdense gallbladder, likely secondary to vicarious contrast excretion; stones again demonstrated without CT evidence cholecystitis. No obvious dilatation biliary tree. Large unchanged right kidney cyst and some contrast within the right renal collecting system. No acute renal abnormality. No hydronephrosis. GI/Bowel: Fluid-filled mildly distended stomach.   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 Decompressed left colon; air-filled mildly distended ascending and transverse colon with air-fluid levels but no wall thickening or definite focal lesion splenic flexure. Gaseous mild-moderate dilatation and fluid with levels throughout predominantly ileal loops (no significant dilatation jejunum). No abnormal wall thickening. Pelvis: Rangel catheter, air and some contrast in the bladder, without wall thickening. Similar abnormally enlarged prostate measuring ~ 6.0 x 4.8 x 5.3 cm with rightward lobular extension as previously noted. Moderate pathologic free fluid in the posterior pelvis. Bilateral fat containing inguinal hernias, unchanged. Peritoneum/Retroperitoneum: Unenhanced major vascular structures unchanged with scattered calcified plaque but no aneurysm. No bulky lymphadenopathy. No adenopathy in the mesentery, specifically right upper quadrant. No free fluid. Additional mild soft tissue stranding/fluid in the gutters, slightly more on the left. Bones/Soft Tissues: No acute abnormality. Probable osteopenia and multilevel DDD/DJD, similar by comparison. Bilateral fluid collections both hip joints and unchanged degenerative findings. No bony destructive lesion. No acute CT finding in the chest; slightly larger cavitated lesion lateral segment RML. Slightly larger small bilateral pleural effusions with associated mild compressive atelectasis. Unchanged differential including infectious or inflammatory etiologies and neoplasm. 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 No associated adenopathy identified. Dilated fluid-filled bowel, mostly ileal and right colon to the splenic flexure with air-fluid levels but no clear cut colitis, or focal lesion splenic flexure, as noted. Mildly distended fluid-filled stomach. Small amount of pathologic free fluid, mostly in the pelvis.   No free air. Findings more suggestive of generalized ileus; if there is clinical concern for developing obstruction, consider SBFT. Bilateral hip joint effusions; correlate clinically. Slightly larger but small anterior pericardial effusion. Rangel catheter in place with a small amount of air and contrast in the urinary bladder. Enlarged unchanged appearing prostate with rightward lobular extension. Vicarious gallbladder contrast excretion and cholelithiasis without cholecystitis. Additional unchanged or likely incidental findings, as detailed above. RECOMMENDATIONS: Unavailable     MRI BRAIN W WO CONTRAST    Result Date: 1/10/2022  EXAMINATION: MRI OF THE BRAIN WITHOUT AND WITH CONTRAST  1/10/2022 1:18 pm TECHNIQUE: Multiplanar multisequence MRI of the head/brain was performed without and with the administration of intravenous contrast. COMPARISON: CT brain performed 01/10/2022. HISTORY: ORDERING SYSTEM PROVIDED HISTORY: infection TECHNOLOGIST PROVIDED HISTORY: infection Reason for Exam: infection FINDINGS: INTRACRANIAL STRUCTURES/VENTRICLES:  The sellar and suprasellar structures, optic chiasm, corpus callosum, pineal gland, tectum, and midline brainstem structures are unremarkable. The craniocervical junction is unremarkable. There is no acute hemorrhage, mass effect, midline shift. There is FLAIR signal abnormality in the periventricular white matter. The ventricles are symmetric and enlarged containing a large amount of debris and this is worse on the right compared to the left. The debris is seen in the 3rd ventricle and 4th ventricle. There is a shunt tube from a right frontal approach with the tip in the right frontal horn. There is enhancement involving the ependymal surface of the ventricular system and along the periphery of the choroid plexus. Left a meningeal enhancement extends into the cranial cervical junction and visualized cervical spinal cord.  ORBITS: The visualized portion of the orbits demonstrate no acute abnormality. SINUSES: There is fluid throughout the mastoid air cells. The paranasal sinuses are normally aerated. BONES/SOFT TISSUES: The bone marrow signal intensity appears normal. The soft tissues demonstrate no acute abnormality. Prominent ventricular system with debris throughout the CSF. Associated FLAIR signal abnormality involving the ependymal and subependymal surface with enhancement of the ependymal surface of the ventricular system consistent with an infectious process. There is associated leptomeningeal enhancement extending into the craniocervical region and along the visualized cervical spinal cord. RECOMMENDATIONS: Unavailable     MRI BRAIN WO CONTRAST    Result Date: 1/14/2022  EXAMINATION: MRI OF THE BRAIN WITHOUT CONTRAST  1/14/2022 3:47 pm TECHNIQUE: Multiplanar multisequence MRI of the brain was performed without the administration of intravenous contrast. COMPARISON: 01/10/2022 HISTORY: ORDERING SYSTEM PROVIDED HISTORY: eval for acute stroke in setting of vascultitis with narrowing intracranial vasculature and elevated TCD TECHNOLOGIST PROVIDED HISTORY: eval for acute stroke in setting of vascultitis with narrowing intracranial vasculature and elevated TCD Reason for Exam: eval for acute stroke in setting of vascultitis with narrowing intracranial vasculature and elevated TCD FINDINGS: INTRACRANIAL STRUCTURES/VENTRICLES: There is diffuse ventricular enlargement with layering complex fluid that demonstrates diffusion restriction. The ventricular dilation is increased in the interval with the frontal horns now measuring 5 cm in diameter (previously 4.3 cm). Right frontal approach ventricular shunt catheter is again noted. There is associated FLAIR signal abnormality extending throughout the ventricles and adjacent to the ventral in the white matter.   There are multiple foci of diffusion restriction in the right frontal and parietal lobes, which appear increased in the interval. There is also diffusion restriction in the right occipital lobe, similar to slightly increased from prior examination. There are scattered areas of susceptibility artifact, likely a combination of blood products and gas. ORBITS: The visualized portion of the orbits demonstrate no acute abnormality. SINUSES: Paranasal sinuses are predominantly clear. Bilateral mastoid air cell effusions. BONES/SOFT TISSUES: The bone marrow signal intensity appears normal. The soft tissues demonstrate no acute abnormality. 1. There are foci of diffusion restriction in the right frontal, parietal, and occipital lobes, concerning for acute infarction. 2. Layering debris in the ventricles with diffusion restriction is consistent with ventriculitis. This appears slightly increased from prior examination. There is also increased ventriculomegaly despite right frontal approach ventricular shunt catheter. 3. Mastoid air cell effusions. RECOMMENDATIONS: Unavailable     CT MAXILLOFACIAL W CONTRAST    Result Date: 1/12/2022  EXAMINATION: CT OF THE FACE WITH CONTRAST, 1/12/2022 TECHNIQUE: CT of the face was performed with the administration of intravenous contrast. Multiplanar reformatted images are provided for review. Dose modulation, iterative reconstruction, and/or weight based adjustment of the mA/kV was utilized to reduce the radiation dose to as low as reasonably achievable. COMPARISON: None HISTORY: ORDERING SYSTEM PROVIDED HISTORY:  Ventriculitis/meningitis TECHNOLOGIST PROVIDED HISTORY: Ventriculitis/meningitis Reason for Exam:  Ventriculitis/meningitis FINDINGS: The paranasal sinuses are clear without evidence of acute or chronic sinusitis. There are nonspecific right larger than left moderate mastoid effusions without bony erosion which could be secondary to intubation. There is no facial soft tissue infection or abscess. The major facial vessels are patent. There is no orbital inflammation.  The small bore enteric tube is partially coiled in the mouth. Meningitis, ventriculitis, and hydrocephalus are partially imaged. No evidence of facial infection or sinusitis. VL TRANSCRANIAL DOPPLER COMPLETE    Result Date: 1/14/2022    [facility]  Vascular Transcranial Procedure   Patient Name  Atiya Villeda   Date of Study         01/14/2022                Alla Wallis   Date of Birth 1950  Gender                Male   Age           70 year(s)  Race                     Room Number   7226        Height:               70 inch, 177.8 cm   Corporate ID  [id number]    Weight:               207 pounds, 93.9 kg  #   Patient Acct  [de-identified]   BSA:       2.12 m^2   BMI:         29.7 kg/m^2  #   MR #          [id number]     Sonographer           Josue Leung RVT, RDMS   Accession #   [id number]  Interpreting          [address] [place] [address]                            Physician   Referring                 Referring Physician   Jacinto ABBASI-CNP  Nurse  Practitioner  Procedure Type of Study:   Cerebral: Transcranial.  Indications for Study:Stenosis of major intracranial artery. Patient Status: In Patient. Technical Quality:Limited visualization. Limitation reason:acoustic interferrence. Conclusions   Summary   Severe vasospasm in the bilateral MCAs with moderate spasm noted in the  basilar artery.    Signature   ----------------------------------------------------------------  Electronically signed by Josue Leung RVT, RDMS(Sonographer) on 01/14/2022 12:10 PM  ----------------------------------------------------------------   ----------------------------------------------------------------  Electronically signed by Vinson Fusi Reyes,Arthur(Interpreting  physician) on 01/14/2022 05:23 PM  ----------------------------------------------------------------  Findings:   Right Impression:                     Left Impression:  Right Lindegaard Ratio = 6.65         Left Lindegaard Ratio = 7.31  MEAN VELOCITIES: MEAN VELOCITIES:  Transtemporal Approach                Transtemporal Approach  Proximal MCA: 164                     Proximal MCA: 174  Mid MCA: 171                          Mid MCA: 157  Distal MCA: 174                       Distal MCA: 112  Proximal ALCIDES: 137                     Proximal ALCIDES: 93.9  Mid ALCIDES: 124                          Mid ALCIDES: 159  PCA: 35.8                             PCA: 60.8  T ICA: 73.5                           T ICA: 60.1  Submandibular Approach                Submandibular Approach  D ICA: 26.2                           D ICA: 23.9  Transorbital Approach                 Transorbital Approach  Siphon: 20                            Siphon: 29.6  Transforamenal Approach               Transforamenal Approach  Vertebral: 15.8                       Vertebral: 33.1  Risk Factors History +---------+----------+-----------------------------------------------------+ ! Diagnosis! Date      ! Comments                                             ! +---------+----------+-----------------------------------------------------+ ! Other    !01/14/2022! ICP 12; hematocrit 34.6                              ! +---------+----------+-----------------------------------------------------+ ! Other    !01/09/2022! meningitis                                           ! +---------+----------+-----------------------------------------------------+   - The patient's risk factor(s) include: dyslipidemia and arterial     hypertension.   - The patient has skin cancer. Velocities are measured in cm/s ; Diameters are measured in cm Right Transcranial Duplex Measurements Right Transforamenal Approach +-----------+-------+-------+--------+-------+-------+--------+------------+ ! Location   ! PSV    ! EDV    ! Vmean   ! RI     ! PI     ! Depth   ! Direction   ! +-----------+-------+-------+--------+-------+-------+--------+------------+ ! Basilar    !92.8   !30.7   !51.4    !0.67   !1.01   !        !            ! +-----------+-------+-------+--------+-------+-------+--------+------------+ Left Transcranial Duplex Measurements Left Transforamenal Approach +-----------+-------+-------+--------+-------+-------+--------+------------+ ! Location   ! PSV    ! EDV    ! Vmean   ! RI     ! PI     ! Depth   ! Direction   ! +-----------+-------+-------+--------+-------+-------+--------+------------+ ! Basilar    !92.8   !30.7   !51.4    !0.67   !1.01   !        !            ! +-----------+-------+-------+--------+-------+-------+--------+------------+    VL TRANSCRANIAL DOPPLER COMPLETE    Result Date: 1/12/2022    [facility]  Vascular Transcranial Procedure   Patient Name Devin Bruno   Date of Study           01/11/2022               Lakisha Stapleton   Date of      1950  Gender                  Male  Birth   Age          70 year(s)  Race                       Room Number  4726        Height:                 70 inch, 177.8 cm   Corporate ID [id number]    Weight:                 207 pounds, 93.9 kg  #   Patient Acct [de-identified]   BSA:        2.12 m^2    BMI:         29.7 kg/m^2  #   MR #         [id number]     Madeline Johnson, T   Accession #  [id number]  Interpreting Physician  Felisha Gonzales   Referring                Referring Physician     Lauro Francois, DOMENIC  Nurse                                            Keyonna Real  Practitioner  Procedure Type of Study:   Cerebral: Transcranial.  Indications for Study:Stenosis of major intracranial artery. Patient Status: In Patient. Technical Quality:Limited visualization. Conclusions   Summary   Mild vasospasm noted in the right MCA with moderate spasm in the left MCA.    Signature   ----------------------------------------------------------------  Electronically signed by Roberto Segura RVT(Sonographer) on  01/11/2022 03:56 PM  ----------------------------------------------------------------   ---------------------------------------------------------------- Electronically signed by Lennice Mourning Reyes,Arthur(Interpreting  physician) on 01/12/2022 08:50 PM  ----------------------------------------------------------------  Findings:   Right Impression:                     Left Impression:  Right Lindegaard Ratio =4.28          Left Lindegaard Ratio =6.74  MEAN VELOCITIES:                      MEAN VELOCITIES:  Transtemporal Approach                Transtemporal Approach  Proximal MCA: 114                     Proximal MCA: 183  Mid MCA: 129                          Mid MCA: 189  Distal MCA: 97.0                      Distal MCA: 168  Proximal ALCIDES: 57.8                    Proximal ALCIDES: 40.8  Mid ALCIDES: 64.7                         Mid ALCIDES: 48.9  PCA: 46.6                             PCA: 19.6  T ICA: 36.6                           T ICA: 28.1  Submandibular Approach                Submandibular Approach  D ICA: 30.0                           D ICA: 28.1  Transorbital Approach                 Transorbital Approach  Siphon: 35.0                          Siphon:44.3  Vertebral: not obtained               Vertebral: not obtained   BASILAR: not obtained  Risk Factors History +---------+----------+-----------------------------------------------------+ ! Diagnosis! Date      ! Comments                                             ! +---------+----------+-----------------------------------------------------+ ! Other    !01/11/2022! ICP 1; hematocrit 31.6; /93                    ! +---------+----------+-----------------------------------------------------+   - The patient's risk factor(s) include: dyslipidemia and arterial     hypertension.   - The patient has skin cancer. CT LUMBAR SPINE TRAUMA RECONSTRUCTION    Result Date: 1/10/2022  EXAMINATION: CT OF THE LUMBAR SPINE WITHOUT CONTRAST  1/10/2022 TECHNIQUE: CT of the lumbar spine was performed without the administration of intravenous contrast. Multiplanar reformatted images are provided for review.  Adjustment of mA and/or kV according destructive lesion is seen. DEGENERATIVE CHANGES: Mild multilevel degenerative changes of the thoracic spine with osteophyte formation. No bony neural foraminal narrowing of the thoracic spine. SOFT TISSUES: No paraspinal mass is seen. Please see concurrently performed CT chest for additional findings peer     Mild degenerative changes of the thoracic spine without evidence of acute osseous abnormality. Please see concurrently performed CT chest and subsequently performed MRI thoracic spine for additional findings.          DISCHARGE INSTRUCTIONS     Discharge Medications:        Medication List      ASK your doctor about these medications    amLODIPine 5 MG tablet  Commonly known as: NORVASC  Take 1 tablet by mouth daily     atorvastatin 10 MG tablet  Commonly known as: LIPITOR  take 1 tablet by mouth once daily     benazepril 40 MG tablet  Commonly known as: LOTENSIN  take 1 tablet by mouth once daily     fenofibrate 160 MG tablet  Commonly known as: TRIGLIDE  Take 1 tablet by mouth daily     ferrous sulfate 325 (65 Fe) MG tablet  Commonly known as: IRON 325     hydroCHLOROthiazide 12.5 MG capsule  Commonly known as: MICROZIDE  Take 1 capsule by mouth daily     omeprazole 20 MG delayed release capsule  Commonly known as: PriLOSEC  Take 1 capsule by mouth daily          Diet: No diet orders on file diet as tolerated  Activity: as  tolerated  Follow-up:  Dc to hospice  Time Spent for discharge: 30 minutes    Etelvina Dewey DO  Neuro Critical Care  1/18/2022, 5:00 PM

## 2022-01-18 NOTE — FLOWSHEET NOTE
SPIRITUAL CARE DEPARTMENT - [address] 83  PROGRESS NOTE    Shift date: 1.17.2022  Shift day: Monday   Shift # 2    Room # 2508/5733-46   Name: Sanjeev Navarro            Age: 70 y.o. Gender: male          Temple: [address] of Congregational: unknown    Referral: Routine Visit    Admit Date & Time: 1/9/2022 11:26 PM    PATIENT/EVENT DESCRIPTION:  Sanjeev Navarro is a 70 y.o. male with a referral for       SPIRITUAL ASSESSMENT/INTERVENTION:   contacted legal who provided clarity regarding the situation with financial power of . SPIRITUAL CARE FOLLOW-UP PLAN:  Chaplains will remain available to offer spiritual and emotional support as needed.       Electronically signed by Jia Mustafa on 1/17/2022 at 8:19 PM.  [address]  [phone]       01/17/22 1738   Encounter Summary   Services provided to: Patient   Referral/Consult From: Other    Support System Children   Continue Visiting   ([phone])   Complexity of Encounter Low   Length of Encounter 30 minutes   Routine   Type Follow up   Assessment Unable to respond     Electronically signed by Feli Hernandez on 1/17/2022 at 8:19 PM

## 2022-01-20 LAB
CULTURE: ABNORMAL
CULTURE: ABNORMAL
DIRECT EXAM: ABNORMAL
DIRECT EXAM: ABNORMAL
Lab: ABNORMAL
SPECIMEN DESCRIPTION: ABNORMAL

## 2023-09-10 NOTE — DISCHARGE INSTR - COC
Continuity of Care Form    Patient Name: Armando Duckworth   :  1950  MRN:  [id number]    Admit date:  2022  Discharge date:  ***    Code Status Order: DNR-CC   Advance Directives:      Admitting Physician:  Marylin Holman MD  PCP: Edith Lee MD    Discharging Nurse: [facility] Unit/Room#: 0895/1915-44  Discharging Unit Phone Number: ***    Emergency Contact:   Extended Emergency Contact Information  Primary Emergency Contact: Estefany [address] Phone: [phone]  Mobile Phone: [phone]  Relation: Other    Past Surgical History:  Past Surgical History:   Procedure Laterality Date    COLONOSCOPY      internal hemorrhoids    COLONOSCOPY  2015    polypx1, repeat 5yrs due to family hx & hx polyps- brannon    COLONOSCOPY  2015    polyp X2  int. Hemorrhoids  partial prolapse of ileocecal valve    COLONOSCOPY  2016    COLONOSCOPY N/A 2020    COLONOSCOPY POLYPECTOMY SNARE/HOT BIOPSY performed by Jeison Flores DO at [facility]    to correct flat arches (age 9)    PRE-MALIGNANT / BENIGN SKIN LESION EXCISION Right 10/15/2021    v-EXCISION Lesion Right cheek, Right nose, scalp, nasal tip performed by Osvaldo Morton MD at [facility] Bilateral 2012    benign    PROSTATE BIOPSY Bilateral 2014    benign    PROSTATECTOMY  12/10/2019    LAPAROSCOPIC ROBOTIC SIMPLE PROSTATECTOMY     PROSTATECTOMY N/A 12/10/2019    XI LAPAROSCOPIC ROBOTIC SIMPLE PROSTATECTOMY performed by Dinah Lopez MD at [address]  12/8/15    Normal upper GI tract, no evidence of blood in upper GI tract, mild distal esophagitis    UPPER GASTROINTESTINAL ENDOSCOPY  2016       Immunization History:   Immunization History   Administered Date(s) Administered    COVID-19, Moderna, Booster, PF, 50mcg/0.25ml 11/10/2021    COVID-19, Caroline Bora, Primary or Immunocompromised, PF, 100mcg/0.5mL 02/15/2021, 03/15/2021    DT (pediatric) 09/19/1997, 09/28/2007    Pneumococcal Conjugate 13-valent Vaibhav Ly) 09/14/2015, 03/14/2017    Pneumococcal Polysaccharide (Zjluumdtx14) 09/28/2007, 03/24/2020    Tdap (Boostrix, Adacel) 03/24/2020       Active Problems:  Patient Active Problem List   Diagnosis Code    Hypertension I10    Hyperlipidemia E78.5    BPH with elevated PSA N40.0, R97.20    History of chronic fatigue syndrome Z87.898    GI bleed K92.2    Symptomatic anemia D64.9    Iron deficiency anemia D50.9    Gastroesophageal reflux disease without esophagitis K21.9    Gastritis without bleeding K29.70    Vitamin B12 deficiency E53.8    BPH with obstruction/lower urinary tract symptoms N40.1, N13.8    Neoplasm of uncertain behavior of skin D48.5    Basal cell carcinoma (BCC) of left cheek C44.319    Basal cell carcinoma of right nasal sidewall C44.311    Basal cell carcinoma of right cheek C44.319    Basal cell carcinoma of nasal tip C44.311    Basal cell carcinoma of scalp C44.41    JOZEF (acute kidney injury) ([facility] [address] N17.9    Acute respiratory failure (HCC) J96.00    Rhabdomyolysis M62.82    Altered mental status R41.82    Septicemia (HCC) A41.9    Closed fracture of multiple ribs of left side S22.42XA    Abrasion of left cornea S05. 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    Intracranial vascular stenosis I67.9    Cerebritis G04.90    Cerebral ischemic stroke due to global hypoperfusion with watershed infarct [facility]) I63.9       Isolation/Infection:   Isolation            No Isolation          Patient Infection Status       Infection Onset Added Last Indicated Last Indicated By Review Planned Expiration Resolved Resolved By    None active    Resolved    COVID-19 (Rule Out) 01/10/22 01/10/22 01/10/22 COVID-19, Rapid (Ordered)   01/10/22 Rule-Out Test Resulted    COVID-19 (Rule Out) MGTIVW}  Last Modified Barium Swallow with Video (Video Swallowing Test): {Done Not Done WPZL:030722919}    Treatments at the Time of Hospital Discharge:   Respiratory Treatments: ***  Oxygen Therapy:  {Therapy; copd oxygen:45639}  Ventilator:    { CC Vent ECXV:690323717}    Rehab Therapies: {THERAPEUTIC INTERVENTION:7312407514}  Weight Bearing Status/Restrictions: { CC Weight Bearin}  Other Medical Equipment (for information only, NOT a DME order):  {EQUIPMENT:580096757}  Other Treatments: ***    Patient's personal belongings (please select all that are sent with patient):  {[facility] DME Belongings:630905466}    RN SIGNATURE:  {Esignature:164723715}    CASE MANAGEMENT/SOCIAL WORK SECTION    Inpatient Status Date: 1/10/22    Readmission Risk Assessment Score:  Readmission Risk              Risk of Unplanned Readmission:  16           Discharging to Facility/ [facility] [address]  [address] [facility]  [address], [place], [address]      / signature: Electronically signed by Willy Preston RN on 22 at 5:06 PM EST    PHYSICIAN SECTION    Prognosis: {Prognosis:2091437919}    Condition at Discharge: [facility] Patient Condition:692802215}    Rehab Potential (if transferring to Rehab): {Prognosis:8583955117}    Recommended Labs or Other Treatments After Discharge: ***    Physician Certification: I certify the above information and transfer of Marcelo Wellington  is necessary for the continuing treatment of the diagnosis listed and that he requires {Admit to Appropriate Level of Care:55460} for {GREATER/LESS:402372202} 30 days.      Update Admission H&P: {CHP DME Changes in DFTQL:338604299}    PHYSICIAN SIGNATURE:  {Esignature:612208082} [FreeTextEntry1] : 71-year-old gentleman with above medical history active medical problems as noted below\par  1.  PVCs bifascicular block.  Pathophysiology reviewed.  Possibility of subclinical cardiovascular disease needs to be ruled out in presence of PVCs and conduction system abnormality with CAD risk factors in the form of age, gender, extensive history of cigar smoking.\par  At present recommended\par  Echocardiogram for LV ejection fraction wall motion\par  Stress myocardial perfusion scan to assess evaluate and rule out any significant obstructive CAD, exercise related blood pressure heart rate change and rule out any exercise-induced arrhythmias.\par  Event monitor\par  Risk benefits alternatives of above evaluation reviewed.\par  Technetium 99 radioisotope use was reviewed.  Risks, benefits, alternatives of use of radioisotope was discussed at length.  Patient verbalized understanding and agreed with the management plan.\par  Based on about test we will discuss further regarding whether he would benefit from further evaluation with coronary calcium score or coronary CTA also whether to consider statin therapy/aspirin.\par  I have also discussed progressive worsening of conduction system can lead to need for device management.\par  High risk symptoms to notify us which includes chest pain, exertional dyspnea, dizziness near syncopal or syncopal event he will call 911 and go to nearest emergency room.\par  2.  Extensive history of cigarette smoking.  Age gender.  Dyslipidemia.  Recommended carotid Doppler study in presence of decreased carotid upstroke to rule out subclinical carotid atherosclerotic vascular disease.\par  Abdominal aortic ultrasound to rule out abdominal aortic aneurysm.\par  3.  Dyslipidemia.  Elevated triglycerides.  Obtain full lipid panel along with CMP.  Based on above evaluation will discuss further whether he would benefit from statin therapy for primary prevention of ASCVD related events and associated morbidity mortality.\par    4 cigar smoking.  Smoking cessation. Counseling done. Relationship with ASCVD, and associated morbidity, mortality was reviewed. Options available discussed.\par  \par  Counseling regarding low saturated fat, salt and carbohydrate intake was reviewed. Active lifestyle and regular. Exercise along with weight management is advised.\par  All the above were at length reviewed. Answered all the questions. Thank you very much for this kind referral. Please do not hesitate to give me a call for any question.\par  Part of this transcription was done with voice recognition software and phonetically similar errors are common. I apologize for that. Please do not hesitate to call for any questions due to above.\par  \par  Sincerely,\par  Sundar Thompson MD,FACC,FASE\par

## 2024-05-31 NOTE — PROGRESS NOTES
Well controlled on anoro ellipta. Rarely needs use of rescue inhaler. Will continue.    966, , WBC 53659. Meningitis panel negative. Acyclovir discontinued. Continued on Rocephin, Vancomycin and Ampicillin. Received 4 doses of IV Decadron. MRI Brain with/without contrast showed known debris in the ventricular system with enhancement of the ependymal surface of the ventricular system and leptomeningeal enhancement. MRI Cervical spine with no new findings.        1/12:   EVD set at [facility], ICP 3-11, 20cc out/24h. ID discontinued antibiotics overnight. Discussed case this AM; started on Rocephin 2g Q12h. Neurosurgery following and concerned about subarachnoid purulence in cisterns and cervical space. Dr. Davion Lamb considering intraoperative subarachnoid culture. Awaiting MRI Thoracic and Lumbar spine. Will consider CT facial bone with contrast.  Repeat blood culture and respiratory culture ordered. Neuro Endovascular reviewed CTA findings (concern for vasculitis) and recommended TCD which showed elevated mean velocities in the left MCA (189 max, LR 6.74). Recommended Verapamil, started on 80mg Q8h. Clinical exam remains poor. Fentanyl infusion changed to PRN dosing, will use Precedex if needs more sedation (becomes hypertensive, tachypneic intermittently).    1/12: SBP >180 ON, PRN hydralazine orderedResp cx Gram + cocci, 1/2 + blood cultures likely staph. Straight cath x2 -> amaya. In the am, pt is obtunded, not following commands. Pupils 2mm slugglishly reactive. Spontaneous movement in the left upper extremity, flicker movement in the b/l lower extremities. CT maxillofacial w contrast ordered to asses if abcesss present. NS switched to LR 80ml/hr. CSF cell count WBC decreasing 75772 -> 61600. Plan to continue with recophin for now. Family updated about management plan, mri finding and ltme.      1/13: Overnight pt was stable. More spontaneous eye opening.s ICPs in 2-8. Output 19 in the last 12 hours. TB test in process. Hyponatremic. Free water bolus started.  Tube feeds ordered. LTME DC. Plan to send csf sample for infectious workup. A line has been removed. Follow TCD doppler tomorrow. VL lower extremity duplex scan ordered. 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 CSF is positive for viridans streptococcus. Plan to continue with ceftriaxone. CT maxillofacial unremarkable for abscess or sinusitis. : Pt exam has improved a little. He opens eyes more frequently. Had few eye blinks. Withdraw to pain in all four extremities. EVD clamped only for CSF sample. Residual 400s. Xray kub concerning for ileus. Bowel regimens increased to colace, glycolax, simethicone. Free water bolus increased to 250 every 4 hours.  MRI brain wo contrast eval for acute stroke in setting of vascultitis with narrowing intracranial vasculature and elevated TCD.       CURRENT MEDICATIONS:  SCHEDULED MEDICATIONS:   senna  5 mL Oral Nightly    docusate  100 mg Oral Daily    polyethylene glycol  17 g Oral Daily    enoxaparin  40 mg SubCUTAneous Daily    cefTRIAXone (ROCEPHIN) IV  2,000 mg IntraVENous Q12H    verapamil  80 mg Per NG tube 3 times per day    insulin lispro  0-12 Units SubCUTAneous Q6H    erythromycin   Left Eye BID    atorvastatin  10 mg Per NG tube Nightly    sodium chloride flush  5-40 mL IntraVENous 2 times per day    pantoprazole  40 mg IntraVENous Daily    And    sodium chloride (PF)  10 mL IntraVENous Daily    folic acid IVPB  1 mg IntraVENous Daily    Vitamin D  1,000 Units Oral Daily    chlorhexidine  15 mL Mouth/Throat BID     CONTINUOUS INFUSIONS:   lactated ringers 80 mL/hr at 22 2352    dextrose      sodium chloride       PRN MEDICATIONS:   simethicone, glucose, dextrose, glucagon (rDNA), dextrose, fentanNYL, labetalol, sodium chloride flush, sodium chloride flush, sodium chloride, ondansetron **OR** ondansetron, acetaminophen **OR** acetaminophen, sodium chloride flush    VITALS:  Temperature Range: Temp: 98.1 °F (36.7 °C) Temp  Av.1 °F (36.7 °C)  Min: 97.9 °F (36.6 °C)  Max: 98.2 °F (36.8 °C)  BP Range: Systolic (58LVP), JDX:728 , Min:140 , ICH:338     Diastolic (75SRT), SVW:92, Min:74, Max:103    Pulse Range: Pulse  Av.5  Min: 56  Max: 79  Respiration Range: Resp  Av.5  Min: 13  Max: 35  Current Pulse Ox: SpO2: 100 %  24HR Pulse Ox Range: SpO2  Av.9 %  Min: 99 %  Max: 100 %  Patient Vitals for the past 12 hrs:   BP Temp Temp src Pulse Resp SpO2   22 1400 (!) 183/103   78 21    22 1320 (!) 180/102   76 29    22 1300 (!) 185/98   74 30    22 1223    69 21 100 %   22 1200 (!) 189/87 98.1 °F (36.7 °C)  75 23    22 1100 (!) 177/92   63 14    22 1000 (!) 165/86   64 (!) 35    22 0900 (!) 161/83   69 24    22 0836    66 25 99 %   22 0800 (!) 165/86 98.2 °F (36.8 °C) CORE 74 20    22 0700 (!) 148/92   75 24    22 0600 (!) 161/87   72 27    22 0500 (!) 168/89   69 16 100 %   22 0400 (!) 163/92 97.9 °F (36.6 °C)  61 13 100 %   22 0348    67 18 100 %     Estimated body mass index is 28.7 kg/m² as calculated from the following:    Height as of this encounter: 5' 10\" (1.778 m).     Weight as of an earlier encounter on 22: 200 lb (90.7 kg).  []<16 Severe malnutrition  []1616.99 Moderate malnutrition  []1718.49 Mild malnutrition  []18.524.9 Normal  [x]2529.9 Overweight (not obese)  []3034.9 Obese class 1 (Low Risk)  []3539.9 Obese class 2 (Moderate Risk)  []?40 Obese class 3 (High Risk)    RECENT LABS:   Lab Results   Component Value Date    WBC 13.1 (H) 2022    HGB 10.4 (L) 2022    HCT 34.6 (L) 2022     2022    CHOL 127 03/15/2021    TRIG 123 2022    HDL 27 (L) 03/15/2021    LDLCHOLESTEROL 66 03/15/2021    ALT 32 2022    AST 48 (H) 2022     (H) 2022    K 4.6 2022     (H) 2022    CREATININE 0.84 2022    BUN 42 (H) 2022    CO2 22 2022 TSH 0.50 01/10/2022    PSA 0.95 04/28/2021    INR 1.1 01/10/2022    LABA1C 6.7 (H) 01/11/2022     24 HOUR INTAKE/OUTPUT:    Intake/Output Summary (Last 24 hours) at 1/14/2022 1510  Last data filed at 1/14/2022 1400  Gross per 24 hour   Intake 9236.5 ml   Output 1920 ml   Net 7316.5 ml       IMAGING:     Place summary of recent imaging here. Labs and Images reviewed with:  [] Dr. Johan Joe    [x] Dr. Rehan Lockhart  [] Dr. Elly Mejia  [] There are no new interval images to review. PHYSICAL EXAM       CONSTITUTIONAL:  Intubated. Sponatenous eye opening with few eye blinks. Doesnot follow commands. HEAD:  normocephalic, right facial pressure wounds   EYES:  PERRL, upward gaze   ENT:  moist mucous membranes   NECK:  supple, symmetric   LUNGS:  Equal air entry bilaterally, clear   CARDIOVASCULAR:  normal s1 / s2, RRR, distal pulses intact   ABDOMEN:  Soft, no rigidity   NEUROLOGIC:  Mental Status:  Intubated. Sponatenous eye opening with occasional weak eye blinks. Doesnot follow commands. Cranial Nerves:    II: Visual fields:  abnormal - blink to threat  III: Pupils:  equal, round, reactive to light  III,IV,VI: Extra Ocular Movements: abnormal - not tracking  V: Corneal reflex:  completed. abnormal - weak on right, absent left  VII: Facial strength: intact  Weak cough/gag     Motor Exam:    Patient has withdrawal movement in all four extremities. No antigravity movement. DRAINS:  [] There are no drains for Neuro Critical Care to monitor at this time. ASSESSMENT AND PLAN:       The patient is 69 yo male with a history of hypertension who presented to [facility] ED as a transfer from [facility] ED for ventricular debris. Initially presented to [facility] ED with altered mentation after being found down. CT Head showed debris within bilateral ventricles secondary to subacute hemorrhage vs infectious process.   Noted to have right middle lobe opacification concerning for cavitary mass lesion. Labs revealed elevated CK, JOZEF, leukocytosis. Transferred for Neurosurgical evaluation. Neurosurgery consulted. EVD placed urgently 1/10. CSF studies concerning for bacterial meningitis. MRI Brain re-demonstrated debris in the ventricular system and showed enhancement of the ependymal surface of the ventricular system and leptomeningeal enhancement. ID consulted; recommending CNS dosed Rocephin. Neuro Endovasuclar consulted due to concerns for vasculitis on CTA Head/Neck and recommended TCD which showed elevated L MCA velocities. Patient was started on Verapamil. CT maxillofacial ordered. Follow Csf culture. Csf culture 1/10 alpha hemolytic strep. On ceftraixone. EVD clamped. Free water flushes. NEUROLOGIC:  - Acute debris within the ventricular system with enhancement of the ependymal surface of the ventricular system and leptomeningeal enhancement  - Concern for bacterial meningitis/ventriculitis. Csf culture 1/10 positive for alpha hemolytic strep. CSF culture 1/10 reported today growing viridians streptoccocus.   - EVD clamped on 1/14  - CSF studies; CSF studies; Glucose <2, Protein 966, >1600>28, WBC 27756>03935>52859, meningitis panel negative, CSF culture viridians streptoccocus. CSF protein 312.4. Glucose <4.   - Neurosurgery following; considering intraop subarachnoid culture. No plan for intervention  - ID recommending Rocephin 2g Q12h. -NSG:EVD clamped. - CTA Head/Neck showed beading of the bilateral M1 MCAs and diminutive anterior and posterior cerebral arteries. MRI brain wo contrast pending: eval for acute stroke in setting of vascultitis with narrowing intracranial vasculature and elevated TCD. - Neuro Endovascular consulted; sWill consider DSA if exam remain poor. Consider CTA in 7-10 days   - Started on Verapamil 80mg Q8h per Endovascular recs  -F/U LTME: Moderate-severe non specific encephalopathy.  The presence of R temporal LPDs increases patient risk for focal seizures, can also be seen in CNS-itis. LTME dc/  - Goal -180, avoid hypotension  - PRN Fentanyl for agitation/sedation, OK to use Precedex if needed  - Neuro checks per protocol     CARDIOVASCULAR:    BP Range: Systolic (33PKN), PFJ:760 , Min:140 , RBD:197     Diastolic (65QZI), FCM:96, Min:74, Max:103    Pulse Range: Pulse  Av.5  Min: 56  Max: 79  - Goal -180  - PRN Labetalol  - Troponin 17, EKG sinus tachycardia  - Echo EF 54%  - Continue home Lipitor for hyperlipidemia  - Continue telemetry    PULMONARY:    Respiration Range: Resp  Av.5  Min: 13  Max: 35  Current Pulse Ox: SpO2: 100 %    - Intubated in ED for acute hypoxic respiratory failure  - Vent Settings: PRVC 30/16/580/5  - Daily ABG : 7.42/32.7/118.5/24.7  - Right lung opacification possible cavitary lesion .   - CXR  showed stable nodular opacity in the right lower lung.  - Pulmonology following; : continue medical management. - ID ordered TB quantiferon in process. RENAL/FLUID/ELECTROLYTE:    Lab Results   Component Value Date     2022    K 4.6 2022     2022    CO2 22 2022    BUN 42 2022    CREATININE 0.84 2022    GLUCOSE 188 2022    CALCIUM 8.5 2022          I/O last 3 completed shifts: In: 5318.7 [I.V.:4307.7; NG/GT:911; IV Piggyback:100]  Out: 5885 [Urine:2420; Drains:22]  I/O this shift:  In: 8126 [I.V.:6255; NG/GT:176]  Out: 0 [Urine:1050]    - JOZEF resolved  - BUN 34>29/ Creatinine 0.84  - Monitor I&O; 3056/1079  - Hypernatremia.  Water flushes 250ml Q4.  - IVF: Total fluids 80cc/hr  - CK and lactic normalized  - Give 2g Calcium gluconate   - Replace electrolytes PRN  - Daily BMP    GI/NUTRITION:  NUTRITION:  Diet NPO Exceptions are: Sips of Water with Meds  ADULT TUBE FEEDING; Nasogastric; Peptide Based; Continuous; 25; Yes; 20; Q 4 hours; 75; 30; Q 4 hours  - Start tube feeds via OG  - Bowel regimen: Senokot-S daily, semithicone and

## (undated) DEVICE — Z DISCONTINUED GLOVE SURG SZ 7.5 L12IN FNGR THK13MIL WHT ISOLEX

## (undated) DEVICE — COVER LT HNDL BLU PLAS

## (undated) DEVICE — SUTURE VCRL SZ 1 L18IN ABSRB VLT CT-1 L36MM 1/2 CIR J741D

## (undated) DEVICE — RESERVOIR,SUCTION,100CC,SILICONE: Brand: MEDLINE

## (undated) DEVICE — SOLUTION SCRB 4OZ 7.5% POVIDONE IOD FLIP TOP CAP

## (undated) DEVICE — SUTURE DEV SZ 2-0 WND CLSR ABSRB GS-22 VLOC COVIDIEN VLOCM2145

## (undated) DEVICE — MERCY DEFIANCE ENDO KIT: Brand: MEDLINE INDUSTRIES, INC.

## (undated) DEVICE — CATHETER URETH 22FR BLLN 30CC 3 W F SPEC INF CTRL BARDX

## (undated) DEVICE — CANNULA SEAL

## (undated) DEVICE — PROTECTOR ULN NRV PUR FOAM HK LOOP STRP ANATOMICALLY

## (undated) DEVICE — CLIP INT XL YEL POLYMER HEM-O-LOK WECK

## (undated) DEVICE — GAUZE,SPONGE,4"X4",16PLY,XRAY,STRL,LF: Brand: MEDLINE

## (undated) DEVICE — AIRSEAL 12 MM ACCESS PORT AND PALM GRIP OBTURATOR WITH BLADELESS OPTICAL TIP, 120 MM LENGTH: Brand: AIRSEAL

## (undated) DEVICE — Z DISCONTINUED USE 2624853 GLOVE SURG SZ 75 L12IN THK91MIL BRN LTX FREE

## (undated) DEVICE — ELECTRO LUBE IS A SINGLE PATIENT USE DEVICE THAT IS INTENDED TO BE USED ON ELECTROSURGICAL ELECTRODES TO REDUCE STICKING.: Brand: KEY SURGICAL ELECTRO LUBE

## (undated) DEVICE — CONTAINER,SPECIMEN,4OZ,OR STRL: Brand: MEDLINE

## (undated) DEVICE — TRAP SURG QUAD PARABOLA SLOT DSGN SFTY SCRN TRAPEASE

## (undated) DEVICE — SOLUTION ANTIFOG VIS SYS CLEARIFY LAPSCP

## (undated) DEVICE — SHEET DRAPE FULL 70X100

## (undated) DEVICE — 9165 UNIVERSAL PATIENT PLATE: Brand: 3M™

## (undated) DEVICE — SHIELD SOAK PRE-KLENZ 6ML

## (undated) DEVICE — TOWEL,OR,DSP,ST,BLUE,STD,4/PK,20PK/CS: Brand: MEDLINE

## (undated) DEVICE — POSITIONER,HEAD,MULTIRING,36CS: Brand: MEDLINE

## (undated) DEVICE — GARMENT COMPR STD FOR 17IN CALF UNIF THER FLOTRN

## (undated) DEVICE — 3M™ STERI-STRIP™ REINFORCED ADHESIVE SKIN CLOSURES, R1547, 1/2 IN X 4 IN (12 MM X 100 MM), 6 STRIPS/ENVELOPE: Brand: 3M™ STERI-STRIP™

## (undated) DEVICE — SUTURE V-LOC 180 SZ 0 L9IN ABSRB GRN GS-21 L37MM 1/2 CIR VLOCL0346

## (undated) DEVICE — BLADELESS OBTURATOR: Brand: WECK VISTA

## (undated) DEVICE — Device

## (undated) DEVICE — APPLICATOR SURG XL L38CM FOR ARISTA ABSRB HEMSTAT FLEXITIP

## (undated) DEVICE — BASIN SET: Brand: MEDLINE INDUSTRIES, INC.

## (undated) DEVICE — DRAIN,WOUND,15FR,3/16,FULL-FLUTED: Brand: MEDLINE

## (undated) DEVICE — 1200CC GUARDIAN II: Brand: GUARDIAN

## (undated) DEVICE — Z INACTIVE USE 2641839 CLIP INT M L POLYMER LOK LIG HEM O LOK

## (undated) DEVICE — FORCEPS BX L240CM JAW DIA2.2MM RAD JAW 4 HOT DISP

## (undated) DEVICE — SUTURE VCRL SZ 4-0 L18IN ABSRB UD L13MM P-3 3/8 CIR PRIM J494H

## (undated) DEVICE — Y-TYPE TUR/BLADDER IRRIGATION SET, REGULATING CLAMP

## (undated) DEVICE — SUTURE MCRYL + SZ 4 0 L18IN ABSRB UD PC 3 L16MM 3 8 CIR PRIM MCP845G

## (undated) DEVICE — SNARE ENDOSCP L240CM SHTH DIA2.4MM LOOP W20MM MIN WRK CHN

## (undated) DEVICE — LINE SAMP O2 6.5FT W/FEMALE CONN F/ADULT CAPNOLINE PLUS

## (undated) DEVICE — SUTURE PROL SZ 3-0 L18IN NONABSORBABLE BLU L24MM FS-1 3/8 8684G

## (undated) DEVICE — SUTURE ABSORBABLE BRAIDED 4-0 P-3 18 IN UD VICRYL J494G

## (undated) DEVICE — 40580 - THE PINK PAD - ADVANCED TRENDELENBURG POSITIONING KIT: Brand: 40580 - THE PINK PAD - ADVANCED TRENDELENBURG POSITIONING KIT

## (undated) DEVICE — METER,URINE,400ML,DRAIN BAG,L/F,LL: Brand: MEDLINE

## (undated) DEVICE — ARM DRAPE

## (undated) DEVICE — CONNECTOR TBNG AUX H2O JET DISP FOR OLY 160/180 SER

## (undated) DEVICE — TRI-LUMEN FILTERED TUBE SET WITH ACTIVATED CHARCOAL FILTER: Brand: AIRSEAL

## (undated) DEVICE — SUTURE PROL SZ 4-0 L18IN NONABSORBABLE BLU L16MM PC-3 3/8 8634G

## (undated) DEVICE — SOLUTION IV IRRIG POUR BRL 0.9% SODIUM CHL 2F7124

## (undated) DEVICE — PACK SURG PROC REMINDER N WVN DISPOSABLE BEAC TIME OUT

## (undated) DEVICE — MARKER W/PRE PRINTED CUSTOM LABEL

## (undated) DEVICE — 60 ML SYRINGE REGULAR TIP: Brand: MONOJECT

## (undated) DEVICE — TIP COVER ACCESSORY

## (undated) DEVICE — FORCEPS BX L240CM JAW DIA2.4MM ORNG L CAP W/ NDL DISP RAD

## (undated) DEVICE — STRAP,CATHETER,ELASTIC,HOOK&LOOP: Brand: MEDLINE

## (undated) DEVICE — AGENT HEMSTAT 5GM ARISTA AH